# Patient Record
Sex: FEMALE | Race: WHITE | NOT HISPANIC OR LATINO
[De-identification: names, ages, dates, MRNs, and addresses within clinical notes are randomized per-mention and may not be internally consistent; named-entity substitution may affect disease eponyms.]

---

## 2018-07-05 ENCOUNTER — APPOINTMENT (OUTPATIENT)
Dept: INTERNAL MEDICINE | Facility: CLINIC | Age: 68
End: 2018-07-05

## 2018-07-05 VITALS
TEMPERATURE: 97.7 F | BODY MASS INDEX: 34.41 KG/M2 | WEIGHT: 187 LBS | DIASTOLIC BLOOD PRESSURE: 88 MMHG | HEART RATE: 95 BPM | OXYGEN SATURATION: 99 % | HEIGHT: 62 IN | SYSTOLIC BLOOD PRESSURE: 129 MMHG

## 2018-07-05 DIAGNOSIS — Z83.3 FAMILY HISTORY OF DIABETES MELLITUS: ICD-10-CM

## 2018-07-05 NOTE — HISTORY OF PRESENT ILLNESS
[FreeTextEntry1] : dyspnea on exertion [de-identified] : pt with chronic sob up 1 flight of stairs or up hills going on for few yrs. Pt able to go on elliptical machine for 45 mins without problems. pt had evaluation by me 3 yrs ago with normal pft and cxr. sob is slightly worse now than 3 yrs ago. denies cough or wheezing. Pt had normal echo and treadmill stress test recently.

## 2018-07-05 NOTE — ASSESSMENT
[FreeTextEntry1] : the etiology of dyspnea is unclear. I will repeat pft and cxr.We may be dealing with dyspnea due to simple obesity. I doubt we will find significant pulm pathology.

## 2018-07-06 ENCOUNTER — TRANSCRIPTION ENCOUNTER (OUTPATIENT)
Age: 68
End: 2018-07-06

## 2018-10-31 ENCOUNTER — RECORD ABSTRACTING (OUTPATIENT)
Age: 68
End: 2018-10-31

## 2018-11-16 ENCOUNTER — RESULT REVIEW (OUTPATIENT)
Age: 68
End: 2018-11-16

## 2018-11-19 ENCOUNTER — APPOINTMENT (OUTPATIENT)
Dept: BREAST CENTER | Facility: CLINIC | Age: 68
End: 2018-11-19
Payer: MEDICARE

## 2018-11-19 PROCEDURE — 99203 OFFICE O/P NEW LOW 30 MIN: CPT

## 2018-11-21 ENCOUNTER — RESULT REVIEW (OUTPATIENT)
Age: 68
End: 2018-11-21

## 2018-11-26 ENCOUNTER — APPOINTMENT (OUTPATIENT)
Dept: OBGYN | Facility: CLINIC | Age: 68
End: 2018-11-26
Payer: MEDICARE

## 2018-11-26 VITALS
BODY MASS INDEX: 33.49 KG/M2 | WEIGHT: 189 LBS | DIASTOLIC BLOOD PRESSURE: 82 MMHG | SYSTOLIC BLOOD PRESSURE: 124 MMHG | HEIGHT: 63 IN

## 2018-11-26 DIAGNOSIS — Z00.00 ENCOUNTER FOR GENERAL ADULT MEDICAL EXAMINATION W/OUT ABNORMAL FINDINGS: ICD-10-CM

## 2018-11-26 PROCEDURE — 99214 OFFICE O/P EST MOD 30 MIN: CPT

## 2018-11-27 PROBLEM — Z00.00 ENCOUNTER FOR PREVENTIVE HEALTH EXAMINATION: Status: ACTIVE | Noted: 2018-06-29

## 2018-11-28 ENCOUNTER — APPOINTMENT (OUTPATIENT)
Dept: RADIATION ONCOLOGY | Facility: CLINIC | Age: 68
End: 2018-11-28
Payer: MEDICARE

## 2018-11-28 VITALS
TEMPERATURE: 98.1 F | OXYGEN SATURATION: 98 % | DIASTOLIC BLOOD PRESSURE: 78 MMHG | RESPIRATION RATE: 18 BRPM | BODY MASS INDEX: 33.31 KG/M2 | HEART RATE: 98 BPM | HEIGHT: 63 IN | SYSTOLIC BLOOD PRESSURE: 125 MMHG | WEIGHT: 188 LBS

## 2018-11-28 PROCEDURE — 99205 OFFICE O/P NEW HI 60 MIN: CPT | Mod: 25

## 2018-11-28 NOTE — OB/GYN HISTORY
[Definite:  ___ (Date)] : the last menstrual period was [unfilled] [History of Birth Control Pills] : Patient has a history of taking birth control pills [___] : Living: [unfilled] [History of Hormone Replacement Therapy] : no history of hormone replacement therapy

## 2018-11-28 NOTE — REVIEW OF SYSTEMS
[Patient Intake Form Reviewed] : Patient intake form was reviewed [Fatigue] : fatigue [Loss of Hearing] : loss of hearing [SOB on Exertion] : shortness of breath during exertion [Joint Pain] : joint pain [Negative] : Endocrine [Fever] : no fever [Chills] : no chills [Recent Change In Weight] : ~T no recent weight change [Abdominal Pain] : no abdominal pain [Vomiting] : no vomiting [Constipation] : no constipation [Diarrhea] : no diarrhea [FreeTextEntry4] : left ear [FreeTextEntry5] : irregular heartbeat [FreeTextEntry7] : indigestion [FreeTextEntry9] : k [de-identified] : blood dyscrasia

## 2018-11-28 NOTE — HISTORY OF PRESENT ILLNESS
[FreeTextEntry1] : Mrs. Mcodwell is a 68 year old  postmenopausal female of Ashkenazi descent, who was recently diagnosed left breast invasive ductal carcinoma, referred to our office for a consultation regarding radiation therapy options. \par \par On 11/2/18, she had a screening mammogram that demonstrated a left focal asymmetric density in the upper outer left breast around the 1:00 axis, 8 cm from nipple. On the same day, a breast ultrasound revealed no suspicious sonographic abnormalities. A second look left  breast ultrasound (11/16/18) revealed a 1 cm left 1:00 N6 mass highly suspicious for malignancy. Diagnostic left breast mammogram demonstrated a spiculated mass spanning about 1 cm in long axis in the upper outer quadrant of the left breast.\par \par Mrs. Mcdowell underwent a left breast U/S guided core biopsy on 11/21/18 and pathology revealed invasive ductal carcinoma, grade 1 SBR 5/9, 1 / 1 cores positive for invasive carcinoma, and minimal extent of invasive carcinoma is 7 mm. Biomarkers pending. Recent bilateral breast MRI (11/27/18) demonstrated the known malignancy in the left breast measuring 0.9 cm at the 12:00 axis, in the mid third region. No abnormalities seen in the right breast. There is no evidence of axillary lymphadenopathy. \par \par She has a significant family history of cancer, which includes mother who was diagnosed with breast cancer at the age of 59, several 1 st cousins with breast cancer as well, and father who was diagnosed with rectal ca at the age of 66. Genetic testing done in 2014 was negative for BRCA1 and 2. Further genetic testing panels were done recently and are pending.  Mrs. Mcdowell has a follow-up appointment with Dr. Demarco on Dec 10th to further discuss surgical management. She  presents  to our office today to discuss the role of radiation therapy, whole breast vs. IORT.

## 2018-11-28 NOTE — LETTER CLOSING
[Consult Closing:] : Thank you for allowing me to participate in the care of this patient.  If you have any questions, please do not hesitate to contact me. [Sincerely yours,] : Sincerely yours, [FreeTextEntry3] : Roxana Porter MD

## 2018-11-28 NOTE — VITALS
[Maximal Pain Intensity: 0/10] : 0/10 [Least Pain Intensity: 0/10] : 0/10 [100: Normal, no complaints, no evidence of disease.] : 100: Normal, no complaints, no evidence of disease. [Date: ____________] : Patient's last distress assessment performed on [unfilled]. [8 - Distress Level] : Distress Level: 8 [Referred Patient  to social work for follow-up] : Patient was referred to social work for follow-up [Patient given social work contact information and resource sheet] : Patient was given social work contact information and resource sheet

## 2018-11-28 NOTE — PHYSICAL EXAM
[Normal] : normal scars, no masses, no nipple discharge [de-identified] : scar to posterior right flank s/p heminephrectomy in 1976 Statement Selected

## 2018-12-03 ENCOUNTER — NON-APPOINTMENT (OUTPATIENT)
Age: 68
End: 2018-12-03

## 2018-12-03 ENCOUNTER — RESULT REVIEW (OUTPATIENT)
Age: 68
End: 2018-12-03

## 2018-12-03 ENCOUNTER — APPOINTMENT (OUTPATIENT)
Dept: GERIATRICS | Facility: CLINIC | Age: 68
End: 2018-12-03
Payer: MEDICARE

## 2018-12-03 VITALS
SYSTOLIC BLOOD PRESSURE: 128 MMHG | OXYGEN SATURATION: 96 % | TEMPERATURE: 97.9 F | WEIGHT: 191 LBS | DIASTOLIC BLOOD PRESSURE: 80 MMHG | BODY MASS INDEX: 33.83 KG/M2 | HEART RATE: 105 BPM

## 2018-12-03 DIAGNOSIS — Z82.0 FAMILY HISTORY OF EPILEPSY AND OTHER DISEASES OF THE NERVOUS SYSTEM: ICD-10-CM

## 2018-12-03 DIAGNOSIS — Z80.0 FAMILY HISTORY OF MALIGNANT NEOPLASM OF DIGESTIVE ORGANS: ICD-10-CM

## 2018-12-03 PROCEDURE — 93010 ELECTROCARDIOGRAM REPORT: CPT

## 2018-12-03 PROCEDURE — 99204 OFFICE O/P NEW MOD 45 MIN: CPT | Mod: 25

## 2018-12-03 RX ORDER — RANITIDINE 300 MG/1
300 TABLET ORAL
Qty: 90 | Refills: 0 | Status: DISCONTINUED | COMMUNITY
Start: 2018-02-05 | End: 2018-12-03

## 2018-12-03 RX ORDER — PRAMIPEXOLE DIHYDROCHLORIDE 1.5 MG/1
1.5 TABLET, EXTENDED RELEASE ORAL ONCE
Refills: 0 | Status: DISCONTINUED | COMMUNITY
End: 2018-12-03

## 2018-12-03 RX ORDER — CARBIDOPA AND LEVODOPA 10; 100 MG/1; MG/1
10-100 TABLET ORAL 4 TIMES DAILY
Refills: 0 | Status: DISCONTINUED | COMMUNITY
End: 2018-12-03

## 2018-12-03 RX ORDER — ROPINIROLE 0.5 MG/1
0.5 TABLET, FILM COATED ORAL
Qty: 30 | Refills: 0 | Status: DISCONTINUED | COMMUNITY
Start: 2018-09-17 | End: 2018-12-03

## 2018-12-03 RX ORDER — GABAPENTIN 100 MG/1
100 CAPSULE ORAL
Qty: 30 | Refills: 0 | Status: DISCONTINUED | COMMUNITY
Start: 2018-10-03 | End: 2018-12-03

## 2018-12-03 RX ORDER — PREGABALIN 50 MG/1
50 CAPSULE ORAL
Qty: 60 | Refills: 0 | Status: DISCONTINUED | COMMUNITY
Start: 2018-11-06 | End: 2018-12-03

## 2018-12-03 RX ORDER — CARBIDOPA AND LEVODOPA 25; 100 MG/1; MG/1
25-100 TABLET, EXTENDED RELEASE ORAL 3 TIMES DAILY
Refills: 0 | Status: DISCONTINUED | COMMUNITY
End: 2018-12-03

## 2018-12-04 ENCOUNTER — APPOINTMENT (OUTPATIENT)
Dept: PLASTIC SURGERY | Facility: CLINIC | Age: 68
End: 2018-12-04
Payer: MEDICARE

## 2018-12-04 VITALS
OXYGEN SATURATION: 98 % | DIASTOLIC BLOOD PRESSURE: 80 MMHG | HEIGHT: 62 IN | RESPIRATION RATE: 18 BRPM | WEIGHT: 188 LBS | HEART RATE: 98 BPM | BODY MASS INDEX: 34.6 KG/M2 | SYSTOLIC BLOOD PRESSURE: 130 MMHG

## 2018-12-04 PROCEDURE — 99203 OFFICE O/P NEW LOW 30 MIN: CPT

## 2018-12-06 ENCOUNTER — TRANSCRIPTION ENCOUNTER (OUTPATIENT)
Age: 68
End: 2018-12-06

## 2018-12-12 ENCOUNTER — APPOINTMENT (OUTPATIENT)
Dept: BREAST CENTER | Facility: HOSPITAL | Age: 68
End: 2018-12-12
Payer: MEDICARE

## 2018-12-12 VITALS
RESPIRATION RATE: 18 BRPM | HEART RATE: 110 BPM | SYSTOLIC BLOOD PRESSURE: 129 MMHG | HEIGHT: 62 IN | DIASTOLIC BLOOD PRESSURE: 85 MMHG | OXYGEN SATURATION: 96 % | BODY MASS INDEX: 34.6 KG/M2 | WEIGHT: 188 LBS

## 2018-12-12 PROCEDURE — 99213 OFFICE O/P EST LOW 20 MIN: CPT

## 2018-12-13 ENCOUNTER — RESULT REVIEW (OUTPATIENT)
Age: 68
End: 2018-12-13

## 2018-12-19 ENCOUNTER — APPOINTMENT (OUTPATIENT)
Dept: BREAST CENTER | Facility: HOSPITAL | Age: 68
End: 2018-12-19

## 2018-12-19 ENCOUNTER — RESULT REVIEW (OUTPATIENT)
Age: 68
End: 2018-12-19

## 2018-12-26 ENCOUNTER — APPOINTMENT (OUTPATIENT)
Dept: BREAST CENTER | Facility: CLINIC | Age: 68
End: 2018-12-26
Payer: MEDICARE

## 2018-12-26 PROCEDURE — 99058 OFFICE EMERGENCY CARE: CPT

## 2018-12-28 ENCOUNTER — TRANSCRIPTION ENCOUNTER (OUTPATIENT)
Age: 68
End: 2018-12-28

## 2019-01-09 ENCOUNTER — APPOINTMENT (OUTPATIENT)
Dept: BREAST CENTER | Facility: CLINIC | Age: 69
End: 2019-01-09
Payer: MEDICARE

## 2019-01-09 ENCOUNTER — RX RENEWAL (OUTPATIENT)
Age: 69
End: 2019-01-09

## 2019-01-09 PROCEDURE — 99024 POSTOP FOLLOW-UP VISIT: CPT

## 2019-01-09 NOTE — PHYSICAL EXAM
[Normocephalic] : normocephalic [Atraumatic] : atraumatic [Supple] : supple [No Supraclavicular Adenopathy] : no supraclavicular adenopathy [Examined in the supine and seated position] : examined in the supine and seated position [No dominant masses] : no dominant masses in right breast  [No dominant masses] : no dominant masses left breast [No Nipple Retraction] : no left nipple retraction [No Nipple Discharge] : no left nipple discharge [No Axillary Lymphadenopathy] : no left axillary lymphadenopathy [No Edema] : no edema [No Rashes] : no rashes [No Ulceration] : no ulceration

## 2019-01-28 ENCOUNTER — APPOINTMENT (OUTPATIENT)
Dept: HEMATOLOGY ONCOLOGY | Facility: CLINIC | Age: 69
End: 2019-01-28
Payer: MEDICARE

## 2019-01-28 VITALS
OXYGEN SATURATION: 96 % | BODY MASS INDEX: 34.96 KG/M2 | TEMPERATURE: 98 F | SYSTOLIC BLOOD PRESSURE: 126 MMHG | DIASTOLIC BLOOD PRESSURE: 85 MMHG | HEIGHT: 61.81 IN | RESPIRATION RATE: 16 BRPM | HEART RATE: 93 BPM | WEIGHT: 189.99 LBS

## 2019-01-28 DIAGNOSIS — Z80.3 FAMILY HISTORY OF MALIGNANT NEOPLASM OF BREAST: ICD-10-CM

## 2019-01-28 PROCEDURE — 99205 OFFICE O/P NEW HI 60 MIN: CPT

## 2019-01-28 NOTE — HISTORY OF PRESENT ILLNESS
[de-identified] : Mrs. Mcdowell is a 68 year old postmenopausal female who is referred by Dr.Alice Demarco for newly diagnosed breast cancer.\par She was found on routine mammography in November 2019 she you have a focal asymmetric density in the left upper outer breast at 1:00 access 8 cm from the nipple. A one year prior had been negative.  Ultrasound-guided core biopsy in November 21, 2018 revealed a grade 1/3 invasive ductal carcinoma ER positive at 98% and DE positive at 90%,Ki-67 was less than 10%. She underwent some left breast lumpectomy and sentinel node dissection on December 19, 2018. Pathology revealed an 8 mm grade 1/3 invasive ductal carcinoma zero of 3 sentinel lymph nodes were involved with tumor. Oncotype DX score was 11. She also underwent genetic testing which is negative.\par

## 2019-01-28 NOTE — ASSESSMENT
[FreeTextEntry1] : 69 yo female referred by Dr. Nicole Demarco for evaluation of breast cancer.\par Patient has 8 mm tumor, invasive ductal carcinoma, ER/ [place] positive, HER2 not amplified with Oncotype Dx 11.\par Reviewed pathology results with patient and her , went over prognostic and therapeutic implications of receptor status and Oncotype Dx results. \par Plan:\par - bone density\par - Arimidex 1 mg PO daily - side effects explained to patient \par - she has Parkinson disease and PGM mutation which has to be taken into consideration\par - if decline in bone density will treat to prevent fracture form falls in view of Parkinson disease\par - will defer use of SERM in view of prothrombin gene mutation and increased risk of thrombosis \par Discussed weight control and healthy eating habits.  Encourage to participate in moderate exercise.\par \par \par

## 2019-01-28 NOTE — PHYSICAL EXAM
[Fully active, able to carry on all pre-disease performance without restriction] : Status 0 - Fully active, able to carry on all pre-disease performance without restriction [Normal] : affect appropriate [de-identified] : left breast scar

## 2019-01-28 NOTE — CONSULT LETTER
[Dear  ___] : Dear  [unfilled], [Consult Letter:] : I had the pleasure of evaluating your patient, [unfilled]. [Please see my note below.] : Please see my note below. [Consult Closing:] : Thank you very much for allowing me to participate in the care of this patient.  If you have any questions, please do not hesitate to contact me. [Sincerely,] : Sincerely, [FreeTextEntry3] : Saundra Shaffer MD\par [facility] [place] [facility]\par  [DrSakina  ___] : Dr. BELL [DrSakina ___] : Dr. BELL

## 2019-01-31 ENCOUNTER — APPOINTMENT (OUTPATIENT)
Dept: RADIATION ONCOLOGY | Facility: CLINIC | Age: 69
End: 2019-01-31
Payer: MEDICARE

## 2019-01-31 VITALS
RESPIRATION RATE: 18 BRPM | TEMPERATURE: 97.8 F | HEART RATE: 90 BPM | WEIGHT: 188 LBS | DIASTOLIC BLOOD PRESSURE: 91 MMHG | BODY MASS INDEX: 34.6 KG/M2 | OXYGEN SATURATION: 99 % | SYSTOLIC BLOOD PRESSURE: 132 MMHG

## 2019-01-31 PROCEDURE — 99213 OFFICE O/P EST LOW 20 MIN: CPT

## 2019-02-05 ENCOUNTER — RESULT REVIEW (OUTPATIENT)
Age: 69
End: 2019-02-05

## 2019-02-14 ENCOUNTER — APPOINTMENT (OUTPATIENT)
Dept: GERIATRICS | Facility: CLINIC | Age: 69
End: 2019-02-14
Payer: MEDICARE

## 2019-02-14 VITALS
DIASTOLIC BLOOD PRESSURE: 80 MMHG | HEIGHT: 61.81 IN | BODY MASS INDEX: 34.78 KG/M2 | SYSTOLIC BLOOD PRESSURE: 124 MMHG | OXYGEN SATURATION: 96 % | TEMPERATURE: 97.4 F | WEIGHT: 189 LBS | HEART RATE: 106 BPM

## 2019-02-14 PROCEDURE — 99214 OFFICE O/P EST MOD 30 MIN: CPT

## 2019-02-14 RX ORDER — HYDROCODONE BITARTRATE AND ACETAMINOPHEN 5; 300 MG/1; MG/1
5-300 TABLET ORAL
Qty: 20 | Refills: 0 | Status: DISCONTINUED | COMMUNITY
Start: 2018-12-12 | End: 2019-02-14

## 2019-02-14 NOTE — PHYSICAL EXAM
[General Appearance - Alert] : alert [General Appearance - In No Acute Distress] : in no acute distress [General Appearance - Well Nourished] : well nourished [General Appearance - Well Developed] : well developed [Sclera] : the sclera and conjunctiva were normal [PERRL With Normal Accommodation] : pupils were equal in size, round, and reactive to light [Extraocular Movements] : extraocular movements were intact [Normal Oral Mucosa] : normal oral mucosa [No Oral Pallor] : no oral pallor [Neck Appearance] : the appearance of the neck was normal [Neck Cervical Mass (___cm)] : no neck mass was observed [Respiration, Rhythm And Depth] : normal respiratory rhythm and effort [Exaggerated Use Of Accessory Muscles For Inspiration] : no accessory muscle use [Auscultation Breath Sounds / Voice Sounds] : lungs were clear to auscultation bilaterally [Heart Rate And Rhythm] : heart rate was normal and rhythm regular [Heart Sounds] : normal S1 and S2 [Heart Sounds Gallop] : no gallops [Heart Sounds Pericardial Friction Rub] : no pericardial rub [FreeTextEntry1] : L breast scar healing well [Bowel Sounds] : normal bowel sounds [Abdomen Soft] : soft [Abdomen Tenderness] : non-tender [Cervical Lymph Nodes Enlarged Posterior Bilaterally] : posterior cervical [Cervical Lymph Nodes Enlarged Anterior Bilaterally] : anterior cervical [Musculoskeletal - Swelling] : no joint swelling seen [Skin Color & Pigmentation] : normal skin color and pigmentation [] : no rash [Affect] : the affect was normal [Mood] : the mood was normal

## 2019-02-14 NOTE — ASSESSMENT
[FreeTextEntry1] : nausea\par advised to keep food log to see if related to meals\par could be due to recently added pramipaxole and sinemet combination but she has been on this in past\par may have to consider PRN zofran\par on H2 blocker and not wanting to be on PPI due to concern about bone fragility\par will have medicare wellness in summer 2019\par breast exam, labs and patient to obtiann immunization records\par colonoscopy printed and to be scanned into chart\par labs also available on [facility] and [facility]

## 2019-02-14 NOTE — HISTORY OF PRESENT ILLNESS
[0] : 2) Feeling down, depressed, or hopeless: Not at all [FreeTextEntry1] : now following with Dr. Shaffer\par has recently started arimidex - tolerating - taking it at night\par typically has constipation, so wondering if this will cause diarrhea or more regular stools\par did have dental work requiring antibiotics, for that reason there has been some hesitance to discuss bisphosphonates\par saw Dr. Delong - [place] [facility] specialist - who has restrated lower dose pramipexole (0.75mg daily).  prior had been on higher dose with Dr. Cardona but stopped due to LE edema.  LE edema has not returned.  \par She has intermittent nausea - sporadic but at times wakes up with feeling\par on zantac chronically\par remains on zetia as she did not tolerate statins

## 2019-02-14 NOTE — SOCIAL HISTORY
[No falls in past year] : Patient reported no falls in the past year [Fully functional (bathing, dressing, toileting, transferring, walking, feeding)] : Fully functional (bathing, dressing, toileting, transferring, walking, feeding) [Fully functional (using the telephone, shopping, preparing meals, housekeeping, doing laundry, using transportation,] : Fully functional and needs no help or supervision to perform IADLs (using the telephone, shopping, preparing meals, housekeeping, doing laundry, using transportation, managing medications and managing finances) [Smoke Detector] : smoke detector [Carbon Monoxide Detector] : carbon monoxide detector

## 2019-02-14 NOTE — REVIEW OF SYSTEMS
[Fever] : no fever [Chills] : no chills [Feeling Poorly] : not feeling poorly [Feeling Tired] : not feeling tired [Eyesight Problems] : eyesight problems [Discharge From Eyes] : no purulent discharge from the eyes [Nosebleeds] : no nosebleeds [Nasal Discharge] : no nasal discharge [Chest Pain] : no chest pain [Palpitations] : no palpitations [Cough] : no cough [SOB on Exertion] : no shortness of breath during exertion [Abdominal Pain] : no abdominal pain [Vomiting] : no vomiting [Constipation] : constipation [Vaginal Discharge] : no vaginal discharge [Abn Vaginal Bleeding] : no unexplained vaginal bleeding [Skin Lesions] : no skin lesions [Skin Wound] : no skin wound [Dizziness] : no dizziness [Fainting] : no fainting [Change In Personality] : no personality change [Emotional Problems] : no emotional problems [FreeTextEntry7] : intermittent nausea

## 2019-03-27 ENCOUNTER — TRANSCRIPTION ENCOUNTER (OUTPATIENT)
Age: 69
End: 2019-03-27

## 2019-03-27 ENCOUNTER — RESULT REVIEW (OUTPATIENT)
Age: 69
End: 2019-03-27

## 2019-03-27 ENCOUNTER — APPOINTMENT (OUTPATIENT)
Dept: HEMATOLOGY ONCOLOGY | Facility: CLINIC | Age: 69
End: 2019-03-27
Payer: MEDICARE

## 2019-03-27 VITALS
RESPIRATION RATE: 20 BRPM | BODY MASS INDEX: 34.6 KG/M2 | HEIGHT: 61.81 IN | HEART RATE: 112 BPM | WEIGHT: 188 LBS | TEMPERATURE: 97.4 F | SYSTOLIC BLOOD PRESSURE: 116 MMHG | OXYGEN SATURATION: 96 % | DIASTOLIC BLOOD PRESSURE: 77 MMHG

## 2019-03-27 PROCEDURE — 99214 OFFICE O/P EST MOD 30 MIN: CPT

## 2019-03-29 ENCOUNTER — APPOINTMENT (OUTPATIENT)
Dept: GERIATRICS | Facility: CLINIC | Age: 69
End: 2019-03-29
Payer: MEDICARE

## 2019-03-29 VITALS
SYSTOLIC BLOOD PRESSURE: 130 MMHG | BODY MASS INDEX: 34.96 KG/M2 | DIASTOLIC BLOOD PRESSURE: 84 MMHG | OXYGEN SATURATION: 97 % | WEIGHT: 190 LBS | HEIGHT: 61.81 IN | HEART RATE: 105 BPM | TEMPERATURE: 97.6 F

## 2019-03-29 PROCEDURE — ZZZZZ: CPT

## 2019-03-29 NOTE — SOCIAL HISTORY
[One fall no injury in past year] : Patient reported one fall in the past year without injury [Fully functional (bathing, dressing, toileting, transferring, walking, feeding)] : Fully functional (bathing, dressing, toileting, transferring, walking, feeding) [Fully functional (using the telephone, shopping, preparing meals, housekeeping, doing laundry, using transportation,] : Fully functional and needs no help or supervision to perform IADLs (using the telephone, shopping, preparing meals, housekeeping, doing laundry, using transportation, managing medications and managing finances)

## 2019-04-03 ENCOUNTER — OTHER (OUTPATIENT)
Age: 69
End: 2019-04-03

## 2019-04-05 ENCOUNTER — TRANSCRIPTION ENCOUNTER (OUTPATIENT)
Age: 69
End: 2019-04-05

## 2019-04-05 NOTE — PHYSICAL EXAM
[Fully active, able to carry on all pre-disease performance without restriction] : Status 0 - Fully active, able to carry on all pre-disease performance without restriction [Normal] : affect appropriate [de-identified] : left breast scar

## 2019-04-05 NOTE — REVIEW OF SYSTEMS
[Negative] : Allergic/Immunologic [Fatigue] : fatigue [FreeTextEntry9] : tremor and muscle spasms due to parkinsons

## 2019-04-05 NOTE — HISTORY OF PRESENT ILLNESS
[de-identified] : Mrs. Mcdowell is a 68 year old postmenopausal female who is referred by Dr.Alice Demarco for newly diagnosed breast cancer.\par She was found on routine mammography in November 2019 she you have a focal asymmetric density in the left upper outer breast at 1:00 access 8 cm from the nipple. A one year prior had been negative.  Ultrasound-guided core biopsy in November 21, 2018 revealed a grade 1/3 invasive ductal carcinoma ER positive at 98% and [place] positive at 90%,Ki-67 was less than 10%. She underwent some left breast lumpectomy and sentinel node dissection on December 19, 2018. Pathology revealed an 8 mm grade 1/3 invasive ductal carcinoma zero of 3 sentinel lymph nodes were involved with tumor. Oncotype DX score was 11. She also underwent genetic testing which is negative.\par  [de-identified] : Patient presents today for follow up and management of breast cancer- remains on Anastrazole tolerating well.

## 2019-04-05 NOTE — CONSULT LETTER
[Consult Letter:] : I had the pleasure of evaluating your patient, [unfilled]. [Please see my note below.] : Please see my note below. [Dear  ___] : Dear  [unfilled], [Consult Closing:] : Thank you very much for allowing me to participate in the care of this patient.  If you have any questions, please do not hesitate to contact me. [Sincerely,] : Sincerely, [DrSakina ___] : Dr. BELL [DrSakina  ___] : Dr. BELL [FreeTextEntry3] : Saundra Shaffer MD\par [facility] [place] [facility]\par

## 2019-04-05 NOTE — ASSESSMENT
[FreeTextEntry1] : 69 yo female referred by Dr. Nicole Demarco for evaluation of breast cancer.\par Patient has 8 mm tumor, invasive ductal carcinoma, ER/ [place] positive, HER2 not amplified with Oncotype Dx 11.\par Reviewed pathology results with patient and her , went over prognostic and therapeutic implications of receptor status and Oncotype Dx results. \par \par Plan:\par - bone density 2/19- showed osteopenia -1.8 left femoral neck and lumbar spine 1.3\par - patient undergoing dental tx and wants to postpone tx\par - will refer for bone density after 12 months\par - weight bearing exercises 30 min 2-3 times per week\par - calcium and vit D \par \par - elevated CEA - 4.9 - repeat today\par - colonoscopy Feb 2018 \par - Arimidex 1 mg PO daily \par \par - she has Parkinson disease and PGM mutation which has to be taken into consideration\par \par - will defer use of SERM in view of prothrombin gene mutation and increased risk of thrombosis \par Discussed weight control and healthy eating habits.  Encourage to participate in moderate exercise.\par \par \par

## 2019-04-08 ENCOUNTER — APPOINTMENT (OUTPATIENT)
Dept: BREAST CENTER | Facility: CLINIC | Age: 69
End: 2019-04-08
Payer: MEDICARE

## 2019-04-08 ENCOUNTER — OTHER (OUTPATIENT)
Age: 69
End: 2019-04-08

## 2019-04-08 VITALS
HEART RATE: 108 BPM | RESPIRATION RATE: 18 BRPM | DIASTOLIC BLOOD PRESSURE: 69 MMHG | WEIGHT: 188 LBS | OXYGEN SATURATION: 96 % | SYSTOLIC BLOOD PRESSURE: 117 MMHG | BODY MASS INDEX: 34.6 KG/M2 | HEIGHT: 61.81 IN

## 2019-04-08 PROCEDURE — 99213 OFFICE O/P EST LOW 20 MIN: CPT

## 2019-04-10 ENCOUNTER — TRANSCRIPTION ENCOUNTER (OUTPATIENT)
Age: 69
End: 2019-04-10

## 2019-04-11 ENCOUNTER — OTHER (OUTPATIENT)
Age: 69
End: 2019-04-11

## 2019-04-13 ENCOUNTER — APPOINTMENT (OUTPATIENT)
Dept: NUCLEAR MEDICINE | Facility: IMAGING CENTER | Age: 69
End: 2019-04-13
Payer: MEDICARE

## 2019-04-13 ENCOUNTER — OUTPATIENT (OUTPATIENT)
Dept: OUTPATIENT SERVICES | Facility: HOSPITAL | Age: 69
LOS: 1 days | End: 2019-04-13
Payer: MEDICARE

## 2019-04-13 DIAGNOSIS — C50.912 MALIGNANT NEOPLASM OF UNSPECIFIED SITE OF LEFT FEMALE BREAST: ICD-10-CM

## 2019-04-13 PROCEDURE — 78815 PET IMAGE W/CT SKULL-THIGH: CPT | Mod: 26,PS

## 2019-04-13 PROCEDURE — A9552: CPT

## 2019-04-13 PROCEDURE — 78815 PET IMAGE W/CT SKULL-THIGH: CPT

## 2019-04-16 ENCOUNTER — RESULT REVIEW (OUTPATIENT)
Age: 69
End: 2019-04-16

## 2019-04-18 ENCOUNTER — APPOINTMENT (OUTPATIENT)
Dept: HEMATOLOGY ONCOLOGY | Facility: CLINIC | Age: 69
End: 2019-04-18
Payer: MEDICARE

## 2019-04-18 ENCOUNTER — RESULT REVIEW (OUTPATIENT)
Age: 69
End: 2019-04-18

## 2019-04-18 PROCEDURE — 99215 OFFICE O/P EST HI 40 MIN: CPT

## 2019-04-19 ENCOUNTER — RESULT REVIEW (OUTPATIENT)
Age: 69
End: 2019-04-19

## 2019-04-19 NOTE — ASSESSMENT
[FreeTextEntry1] : 67 yo female referred by Dr. Nicole Demarco for evaluation of breast cancer.\par Patient has Stage 1 breast cancer 8 mm tumor, invasive ductal carcinoma, ER/ [place] positive, HER2 not amplified with Oncotype Dx 11. diagnosed November 2018\par \par Patient started on Arimidex - tolerates well.\par Repeated CEA - increased level to 18, with hoarseness of the voice. \par CT scan showed mass 1.8 x 1.4 x 1.7 cm lesion.\par Patient underwent biopsy- results c/w acinar adenoca of the lung -molecular testing ordered.\par \par PETCT done at [facility] images reviewed.\par 1, Skin thickening of the breast - post op/ RT \par 2. SERGEY lung nodule\par 3. Perihilar LN\par 4. Bone foci - diffuse with no corresponding lesions on CT\par 5. Live activity - small foci\par 6. DIstal esophagus uptake\par \par \par Plan:\par - liver MRI\par - bone biopsy - d/w Dr. Mcwilliams\par - GI evaluation\par - tx surgery evaluation after above testing\par

## 2019-04-19 NOTE — HISTORY OF PRESENT ILLNESS
[de-identified] : Mrs. Mcdowell is a 68 year old postmenopausal female who is referred by Dr.Alice Demarco for newly diagnosed breast cancer.\par She was found on routine mammography in November 2019 she you have a focal asymmetric density in the left upper outer breast at 1:00 access 8 cm from the nipple. A one year prior had been negative.  Ultrasound-guided core biopsy in November 21, 2018 revealed a grade 1/3 invasive ductal carcinoma ER positive at 98% and [place] positive at 90%,Ki-67 was less than 10%. She underwent some left breast lumpectomy and sentinel node dissection on December 19, 2018. Pathology revealed an 8 mm grade 1/3 invasive ductal carcinoma zero of 3 sentinel lymph nodes were involved with tumor. Oncotype DX score was 11. She also underwent genetic testing which is negative.\par  [de-identified] : Patient presents today for follow up and management of breast cancer- remains on Anastrazole tolerating well.

## 2019-04-19 NOTE — PHYSICAL EXAM
[Fully active, able to carry on all pre-disease performance without restriction] : Status 0 - Fully active, able to carry on all pre-disease performance without restriction [Normal] : grossly intact [de-identified] : left breast scar

## 2019-04-19 NOTE — REVIEW OF SYSTEMS
[Fatigue] : fatigue [Negative] : Heme/Lymph [FreeTextEntry9] : tremor and muscle spasms due to parkinsons

## 2019-04-22 ENCOUNTER — RESULT REVIEW (OUTPATIENT)
Age: 69
End: 2019-04-22

## 2019-04-23 ENCOUNTER — RESULT REVIEW (OUTPATIENT)
Age: 69
End: 2019-04-23

## 2019-04-30 ENCOUNTER — RX CHANGE (OUTPATIENT)
Age: 69
End: 2019-04-30

## 2019-05-22 ENCOUNTER — RESULT REVIEW (OUTPATIENT)
Age: 69
End: 2019-05-22

## 2019-05-22 ENCOUNTER — OTHER (OUTPATIENT)
Age: 69
End: 2019-05-22

## 2019-05-22 ENCOUNTER — APPOINTMENT (OUTPATIENT)
Dept: HEMATOLOGY ONCOLOGY | Facility: CLINIC | Age: 69
End: 2019-05-22
Payer: MEDICARE

## 2019-05-22 VITALS
HEIGHT: 61.81 IN | TEMPERATURE: 97.5 F | RESPIRATION RATE: 18 BRPM | SYSTOLIC BLOOD PRESSURE: 127 MMHG | OXYGEN SATURATION: 99 % | DIASTOLIC BLOOD PRESSURE: 85 MMHG | BODY MASS INDEX: 33.49 KG/M2 | HEART RATE: 100 BPM | WEIGHT: 182 LBS

## 2019-05-22 PROCEDURE — 99214 OFFICE O/P EST MOD 30 MIN: CPT

## 2019-05-22 RX ORDER — SUCRALFATE 1 G/1
1 TABLET ORAL
Qty: 69 | Refills: 0 | Status: DISCONTINUED | COMMUNITY
Start: 2019-03-29 | End: 2019-05-22

## 2019-05-22 RX ORDER — CLINDAMYCIN HYDROCHLORIDE 300 MG/1
300 CAPSULE ORAL
Qty: 21 | Refills: 0 | Status: DISCONTINUED | COMMUNITY
Start: 2019-01-30 | End: 2019-05-22

## 2019-05-22 RX ORDER — CHLORHEXIDINE GLUCONATE, 0.12% ORAL RINSE 1.2 MG/ML
0.12 SOLUTION DENTAL
Qty: 473 | Refills: 0 | Status: DISCONTINUED | COMMUNITY
Start: 2019-01-30 | End: 2019-05-22

## 2019-05-22 RX ORDER — METHYLPREDNISOLONE 4 MG/1
4 TABLET ORAL
Qty: 1 | Refills: 0 | Status: DISCONTINUED | COMMUNITY
Start: 2019-05-10 | End: 2019-05-22

## 2019-05-22 RX ORDER — AZITHROMYCIN 250 MG/1
250 TABLET, FILM COATED ORAL
Qty: 6 | Refills: 0 | Status: DISCONTINUED | COMMUNITY
Start: 2019-01-09 | End: 2019-05-22

## 2019-05-24 ENCOUNTER — OTHER (OUTPATIENT)
Age: 69
End: 2019-05-24

## 2019-05-28 ENCOUNTER — TRANSCRIPTION ENCOUNTER (OUTPATIENT)
Age: 69
End: 2019-05-28

## 2019-05-29 ENCOUNTER — TRANSCRIPTION ENCOUNTER (OUTPATIENT)
Age: 69
End: 2019-05-29

## 2019-05-31 ENCOUNTER — APPOINTMENT (OUTPATIENT)
Dept: GERIATRICS | Facility: CLINIC | Age: 69
End: 2019-05-31
Payer: MEDICARE

## 2019-05-31 ENCOUNTER — APPOINTMENT (OUTPATIENT)
Dept: CARDIOLOGY | Facility: CLINIC | Age: 69
End: 2019-05-31
Payer: MEDICARE

## 2019-05-31 ENCOUNTER — MOBILE ON CALL (OUTPATIENT)
Age: 69
End: 2019-05-31

## 2019-05-31 VITALS
WEIGHT: 180 LBS | HEIGHT: 61.81 IN | SYSTOLIC BLOOD PRESSURE: 122 MMHG | BODY MASS INDEX: 33.13 KG/M2 | DIASTOLIC BLOOD PRESSURE: 80 MMHG | HEART RATE: 109 BPM

## 2019-05-31 VITALS
HEART RATE: 112 BPM | DIASTOLIC BLOOD PRESSURE: 67 MMHG | SYSTOLIC BLOOD PRESSURE: 128 MMHG | WEIGHT: 181 LBS | BODY MASS INDEX: 33.31 KG/M2 | OXYGEN SATURATION: 98 % | TEMPERATURE: 97.9 F

## 2019-05-31 PROCEDURE — 93000 ELECTROCARDIOGRAM COMPLETE: CPT

## 2019-05-31 PROCEDURE — 99214 OFFICE O/P EST MOD 30 MIN: CPT

## 2019-05-31 PROCEDURE — 99204 OFFICE O/P NEW MOD 45 MIN: CPT

## 2019-05-31 RX ORDER — ONDANSETRON 8 MG/1
8 TABLET, ORALLY DISINTEGRATING ORAL
Qty: 20 | Refills: 2 | Status: DISCONTINUED | COMMUNITY
Start: 2019-05-22 | End: 2019-05-31

## 2019-05-31 RX ORDER — ALPRAZOLAM 0.25 MG/1
0.25 TABLET ORAL
Qty: 30 | Refills: 0 | Status: DISCONTINUED | COMMUNITY
Start: 2019-04-18 | End: 2019-05-31

## 2019-05-31 RX ORDER — PRAMIPEXOLE DIHYDROCHLORIDE 0.38 MG/1
0.38 TABLET, EXTENDED RELEASE ORAL
Qty: 30 | Refills: 0 | Status: DISCONTINUED | COMMUNITY
Start: 2018-12-27 | End: 2019-05-31

## 2019-05-31 RX ORDER — TRIAMCINOLONE ACETONIDE 5 MG/G
0.5 CREAM TOPICAL 3 TIMES DAILY
Qty: 2 | Refills: 1 | Status: DISCONTINUED | COMMUNITY
Start: 2019-05-13 | End: 2019-05-31

## 2019-05-31 RX ORDER — CLONAZEPAM 0.5 MG/1
0.5 TABLET ORAL
Qty: 60 | Refills: 0 | Status: DISCONTINUED | COMMUNITY
Start: 2018-11-27 | End: 2019-05-31

## 2019-05-31 RX ORDER — PRAMIPEXOLE DIHYDROCHLORIDE 0.75 MG/1
0.75 TABLET ORAL DAILY
Refills: 0 | Status: DISCONTINUED | COMMUNITY
Start: 2019-02-14 | End: 2019-05-31

## 2019-05-31 NOTE — HISTORY OF PRESENT ILLNESS
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  She was also followed for small pericardial effusion.Unfortunately this year she was diagnosed with breast cancer   left lumpectomy with intraop RT and arimadex and was found to have adenocarcinoma  lung cancer with bony mets. She is now on Tagrisso as a daily pill. 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 She has an appointment with palliative care later today.  She denies chest  pain, dyspnea, palpitations  or syncope.

## 2019-06-09 NOTE — HISTORY OF PRESENT ILLNESS
[de-identified] : Mrs. Mcdowell is a 68 year old postmenopausal female who is referred by Dr.Alice Demarco for newly diagnosed breast cancer.\par She was found on routine mammography in November 2019 she you have a focal asymmetric density in the left upper outer breast at 1:00 access 8 cm from the nipple. A one year prior had been negative.  Ultrasound-guided core biopsy in November 21, 2018 revealed a grade 1/3 invasive ductal carcinoma ER positive at 98% and [place] positive at 90%,Ki-67 was less than 10%. She underwent some left breast lumpectomy and sentinel node dissection on December 19, 2018. Pathology revealed an 8 mm grade 1/3 invasive ductal carcinoma zero of 3 sentinel lymph nodes were involved with tumor. Oncotype DX score was 11. She also underwent genetic testing which is negative.\par  [de-identified] : Mrs. 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 She is s/p collagen injections for dysphonia. and has pain in the left femur and between the scapulae. She is using tramadol 50 mg with some success.She denies rash or diarrhea.

## 2019-06-09 NOTE — ASSESSMENT
[FreeTextEntry1] : 69 yo female referred by Dr. Nicole Demarco for evaluation of breast cancer.\par Patient has Stage 1 breast cancer 8 mm tumor, invasive ductal carcinoma, ER/ [place] positive, HER2 not amplified with Oncotype Dx 11. diagnosed November 2018\par \par Patient started on Arimidex - tolerates well.\par Repeated CEA - increased level to 18, with hoarseness of the voice. \par CT scan showed mass 1.8 x 1.4 x 1.7 cm lesion.\par Patient underwent biopsy- results c/w acinar adenoca of the lung -molecular testing ordered.\par \par PETCT done at [facility] images reviewed.\par 1, Skin thickening of the breast - post op/ RT \par 2. SERGEY lung nodule\par 3. Perihilar LN\par 4. Bone foci - diffuse with no corresponding lesions on CT\par 5. Live activity - small foci\par 6. DIstal esophagus uptake\par \par \par Plan:\par - biopsy of lung nodule c/w adeno ca of the lung, EGFR mutated, T190 mutated, started on Tagrisso.  Side effects, toxicities and benefits reviewed with pt. L4 vetebral body biopsy confirmed met adenoca of the lung.\par -  GI evaluation, endoscopy showed hiatal hernia, \par - History of present illness, review of systems, physical exam and treatment plan reviewed with . \par - Office visit in 3 weeks or prn for new or worsening symptoms. tx surgery evaluation after above testing\par - Monitor CBC,Chem Profile, CEA,CA 27-29\par

## 2019-06-09 NOTE — REASON FOR VISIT
[Follow-Up Visit] : a follow-up [Spouse] : spouse [FreeTextEntry2] : breast cancer, EGFR mutated adenocarcinoma of the lung.

## 2019-06-09 NOTE — REVIEW OF SYSTEMS
[Fatigue] : fatigue [Negative] : Heme/Lymph [Fever] : no fever [Recent Change In Weight] : ~T no recent weight change [Eye Pain] : no eye pain [Vision Problems] : no vision problems [Dysphagia] : no dysphagia [Nosebleeds] : no nosebleeds [Chest Pain] : no chest pain [Lower Ext Edema] : no lower extremity edema [Shortness Of Breath] : no shortness of breath [Cough] : no cough [Constipation] : no constipation [Diarrhea] : no diarrhea [Dysuria] : no dysuria [Joint Pain] : joint pain [Muscle Pain] : muscle pain [Dizziness] : dizziness [Anxiety] : no anxiety [Depression] : no depression [Muscle Weakness] : muscle weakness [Easy Bleeding] : no tendency for easy bleeding [Easy Bruising] : no tendency for easy bruising [FreeTextEntry9] :  muscle spasms due to parkinsons [de-identified] : tremor

## 2019-06-09 NOTE — RESULTS/DATA
[FreeTextEntry1] : 5/22/19\par WBC 6.8\par HGB 12.4\par HCT 38.5\par ,000\par Chem WNL\par CEA 23.5 ( prior 26.6)

## 2019-06-09 NOTE — PHYSICAL EXAM
[Restricted in physically strenuous activity but ambulatory and able to carry out work of a light or sedentary nature] : Status 1- Restricted in physically strenuous activity but ambulatory and able to carry out work of a light or sedentary nature, e.g., light house work, office work [Normal] : normal spine exam without palpable tenderness, no kyphosis or scoliosis [de-identified] : left breast scar

## 2019-06-19 ENCOUNTER — RESULT REVIEW (OUTPATIENT)
Age: 69
End: 2019-06-19

## 2019-06-19 ENCOUNTER — APPOINTMENT (OUTPATIENT)
Dept: HEMATOLOGY ONCOLOGY | Facility: CLINIC | Age: 69
End: 2019-06-19
Payer: MEDICARE

## 2019-06-19 ENCOUNTER — APPOINTMENT (OUTPATIENT)
Dept: PAIN MANAGEMENT | Facility: CLINIC | Age: 69
End: 2019-06-19
Payer: MEDICARE

## 2019-06-19 VITALS
BODY MASS INDEX: 33.49 KG/M2 | DIASTOLIC BLOOD PRESSURE: 73 MMHG | HEART RATE: 94 BPM | HEIGHT: 61.81 IN | RESPIRATION RATE: 20 BRPM | SYSTOLIC BLOOD PRESSURE: 112 MMHG | WEIGHT: 182 LBS | OXYGEN SATURATION: 98 % | TEMPERATURE: 97.4 F

## 2019-06-19 VITALS
HEIGHT: 61.81 IN | SYSTOLIC BLOOD PRESSURE: 124 MMHG | BODY MASS INDEX: 33.49 KG/M2 | DIASTOLIC BLOOD PRESSURE: 82 MMHG | WEIGHT: 182 LBS

## 2019-06-19 PROCEDURE — 99204 OFFICE O/P NEW MOD 45 MIN: CPT

## 2019-06-19 PROCEDURE — 99215 OFFICE O/P EST HI 40 MIN: CPT

## 2019-06-19 NOTE — PHYSICAL EXAM
[Restricted in physically strenuous activity but ambulatory and able to carry out work of a light or sedentary nature] : Status 1- Restricted in physically strenuous activity but ambulatory and able to carry out work of a light or sedentary nature, e.g., light house work, office work [Normal] : affect appropriate [de-identified] : left breast scar

## 2019-06-19 NOTE — REVIEW OF SYSTEMS
[Fatigue] : fatigue [Joint Pain] : joint pain [Muscle Pain] : muscle pain [Dizziness] : dizziness [Muscle Weakness] : muscle weakness [Negative] : Allergic/Immunologic [Anxiety] : anxiety [Fever] : no fever [Recent Change In Weight] : ~T no recent weight change [Eye Pain] : no eye pain [Vision Problems] : no vision problems [Dysphagia] : no dysphagia [Nosebleeds] : no nosebleeds [Chest Pain] : no chest pain [Lower Ext Edema] : no lower extremity edema [Shortness Of Breath] : no shortness of breath [Cough] : no cough [Constipation] : no constipation [Diarrhea] : no diarrhea [Dysuria] : no dysuria [Easy Bleeding] : no tendency for easy bleeding [Depression] : no depression [Easy Bruising] : no tendency for easy bruising [FreeTextEntry9] :  muscle spasms due to parkinsons [de-identified] : tremor

## 2019-06-19 NOTE — HISTORY OF PRESENT ILLNESS
[de-identified] : Mrs. Mcdowell is a 68 year old postmenopausal female who is referred by Dr.Alice Demarco for newly diagnosed breast cancer.\par She was found on routine mammography in November 2019 she you have a focal asymmetric density in the left upper outer breast at 1:00 access 8 cm from the nipple. A one year prior had been negative.  Ultrasound-guided core biopsy in November 21, 2018 revealed a grade 1/3 invasive ductal carcinoma ER positive at 98% and WI positive at 90%,Ki-67 was less than 10%. She underwent some left breast lumpectomy and sentinel node dissection on December 19, 2018. Pathology revealed an 8 mm grade 1/3 invasive ductal carcinoma zero of 3 sentinel lymph nodes were involved with tumor. Oncotype DX score was 11. She also underwent genetic testing which is negative.\par  [de-identified] : Mrs. 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  Ptient is having left hip and knee pain- using medical marijuana with relief.

## 2019-06-19 NOTE — ASSESSMENT
[FreeTextEntry1] : 69 yo female referred by Dr. Nicole Demarco for evaluation of breast cancer.\par Patient has Stage 1 breast cancer 8 mm tumor, invasive ductal carcinoma, ER/ MO positive, HER2 not amplified with Oncotype Dx 11. diagnosed November 2018\par \par Patient started on Arimidex - tolerates well.\par Repeated CEA - increased level to 18, with hoarseness of the voice. 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  Side effects, toxicities and benefits reviewed with pt. L4 vertebral body biopsy confirmed met adenoca of the lung.\par \par \par Plan:\par - tolerates Tagrisso well\par - intermittent nausea- rx Kytril \par - pain left hip area, increased recently with ambulation\par - doesn’t tolerate tramadol, no response to tylenol, BDA - pain management evaluation \par

## 2019-06-20 NOTE — PHYSICAL EXAM
[de-identified] : Constitutional: Well-developed, in no acute distress\par Eyes: Pupil- Right: normal, Left: normal\par Neck exam: Inspection normal\par Respiratory: Normal effort, speaking in full sentences\par Cardiovascular: Regular rate and rhythm\par Vascular: Dorsal pedis pulses normal and equal bilaterally\par Abdomen: Inspection normal, no distension\par Skin: Inspection normal, no bruising noted\par Musculoskeletal:\par Lumbar Spine:   Gait: Antalgic\par 		Inspection: Normal curvature, no abnormal kyphosis or scoliosis\par 		Facet loading: pain bilaterally\par 		Palpation:\par 			Lumbar and paraspinal muscles: pain bilaterally\par 			Sacroiliac joint: no pain\par 			Greater trochanter: no pain\par 		Muscle Strength:\par 		Iliopsoas: 5/5 bilaterally\par 		Quadriceps: 5/5 bilaterally\par 		Hamstrings: 5/5 bilaterally\par 		Tibialis anterior: 5/5 bilaterally\par 		Extensor hallucis longus: 5/5 bilaterally\par \par 		Sensation: normal and equal in bilateral lower extremities\par \par 		Reflexes:\par 			Patellar reflex: 2+ bilaterally\par 			Ankle jerk reflex: 2+ bilaterally\par 		\par 		Straight leg raise: negative bilaterally\par \par Extremity: no edema noted\par Neurological: Memory normal, AAO x 3, Cranial nerves II - XII grossly normal\par Psychiatric: Appropriate mood and affect, oriented to time, place, person, and situation\par \par \par

## 2019-06-20 NOTE — ASSESSMENT
[FreeTextEntry1] : 68 yof w/ breast and lung ca and Parkinson disease presents w/ left hip pain that radiates down the left leg. Pain radiates down the lateral aspect of the leg pain does not go below the knee. Pain is most consistent with lumbar radiculopathy. It is potentially also related to hip, although because there is no region to the groin this is less likely. Cannot rule out cancer-related pain. Recommend MRI of the lumbar spine to evaluate. Vision wishes to decline imaging at this time and continue conservative care. Patient will return if she desires further intervention, I do believe that she would likely benefit from an epidural steroid injection.

## 2019-06-20 NOTE — HISTORY OF PRESENT ILLNESS
[___ mths] : [unfilled] month(s) ago [Joint Pain] : joint  [4] : a current pain level of 4/10 [Aching] : aching [7] : a minimum pain level of 7/10 [Walking] : walking [Heat] : heat [FreeTextEntry1] : 68 yof w/ breast and lung ca and Parkinson disease presents w/ left hip pain that radiates down the left leg. Pain radiates down the lateral aspect of the leg pain does not go below the knee. Pain is worse activity. Pain improves with rest. Pain is described as aching. The patient has had the pain in the past, however, it worsened in the past 2 months. Sitting and heat improves the pain. She is using medical marijuana with relief. She reports that she has had side effects with multiple other medications including opioids and gabapentin. Walking makes the pain worse. Quality of life is significantly impaired.\par  [FreeTextEntry7] : left keen/leg

## 2019-06-26 ENCOUNTER — APPOINTMENT (OUTPATIENT)
Dept: GASTROENTEROLOGY | Facility: CLINIC | Age: 69
End: 2019-06-26
Payer: MEDICARE

## 2019-06-26 ENCOUNTER — RESULT REVIEW (OUTPATIENT)
Age: 69
End: 2019-06-26

## 2019-06-26 VITALS
SYSTOLIC BLOOD PRESSURE: 118 MMHG | WEIGHT: 180 LBS | DIASTOLIC BLOOD PRESSURE: 64 MMHG | HEART RATE: 109 BPM | HEIGHT: 61 IN | BODY MASS INDEX: 33.99 KG/M2 | OXYGEN SATURATION: 96 %

## 2019-06-26 PROCEDURE — 99204 OFFICE O/P NEW MOD 45 MIN: CPT

## 2019-06-27 ENCOUNTER — APPOINTMENT (OUTPATIENT)
Dept: GERIATRICS | Facility: CLINIC | Age: 69
End: 2019-06-27
Payer: MEDICARE

## 2019-06-27 VITALS
TEMPERATURE: 97.8 F | OXYGEN SATURATION: 97 % | SYSTOLIC BLOOD PRESSURE: 94 MMHG | DIASTOLIC BLOOD PRESSURE: 62 MMHG | BODY MASS INDEX: 33.99 KG/M2 | WEIGHT: 180 LBS | HEIGHT: 61 IN | HEART RATE: 117 BPM

## 2019-06-27 PROCEDURE — 99215 OFFICE O/P EST HI 40 MIN: CPT

## 2019-06-28 ENCOUNTER — RX CHANGE (OUTPATIENT)
Age: 69
End: 2019-06-28

## 2019-06-28 ENCOUNTER — RX RENEWAL (OUTPATIENT)
Age: 69
End: 2019-06-28

## 2019-06-28 NOTE — HISTORY OF PRESENT ILLNESS
[FreeTextEntry1] : 69 year F stage 4 lung ca on chemotherapy, stage 1 breast ca  s/p left lumpectomy and XRT, now on anastrazole, \par Parkinsons disease (Dr. Cardona) , hoarse voice starting 2 weeks after breast surgery s/p vocal cord injections, chronic GERD previously followed with Dr. Moore. \par She is seen at the request of Dr. Shaffer . \par She had recent PET/CT with increased signal in distal esophagus \par She has elevated CEA\par \par She has nausea since starting chemotherapy. no vomiting. few pounds of weight loss since starting chemo. \par She takes H2RA for GERD, feels that PPI increases tremor from PD. \par Patient denies abdominal pain , dysphagia/odynophagia, change in bowel habits, diarrhea, constipation, brbpr, melena.  Patient has daily BM, denies regular NSAID use. \par \par last egd/colonoscopy 2/1/2018 with Dr. Moore- 8 cm HH, mild reflux changes on bx\par ?poor right sided prep\par redundant colon \par no h/o colon polyps\par told to repeat in 5 years due to family hx crc (father rectal ca, dx 66)\par

## 2019-06-28 NOTE — PHYSICAL EXAM
[General Appearance - Alert] : alert [General Appearance - In No Acute Distress] : in no acute distress [Sclera] : the sclera and conjunctiva were normal [Outer Ear] : the ears and nose were normal in appearance [Hearing Threshold Finger Rub Not Bryan] : hearing was normal [Neck Appearance] : the appearance of the neck was normal [] : no respiratory distress [Apical Impulse] : the apical impulse was normal [Abdomen Soft] : soft [Abdomen Tenderness] : non-tender [Abnormal Walk] : normal gait [Skin Color & Pigmentation] : normal skin color and pigmentation [No Focal Deficits] : no focal deficits [Oriented To Time, Place, And Person] : oriented to person, place, and time

## 2019-06-28 NOTE — ASSESSMENT
[FreeTextEntry1] : Explained that elevated CEA is nonspecific. With colonoscopy ~ 1 year ago without polyps (although report suggests limited preparation in the right colon) risk of colon cancer is low. Due to patient's significant nausea currently which is related to chemotherapy performing colon preparation would be challenging for the patient. \par Due to increased signal on PET in distal esophagus recommend EGD for evaluation, r/o malignancy. \par Will continue to follow patient closely and will consider for colonoscopy following EGD. \par \par

## 2019-07-09 NOTE — REVIEW OF SYSTEMS
[Feeling Tired] : feeling tired [Hoarseness] : hoarseness [As Noted in HPI] : as noted in HPI [Negative] : Heme/Lymph

## 2019-07-09 NOTE — ASSESSMENT
[FreeTextEntry1] : 69 y/o F with metastatic lung cancer, breast cancer, Parkinson's, with back and leg pain possible radiculopathy and nausea\par \par valium TID prn anxiety muscle spasm trial\par lidoderm patch to left hip\par tylenol 1000 mg Q8hr\par vicoden \par zofran \par medical danay certified --will give trial and referral to pain management as trying to avoid side effects from oral pain meds\par \par f/u in one month  [Driving] : driving [Pain Management] : pain management [Medication Management] : medication management

## 2019-07-09 NOTE — PHYSICAL EXAM
[General Appearance - Alert] : alert [General Appearance - In No Acute Distress] : in no acute distress [General Appearance - Well Nourished] : well nourished [General Appearance - Well Developed] : well developed [General Appearance - Well-Appearing] : healthy appearing [Sclera] : the sclera and conjunctiva were normal [Extraocular Movements] : extraocular movements were intact [Strabismus] : no strabismus was seen [Normal Oral Mucosa] : normal oral mucosa [Outer Ear] : the ears and nose were normal in appearance [Hearing Threshold Finger Rub Not [place]] : hearing was normal [Nasal Cavity] : the nasal mucosa and septum were normal [Oropharynx] : The oropharynx was normal [Neck Appearance] : the appearance of the neck was normal [Thyroid Diffuse Enlargement] : the thyroid was not enlarged [Respiration, Rhythm And Depth] : normal respiratory rhythm and effort [Auscultation Breath Sounds / Voice Sounds] : lungs were clear to auscultation bilaterally [Exaggerated Use Of Accessory Muscles For Inspiration] : no accessory muscle use [Heart Rate And Rhythm] : heart rate was normal and rhythm regular [Heart Sounds] : normal S1 and S2 [Abdomen Soft] : soft [Bowel Sounds] : normal bowel sounds [Abdomen Tenderness] : non-tender [Abdomen Mass (___ Cm)] : no abdominal mass palpated [Abdomen Hernia] : no hernia was discovered [No CVA Tenderness] : no ~M costovertebral angle tenderness [No Spinal Tenderness] : no spinal tenderness [Abnormal Walk] : normal gait [Musculoskeletal - Swelling] : no joint swelling seen [Motor Tone] : muscle strength and tone were normal [Skin Turgor] : normal skin turgor [] : no rash [Cranial Nerves] : cranial nerves 2-12 were intact [Oriented To Time, Place, And Person] : oriented to person, place, and time

## 2019-07-09 NOTE — HISTORY OF PRESENT ILLNESS
[FreeTextEntry1] : pt is a 68 y/o pleasant woman accompanied by her  of over 30 years with recently diagnosed lung cancer by biopys of SERGEY nodule with perihilar LN + for adenoca of lung diagnose 4/19\par pt also with history of stage 1 breast ca invasive ductal ca ER/SC +, diagnosed in 11/18\par \par she also has history of parkinson's disease on car/dopa\par and with chronic hoarsenes for which she has been seeing GI and ENT\par \par pt reports she battles with depressive moods since diagnosis and with low energy. she also has chronic pain in hip for which she has tried multiple medications and "does not like the way they make me sleepy" \par \par pt and  tearful during interview. she would like to pursue all treatments to prolong life in balance with quality of life. she has grandchildren she cares for and would like to remain active for them.\par \par + nausea with oral chemo, minimal relief with zofran\par She is interested in medical marijuana \par \par

## 2019-07-10 ENCOUNTER — APPOINTMENT (OUTPATIENT)
Dept: GERIATRICS | Facility: CLINIC | Age: 69
End: 2019-07-10
Payer: MEDICARE

## 2019-07-10 ENCOUNTER — RESULT REVIEW (OUTPATIENT)
Age: 69
End: 2019-07-10

## 2019-07-10 VITALS
WEIGHT: 178 LBS | HEART RATE: 111 BPM | OXYGEN SATURATION: 99 % | BODY MASS INDEX: 33.63 KG/M2 | RESPIRATION RATE: 20 BRPM | SYSTOLIC BLOOD PRESSURE: 90 MMHG | TEMPERATURE: 97.9 F | DIASTOLIC BLOOD PRESSURE: 63 MMHG

## 2019-07-10 PROCEDURE — 99215 OFFICE O/P EST HI 40 MIN: CPT

## 2019-07-10 NOTE — SOCIAL HISTORY
[No falls in past year] : Patient reported no falls in the past year [Fully functional (bathing, dressing, toileting, transferring, walking, feeding)] : Fully functional (bathing, dressing, toileting, transferring, walking, feeding) [Fully functional (using the telephone, shopping, preparing meals, housekeeping, doing laundry, using transportation,] : Fully functional and needs no help or supervision to perform IADLs (using the telephone, shopping, preparing meals, housekeeping, doing laundry, using transportation, managing medications and managing finances) [Smoke Detector] : smoke detector [Carbon Monoxide Detector] : carbon monoxide detector [Grab Bars] : grab bars [Seat Belt] :  uses seat belt [Driving] : driving

## 2019-07-11 VITALS
DIASTOLIC BLOOD PRESSURE: 70 MMHG | RESPIRATION RATE: 20 BRPM | HEART RATE: 90 BPM | OXYGEN SATURATION: 99 % | SYSTOLIC BLOOD PRESSURE: 110 MMHG

## 2019-07-11 NOTE — HISTORY OF PRESENT ILLNESS
[0] : 2) Feeling down, depressed, or hopeless: Not at all [FreeTextEntry1] : Mrs. Mcdowell is a very pleasant 68 y/o F with multiple medical problems including metastatic lung cancer with disease to bone on Tagrisso, history of Breast cancer s/p Mastectomy, radiation and on arimadex, Parkinsons' disease with restless legs on Carba-dopa, aslo with \par Prothrombin gene mutation with vasculitis. pt accompanied bye her  of over 30 years \par pt with persistent recurrent nausea, occasional dizziness\par decreased appetite and pain in left hip and back.\par \par pt has tried multiple medications and sensitive to side effects including oxycodone, morphine\par pt has been able to tolerate vicodin well with minimal side effects \par \par Pt has tired MM with good response pt reports her nausea was well controlled, appetite stable, and pain resolved. pt has also been using lidoderm patch which helps with pain in the night as well. \par \par Pt takes tylenol also which takes edge off pain.\par

## 2019-07-11 NOTE — ASSESSMENT
[PPS Score: ____] : Palliative Performance Scale (PPS) Score: [unfilled] [FreeTextEntry1] : Mrs. 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 \par \par Pain/ nausea/ decreased appetite\par continue trial of medical marijuana 1:1 dose BID as symptoms well controlled\par continue lidoderm patch\par continue tylenol \par continue 1/4 tab of vicodin QHS\par f/u with Dr. kelly for possible injection for pain management \par continue Zofran prn as Ganisteron not tolerated and pt allergic to compazine\par \par discussed results of MRI and echo with pt \par f/u neuro,  Dr. Bryan, Dr. Hdez, and pt has appointment with PCP next week to discuss hypotension \par f/u prn based on symptoms

## 2019-07-11 NOTE — PHYSICAL EXAM
[General Appearance - Alert] : alert [General Appearance - Well Nourished] : well nourished [General Appearance - Well Developed] : well developed [General Appearance - In No Acute Distress] : in no acute distress [Normal Oral Mucosa] : normal oral mucosa [Nasal Cavity] : the nasal mucosa and septum were normal [Oropharynx] : The oropharynx was normal [Auscultation Breath Sounds / Voice Sounds] : lungs were clear to auscultation bilaterally [Respiration, Rhythm And Depth] : normal respiratory rhythm and effort [Heart Sounds] : normal S1 and S2 [Bowel Sounds] : normal bowel sounds [Abdomen Soft] : soft [Abdomen Tenderness] : non-tender [Abdomen Mass (___ Cm)] : no abdominal mass palpated [Abdomen Hernia] : no hernia was discovered [Nail Clubbing] : no clubbing  or cyanosis of the fingernails [] : no rash [Skin Lesions] : no skin lesions [No Focal Deficits] : no focal deficits [Oriented To Time, Place, And Person] : oriented to person, place, and time [Impaired Insight] : insight and judgment were intact [Affect] : the affect was normal

## 2019-07-12 NOTE — PHYSICAL EXAM
[General Appearance - Alert] : alert [General Appearance - In No Acute Distress] : in no acute distress [General Appearance - Well Nourished] : well nourished [General Appearance - Well Developed] : well developed [Sclera] : the sclera and conjunctiva were normal [Strabismus] : no strabismus was seen [Normal Oral Mucosa] : normal oral mucosa [No Oral Cyanosis] : no oral cyanosis [Outer Ear] : the ears and nose were normal in appearance [Oropharynx] : The oropharynx was normal [Examination Of The Oral Cavity] : the lips and gums were normal [Neck Appearance] : the appearance of the neck was normal [Auscultation Breath Sounds / Voice Sounds] : lungs were clear to auscultation bilaterally [Heart Rate And Rhythm] : heart rate was normal and rhythm regular [Heart Sounds] : normal S1 and S2 [Murmurs] : no murmurs [Bowel Sounds] : normal bowel sounds [Abdomen Soft] : soft [Abdomen Tenderness] : non-tender [Abdomen Mass (___ Cm)] : no abdominal mass palpated [Abdomen Hernia] : no hernia was discovered [No CVA Tenderness] : no ~M costovertebral angle tenderness [Abnormal Walk] : normal gait [] : no rash [Oriented To Time, Place, And Person] : oriented to person, place, and time

## 2019-07-12 NOTE — HISTORY OF PRESENT ILLNESS
[FreeTextEntry1] : Mrs. Mcdowell is a very pleasant 70 y/o F with multiple medical problems including metastatic lung cancer with disease to bone on Tagrisso, history of Breast cancer s/p Mastectomy, radiation and on arimadex, Parkinsons' disease with restless legs on Carba-dopa, also with Prothrombin gene mutation with vasculitis. \par \par pt with persistent recurrent nausea, occasional dizziness\par decreased appetite and pain in left hip and back.\par \par pt has tried multiple medications and sensitive to side effects including oxycodone, morphine\par pt has been able to tolerate Vicodin well with minimal side effects \par \par Pt has tired MM with equivocal response, pt reports it worked at times and other times she was too "dopey" however was not in pain and helped with pain. pt has also been using lidoderm patch which helps with pain in the night as well. \par \par Pt takes tylenol also which takes edge off pain.\par

## 2019-07-12 NOTE — ASSESSMENT
[FreeTextEntry1] : 69 y/o F with metastatic lung cancer, history of breast cancer, Parkinson's Disease on chemotherapy \par pt with persistent nausea --will continue to titrate medical marijuana\par discussed acupressure points \par ganisteron not effective and pt with side effects with zofran though helps with nausea\par pt reports Valium was too strong, will continue xanax as needed as pt also with sedating effects with Klonopin \par Lidoderm patch to left hip\par tylenol 1000 mg Q8h \par vicoden prn\par will give trial of diclofenac gel to left hip and shoulders.\par pt being seen by Pain management encouraged pt to proceed with imaging for possible injection as she is sensitive to medications. \par \par gianfranco jon certified

## 2019-07-16 ENCOUNTER — APPOINTMENT (OUTPATIENT)
Dept: GERIATRICS | Facility: CLINIC | Age: 69
End: 2019-07-16
Payer: MEDICARE

## 2019-07-16 VITALS
BODY MASS INDEX: 33.79 KG/M2 | HEART RATE: 108 BPM | OXYGEN SATURATION: 99 % | DIASTOLIC BLOOD PRESSURE: 70 MMHG | SYSTOLIC BLOOD PRESSURE: 108 MMHG | TEMPERATURE: 97.4 F | WEIGHT: 179 LBS | HEIGHT: 61 IN

## 2019-07-16 DIAGNOSIS — Z78.9 OTHER SPECIFIED HEALTH STATUS: ICD-10-CM

## 2019-07-16 PROCEDURE — 99215 OFFICE O/P EST HI 40 MIN: CPT | Mod: 25

## 2019-07-16 PROCEDURE — G0439: CPT

## 2019-07-16 RX ORDER — DIAZEPAM 2 MG/1
2 TABLET ORAL 3 TIMES DAILY
Qty: 90 | Refills: 0 | Status: DISCONTINUED | COMMUNITY
Start: 2019-05-31 | End: 2019-07-16

## 2019-07-16 RX ORDER — GRANISETRON HYDROCHLORIDE 1 MG/1
1 TABLET, FILM COATED ORAL
Qty: 20 | Refills: 0 | Status: DISCONTINUED | COMMUNITY
Start: 2019-06-19 | End: 2019-07-16

## 2019-07-16 NOTE — ASSESSMENT
[FreeTextEntry1] : ultimate goal is pain management and nausea control at this time\par refilled diclofenac as she has not trialed, advised that this is an nsaid but will try on trial basis only\par also advised to follow up with pain management to discuss MRI and possibility of epidural\par heating pad to BL shoulders plus diclofenac\par labs ordered \par will reach out to Dr. Cardona neurology to discuss hypotension and potentially decreasing night time sinemet as symptoms are usually in late afternoon - to discuss trial.  IT is very likely that symptoms are related to her active treatment and other medications, but I feel this is worth at least discussing with neurology\par \par may have to consider PRN zofran\par

## 2019-07-16 NOTE — REVIEW OF SYSTEMS
[Feeling Poorly] : feeling poorly [Feeling Tired] : feeling tired [Eyesight Problems] : eyesight problems [Constipation] : constipation [Arthralgias] : arthralgias [Joint Pain] : joint pain [Joint Swelling] : joint swelling [Fever] : no fever [Chills] : no chills [Discharge From Eyes] : no purulent discharge from the eyes [Nosebleeds] : no nosebleeds [Nasal Discharge] : no nasal discharge [Chest Pain] : no chest pain [Palpitations] : no palpitations [Cough] : no cough [SOB on Exertion] : no shortness of breath during exertion [Abdominal Pain] : no abdominal pain [Vomiting] : no vomiting [Vaginal Discharge] : no vaginal discharge [Abn Vaginal Bleeding] : no unexplained vaginal bleeding [Skin Lesions] : no skin lesions [Skin Wound] : no skin wound [Confused] : no confusion [Convulsions] : no convulsions [Dizziness] : no dizziness [Fainting] : no fainting [Change In Personality] : no personality change [Emotional Problems] : no emotional problems [FreeTextEntry7] : intermittent nausea

## 2019-07-16 NOTE — PHYSICAL EXAM
[General Appearance - Alert] : alert [General Appearance - In No Acute Distress] : in no acute distress [General Appearance - Well Nourished] : well nourished [General Appearance - Well Developed] : well developed [Sclera] : the sclera and conjunctiva were normal [PERRL With Normal Accommodation] : pupils were equal in size, round, and reactive to light [Extraocular Movements] : extraocular movements were intact [Normal Oral Mucosa] : normal oral mucosa [No Oral Pallor] : no oral pallor [Neck Appearance] : the appearance of the neck was normal [Neck Cervical Mass (___cm)] : no neck mass was observed [Respiration, Rhythm And Depth] : normal respiratory rhythm and effort [Exaggerated Use Of Accessory Muscles For Inspiration] : no accessory muscle use [Auscultation Breath Sounds / Voice Sounds] : lungs were clear to auscultation bilaterally [Heart Rate And Rhythm] : heart rate was normal and rhythm regular [Heart Sounds] : normal S1 and S2 [Heart Sounds Gallop] : no gallops [Heart Sounds Pericardial Friction Rub] : no pericardial rub [Breast Appearance] : normal in appearance [Breast Abnormal Lactation (Galactorrhea)] : no nipple discharge [Bowel Sounds] : normal bowel sounds [Abdomen Soft] : soft [Abdomen Tenderness] : non-tender [Cervical Lymph Nodes Enlarged Posterior Bilaterally] : posterior cervical [Cervical Lymph Nodes Enlarged Anterior Bilaterally] : anterior cervical [Musculoskeletal - Swelling] : no joint swelling seen [Skin Color & Pigmentation] : normal skin color and pigmentation [] : no rash [Affect] : the affect was normal [Mood] : the mood was normal [FreeTextEntry1] : two left breast scars, well healed

## 2019-07-16 NOTE — HISTORY OF PRESENT ILLNESS
[PMH Reviewed and Updated] : past medical history reviewed and updated [PSH Reviewed and Updated] : past surgical history reviewed and updated [Family History Reviewed and Updated] : family history reviewed and updated [Medication and Allergies Reconciled] : medication and allergies reconciled [4] : 4 [Over the Past 2 Weeks, Have You Felt Down, Depressed, or Hopeless?] : 1.) Over the past 2 weeks, have you felt down, depressed, or hopeless? Yes [Retired] : retired from work [None] : The patient has no concerns about alcohol abuse [Never] : has never used illicit drugs [Compliant with medications] : compliant with medications [Fully Independent] : fully independent [Drives without concerns] : drives without concerns [Seatbelts] : seatbelts [Smoke Detectors] : smoke detectors [Carbon Monoxide Detector] : carbon monoxide detector [Over the Past 2 Weeks, Have You Felt Little Interest or Pleasure Doing Things?] : 2.) Over the past 2 weeks, have you felt little interest or pleasure doing things? No [FreeTextEntry1] : many new medical problems since our last visit including metastatic lung cancer with metastasis to bone on Tagrisso, history of Breast cancer s/p Mastectomy, Parkinsons' disease with restless legs on sinemet and pramipexole for RLS.  She is being treated for vocal cord paralysis by ENT Dr. Rodriguez.  She is workiing with pain management and recently had MRI and has EGD scheduled given persistent nausea.  \par \par She is working with palliative care and using lidocaine patches for pain.  Diclofenac was recently added to plan and she continues to use medical marijuana PRN in evening which helps greatly with nausea and pain but often makes her "loopy" so she uses doses sparingly.  \par \par Recently she has noted low blood pressures (SBP ) and feels very lethargic and tired.  \par She feels exhausted and feels she is seeing so many doctors which is overwhelming.  Her goal is to celebrate her 50th wedding anniversary with her  which is in 3 years but she is not sure she will be alive at that time.  She continues to see her 3 grand kids who give her great happiness.  \par pt has tried multiple medications and sensitive to side effects including oxycodone, morphine\par pt has been able to tolerate vicodin well with minimal side effects \par \par Pt has tired MM with good response pt reports her nausea was well controlled, appetite stable, and pain resolved. pt has also been using lidoderm patch which helps with pain in the night as well. \par \par Pt takes tylenol also which takes edge off pain.\par \par

## 2019-07-16 NOTE — ADDENDUM
[FreeTextEntry1] : spoke with neurology\par plan will be to change extended release or CR to BID and maintain short acting dosing\par this is on trial basis (skip evening dose)

## 2019-07-17 ENCOUNTER — RESULT REVIEW (OUTPATIENT)
Age: 69
End: 2019-07-17

## 2019-07-17 ENCOUNTER — RX RENEWAL (OUTPATIENT)
Age: 69
End: 2019-07-17

## 2019-07-17 ENCOUNTER — APPOINTMENT (OUTPATIENT)
Dept: HEMATOLOGY ONCOLOGY | Facility: CLINIC | Age: 69
End: 2019-07-17
Payer: MEDICARE

## 2019-07-17 ENCOUNTER — MEDICATION RENEWAL (OUTPATIENT)
Age: 69
End: 2019-07-17

## 2019-07-17 VITALS
OXYGEN SATURATION: 98 % | HEART RATE: 94 BPM | RESPIRATION RATE: 20 BRPM | WEIGHT: 175 LBS | DIASTOLIC BLOOD PRESSURE: 70 MMHG | BODY MASS INDEX: 33.04 KG/M2 | TEMPERATURE: 98.7 F | SYSTOLIC BLOOD PRESSURE: 105 MMHG | HEIGHT: 61.02 IN

## 2019-07-17 VITALS
DIASTOLIC BLOOD PRESSURE: 70 MMHG | BODY MASS INDEX: 33.04 KG/M2 | HEART RATE: 94 BPM | OXYGEN SATURATION: 98 % | RESPIRATION RATE: 20 BRPM | TEMPERATURE: 98.7 F | HEIGHT: 61.02 IN | SYSTOLIC BLOOD PRESSURE: 105 MMHG | WEIGHT: 175 LBS

## 2019-07-17 PROCEDURE — 99213 OFFICE O/P EST LOW 20 MIN: CPT

## 2019-07-21 ENCOUNTER — RESULT REVIEW (OUTPATIENT)
Age: 69
End: 2019-07-21

## 2019-07-22 ENCOUNTER — APPOINTMENT (OUTPATIENT)
Dept: GASTROENTEROLOGY | Facility: HOSPITAL | Age: 69
End: 2019-07-22

## 2019-07-24 ENCOUNTER — APPOINTMENT (OUTPATIENT)
Dept: PAIN MANAGEMENT | Facility: CLINIC | Age: 69
End: 2019-07-24
Payer: MEDICARE

## 2019-07-24 VITALS
SYSTOLIC BLOOD PRESSURE: 100 MMHG | BODY MASS INDEX: 33.04 KG/M2 | DIASTOLIC BLOOD PRESSURE: 64 MMHG | HEIGHT: 61.02 IN | WEIGHT: 175 LBS

## 2019-07-24 PROCEDURE — 99214 OFFICE O/P EST MOD 30 MIN: CPT

## 2019-07-24 NOTE — HISTORY OF PRESENT ILLNESS
[Joint Pain] : joint  [___ mths] : [unfilled] month(s) ago [4] : a current pain level of 4/10 [7] : a minimum pain level of 7/10 [Aching] : aching [Walking] : walking [Heat] : heat [FreeTextEntry1] : As per my note dated 06/19/19: "68 yof w/ breast and lung ca and Parkinson disease presents w/ left hip pain that radiates down the left leg. Pain radiates down the lateral aspect of the leg pain does not go below the knee. Pain is worse activity. Pain improves with rest. Pain is described as aching. The patient has had the pain in the past, however, it worsened in the past 2 months. Sitting and heat improves the pain. She is using medical marijuana with relief. She reports that she has had side effects with multiple other medications including opioids and gabapentin. Walking makes the pain worse. Quality of life is significantly impaired."\par \par Pt returns for follow-up today, 07/24/19. She returns to review the recent MRI of her lumbar spine. The pain continues down the lateral aspect of the left leg. Pain is worse with activity. The pain improves with rest. Right leg is within normal limits. No other recent changes in health. She is having some relief with medical marijuana.\par  [FreeTextEntry7] : left keen/leg

## 2019-07-24 NOTE — DATA REVIEWED
[FreeTextEntry1] : MRI LUMBAR SPINE (07/10/19):\par \par  There is a mild levoscoliosis of the lumbar spine. There is additionally a levoscoliosis of the\par visualized lower thoracic spine. There is exaggeration of the lumbar lordosis. There is degenerative\par grade 1 retrolisthesis of L1 on L2 and L2 on L3 and anterolisthesis of L4 on L5.\par \par  There are innumerable foci of abnormal marrow signal throughout the visualized osseous structures\par compatible with the history of metastatic disease. No prior MR imaging of the lumbar spine is\par available for comparison. There are images from prior CT chest, abdomen, and pelvis performed on\par 4/5/2019 and outside PET/CT performed 4/13/2019 which demonstrated osseous metastases, however\par comparison across modalities is limited. There is no evidence of pathologic fracture or epidural\par tumor.\par \par  The visualized paraspinal soft tissues demonstrate no acute abnormalities. There is a cystic lesion\par in the pancreatic neck was previously evaluated on abdominal MRI of 4/22/2019. Bilateral renal cysts\par are noted.\par \par  The conus medullaris terminates at L1 and the thecal sac terminates at S2-S3. No abnormal signal is\par identified in the visualized spinal cord.\par \par  L1-2: Minimal retrolisthesis of L1 and L2 with a mild disc bulge. Mild facet/ligamentous\par hypertrophy. No significant central canal or subarticular stenosis. Mild left foraminal stenosis and\par no significant right foraminal stenosis.\par \par  L2-3: Minimal retrolisthesis of L2 on L3 with a mild disc bulge. Mild facet/ligamentous\par hypertrophy. No significant central canal or subarticular stenosis. Mild/moderate bilateral\par foraminal stenosis.\par \par  L3-4: Mild disc bulge. Mild facet/ligamentous hypertrophy. No significant central canal or\par subarticular stenosis. Mild/moderate bilateral foraminal stenosis.\par \par  L4-5: Grade 1 anterolisthesis of L4 on L5 with a mild disc bulge. Moderate facet arthropathy. No\par significant central canal stenosis. Mild bilateral subarticular stenosis. Moderate right foraminal\par stenosis and no significant left foraminal stenosis.\par \par  L5-S1: No significant disc bulge or herniation. Mild facet arthropathy. No significant central\par canal, subarticular, or foraminal stenosis.\par \par \par \par  IMPRESSION:\par \par  Diffuse osseous metastatic disease. No evidence of pathologic fracture or epidural tumor.\par \par  Lumbar spondylosis and spondylolisthesis. No significant canal stenosis. Varying degrees of\par foraminal stenosis as above, most pronounced at L4-5 on the right.\par

## 2019-07-24 NOTE — PHYSICAL EXAM
[de-identified] : Constitutional: Well-developed, in no acute distress\par Eyes: Pupil- Right: normal, Left: normal\par Neck exam: Inspection normal\par Respiratory: Normal effort, speaking in full sentences\par Cardiovascular: Regular rate and rhythm\par Vascular: Dorsal pedis pulses normal and equal bilaterally\par Abdomen: Inspection normal, no distension\par Skin: Inspection normal, no bruising noted\par Musculoskeletal:\par Lumbar Spine:   Gait: Antalgic\par 		Inspection: Normal curvature, no abnormal kyphosis or scoliosis\par 		Facet loading: pain bilaterally\par 		Palpation:\par 			Lumbar and paraspinal muscles: pain bilaterally\par 			Sacroiliac joint: no pain\par 			Greater trochanter: no pain\par 		Muscle Strength:\par 		Iliopsoas: 5/5 bilaterally\par 		Quadriceps: 5/5 bilaterally\par 		Hamstrings: 5/5 bilaterally\par 		Tibialis anterior: 5/5 bilaterally\par 		Extensor hallucis longus: 5/5 bilaterally\par \par 		Sensation: normal and equal in bilateral lower extremities\par \par 		Reflexes:\par 			Patellar reflex: 2+ bilaterally\par 			Ankle jerk reflex: 2+ bilaterally\par 		\par 		Straight leg raise: negative bilaterally\par \par Extremity: no edema noted\par Neurological: Memory normal, AAO x 3, Cranial nerves II - XII grossly normal\par Psychiatric: Appropriate mood and affect, oriented to time, place, person, and situation\par \par \par

## 2019-07-24 NOTE — ASSESSMENT
[FreeTextEntry1] : 69 yof w/ breast and lung ca and Parkinson disease presents w/ left hip pain that radiates down the left leg, here to review recent MRI of the lumbar spine. I have personally reviewed the patient's MRI in detail and discussed it with them which is significant for varying degrees of  foraminal stenosis, most pronounced at L4-5 on the right. Pain radiates down the lateral aspect of the leg pain does not go below the knee. Cannot rule out cancer-related pain. Recommend MRI of the lumbar spine to evaluate. Although there are no clear findings on MRI to elucidate a cause of the left lateral posterior leg pain, it is reasonable to consider inflammation from the disc. Given her failure to improve with all other conservative measures, recommend L5-S1 intralaminar epidural steroid injection under fluoroscopic guidance. Patient will followup 2 weeks following intervention.

## 2019-07-26 NOTE — HISTORY OF PRESENT ILLNESS
[de-identified] : Mrs. Mcdowell is a 68 year old postmenopausal female who is referred by Dr.Alice Demarco for newly diagnosed breast cancer.\par She was found on routine mammography in November 2019 she you have a focal asymmetric density in the left upper outer breast at 1:00 access 8 cm from the nipple. A one year prior had been negative.  Ultrasound-guided core biopsy in November 21, 2018 revealed a grade 1/3 invasive ductal carcinoma ER positive at 98% and AK positive at 90%,Ki-67 was less than 10%. She underwent some left breast lumpectomy and sentinel node dissection on December 19, 2018. Pathology revealed an 8 mm grade 1/3 invasive ductal carcinoma zero of 3 sentinel lymph nodes were involved with tumor. Oncotype DX score was 11. She also underwent genetic testing which is negative.\par  [de-identified] : Mrs. 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 Diaz for back pain management. She underwent an MRI which certainly is at disease. She continues on Zofran ODT. She is having an upper endoscopy on Monday.  She is having hypotension form antiparkinsonian meds. She uses Medical marijuana with relief.  She has a.m nausea and has decreased appetite.

## 2019-07-26 NOTE — PHYSICAL EXAM
[Restricted in physically strenuous activity but ambulatory and able to carry out work of a light or sedentary nature] : Status 1- Restricted in physically strenuous activity but ambulatory and able to carry out work of a light or sedentary nature, e.g., light house work, office work [Normal] : affect appropriate [de-identified] : left breast scar

## 2019-07-26 NOTE — ASSESSMENT
[FreeTextEntry1] : 69 yo female referred by Dr. Nicole Demarco for evaluation of breast cancer.\par Patient has Stage 1 breast cancer 8 mm tumor, invasive ductal carcinoma, ER/ OK positive, HER2 not amplified with Oncotype Dx 11. diagnosed November 2018\par \par Patient started on Arimidex - tolerates well.\par Repeated CEA - increased level to 18, with hoarseness of the voice. 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  Side effects, toxicities and benefits reviewed with pt. L4 vertebral body biopsy confirmed met adenoca of the lung.\par \par \par Plan:\par - tolerates Tagrisso well\par - intermittent nausea- Ondansetron ODT, Kytril not effective\par - pain left hip area, increased recently with ambulation, to see pain amnagement\par - Upper endoscopy pending\par - PET/CT in Aug\par - History of present illness, review of systems, physical exam and treatment plan reviewed with . \par - Office visit in 4  weeks or prn for new or worsening symptoms.  \par - CBC,Chem,CEA at next visit\par

## 2019-07-26 NOTE — RESULTS/DATA
[FreeTextEntry1] : 7/17/19\par WBC 7.3\par HGB 12.4\par HCT 37.4\par ,000\par Chem WNL\par CEA 16.4 (16.9)

## 2019-07-26 NOTE — REVIEW OF SYSTEMS
[Fatigue] : fatigue [Joint Pain] : joint pain [Anxiety] : anxiety [Dizziness] : dizziness [Muscle Pain] : muscle pain [Muscle Weakness] : muscle weakness [Negative] : Heme/Lymph [Fever] : no fever [Recent Change In Weight] : ~T no recent weight change [Eye Pain] : no eye pain [Vision Problems] : no vision problems [Dysphagia] : no dysphagia [Nosebleeds] : no nosebleeds [Chest Pain] : no chest pain [Lower Ext Edema] : no lower extremity edema [Shortness Of Breath] : no shortness of breath [Cough] : no cough [Constipation] : no constipation [Diarrhea] : no diarrhea [Dysuria] : no dysuria [Depression] : no depression [Easy Bleeding] : no tendency for easy bleeding [Easy Bruising] : no tendency for easy bruising [FreeTextEntry9] :  muscle spasms due to parkinsons [de-identified] : tremor

## 2019-07-31 ENCOUNTER — RESULT REVIEW (OUTPATIENT)
Age: 69
End: 2019-07-31

## 2019-07-31 ENCOUNTER — APPOINTMENT (OUTPATIENT)
Dept: PAIN MANAGEMENT | Facility: HOSPITAL | Age: 69
End: 2019-07-31

## 2019-08-01 ENCOUNTER — RX RENEWAL (OUTPATIENT)
Age: 69
End: 2019-08-01

## 2019-08-14 ENCOUNTER — RX RENEWAL (OUTPATIENT)
Age: 69
End: 2019-08-14

## 2019-08-14 ENCOUNTER — RESULT REVIEW (OUTPATIENT)
Age: 69
End: 2019-08-14

## 2019-08-14 ENCOUNTER — APPOINTMENT (OUTPATIENT)
Dept: PAIN MANAGEMENT | Facility: CLINIC | Age: 69
End: 2019-08-14
Payer: MEDICARE

## 2019-08-14 ENCOUNTER — APPOINTMENT (OUTPATIENT)
Dept: HEMATOLOGY ONCOLOGY | Facility: CLINIC | Age: 69
End: 2019-08-14
Payer: MEDICARE

## 2019-08-14 ENCOUNTER — APPOINTMENT (OUTPATIENT)
Dept: HEMATOLOGY ONCOLOGY | Facility: CLINIC | Age: 69
End: 2019-08-14

## 2019-08-14 VITALS
BODY MASS INDEX: 32.85 KG/M2 | DIASTOLIC BLOOD PRESSURE: 82 MMHG | WEIGHT: 174 LBS | SYSTOLIC BLOOD PRESSURE: 122 MMHG | HEIGHT: 61 IN

## 2019-08-14 VITALS
RESPIRATION RATE: 18 BRPM | HEART RATE: 98 BPM | DIASTOLIC BLOOD PRESSURE: 79 MMHG | HEIGHT: 61.02 IN | BODY MASS INDEX: 32.47 KG/M2 | TEMPERATURE: 98 F | SYSTOLIC BLOOD PRESSURE: 122 MMHG | WEIGHT: 172 LBS | OXYGEN SATURATION: 98 %

## 2019-08-14 PROCEDURE — 99215 OFFICE O/P EST HI 40 MIN: CPT

## 2019-08-14 PROCEDURE — 99214 OFFICE O/P EST MOD 30 MIN: CPT

## 2019-08-14 RX ORDER — ACETAMINOPHEN 500 MG/1
500 TABLET ORAL 3 TIMES DAILY
Qty: 180 | Refills: 0 | Status: COMPLETED | COMMUNITY
Start: 2019-05-31 | End: 2019-08-14

## 2019-08-14 RX ORDER — RASAGILINE 1 MG/1
1 TABLET ORAL DAILY
Refills: 0 | Status: COMPLETED | COMMUNITY
Start: 2018-03-12 | End: 2019-08-14

## 2019-08-14 NOTE — REVIEW OF SYSTEMS
[Fever] : no fever [Recent Change In Weight] : ~T no recent weight change [Eye Pain] : no eye pain [Vision Problems] : no vision problems [Dysphagia] : no dysphagia [Nosebleeds] : no nosebleeds [Chest Pain] : no chest pain [Lower Ext Edema] : no lower extremity edema [Shortness Of Breath] : no shortness of breath [Cough] : no cough [Constipation] : no constipation [Diarrhea] : no diarrhea [Dysuria] : no dysuria [Depression] : no depression [Easy Bruising] : no tendency for easy bruising [Easy Bleeding] : no tendency for easy bleeding [FreeTextEntry9] :  muscle spasms due to parkinsons [de-identified] : tremor

## 2019-08-14 NOTE — HISTORY OF PRESENT ILLNESS
[de-identified] : Mrs. Mcdowell is a 68 year old postmenopausal female who is referred by Dr.Alice Demarco for newly diagnosed breast cancer.\par She was found on routine mammography in November 2019 she you have a focal asymmetric density in the left upper outer breast at 1:00 access 8 cm from the nipple. A one year prior had been negative.  Ultrasound-guided core biopsy in November 21, 2018 revealed a grade 1/3 invasive ductal carcinoma ER positive at 98% and MN positive at 90%,Ki-67 was less than 10%. She underwent some left breast lumpectomy and sentinel node dissection on December 19, 2018. Pathology revealed an 8 mm grade 1/3 invasive ductal carcinoma zero of 3 sentinel lymph nodes were involved with tumor. Oncotype DX score was 11. She also underwent genetic testing which is negative.\par  [de-identified] : Mrs. 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  Having some fatigue and left leg pain seeing pain mgmt s/p epidural not much improvement noticed per pt.  Using medical marijuana with Dr. Diaz

## 2019-08-14 NOTE — HISTORY OF PRESENT ILLNESS
[Joint Pain] : joint  [___ mths] : [unfilled] month(s) ago [4] : a current pain level of 4/10 [7] : a minimum pain level of 7/10 [Aching] : aching [Walking] : walking [Heat] : heat [FreeTextEntry1] : As per my note dated 06/19/19: "68 yof w/ breast and lung ca and Parkinson disease presents w/ left hip pain that radiates down the left leg. Pain radiates down the lateral aspect of the leg pain does not go below the knee. Pain is worse activity. Pain improves with rest. Pain is described as aching. The patient has had the pain in the past, however, it worsened in the past 2 months. Sitting and heat improves the pain. She is using medical marijuana with relief. She reports that she has had side effects with multiple other medications including opioids and gabapentin. Walking makes the pain worse. Quality of life is significantly impaired."\par \par As per my note dated, 07/24/19. "She returns to review the recent MRI of her lumbar spine. The pain continues down the lateral aspect of the left leg. Pain is worse with activity. The pain improves with rest. Right leg is within normal limits. No other recent changes in health. She is having some relief with medical marijuana."\par \par Pt returns for follow-up today, 08/14/19. She is s/p L5-S1 intralaminar LANE. She does not report significant relief from the injection. She reports that her pain is improving with increased use of medical marijuana. Pain is sharp and burning. Different from her restless leg pain. \par \par Interventions:\par L5-S1 intralaminar LANE (07/31/19):\par  [FreeTextEntry7] : left keen/leg

## 2019-08-14 NOTE — ASSESSMENT
[FreeTextEntry1] : 69 yo female referred by Dr. Nicole Demarco for evaluation of breast cancer.\par Patient has Stage 1 breast cancer 8 mm tumor, invasive ductal carcinoma, ER/ SD positive, HER2 not amplified with Oncotype Dx 11. diagnosed November 2018\par \par Patient started on Arimidex - tolerates well.\par Repeated CEA - increased level to 18, with hoarseness of the voice. 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  Side effects, toxicities and benefits reviewed with pt. L4 vertebral body biopsy confirmed met adenoca of the lung.\par \par \par Plan:\par - takes Tagrisso 11 pm, nausea in am \par -no response to Kytril or Zofran \par - pain left hip area - no evidence of bone infiltration\par - Upper endoscopy - gastritis \par - PET/CT in Aug- scheduled for September 4\par - hypotension - responded to change of dose/ meds\par  - CBC,Chem,CEA at next visit\par - B12 level > 1500 - change to 3 x week \par

## 2019-08-14 NOTE — ASSESSMENT
[FreeTextEntry1] : 69 yof w/ breast and lung ca and Parkinson disease presents w/ left hip pain that radiates down the left leg, now s/p L5-S1 interlaminar LANE without significant relief. I have again  personally reviewed the patient's MRI in detail and discussed it with them which is significant for varying degrees of  foraminal stenosis, most pronounced at L4-5 on the right. As she is improving with medical marijuana, recommend continue conservative care at this time. Patient does have PET scan coming up to reassess disease state. Following imaging, if patient desires, would consider LANE at higher level, which may be source of leg pain, if no other source found on imaging. RTC two months, or sooner if patient desires.

## 2019-08-14 NOTE — PHYSICAL EXAM
[de-identified] : Constitutional: Well-developed, in no acute distress\par Eyes: Pupil- Right: normal, Left: normal\par Neck exam: Inspection normal\par Respiratory: Normal effort, speaking in full sentences\par Cardiovascular: Regular rate and rhythm\par Vascular: Dorsal pedis pulses normal and equal bilaterally\par Abdomen: Inspection normal, no distension\par Skin: Inspection normal, no bruising noted\par Musculoskeletal:\par Lumbar Spine:   Gait: Antalgic\par 		Inspection: Normal curvature, no abnormal kyphosis or scoliosis\par 		Facet loading: pain bilaterally\par 		Palpation:\par 			Lumbar and paraspinal muscles: pain bilaterally\par 			Sacroiliac joint: no pain\par 			Greater trochanter: no pain\par 		Muscle Strength:\par 		Iliopsoas: 5/5 bilaterally\par 		Quadriceps: 5/5 bilaterally\par 		Hamstrings: 5/5 bilaterally\par 		Tibialis anterior: 5/5 bilaterally\par 		Extensor hallucis longus: 5/5 bilaterally\par \par 		Sensation: normal and equal in bilateral lower extremities\par \par 		Reflexes:\par 			Patellar reflex: 2+ bilaterally\par 			Ankle jerk reflex: 2+ bilaterally\par 		\par 		Straight leg raise: negative bilaterally\par \par Extremity: no edema noted\par Neurological: Memory normal, AAO x 3, Cranial nerves II - XII grossly normal\par Psychiatric: Appropriate mood and affect, oriented to time, place, person, and situation\par \par \par

## 2019-08-22 ENCOUNTER — RESULT REVIEW (OUTPATIENT)
Age: 69
End: 2019-08-22

## 2019-08-22 ENCOUNTER — OTHER (OUTPATIENT)
Age: 69
End: 2019-08-22

## 2019-08-27 ENCOUNTER — OTHER (OUTPATIENT)
Age: 69
End: 2019-08-27

## 2019-08-28 ENCOUNTER — RX RENEWAL (OUTPATIENT)
Age: 69
End: 2019-08-28

## 2019-09-04 ENCOUNTER — APPOINTMENT (OUTPATIENT)
Dept: NUCLEAR MEDICINE | Facility: IMAGING CENTER | Age: 69
End: 2019-09-04
Payer: MEDICARE

## 2019-09-04 ENCOUNTER — OUTPATIENT (OUTPATIENT)
Dept: OUTPATIENT SERVICES | Facility: HOSPITAL | Age: 69
LOS: 1 days | End: 2019-09-04
Payer: MEDICARE

## 2019-09-04 DIAGNOSIS — C50.912 MALIGNANT NEOPLASM OF UNSPECIFIED SITE OF LEFT FEMALE BREAST: ICD-10-CM

## 2019-09-04 PROCEDURE — 78815 PET IMAGE W/CT SKULL-THIGH: CPT | Mod: 26,PS

## 2019-09-04 PROCEDURE — 78815 PET IMAGE W/CT SKULL-THIGH: CPT

## 2019-09-04 PROCEDURE — A9552: CPT

## 2019-09-11 ENCOUNTER — RESULT REVIEW (OUTPATIENT)
Age: 69
End: 2019-09-11

## 2019-09-11 ENCOUNTER — APPOINTMENT (OUTPATIENT)
Dept: HEMATOLOGY ONCOLOGY | Facility: CLINIC | Age: 69
End: 2019-09-11
Payer: MEDICARE

## 2019-09-11 VITALS
HEIGHT: 61.02 IN | TEMPERATURE: 98 F | RESPIRATION RATE: 16 BRPM | BODY MASS INDEX: 32.1 KG/M2 | SYSTOLIC BLOOD PRESSURE: 100 MMHG | WEIGHT: 170 LBS | OXYGEN SATURATION: 98 % | HEART RATE: 101 BPM | DIASTOLIC BLOOD PRESSURE: 69 MMHG

## 2019-09-11 PROCEDURE — 99215 OFFICE O/P EST HI 40 MIN: CPT

## 2019-09-11 NOTE — PHYSICAL EXAM
[Restricted in physically strenuous activity but ambulatory and able to carry out work of a light or sedentary nature] : Status 1- Restricted in physically strenuous activity but ambulatory and able to carry out work of a light or sedentary nature, e.g., light house work, office work [Normal] : affect appropriate [de-identified] : left breast scar

## 2019-09-11 NOTE — HISTORY OF PRESENT ILLNESS
[de-identified] : Mrs. Mcdowell is a 68 year old postmenopausal female who is referred by Dr.Alice Demarco for newly diagnosed breast cancer.\par She was found on routine mammography in November 2019 she you have a focal asymmetric density in the left upper outer breast at 1:00 access 8 cm from the nipple. A one year prior had been negative.  Ultrasound-guided core biopsy in November 21, 2018 revealed a grade 1/3 invasive ductal carcinoma ER positive at 98% and AR positive at 90%,Ki-67 was less than 10%. She underwent some left breast lumpectomy and sentinel node dissection on December 19, 2018. Pathology revealed an 8 mm grade 1/3 invasive ductal carcinoma zero of 3 sentinel lymph nodes were involved with tumor. Oncotype DX score was 11. She also underwent genetic testing which is negative.\par  [de-identified] : Mrs. 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  Was started on Midordrine however she feels that it caused reoccurring UTI's

## 2019-09-11 NOTE — REVIEW OF SYSTEMS
[Fatigue] : fatigue [Joint Pain] : joint pain [Muscle Pain] : muscle pain [Dizziness] : dizziness [Anxiety] : anxiety [Muscle Weakness] : muscle weakness [Negative] : Allergic/Immunologic [Fever] : no fever [Eye Pain] : no eye pain [Recent Change In Weight] : ~T no recent weight change [Vision Problems] : no vision problems [Dysphagia] : no dysphagia [Nosebleeds] : no nosebleeds [Chest Pain] : no chest pain [Lower Ext Edema] : no lower extremity edema [Shortness Of Breath] : no shortness of breath [Cough] : no cough [Constipation] : no constipation [Dysuria] : no dysuria [Diarrhea] : no diarrhea [Easy Bleeding] : no tendency for easy bleeding [Depression] : no depression [Easy Bruising] : no tendency for easy bruising [FreeTextEntry9] :  muscle spasms due to parkinsons [FreeTextEntry7] : nausea [de-identified] : tremor

## 2019-09-12 ENCOUNTER — RESULT REVIEW (OUTPATIENT)
Age: 69
End: 2019-09-12

## 2019-09-16 ENCOUNTER — RX RENEWAL (OUTPATIENT)
Age: 69
End: 2019-09-16

## 2019-09-23 ENCOUNTER — APPOINTMENT (OUTPATIENT)
Dept: BREAST CENTER | Facility: CLINIC | Age: 69
End: 2019-09-23
Payer: MEDICARE

## 2019-09-23 ENCOUNTER — RESULT REVIEW (OUTPATIENT)
Age: 69
End: 2019-09-23

## 2019-09-23 VITALS
HEIGHT: 61.02 IN | DIASTOLIC BLOOD PRESSURE: 65 MMHG | HEART RATE: 100 BPM | WEIGHT: 168 LBS | RESPIRATION RATE: 18 BRPM | SYSTOLIC BLOOD PRESSURE: 119 MMHG | BODY MASS INDEX: 31.72 KG/M2 | OXYGEN SATURATION: 96 %

## 2019-09-23 PROCEDURE — 99213 OFFICE O/P EST LOW 20 MIN: CPT

## 2019-09-23 RX ORDER — MIDODRINE HYDROCHLORIDE 5 MG/1
5 TABLET ORAL
Refills: 0 | Status: DISCONTINUED | COMMUNITY
End: 2019-08-29

## 2019-09-23 RX ORDER — ASPIRIN 81 MG
81 TABLET, DELAYED RELEASE (ENTERIC COATED) ORAL DAILY
Refills: 0 | Status: DISCONTINUED | COMMUNITY
End: 2019-09-12

## 2019-09-23 RX ORDER — CARBIDOPA AND LEVODOPA 25; 100 MG/1; MG/1
25-100 TABLET, EXTENDED RELEASE ORAL 3 TIMES DAILY
Refills: 0 | Status: COMPLETED | COMMUNITY
Start: 2018-04-09 | End: 2019-09-23

## 2019-09-23 RX ORDER — SULFAMETHOXAZOLE AND TRIMETHOPRIM 800; 160 MG/1; MG/1
800-160 TABLET ORAL
Qty: 10 | Refills: 0 | Status: DISCONTINUED | COMMUNITY
Start: 2019-08-27 | End: 2019-09-23

## 2019-09-23 RX ORDER — RANITIDINE 150 MG/1
150 TABLET ORAL
Qty: 180 | Refills: 2 | Status: DISCONTINUED | COMMUNITY
End: 2019-09-23

## 2019-09-24 ENCOUNTER — RESULT REVIEW (OUTPATIENT)
Age: 69
End: 2019-09-24

## 2019-09-24 ENCOUNTER — RX RENEWAL (OUTPATIENT)
Age: 69
End: 2019-09-24

## 2019-10-10 ENCOUNTER — RESULT REVIEW (OUTPATIENT)
Age: 69
End: 2019-10-10

## 2019-10-10 ENCOUNTER — APPOINTMENT (OUTPATIENT)
Dept: HEMATOLOGY ONCOLOGY | Facility: CLINIC | Age: 69
End: 2019-10-10
Payer: MEDICARE

## 2019-10-10 VITALS
HEART RATE: 88 BPM | OXYGEN SATURATION: 98 % | RESPIRATION RATE: 16 BRPM | HEIGHT: 61.02 IN | WEIGHT: 164.99 LBS | TEMPERATURE: 98.6 F | DIASTOLIC BLOOD PRESSURE: 77 MMHG | BODY MASS INDEX: 31.15 KG/M2 | SYSTOLIC BLOOD PRESSURE: 126 MMHG

## 2019-10-10 PROCEDURE — 99215 OFFICE O/P EST HI 40 MIN: CPT

## 2019-10-10 NOTE — PHYSICAL EXAM
[Restricted in physically strenuous activity but ambulatory and able to carry out work of a light or sedentary nature] : Status 1- Restricted in physically strenuous activity but ambulatory and able to carry out work of a light or sedentary nature, e.g., light house work, office work [Normal] : affect appropriate [de-identified] : left breast scar

## 2019-10-10 NOTE — ASSESSMENT
[FreeTextEntry1] : 67 yo female referred by Dr. Nicole Demarco for evaluation of breast cancer.\par Patient has Stage 1 breast cancer 8 mm tumor, invasive ductal carcinoma, ER/ CO positive, HER2 not amplified with Oncotype Dx 11. diagnosed November 2018\par \par Patient started on Arimidex - tolerates well.\par Repeated CEA - increased level to 18, with hoarseness of the voice. 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  Side effects, toxicities and benefits reviewed with pt. L4 vertebral body biopsy confirmed met adenoca of the lung.\par \par \par Plan:\par - takes Tagrisso 11 pm, nausea in am \par - Sancuso\par - Xgeva today 10/10/19\par - ongoing weight loss- on Marinol 2.5 mg - didn’t tolerate higher dose, change to dose am and mid day\par - PET/CT- interval decrease of SERGEY nodule, resolution of liver nodule and bone mets.  \par - Thyroid nodule - US thyroid with biopsy done - no malignancy \par - hypotension - responded to change of dose/ meds\par - CBC,Chem,CEA at next visit\par - florineff for hypotension\par   \par

## 2019-10-10 NOTE — HISTORY OF PRESENT ILLNESS
[de-identified] : Mrs. Mcdowell is a 68 year old postmenopausal female who is referred by Dr.Alice Demarco for newly diagnosed breast cancer.\par She was found on routine mammography in November 2019 she you have a focal asymmetric density in the left upper outer breast at 1:00 access 8 cm from the nipple. A one year prior had been negative.  Ultrasound-guided core biopsy in November 21, 2018 revealed a grade 1/3 invasive ductal carcinoma ER positive at 98% and NC positive at 90%,Ki-67 was less than 10%. She underwent some left breast lumpectomy and sentinel node dissection on December 19, 2018. Pathology revealed an 8 mm grade 1/3 invasive ductal carcinoma zero of 3 sentinel lymph nodes were involved with tumor. Oncotype DX score was 11. She also underwent genetic testing which is negative.\par  [de-identified] : Mrs. 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  She continue to have fatigue and left upper leg pain at times, relieved by Tyleno land [place] qhs.

## 2019-10-10 NOTE — REVIEW OF SYSTEMS
[Fatigue] : fatigue [Joint Pain] : joint pain [Muscle Pain] : muscle pain [Dizziness] : dizziness [Anxiety] : anxiety [Muscle Weakness] : muscle weakness [Negative] : Allergic/Immunologic [Fever] : no fever [Recent Change In Weight] : ~T no recent weight change [Eye Pain] : no eye pain [Vision Problems] : no vision problems [Dysphagia] : no dysphagia [Nosebleeds] : no nosebleeds [Lower Ext Edema] : no lower extremity edema [Shortness Of Breath] : no shortness of breath [Chest Pain] : no chest pain [Cough] : no cough [Constipation] : no constipation [Dysuria] : no dysuria [Diarrhea] : no diarrhea [Depression] : no depression [Easy Bruising] : no tendency for easy bruising [Easy Bleeding] : no tendency for easy bleeding [FreeTextEntry9] :  muscle spasms due to parkinsons [de-identified] : tremor [FreeTextEntry7] : nausea

## 2019-11-07 ENCOUNTER — RESULT REVIEW (OUTPATIENT)
Age: 69
End: 2019-11-07

## 2019-11-07 ENCOUNTER — APPOINTMENT (OUTPATIENT)
Dept: HEMATOLOGY ONCOLOGY | Facility: CLINIC | Age: 69
End: 2019-11-07
Payer: MEDICARE

## 2019-11-07 VITALS
WEIGHT: 164.4 LBS | SYSTOLIC BLOOD PRESSURE: 102 MMHG | DIASTOLIC BLOOD PRESSURE: 68 MMHG | OXYGEN SATURATION: 97 % | HEIGHT: 61.02 IN | HEART RATE: 98 BPM | TEMPERATURE: 98.1 F | BODY MASS INDEX: 31.04 KG/M2 | RESPIRATION RATE: 18 BRPM

## 2019-11-07 PROCEDURE — 99214 OFFICE O/P EST MOD 30 MIN: CPT

## 2019-11-08 ENCOUNTER — RX RENEWAL (OUTPATIENT)
Age: 69
End: 2019-11-08

## 2019-11-20 ENCOUNTER — RESULT REVIEW (OUTPATIENT)
Age: 69
End: 2019-11-20

## 2019-11-22 ENCOUNTER — TRANSCRIPTION ENCOUNTER (OUTPATIENT)
Age: 69
End: 2019-11-22

## 2019-11-27 NOTE — REVIEW OF SYSTEMS
[Fatigue] : fatigue [Joint Pain] : joint pain [Muscle Pain] : muscle pain [Dizziness] : dizziness [Anxiety] : anxiety [Muscle Weakness] : muscle weakness [Negative] : Allergic/Immunologic [Fever] : no fever [Recent Change In Weight] : ~T no recent weight change [Eye Pain] : no eye pain [Vision Problems] : no vision problems [Dysphagia] : no dysphagia [Nosebleeds] : no nosebleeds [Chest Pain] : no chest pain [Lower Ext Edema] : no lower extremity edema [Shortness Of Breath] : no shortness of breath [Cough] : no cough [Constipation] : no constipation [Diarrhea] : no diarrhea [Dysuria] : no dysuria [Skin Rash] : no skin rash [Depression] : no depression [Easy Bleeding] : no tendency for easy bleeding [Easy Bruising] : no tendency for easy bruising [FreeTextEntry7] : nausea [FreeTextEntry9] :  muscle spasms due to parkinsons, left upper leg pain. [de-identified] : tremor

## 2019-11-27 NOTE — PHYSICAL EXAM
[Restricted in physically strenuous activity but ambulatory and able to carry out work of a light or sedentary nature] : Status 1- Restricted in physically strenuous activity but ambulatory and able to carry out work of a light or sedentary nature, e.g., light house work, office work [Normal] : affect appropriate [de-identified] : left breast scar

## 2019-11-27 NOTE — ASSESSMENT
[FreeTextEntry1] : 67 yo female referred by Dr. Nicole Demarco for evaluation of breast cancer.\par Patient has Stage 1 breast cancer 8 mm tumor, invasive ductal carcinoma, ER/ MD positive, HER2 not amplified with Oncotype Dx 11. diagnosed November 2018\par \par Patient started on Arimidex - tolerates well.\par Repeated CEA - increased level to 18, with hoarseness of the voice. 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  Side effects, toxicities and benefits reviewed with pt. L4 vertebral body biopsy confirmed met adenoca of the lung.\par \par \par Plan:\par - continue Tagrisso 11 pm, nausea in am \par - cont Sancuso for nausea, Dronabinol, THC, Medical Marijuana.\par - continue Xgeva monthly\par - ongoing weight loss on Marinol 2.5 mg - didn’t tolerate higher dose, change to dose am and mid day\par - PET/CT- interval decrease of SERGEY nodule, resolution of liver nodule and bone mets.  \par - Thyroid nodule - US thyroid with biopsy done - no malignancy \par - hypotension - responded to change of dose/ meds\par - History of present illness, review of systems, physical exam and treatment plan reviewed with . \par . Office visit in 4 weeks or prn for new or worsening symptoms. \par - CBC,Chem,CEA, CA27-29 at next visit\par \par   \par

## 2019-11-27 NOTE — RESULTS/DATA
[FreeTextEntry1] : 11/7/19\par WBC 4.3\par HGB 11.6\par HCT 35.9\par ,000\par Chem WNL\par CEA 10.5 (12.1)\par CA 27-29 13.6

## 2019-11-27 NOTE — HISTORY OF PRESENT ILLNESS
[Therapy: ___] : Therapy: [unfilled] [de-identified] : Mrs. Mcdowell is a 68 year old postmenopausal female who is referred by Dr.Alice Demarco for newly diagnosed breast cancer.\par She was found on routine mammography in November 2019 she you have a focal asymmetric density in the left upper outer breast at 1:00 access 8 cm from the nipple. A one year prior had been negative.  Ultrasound-guided core biopsy in November 21, 2018 revealed a grade 1/3 invasive ductal carcinoma ER positive at 98% and [place] positive at 90%,Ki-67 was less than 10%. She underwent some left breast lumpectomy and sentinel node dissection on December 19, 2018. Pathology revealed an 8 mm grade 1/3 invasive ductal carcinoma zero of 3 sentinel lymph nodes were involved with tumor. Oncotype DX score was 11. She also underwent genetic testing which is negative.\par  [de-identified] : Mrs. 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 she has ahd nausea since week 4. She is using Sancuso patch,Zofran, Dronabinol, THC and Medical Marijuana.  She continues to have fatigue and left upper leg pain at times, relieved by Tyleno land Vic[place] qhs.

## 2019-12-02 ENCOUNTER — APPOINTMENT (OUTPATIENT)
Dept: OBGYN | Facility: CLINIC | Age: 69
End: 2019-12-02
Payer: MEDICARE

## 2019-12-02 VITALS
WEIGHT: 163 LBS | HEIGHT: 60 IN | SYSTOLIC BLOOD PRESSURE: 120 MMHG | BODY MASS INDEX: 32 KG/M2 | DIASTOLIC BLOOD PRESSURE: 78 MMHG

## 2019-12-02 PROCEDURE — G0101: CPT

## 2019-12-05 ENCOUNTER — RESULT REVIEW (OUTPATIENT)
Age: 69
End: 2019-12-05

## 2019-12-05 ENCOUNTER — APPOINTMENT (OUTPATIENT)
Dept: HEMATOLOGY ONCOLOGY | Facility: CLINIC | Age: 69
End: 2019-12-05
Payer: MEDICARE

## 2019-12-05 VITALS
SYSTOLIC BLOOD PRESSURE: 127 MMHG | HEIGHT: 60 IN | HEART RATE: 101 BPM | TEMPERATURE: 98.1 F | BODY MASS INDEX: 32.06 KG/M2 | RESPIRATION RATE: 16 BRPM | OXYGEN SATURATION: 98 % | DIASTOLIC BLOOD PRESSURE: 81 MMHG | WEIGHT: 163.3 LBS

## 2019-12-05 PROCEDURE — 99214 OFFICE O/P EST MOD 30 MIN: CPT

## 2019-12-27 ENCOUNTER — RX RENEWAL (OUTPATIENT)
Age: 69
End: 2019-12-27

## 2019-12-27 NOTE — HISTORY OF PRESENT ILLNESS
[de-identified] : Mrs. Mcdowell is a 68 year old postmenopausal female who is referred by Dr.Alice Demarco for newly diagnosed breast cancer.\par She was found on routine mammography in November 2019 she you have a focal asymmetric density in the left upper outer breast at 1:00 access 8 cm from the nipple. A one year prior had been negative.  Ultrasound-guided core biopsy in November 21, 2018 revealed a grade 1/3 invasive ductal carcinoma ER positive at 98% and [place] positive at 90%,Ki-67 was less than 10%. She underwent some left breast lumpectomy and sentinel node dissection on December 19, 2018. Pathology revealed an 8 mm grade 1/3 invasive ductal carcinoma zero of 3 sentinel lymph nodes were involved with tumor. Oncotype DX score was 11. She also underwent genetic testing which is negative.\par  [de-identified] : Mrs. 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 she has ahd nausea since week 4. She is using Sancuso patch, Dronabinol, THC and Medical Marijuana (very little).  She continues to have fatigue.    \par \par Mammogram 11/2019- no malignancy in either breast

## 2019-12-27 NOTE — ASSESSMENT
[FreeTextEntry1] : 67 yo female referred by Dr. Nicole Demarco for evaluation of breast cancer.\par Patient has Stage 1 breast cancer 8 mm tumor, invasive ductal carcinoma, ER/ CA positive, HER2 not amplified with Oncotype Dx 11. diagnosed November 2018\par \par Patient started on Arimidex - tolerates well.\par Repeated CEA - increased level to 18, with hoarseness of the voice. 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  Side effects, toxicities and benefits reviewed with pt. L4 vertebral body biopsy confirmed met adenoca of the lung.\par \par \par Plan:\par - continue Tagrisso 11 pm, nausea in am \par - cont Sancuso for nausea, Dronabinol, THC, Medical Marijuana.\par - continue Xgeva monthly\par - ongoing weight loss on Marinol 2.5 mg - didn’t tolerate higher dose, change to dose am and mid day\par - PET/CT- interval decrease of SERGEY nodule, resolution of liver nodule and bone mets.  \par - Thyroid nodule - US thyroid with biopsy done - no malignancy \par - hypotension - responded to change of dose/ meds\par -\par . Office visit in 4 weeks or prn for new or worsening symptoms. \par - CBC,Chem,CEA, CA27-29 at next visit\par \par   \par

## 2019-12-27 NOTE — REVIEW OF SYSTEMS
[Fever] : no fever [Eye Pain] : no eye pain [Recent Change In Weight] : ~T no recent weight change [Vision Problems] : no vision problems [Dysphagia] : no dysphagia [Nosebleeds] : no nosebleeds [Lower Ext Edema] : no lower extremity edema [Chest Pain] : no chest pain [Shortness Of Breath] : no shortness of breath [Cough] : no cough [Diarrhea] : no diarrhea [Constipation] : no constipation [Dysuria] : no dysuria [Skin Rash] : no skin rash [Easy Bleeding] : no tendency for easy bleeding [Depression] : no depression [Easy Bruising] : no tendency for easy bruising [FreeTextEntry7] : nausea improved [FreeTextEntry9] :  muscle spasms due to parkinsons, left upper leg pain. [de-identified] : tremor

## 2019-12-27 NOTE — ASSESSMENT
[FreeTextEntry1] : 69 yo female referred by Dr. Nicole Demarco for evaluation of breast cancer.\par Patient has Stage 1 breast cancer 8 mm tumor, invasive ductal carcinoma, ER/ [place] positive, HER2 not amplified with Oncotype Dx 11. diagnosed November 2018\par \par Patient started on Arimidex - tolerates well.\par Repeated CEA - increased level to 18, with hoarseness of the voice. 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  Side effects, toxicities and benefits reviewed with pt. L4 vertebral body biopsy confirmed met adenoca of the lung.\par \par \par Plan:\par - continue Tagrisso 11 pm, nausea in am \par - cont Sancuso for nausea, Dronabinol, THC, Medical Marijuana.\par - continue Xgeva monthly\par - ongoing weight loss on Marinol 2.5 mg - didn’t tolerate higher dose, change to dose am and mid day\par - PET/CT- interval decrease of SERGEY nodule, resolution of liver nodule and bone mets.  \par - Thyroid nodule - US thyroid with biopsy done - no malignancy \par - hypotension - responded to change of dose/ meds\par -\par . Office visit in 4 weeks or prn for new or worsening symptoms. \par - CBC,Chem,CEA, CA27-29 at next visit\par \par   \par

## 2019-12-27 NOTE — REVIEW OF SYSTEMS
[Fatigue] : fatigue [Joint Pain] : joint pain [Muscle Pain] : muscle pain [Dizziness] : dizziness [Anxiety] : anxiety [Muscle Weakness] : muscle weakness [Negative] : Allergic/Immunologic [Fever] : no fever [Recent Change In Weight] : ~T no recent weight change [Eye Pain] : no eye pain [Vision Problems] : no vision problems [Dysphagia] : no dysphagia [Nosebleeds] : no nosebleeds [Lower Ext Edema] : no lower extremity edema [Chest Pain] : no chest pain [Shortness Of Breath] : no shortness of breath [Cough] : no cough [Constipation] : no constipation [Diarrhea] : no diarrhea [Dysuria] : no dysuria [Skin Rash] : no skin rash [Depression] : no depression [Easy Bleeding] : no tendency for easy bleeding [Easy Bruising] : no tendency for easy bruising [FreeTextEntry7] : nausea improved [FreeTextEntry9] :  muscle spasms due to parkinsons, left upper leg pain. [de-identified] : tremor

## 2019-12-27 NOTE — HISTORY OF PRESENT ILLNESS
[Therapy: ___] : Therapy: [unfilled] [de-identified] : Mrs. Mcdowell is a 68 year old postmenopausal female who is referred by Dr.Alice Demraco for newly diagnosed breast cancer.\par She was found on routine mammography in November 2019 she you have a focal asymmetric density in the left upper outer breast at 1:00 access 8 cm from the nipple. A one year prior had been negative.  Ultrasound-guided core biopsy in November 21, 2018 revealed a grade 1/3 invasive ductal carcinoma ER positive at 98% and [place] positive at 90%,Ki-67 was less than 10%. She underwent some left breast lumpectomy and sentinel node dissection on December 19, 2018. Pathology revealed an 8 mm grade 1/3 invasive ductal carcinoma zero of 3 sentinel lymph nodes were involved with tumor. Oncotype DX score was 11. She also underwent genetic testing which is negative.\par  [de-identified] : Mrs. 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 she has ahd nausea since week 4. She is using Sancuso patch, Dronabinol, THC and Medical Marijuana (very little).  She continues to have fatigue.    \par \par Mammogram 11/2019- no malignancy in either breast

## 2019-12-27 NOTE — PHYSICAL EXAM
[Restricted in physically strenuous activity but ambulatory and able to carry out work of a light or sedentary nature] : Status 1- Restricted in physically strenuous activity but ambulatory and able to carry out work of a light or sedentary nature, e.g., light house work, office work [Normal] : grossly intact [de-identified] : left breast scar

## 2020-01-02 ENCOUNTER — RESULT REVIEW (OUTPATIENT)
Age: 70
End: 2020-01-02

## 2020-01-02 ENCOUNTER — APPOINTMENT (OUTPATIENT)
Dept: HEMATOLOGY ONCOLOGY | Facility: CLINIC | Age: 70
End: 2020-01-02
Payer: MEDICARE

## 2020-01-02 VITALS
OXYGEN SATURATION: 97 % | HEIGHT: 60 IN | DIASTOLIC BLOOD PRESSURE: 81 MMHG | WEIGHT: 164 LBS | HEART RATE: 106 BPM | RESPIRATION RATE: 18 BRPM | BODY MASS INDEX: 32.2 KG/M2 | SYSTOLIC BLOOD PRESSURE: 127 MMHG | TEMPERATURE: 98.5 F

## 2020-01-02 PROCEDURE — 99213 OFFICE O/P EST LOW 20 MIN: CPT

## 2020-01-02 NOTE — REVIEW OF SYSTEMS
[Fatigue] : fatigue [Joint Pain] : joint pain [Muscle Pain] : muscle pain [Anxiety] : anxiety [Muscle Weakness] : muscle weakness [Negative] : Allergic/Immunologic [Eye Pain] : no eye pain [Fever] : no fever [Recent Change In Weight] : ~T no recent weight change [Dysphagia] : no dysphagia [Vision Problems] : no vision problems [Nosebleeds] : no nosebleeds [Chest Pain] : no chest pain [Shortness Of Breath] : no shortness of breath [Lower Ext Edema] : no lower extremity edema [Cough] : no cough [Diarrhea] : no diarrhea [Constipation] : no constipation [Dysuria] : no dysuria [Skin Rash] : no skin rash [Depression] : no depression [Easy Bleeding] : no tendency for easy bleeding [Easy Bruising] : no tendency for easy bruising [FreeTextEntry7] : nausea improved [FreeTextEntry9] :  muscle spasms due to parkinsons, left upper leg pain. [de-identified] : tremor

## 2020-01-02 NOTE — HISTORY OF PRESENT ILLNESS
[Therapy: ___] : Therapy: [unfilled] [de-identified] : Mrs. 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 Her appetite is ok with no recent weight loss but has lost of taste. Pt still takes Vicodin prn for pain. She's going away to [email or web address] and [place] next week and would like to have restaging scan in February. She's currently enrolling in spinning class twice a week to help with her Parkinson. Patient denies vomiting, SOB/GONZALEZ, bleeding, fluid retention, fever, headaches, chest pain.\par \par Mammogram 11/2019- no malignancy in either breast [de-identified] : Mrs. Mcdowell is a 68 year old postmenopausal female who is referred by Dr.Alice Demarco for newly diagnosed breast cancer.\par She was found on routine mammography in November 2019 she you have a focal asymmetric density in the left upper outer breast at 1:00 access 8 cm from the nipple. A one year prior had been negative.  Ultrasound-guided core biopsy in November 21, 2018 revealed a grade 1/3 invasive ductal carcinoma ER positive at 98% and ND positive at 90%,Ki-67 was less than 10%. She underwent some left breast lumpectomy and sentinel node dissection on December 19, 2018. Pathology revealed an 8 mm grade 1/3 invasive ductal carcinoma zero of 3 sentinel lymph nodes were involved with tumor. Oncotype DX score was 11. She also underwent genetic testing which is negative.\par

## 2020-01-02 NOTE — ASSESSMENT
[FreeTextEntry1] : 67 yo female referred by Dr. Nicole Demarco for evaluation of breast cancer.\par Patient has Stage 1 breast cancer 8 mm tumor, invasive ductal carcinoma, ER/ [place] positive, HER2 not amplified with Oncotype Dx 11. diagnosed November 2018\par \par Patient started on Arimidex - tolerates well.\par Repeated CEA - increased level to 18, with hoarseness of the voice. 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  Side effects, toxicities and benefits reviewed with pt. L4 vertebral body biopsy confirmed met adenoca of the lung.\par \par \par Plan:\par - continue Tagrisso 11 pm, nausea in am \par - Nasuea is controlled with  Sancuso and Dronabinol 2.5m bid for nausea\par - continue Xgeva monthly\par - PET/CT- interval decrease of SERGEY nodule, resolution of liver nodule and bone mets.  - new restaging PET scan in Feb 2020\par - Thyroid nodule - US thyroid with biopsy done - no malignancy \par - hypotension - responded to change of dose/ meds\par \par neoplasm pain - continue with Vicodin prn\par \par . Office visit in 4 weeks or prn for new or worsening symptoms. \par - CBC,Chem,CEA, CA27-29 at next visit\par \par   \par

## 2020-01-02 NOTE — PHYSICAL EXAM
[Restricted in physically strenuous activity but ambulatory and able to carry out work of a light or sedentary nature] : Status 1- Restricted in physically strenuous activity but ambulatory and able to carry out work of a light or sedentary nature, e.g., light house work, office work [Normal] : grossly intact [de-identified] : left breast scar

## 2020-01-22 ENCOUNTER — APPOINTMENT (OUTPATIENT)
Dept: ENDOCRINOLOGY | Facility: CLINIC | Age: 70
End: 2020-01-22
Payer: MEDICARE

## 2020-01-22 VITALS
OXYGEN SATURATION: 98 % | DIASTOLIC BLOOD PRESSURE: 70 MMHG | BODY MASS INDEX: 32 KG/M2 | SYSTOLIC BLOOD PRESSURE: 120 MMHG | WEIGHT: 163 LBS | HEART RATE: 96 BPM | HEIGHT: 60 IN

## 2020-01-22 PROCEDURE — 99214 OFFICE O/P EST MOD 30 MIN: CPT

## 2020-01-22 RX ORDER — MIDODRINE HYDROCHLORIDE 2.5 MG/1
2.5 TABLET ORAL
Refills: 0 | Status: DISCONTINUED | COMMUNITY
Start: 2019-11-07 | End: 2020-01-22

## 2020-01-29 ENCOUNTER — RESULT REVIEW (OUTPATIENT)
Age: 70
End: 2020-01-29

## 2020-01-29 ENCOUNTER — APPOINTMENT (OUTPATIENT)
Dept: HEMATOLOGY ONCOLOGY | Facility: CLINIC | Age: 70
End: 2020-01-29
Payer: MEDICARE

## 2020-01-29 VITALS
HEART RATE: 97 BPM | HEIGHT: 60 IN | OXYGEN SATURATION: 97 % | RESPIRATION RATE: 16 BRPM | BODY MASS INDEX: 32 KG/M2 | SYSTOLIC BLOOD PRESSURE: 134 MMHG | WEIGHT: 162.99 LBS | DIASTOLIC BLOOD PRESSURE: 83 MMHG | TEMPERATURE: 98 F

## 2020-01-29 PROCEDURE — 99214 OFFICE O/P EST MOD 30 MIN: CPT

## 2020-01-30 LAB
T4 FREE SERPL-MCNC: 1.4 NG/DL
THYROGLOB AB SERPL-ACNC: 21.9 IU/ML
THYROPEROXIDASE AB SERPL IA-ACNC: <10 IU/ML
TSH SERPL-ACNC: 1.46 UIU/ML

## 2020-01-30 NOTE — HISTORY OF PRESENT ILLNESS
[FreeTextEntry1] : \par Ms. 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

## 2020-01-30 NOTE — REVIEW OF SYSTEMS
[Fatigue] : fatigue [Decreased Appetite] : ~L decreased appetite [Recent Weight Loss (___ Lbs)] : recent [unfilled] ~Ulb weight loss [Dysphonia] : dysphonia [Palpitations] : palpitations [SOB on Exertion] : shortness of breath during exertion [Nausea] : nausea [Constipation] : constipation [Joint Pain] : joint pain [Muscle Cramps] : muscle cramps [Insomnia] : insomnia [Negative] : Heme/Lymph [Dysphagia] : no dysphagia [Neck Pain] : no neck pain [Chest Pain] : no chest pain [Cough] : no cough [Vomiting] : no vomiting was observed [FreeTextEntry2] : insomnia  [FreeTextEntry3] : floaters sees Dr Siddiqui  [FreeTextEntry5] : sees Dr Hdez , leg cramps  [FreeTextEntry7] : colonoscopy 2/2018 [FreeTextEntry8] : polyuria and urgency  [FreeTextEntry9] : had epidural 7/19 [de-identified] : hair loss

## 2020-02-03 ENCOUNTER — APPOINTMENT (OUTPATIENT)
Dept: NUCLEAR MEDICINE | Facility: IMAGING CENTER | Age: 70
End: 2020-02-03
Payer: MEDICARE

## 2020-02-03 ENCOUNTER — OUTPATIENT (OUTPATIENT)
Dept: OUTPATIENT SERVICES | Facility: HOSPITAL | Age: 70
LOS: 1 days | End: 2020-02-03
Payer: MEDICARE

## 2020-02-03 DIAGNOSIS — C34.90 MALIGNANT NEOPLASM OF UNSPECIFIED PART OF UNSPECIFIED BRONCHUS OR LUNG: ICD-10-CM

## 2020-02-03 PROCEDURE — 78815 PET IMAGE W/CT SKULL-THIGH: CPT

## 2020-02-03 PROCEDURE — 78815 PET IMAGE W/CT SKULL-THIGH: CPT | Mod: 26,PS,KX

## 2020-02-03 PROCEDURE — A9552: CPT

## 2020-02-06 NOTE — REVIEW OF SYSTEMS
[Fatigue] : fatigue [Joint Pain] : joint pain [Anxiety] : anxiety [Muscle Pain] : muscle pain [Muscle Weakness] : muscle weakness [Negative] : Allergic/Immunologic [Fever] : no fever [Vision Problems] : no vision problems [Eye Pain] : no eye pain [Recent Change In Weight] : ~T no recent weight change [Nosebleeds] : no nosebleeds [Dysphagia] : no dysphagia [Lower Ext Edema] : no lower extremity edema [Chest Pain] : no chest pain [Shortness Of Breath] : no shortness of breath [Cough] : no cough [Diarrhea] : no diarrhea [Constipation] : no constipation [Skin Rash] : no skin rash [Depression] : no depression [Dysuria] : no dysuria [Easy Bleeding] : no tendency for easy bleeding [Easy Bruising] : no tendency for easy bruising [FreeTextEntry9] :  muscle spasms due to parkinsons, left upper leg pain. [FreeTextEntry7] : nausea improved [de-identified] : tremor

## 2020-02-06 NOTE — REASON FOR VISIT
[Spouse] : spouse [Follow-Up Visit] : a follow-up [FreeTextEntry2] : breast cancer, EGFR mutated adenocarcinoma of the lung.

## 2020-02-06 NOTE — HISTORY OF PRESENT ILLNESS
[Therapy: ___] : Therapy: [unfilled] [de-identified] : Mrs. Mcdowell is a 68 year old postmenopausal female who is referred by Dr.Alice Demarco for newly diagnosed breast cancer.\par She was found on routine mammography in November 2019 she you have a focal asymmetric density in the left upper outer breast at 1:00 access 8 cm from the nipple. A one year prior had been negative.  Ultrasound-guided core biopsy in November 21, 2018 revealed a grade 1/3 invasive ductal carcinoma ER positive at 98% and [place] positive at 90%,Ki-67 was less than 10%. She underwent some left breast lumpectomy and sentinel node dissection on December 19, 2018. Pathology revealed an 8 mm grade 1/3 invasive ductal carcinoma zero of 3 sentinel lymph nodes were involved with tumor. Oncotype DX score was 11. She also underwent genetic testing which is negative.\par  [de-identified] : Mrs. 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 Pt is accompanied by spouse reports of doing well with nausea controlled with Marinol and Sancuso patch. Pt is now in spinning class for Parkinson's twice a week, appetite good with no recent weight loss. She followed up with endocrinologst 1/24/20 with WNLs TSH and to have another thyroid ultrasound in 3/2019. Pt has stopped taking Midodrine x 1 week due to it causing severe b/l leg pain and rash on abdomen. Her BP has been good, running around 90s/60s with no hypotension signs or symptoms. She's to scheduled for restaging scan next week 2/3/20.\par \par Mammogram 11/2019- no malignancy in either breast

## 2020-02-06 NOTE — PHYSICAL EXAM
[Restricted in physically strenuous activity but ambulatory and able to carry out work of a light or sedentary nature] : Status 1- Restricted in physically strenuous activity but ambulatory and able to carry out work of a light or sedentary nature, e.g., light house work, office work [Normal] : affect appropriate [de-identified] : left breast scar

## 2020-02-06 NOTE — ASSESSMENT
[FreeTextEntry1] : 68 yo female referred by Dr. Nicole Demarco for evaluation of breast cancer.\par Patient has Stage 1 breast cancer 8 mm tumor, invasive ductal carcinoma, ER/ [place] positive, HER2 not amplified with Oncotype Dx 11. diagnosed November 2018\par \par #Adenocarcinoma of lung\par -Repeated CEA -plataue at 10 (started out at 26.6)\par -CT scan showed mass 1.8 x 1.4 x 1.7 cm lesion.\par -Biopsy of lung nodule c/w adeno ca of the lung, EGFR mutated, T190 mutated, started on Tagrisso.  Side effects, -toxicities and benefits reviewed with pt. L4 vertebral body biopsy confirmed met adenoca of the lung.\par \par #Invasive Ductal Carcinoma - ER/[place] positive, HER 2 not amplified\par -dx 11/2018\par -continue with Anastrazole\par \par #benign thyroid nodule - followed by Dr. Park.  [facility] TSH, new thyroid ultrasound in 3/20\par \par Plan:\par - continue Tagrisso 11 pm, nausea in am \par - Nasuea is controlled with  Sancuso and Dronabinol 2.5m bid for nausea\par - continue Xgeva monthly\par - PET/CT- interval decrease of SERGEY nodule, resolution of liver nodule and bone mets.  - new restaging PET scan in Feb 3, 2020\par - hypotension - self d/c Midodrine due to b/l lower legs pain and rash - hypotension symptoms advised. to follow up with cardiologist.\par \par neoplasm pain - continue with Vicodin prn\par \par case and management discussed with Dr. Shaffer\par . Office visit in 4 weeks or prn for new or worsening symptoms. \par - CBC,Chem,CEA, CA27-29 at next visit\par \par   \par

## 2020-02-26 ENCOUNTER — RESULT REVIEW (OUTPATIENT)
Age: 70
End: 2020-02-26

## 2020-02-26 ENCOUNTER — APPOINTMENT (OUTPATIENT)
Dept: HEMATOLOGY ONCOLOGY | Facility: CLINIC | Age: 70
End: 2020-02-26
Payer: MEDICARE

## 2020-02-26 VITALS
OXYGEN SATURATION: 100 % | TEMPERATURE: 98.1 F | HEIGHT: 60 IN | SYSTOLIC BLOOD PRESSURE: 110 MMHG | HEART RATE: 92 BPM | DIASTOLIC BLOOD PRESSURE: 73 MMHG | WEIGHT: 162 LBS | RESPIRATION RATE: 20 BRPM | BODY MASS INDEX: 31.8 KG/M2

## 2020-02-26 PROCEDURE — 99215 OFFICE O/P EST HI 40 MIN: CPT

## 2020-02-26 NOTE — HISTORY OF PRESENT ILLNESS
[Therapy: ___] : Therapy: [unfilled] [de-identified] : Mrs. Mcdowell is a 68 year old postmenopausal female who is referred by Dr.Alice Demarco for newly diagnosed breast cancer.\par She was found on routine mammography in November 2019 she you have a focal asymmetric density in the left upper outer breast at 1:00 access 8 cm from the nipple. A one year prior had been negative.  Ultrasound-guided core biopsy in November 21, 2018 revealed a grade 1/3 invasive ductal carcinoma ER positive at 98% and [place] positive at 90%,Ki-67 was less than 10%. She underwent some left breast lumpectomy and sentinel node dissection on December 19, 2018. Pathology revealed an 8 mm grade 1/3 invasive ductal carcinoma zero of 3 sentinel lymph nodes were involved with tumor. Oncotype DX score was 11. She also underwent genetic testing which is negative.\par  [de-identified] : Mrs. 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 \par Nausea is stable- Dronabinol BID and Sancuso patch working, pain improved as well.  \par \par \par Had PET CT 2/3/2020-

## 2020-02-26 NOTE — REVIEW OF SYSTEMS
[Fatigue] : fatigue [Joint Pain] : joint pain [Muscle Pain] : muscle pain [Anxiety] : anxiety [Muscle Weakness] : muscle weakness [Negative] : Allergic/Immunologic [Fever] : no fever [Recent Change In Weight] : ~T no recent weight change [Eye Pain] : no eye pain [Vision Problems] : no vision problems [Dysphagia] : no dysphagia [Nosebleeds] : no nosebleeds [Chest Pain] : no chest pain [Lower Ext Edema] : no lower extremity edema [Shortness Of Breath] : no shortness of breath [Cough] : no cough [Constipation] : no constipation [Diarrhea] : no diarrhea [Dysuria] : no dysuria [Skin Rash] : no skin rash [Depression] : no depression [Easy Bleeding] : no tendency for easy bleeding [Easy Bruising] : no tendency for easy bruising [FreeTextEntry7] : nausea improved [FreeTextEntry9] :  muscle spasms due to parkinsons, left upper leg pain. [de-identified] : tremor

## 2020-02-26 NOTE — ASSESSMENT
[FreeTextEntry1] : 68 yo female referred by Dr. Nicole Demarco\par \par # Stage 1 breast cancer 8 mm tumor, invasive ductal carcinoma, ER/ [place] positive, HER2 not amplified with Oncotype Dx 11. diagnosed November 2018\par -continue with Anastrazole\par \par #Adenocarcinoma of lung\par -Repeated CEA -plateau at 10 (started out at 26.6)\par -CT scan showed mass 1.8 x 1.4 x 1.7 cm lesion.\par -Biopsy of lung nodule c/w adeno ca of the lung, EGFR mutated, T190 mutated, started on Tagrisso.  Side effects, -toxicities and benefits reviewed with pt. L4 vertebral body biopsy confirmed met adenoca of the lung.\par - PETCT  2/3/2020- decreased FDG uptake in promary lesion, sclerotic lesions in bones, thyroid nodule activity- under surveillance, duodenal uptake \par \par #benign thyroid nodule - followed by Dr. Park.  [facility] TSH, new thyroid ultrasound in 3/20\par \par # constipation- try lactulose\par \par Plan:\par - continue Tagrisso 11 pm, nausea in am \par - Nausea is controlled with  Sancuso and Dronabinol 2.5m bid for nausea\par - continue Xgeva monthly\par - PET/CT- interval decrease of SERGEY nodule, resolution of liver nodule and bone mets.  - new restaging PET scan in Feb 3, 2020\par - hypotension - self d/c Midodrine due to b/l lower legs pain and rash - hypotension symptoms advised. to follow up with cardiologist.\par \par neoplasm pain - continue with Vicodin prn\par \par . Office visit in 4 weeks or prn for new or worsening symptoms. \par - CBC,Chem,CEA, CA27-29 at next visit\par \par   \par

## 2020-02-26 NOTE — PHYSICAL EXAM
[Restricted in physically strenuous activity but ambulatory and able to carry out work of a light or sedentary nature] : Status 1- Restricted in physically strenuous activity but ambulatory and able to carry out work of a light or sedentary nature, e.g., light house work, office work [Normal] : grossly intact [de-identified] : left breast scar

## 2020-03-02 ENCOUNTER — RESULT REVIEW (OUTPATIENT)
Age: 70
End: 2020-03-02

## 2020-03-11 ENCOUNTER — APPOINTMENT (OUTPATIENT)
Dept: ENDOCRINOLOGY | Facility: CLINIC | Age: 70
End: 2020-03-11
Payer: MEDICARE

## 2020-03-11 VITALS
DIASTOLIC BLOOD PRESSURE: 70 MMHG | BODY MASS INDEX: 31.8 KG/M2 | SYSTOLIC BLOOD PRESSURE: 110 MMHG | HEART RATE: 88 BPM | WEIGHT: 162 LBS | OXYGEN SATURATION: 99 % | HEIGHT: 60 IN

## 2020-03-11 PROCEDURE — 99214 OFFICE O/P EST MOD 30 MIN: CPT

## 2020-03-11 NOTE — REVIEW OF SYSTEMS
[Fatigue] : fatigue [Decreased Appetite] : ~L decreased appetite [Recent Weight Loss (___ Lbs)] : recent [unfilled] ~Ulb weight loss [Dysphonia] : dysphonia [Palpitations] : palpitations [SOB on Exertion] : shortness of breath during exertion [Nausea] : nausea [Constipation] : constipation [Joint Pain] : joint pain [Muscle Cramps] : muscle cramps [Insomnia] : insomnia [Negative] : Heme/Lymph [Dysphagia] : no dysphagia [Neck Pain] : no neck pain [Chest Pain] : no chest pain [Cough] : no cough [Vomiting] : no vomiting was observed [FreeTextEntry2] : insomnia  [FreeTextEntry3] : floaters sees Dr Siddiqui  [FreeTextEntry5] : sees Dr Hdez , leg cramps  [FreeTextEntry7] : colonoscopy 2/2018 [FreeTextEntry8] : polyuria and urgency  [FreeTextEntry9] : had epidural 7/19 [de-identified] : hair loss

## 2020-03-11 NOTE — HISTORY OF PRESENT ILLNESS
[FreeTextEntry1] : Ms. 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

## 2020-03-18 NOTE — HISTORY OF PRESENT ILLNESS
[de-identified] : Mrs. Mcdowell is a 68 year old postmenopausal female who is referred by Dr.Alice Demarco for newly diagnosed breast cancer.\par She was found on routine mammography in November 2019 she you have a focal asymmetric density in the left upper outer breast at 1:00 access 8 cm from the nipple. A one year prior had been negative.  Ultrasound-guided core biopsy in November 21, 2018 revealed a grade 1/3 invasive ductal carcinoma ER positive at 98% and AK positive at 90%,Ki-67 was less than 10%. She underwent some left breast lumpectomy and sentinel node dissection on December 19, 2018. Pathology revealed an 8 mm grade 1/3 invasive ductal carcinoma zero of 3 sentinel lymph nodes were involved with tumor. Oncotype DX score was 11. She also underwent genetic testing which is negative.\par  [Therapy: ___] : Therapy: [unfilled] [de-identified] : Mrs. 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 \par Nausea is stable- Dronabinol BID and Sancuso patch working, pain improved as well.  \par \par \par Had PET CT 2/3/2020-

## 2020-03-18 NOTE — PHYSICAL EXAM
[Restricted in physically strenuous activity but ambulatory and able to carry out work of a light or sedentary nature] : Status 1- Restricted in physically strenuous activity but ambulatory and able to carry out work of a light or sedentary nature, e.g., light house work, office work [Normal] : affect appropriate [de-identified] : left breast scar

## 2020-03-18 NOTE — REVIEW OF SYSTEMS
[Fever] : no fever [Fatigue] : fatigue [Recent Change In Weight] : ~T no recent weight change [Eye Pain] : no eye pain [Vision Problems] : no vision problems [Dysphagia] : no dysphagia [Nosebleeds] : no nosebleeds [Chest Pain] : no chest pain [Lower Ext Edema] : no lower extremity edema [Shortness Of Breath] : no shortness of breath [Cough] : no cough [Constipation] : no constipation [Diarrhea] : no diarrhea [Dysuria] : no dysuria [Joint Pain] : joint pain [Muscle Pain] : muscle pain [Skin Rash] : no skin rash [Anxiety] : anxiety [Depression] : no depression [Muscle Weakness] : muscle weakness [Easy Bleeding] : no tendency for easy bleeding [Easy Bruising] : no tendency for easy bruising [Negative] : Allergic/Immunologic [FreeTextEntry7] : nausea improved [FreeTextEntry9] :  muscle spasms due to parkinsons, left upper leg pain. [de-identified] : tremor

## 2020-03-18 NOTE — ASSESSMENT
[FreeTextEntry1] : 68 yo female referred by Dr. Nicole Demarco\par \par # Stage 1 breast cancer 8 mm tumor, invasive ductal carcinoma, ER/ [place] positive, HER2 not amplified with Oncotype Dx 11. diagnosed November 2018\par -continue with Anastrazole\par \par #Adenocarcinoma of lung\par -Repeated CEA -plateau at 10 (started out at 26.6)\par -CT scan showed mass 1.8 x 1.4 x 1.7 cm lesion.\par -Biopsy of lung nodule c/w adeno ca of the lung, EGFR mutated, T190 mutated, started on Tagrisso.  Side effects, -toxicities and benefits reviewed with pt. L4 vertebral body biopsy confirmed met adenoca of the lung.\par - PETCT  2/3/2020- decreased FDG uptake in promary lesion, sclerotic lesions in bones, thyroid nodule activity- under surveillance, duodenal uptake \par \par #benign thyroid nodule - followed by Dr. Park.  [facility] TSH, new thyroid ultrasound in 3/20\par \par # constipation- try lactulose\par \par Plan:\par - continue Tagrisso 11 pm, nausea in am \par - Nausea is controlled with  Sancuso and Dronabinol 2.5m bid for nausea\par - continue Xgeva monthly\par - PET/CT- interval decrease of SERGEY nodule, resolution of liver nodule and bone mets.  - new restaging PET scan in Feb 3, 2020\par - hypotension - self d/c Midodrine due to b/l lower legs pain and rash - hypotension symptoms advised. to follow up with cardiologist.\par \par neoplasm pain - continue with Vicodin prn\par \par . Office visit in 4 weeks or prn for new or worsening symptoms. \par - CBC,Chem,CEA, CA27-29 at next visit\par \par   \par

## 2020-03-25 ENCOUNTER — APPOINTMENT (OUTPATIENT)
Dept: HEMATOLOGY ONCOLOGY | Facility: CLINIC | Age: 70
End: 2020-03-25
Payer: MEDICARE

## 2020-04-22 ENCOUNTER — RESULT REVIEW (OUTPATIENT)
Age: 70
End: 2020-04-22

## 2020-04-22 ENCOUNTER — APPOINTMENT (OUTPATIENT)
Dept: HEMATOLOGY ONCOLOGY | Facility: CLINIC | Age: 70
End: 2020-04-22
Payer: MEDICARE

## 2020-04-22 VITALS
RESPIRATION RATE: 18 BRPM | BODY MASS INDEX: 31.61 KG/M2 | OXYGEN SATURATION: 99 % | HEIGHT: 60 IN | DIASTOLIC BLOOD PRESSURE: 70 MMHG | TEMPERATURE: 97.7 F | HEART RATE: 83 BPM | WEIGHT: 161 LBS | SYSTOLIC BLOOD PRESSURE: 119 MMHG

## 2020-04-22 PROCEDURE — 99215 OFFICE O/P EST HI 40 MIN: CPT

## 2020-04-22 NOTE — REVIEW OF SYSTEMS
[Fatigue] : fatigue [Muscle Pain] : muscle pain [Joint Pain] : joint pain [Anxiety] : anxiety [Muscle Weakness] : muscle weakness [Negative] : Heme/Lymph [Fever] : no fever [Recent Change In Weight] : ~T no recent weight change [Eye Pain] : no eye pain [Vision Problems] : no vision problems [Dysphagia] : no dysphagia [Nosebleeds] : no nosebleeds [Lower Ext Edema] : no lower extremity edema [Chest Pain] : no chest pain [Shortness Of Breath] : no shortness of breath [Constipation] : no constipation [Cough] : no cough [Diarrhea] : no diarrhea [Skin Rash] : no skin rash [Dysuria] : no dysuria [Depression] : no depression [Easy Bleeding] : no tendency for easy bleeding [Easy Bruising] : no tendency for easy bruising [FreeTextEntry7] : nausea improved [FreeTextEntry9] :  muscle spasms due to parkinsons, left upper leg pain. [de-identified] : tremor

## 2020-04-22 NOTE — HISTORY OF PRESENT ILLNESS
[Therapy: ___] : Therapy: [unfilled] [de-identified] : Mrs. Mcdowell is a 68 year old postmenopausal female who is referred by Dr.Alice Demarco for newly diagnosed breast cancer.\par She was found on routine mammography in November 2019 she you have a focal asymmetric density in the left upper outer breast at 1:00 access 8 cm from the nipple. A one year prior had been negative.  Ultrasound-guided core biopsy in November 21, 2018 revealed a grade 1/3 invasive ductal carcinoma ER positive at 98% and NC positive at 90%,Ki-67 was less than 10%. She underwent some left breast lumpectomy and sentinel node dissection on December 19, 2018. Pathology revealed an 8 mm grade 1/3 invasive ductal carcinoma zero of 3 sentinel lymph nodes were involved with tumor. Oncotype DX score was 11. She also underwent genetic testing which is negative.\par  [de-identified] : Mrs. 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 \par Nausea is stable- Dronabinol BID and Sancuso patch working, pain improved as well.  \par \par \par Had PET CT 2/3/2020-

## 2020-04-22 NOTE — PHYSICAL EXAM
[Restricted in physically strenuous activity but ambulatory and able to carry out work of a light or sedentary nature] : Status 1- Restricted in physically strenuous activity but ambulatory and able to carry out work of a light or sedentary nature, e.g., light house work, office work [Normal] : affect appropriate [de-identified] : left breast scar

## 2020-04-22 NOTE — ASSESSMENT
[FreeTextEntry1] : 70 yo female referred by Dr. Nicole Demarco\par \par # Stage 1 breast cancer 8 mm tumor, invasive ductal carcinoma, ER/ [place] positive, HER2 not amplified with Oncotype Dx 11. diagnosed November 2018\par -continue with Anastrazole\par \par #Adenocarcinoma of lung- stage 4 diagnosed  April 2019\par -Repeated CEA -plateau at 11.5 (started out at 26.6)\par -CT scan showed mass 1.8 x 1.4 x 1.7 cm lesion.\par -Biopsy of lung nodule c/w adeno ca of the lung, EGFR mutated, T190 mutated, started on Tagrisso.  Side effects, -toxicities and benefits reviewed with pt. L4 vertebral body biopsy confirmed met adenoca of the lung.\par - PETCT  2/3/2020- decreased FDG uptake in primary lesion, sclerotic lesions in bones, thyroid nodule activity- under surveillance, duodenal uptake \par \par #benign thyroid nodule - followed by Dr. Park.  [facility] TSH, new thyroid ultrasound in 3/20\par \par # constipation- try lactulose\par \par Plan:\par - continue Tagrisso 11 pm, nausea in am \par - Nausea is controlled with  Sancuso and Dronabinol 2.5m bid for nausea\par - continue Xgeva monthly\par \par neoplasm pain - continue with Vicodin prn\par \par . Office visit in 4 weeks or prn for new or worsening symptoms. \par - CBC,Chem,CEA, CA27-29 at next visit\par \par   \par

## 2020-06-02 ENCOUNTER — APPOINTMENT (OUTPATIENT)
Dept: CARDIOLOGY | Facility: CLINIC | Age: 70
End: 2020-06-02
Payer: MEDICARE

## 2020-06-02 VITALS
SYSTOLIC BLOOD PRESSURE: 104 MMHG | BODY MASS INDEX: 30.82 KG/M2 | HEIGHT: 60 IN | DIASTOLIC BLOOD PRESSURE: 68 MMHG | HEART RATE: 96 BPM | WEIGHT: 157 LBS

## 2020-06-02 VITALS — SYSTOLIC BLOOD PRESSURE: 102 MMHG | DIASTOLIC BLOOD PRESSURE: 70 MMHG

## 2020-06-02 PROCEDURE — 99214 OFFICE O/P EST MOD 30 MIN: CPT

## 2020-06-02 PROCEDURE — 93000 ELECTROCARDIOGRAM COMPLETE: CPT

## 2020-06-02 RX ORDER — METHYLPREDNISOLONE 4 MG/1
4 TABLET ORAL
Qty: 30 | Refills: 0 | Status: DISCONTINUED | COMMUNITY
Start: 2020-04-23 | End: 2020-06-02

## 2020-06-02 RX ORDER — ONDANSETRON 8 MG/1
8 TABLET, ORALLY DISINTEGRATING ORAL
Qty: 30 | Refills: 2 | Status: DISCONTINUED | COMMUNITY
Start: 2019-08-01 | End: 2020-06-02

## 2020-06-02 RX ORDER — DICLOFENAC SODIUM 10 MG/G
1 GEL TOPICAL
Qty: 100 | Refills: 1 | Status: DISCONTINUED | COMMUNITY
Start: 2019-07-16 | End: 2020-06-02

## 2020-06-02 NOTE — REVIEW OF SYSTEMS
[Loss Of Hearing] : hearing loss [Easy Bruising] : a tendency for easy bruising [Negative] : Heme/Lymph [FreeTextEntry1] : constipation

## 2020-06-02 NOTE — REASON FOR VISIT
[Follow-Up - Clinic] : a clinic follow-up of [FreeTextEntry2] : 1 year [FreeTextEntry1] : sinus tachycardia

## 2020-06-02 NOTE — HISTORY OF PRESENT ILLNESS
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  She was also followed for small pericardial effusion.Unfortunately this year she was diagnosed with breast cancer   left lumpectomy with intraop RT and arimadex and was found to have adenocarcinoma  lung cancer with bony mets. \par She has lost 30 lbs, she has been hypotensive and was begun on midodrine which she  could not tolerate, then the fludrocortisone which "didn't work".

## 2020-06-08 ENCOUNTER — APPOINTMENT (OUTPATIENT)
Dept: BREAST CENTER | Facility: CLINIC | Age: 70
End: 2020-06-08
Payer: MEDICARE

## 2020-06-08 PROCEDURE — 99213 OFFICE O/P EST LOW 20 MIN: CPT

## 2020-06-09 ENCOUNTER — RESULT REVIEW (OUTPATIENT)
Age: 70
End: 2020-06-09

## 2020-06-17 ENCOUNTER — RESULT REVIEW (OUTPATIENT)
Age: 70
End: 2020-06-17

## 2020-06-17 ENCOUNTER — APPOINTMENT (OUTPATIENT)
Dept: HEMATOLOGY ONCOLOGY | Facility: CLINIC | Age: 70
End: 2020-06-17
Payer: MEDICARE

## 2020-06-17 VITALS
HEIGHT: 60 IN | TEMPERATURE: 97.5 F | BODY MASS INDEX: 31.02 KG/M2 | OXYGEN SATURATION: 98 % | WEIGHT: 158 LBS | SYSTOLIC BLOOD PRESSURE: 129 MMHG | HEART RATE: 96 BPM | RESPIRATION RATE: 18 BRPM | DIASTOLIC BLOOD PRESSURE: 78 MMHG

## 2020-06-17 PROCEDURE — 99215 OFFICE O/P EST HI 40 MIN: CPT

## 2020-06-22 NOTE — ASSESSMENT
[FreeTextEntry1] : 70 yo female referred by Dr. Nicole Demarco\par \par # Stage 1 breast cancer 8 mm tumor, invasive ductal carcinoma, ER/ [place] positive, HER2 not amplified with Oncotype Dx 11. diagnosed November 2018\par -continue with Anastrazole\par \par #Adenocarcinoma of lung- stage 4 diagnosed  April 2019\par -Repeated CEA -plateau at 11.5 (started out at 26.6)\par -CT scan showed mass 1.8 x 1.4 x 1.7 cm lesion.\par -Biopsy of lung nodule c/w adeno ca of the lung, EGFR mutated, T190 mutated, started on Tagrisso.  Side effects, -toxicities and benefits reviewed with pt. L4 vertebral body biopsy confirmed met adenoca of the lung.\par - PETCT  2/3/2020- decreased FDG uptake in primary lesion, sclerotic lesions in bones, thyroid nodule activity- under surveillance, duodenal uptake \par - plan for PETCT [facility]- August\par \par #benign thyroid nodule - followed by Dr. Park.  [facility] TSH, new thyroid ultrasound in 3/20\par \par # constipation- try lactulose\par \par Plan:\par - continue Tagrisso 11 pm, nausea in am \par - Nausea is controlled with  Sancuso and Dronabinol 2.5m bid for nausea\par - continue Xgeva monthly\par \par neoplasm pain - continue with Vicodin prn\par \par . Office visit in 4 weeks or prn for new or worsening symptoms. \par - CBC,Chem,CEA, CA27-29 at next visit\par \par   \par

## 2020-06-22 NOTE — HISTORY OF PRESENT ILLNESS
[Therapy: ___] : Therapy: [unfilled] [de-identified] : Mrs. Mcdowell is a 68 year old postmenopausal female who is referred by Dr.Alice Demarco for newly diagnosed breast cancer.\par She was found on routine mammography in November 2019 she you have a focal asymmetric density in the left upper outer breast at 1:00 access 8 cm from the nipple. A one year prior had been negative.  Ultrasound-guided core biopsy in November 21, 2018 revealed a grade 1/3 invasive ductal carcinoma ER positive at 98% and [place] positive at 90%,Ki-67 was less than 10%. She underwent some left breast lumpectomy and sentinel node dissection on December 19, 2018. Pathology revealed an 8 mm grade 1/3 invasive ductal carcinoma zero of 3 sentinel lymph nodes were involved with tumor. Oncotype DX score was 11. She also underwent genetic testing which is negative.\par  [de-identified] : Mrs. 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 \par Nausea is stable- Dronabinol BID and Sancuso patch working, pain improved as well.  \par \par \par Had PET CT 2/3/2020-

## 2020-06-22 NOTE — PHYSICAL EXAM
[Restricted in physically strenuous activity but ambulatory and able to carry out work of a light or sedentary nature] : Status 1- Restricted in physically strenuous activity but ambulatory and able to carry out work of a light or sedentary nature, e.g., light house work, office work [Normal] : affect appropriate [de-identified] : left breast scar

## 2020-06-22 NOTE — REVIEW OF SYSTEMS
[Fatigue] : fatigue [Muscle Pain] : muscle pain [Joint Pain] : joint pain [Anxiety] : anxiety [Muscle Weakness] : muscle weakness [Negative] : Allergic/Immunologic [Fever] : no fever [Recent Change In Weight] : ~T no recent weight change [Eye Pain] : no eye pain [Vision Problems] : no vision problems [Dysphagia] : no dysphagia [Chest Pain] : no chest pain [Nosebleeds] : no nosebleeds [Cough] : no cough [Lower Ext Edema] : no lower extremity edema [Shortness Of Breath] : no shortness of breath [Constipation] : no constipation [Dysuria] : no dysuria [Diarrhea] : no diarrhea [Depression] : no depression [Skin Rash] : no skin rash [Easy Bleeding] : no tendency for easy bleeding [Easy Bruising] : no tendency for easy bruising [de-identified] : tremor [FreeTextEntry7] : nausea improved [FreeTextEntry9] :  muscle spasms due to parkinsons, left upper leg pain.

## 2020-07-15 ENCOUNTER — RESULT REVIEW (OUTPATIENT)
Age: 70
End: 2020-07-15

## 2020-07-15 ENCOUNTER — APPOINTMENT (OUTPATIENT)
Dept: HEMATOLOGY ONCOLOGY | Facility: CLINIC | Age: 70
End: 2020-07-15
Payer: MEDICARE

## 2020-07-15 VITALS
TEMPERATURE: 97 F | OXYGEN SATURATION: 98 % | SYSTOLIC BLOOD PRESSURE: 115 MMHG | BODY MASS INDEX: 31.02 KG/M2 | HEIGHT: 60 IN | WEIGHT: 157.98 LBS | HEART RATE: 94 BPM | RESPIRATION RATE: 18 BRPM | DIASTOLIC BLOOD PRESSURE: 67 MMHG

## 2020-07-15 PROCEDURE — 99214 OFFICE O/P EST MOD 30 MIN: CPT

## 2020-07-24 NOTE — REVIEW OF SYSTEMS
[Fatigue] : fatigue [Joint Pain] : joint pain [Muscle Pain] : muscle pain [Muscle Weakness] : muscle weakness [Anxiety] : anxiety [Negative] : Allergic/Immunologic [Fever] : no fever [Recent Change In Weight] : ~T no recent weight change [Eye Pain] : no eye pain [Vision Problems] : no vision problems [Dysphagia] : no dysphagia [Nosebleeds] : no nosebleeds [Chest Pain] : no chest pain [Lower Ext Edema] : no lower extremity edema [Shortness Of Breath] : no shortness of breath [Cough] : no cough [Constipation] : no constipation [Diarrhea] : no diarrhea [Dysuria] : no dysuria [Skin Rash] : no skin rash [Depression] : no depression [Easy Bleeding] : no tendency for easy bleeding [Easy Bruising] : no tendency for easy bruising [FreeTextEntry7] : nausea improved [FreeTextEntry9] : muscle spasms due to parkinsons, left upper leg pain. [de-identified] : tremor

## 2020-07-24 NOTE — PHYSICAL EXAM
[Restricted in physically strenuous activity but ambulatory and able to carry out work of a light or sedentary nature] : Status 1- Restricted in physically strenuous activity but ambulatory and able to carry out work of a light or sedentary nature, e.g., light house work, office work [Normal] : affect appropriate [de-identified] : left breast scar

## 2020-07-24 NOTE — ASSESSMENT
[FreeTextEntry1] : 69 yo female referred by Dr. Nicole Demarco\par \par # Stage 1 breast cancer 8 mm tumor, invasive ductal carcinoma, ER/ [place] positive, HER2 not amplified with Oncotype Dx 11. diagnosed November 2018\par -continue with Anastrazole\par \par #Adenocarcinoma of lung- stage 4 diagnosed  April 2019\par -Repeated CEA -plateau at 11.5 (started out at 26.6)\par -CT scan showed mass 1.8 x 1.4 x 1.7 cm lesion.\par -Biopsy of lung nodule c/w adeno ca of the lung, EGFR mutated, T190 mutated, started on Tagrisso.  Side effects, -toxicities and benefits reviewed with pt. L4 vertebral body biopsy confirmed met adenoca of the lung.\par - PETCT  2/3/2020- decreased FDG uptake in primary lesion, sclerotic lesions in bones, thyroid nodule activity- under surveillance, duodenal uptake \par - plan for PETCT [facility]- August 2020\par - repeat CEA\par \par #benign thyroid nodule - followed by Dr. Park.  [facility] TSH, new thyroid ultrasound in 3/20\par \par # constipation- daily miralax\par \par Plan:\par - continue Tagrisso 11 pm, nausea in am \par - Nausea is controlled with  Sancuso and Dronabinol 2.5m bid for nausea\par - continue Xgeva monthly (last 7/15/2020)\par \par neoplasm pain - continue with Vicodin prn\par \par . Office visit in 4 weeks or prn for new or worsening symptoms. \par - CBC,Chem,CEA, CA27-29 at next visit

## 2020-07-24 NOTE — HISTORY OF PRESENT ILLNESS
[Therapy: ___] : Therapy: [unfilled] [de-identified] : Mrs. Mcdowell is a 68 year old postmenopausal female who is referred by Dr.Alice Demarco for newly diagnosed breast cancer.\par She was found on routine mammography in November 2019 she you have a focal asymmetric density in the left upper outer breast at 1:00 access 8 cm from the nipple. A one year prior had been negative.  Ultrasound-guided core biopsy in November 21, 2018 revealed a grade 1/3 invasive ductal carcinoma ER positive at 98% and [place] positive at 90%,Ki-67 was less than 10%. She underwent some left breast lumpectomy and sentinel node dissection on December 19, 2018. Pathology revealed an 8 mm grade 1/3 invasive ductal carcinoma zero of 3 sentinel lymph nodes were involved with tumor. Oncotype DX score was 11. She also underwent genetic testing which is negative.\par  [de-identified] : Mrs. 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 Rectocele has been bothering patient, continues to use miralax.  Nausea is controlled- Dronabinol BID and Sancuso patch working.  Appetite and weight stable.  She occasionally feels dizzy, but takes her time standing; she denies feeling lightheaded, SOB, and palpitations. \par \par PET CT scheduled for 8/5/2020\par \par \par

## 2020-07-24 NOTE — REASON FOR VISIT
[Follow-Up Visit] : a follow-up [FreeTextEntry2] : breast cancer, EGFR mutated adenocarcinoma of the lung.

## 2020-08-05 ENCOUNTER — APPOINTMENT (OUTPATIENT)
Dept: NUCLEAR MEDICINE | Facility: IMAGING CENTER | Age: 70
End: 2020-08-05
Payer: MEDICARE

## 2020-08-05 ENCOUNTER — OUTPATIENT (OUTPATIENT)
Dept: OUTPATIENT SERVICES | Facility: HOSPITAL | Age: 70
LOS: 1 days | End: 2020-08-05
Payer: MEDICARE

## 2020-08-05 DIAGNOSIS — C34.90 MALIGNANT NEOPLASM OF UNSPECIFIED PART OF UNSPECIFIED BRONCHUS OR LUNG: ICD-10-CM

## 2020-08-05 PROCEDURE — 78815 PET IMAGE W/CT SKULL-THIGH: CPT | Mod: 26,PS,KX

## 2020-08-05 PROCEDURE — A9552: CPT

## 2020-08-05 PROCEDURE — 78815 PET IMAGE W/CT SKULL-THIGH: CPT

## 2020-08-12 ENCOUNTER — RESULT REVIEW (OUTPATIENT)
Age: 70
End: 2020-08-12

## 2020-08-12 ENCOUNTER — APPOINTMENT (OUTPATIENT)
Dept: HEMATOLOGY ONCOLOGY | Facility: CLINIC | Age: 70
End: 2020-08-12
Payer: MEDICARE

## 2020-08-12 VITALS
BODY MASS INDEX: 30.63 KG/M2 | DIASTOLIC BLOOD PRESSURE: 69 MMHG | TEMPERATURE: 97.1 F | RESPIRATION RATE: 16 BRPM | HEIGHT: 60 IN | OXYGEN SATURATION: 98 % | HEART RATE: 94 BPM | WEIGHT: 156 LBS | SYSTOLIC BLOOD PRESSURE: 104 MMHG

## 2020-08-12 DIAGNOSIS — R94.8 ABNORMAL RESULTS OF FUNCTION STUDIES OF OTHER ORGANS AND SYSTEMS: ICD-10-CM

## 2020-08-12 PROCEDURE — 99215 OFFICE O/P EST HI 40 MIN: CPT

## 2020-08-12 NOTE — PHYSICAL EXAM
[Restricted in physically strenuous activity but ambulatory and able to carry out work of a light or sedentary nature] : Status 1- Restricted in physically strenuous activity but ambulatory and able to carry out work of a light or sedentary nature, e.g., light house work, office work [Normal] : affect appropriate [de-identified] : left breast scar

## 2020-08-12 NOTE — REVIEW OF SYSTEMS
[Fatigue] : fatigue [Joint Pain] : joint pain [Muscle Pain] : muscle pain [Anxiety] : anxiety [Muscle Weakness] : muscle weakness [Negative] : Allergic/Immunologic [Fever] : no fever [Recent Change In Weight] : ~T no recent weight change [Eye Pain] : no eye pain [Vision Problems] : no vision problems [Dysphagia] : no dysphagia [Nosebleeds] : no nosebleeds [Chest Pain] : no chest pain [Lower Ext Edema] : no lower extremity edema [Cough] : no cough [Shortness Of Breath] : no shortness of breath [Constipation] : no constipation [Diarrhea] : no diarrhea [Dysuria] : no dysuria [Skin Rash] : no skin rash [Depression] : no depression [Easy Bleeding] : no tendency for easy bleeding [Easy Bruising] : no tendency for easy bruising [FreeTextEntry7] : nausea improved [FreeTextEntry9] : muscle spasms due to parkinsons, left upper leg pain. [de-identified] : tremor

## 2020-08-12 NOTE — HISTORY OF PRESENT ILLNESS
[Therapy: ___] : Therapy: [unfilled] [de-identified] : Mrs. Mcdowell is a 68 year old postmenopausal female who is referred by Dr.Alice Demarco for newly diagnosed breast cancer.\par She was found on routine mammography in November 2019 she you have a focal asymmetric density in the left upper outer breast at 1:00 access 8 cm from the nipple. A one year prior had been negative.  Ultrasound-guided core biopsy in November 21, 2018 revealed a grade 1/3 invasive ductal carcinoma ER positive at 98% and WA positive at 90%,Ki-67 was less than 10%. She underwent some left breast lumpectomy and sentinel node dissection on December 19, 2018. Pathology revealed an 8 mm grade 1/3 invasive ductal carcinoma zero of 3 sentinel lymph nodes were involved with tumor. Oncotype DX score was 11. She also underwent genetic testing which is negative.\par  [de-identified] : Mrs. 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 Continues to use miralax. Minimal nausea.  Appetite and weight stable.  Lightheadedness persists - she denies SOB and palpitations. \par \par PET CT done on 8/5/2020.\par \par \par

## 2020-08-12 NOTE — ASSESSMENT
[FreeTextEntry1] : 69 yo female referred by Dr. Nicole Demarco\par \par # Stage 1 breast cancer 8 mm tumor, invasive ductal carcinoma, ER/ [place] positive, HER2 not amplified with Oncotype Dx 11. diagnosed November 2018\par -continue with Anastrazole\par - left breast pain - c/w fat necrosis on mammogram \par \par #Adenocarcinoma of lung- stage 4 diagnosed  April 2019\par -Repeated CEA -plateau at 11.5 (started out at 26.6)\par -CT scan showed mass 1.8 x 1.4 x 1.7 cm lesion.\par -Biopsy of lung nodule c/w adeno ca of the lung, EGFR mutated, T190 mutated, started on Tagrisso.  Side effects, -toxicities and benefits reviewed with pt. L4 vertebral body biopsy confirmed met adenoca of the lung.\par - PETCT  2/3/2020- decreased FDG uptake in primary lesion, sclerotic lesions in bones, thyroid nodule activity- under surveillance, duodenal uptake \par - plan for PETCT [facility]- August 2020- stable, uptake in right breast - c/w fat necrosis \par - repeat CEA\par \par #benign thyroid nodule - followed by Dr. Park.  [facility] TSH, new thyroid ultrasound in 3/20\par \par # constipation- daily miralax\par \par Plan:\par - continue Tagrisso 11 pm, nausea in am \par - Nausea is controlled with  Sancuso and Dronabinol 2.5m bid for nausea\par - continue Xgeva monthly (last 7/15/2020, 8/12/2020\par \par neoplasm pain - continue with Vicodin prn\par \par . Office visit in 4 weeks or prn for new or worsening symptoms. \par - CBC,Chem,CEA, CA27-29 at next visit

## 2020-09-08 ENCOUNTER — RESULT REVIEW (OUTPATIENT)
Age: 70
End: 2020-09-08

## 2020-09-09 ENCOUNTER — RESULT REVIEW (OUTPATIENT)
Age: 70
End: 2020-09-09

## 2020-09-09 ENCOUNTER — APPOINTMENT (OUTPATIENT)
Dept: HEMATOLOGY ONCOLOGY | Facility: CLINIC | Age: 70
End: 2020-09-09
Payer: MEDICARE

## 2020-09-09 VITALS
HEIGHT: 60 IN | BODY MASS INDEX: 31.33 KG/M2 | WEIGHT: 159.6 LBS | TEMPERATURE: 97.4 F | SYSTOLIC BLOOD PRESSURE: 128 MMHG | RESPIRATION RATE: 18 BRPM | OXYGEN SATURATION: 98 % | DIASTOLIC BLOOD PRESSURE: 78 MMHG | HEART RATE: 95 BPM

## 2020-09-09 PROCEDURE — 99215 OFFICE O/P EST HI 40 MIN: CPT

## 2020-09-09 NOTE — HISTORY OF PRESENT ILLNESS
[Therapy: ___] : Therapy: [unfilled] [de-identified] : Mrs. Mcdowell is a 68 year old postmenopausal female who is referred by Dr.Alice Demarco for newly diagnosed breast cancer.\par She was found on routine mammography in November 2019 she you have a focal asymmetric density in the left upper outer breast at 1:00 access 8 cm from the nipple. A one year prior had been negative.  Ultrasound-guided core biopsy in November 21, 2018 revealed a grade 1/3 invasive ductal carcinoma ER positive at 98% and [place] positive at 90%,Ki-67 was less than 10%. She underwent some left breast lumpectomy and sentinel node dissection on December 19, 2018. Pathology revealed an 8 mm grade 1/3 invasive ductal carcinoma zero of 3 sentinel lymph nodes were involved with tumor. Oncotype DX score was 11. She also underwent genetic testing which is negative.\par  [de-identified] : Mrs. 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 \par \par Patient had a thyroid US yesterday in [place] ordered by Dr. Park-  Multinodular thyroid, as outlined above.\par \par She has 4 pm period of dizziness,  drop in energy, recovers after nap.  \par \par

## 2020-09-09 NOTE — PHYSICAL EXAM
[Restricted in physically strenuous activity but ambulatory and able to carry out work of a light or sedentary nature] : Status 1- Restricted in physically strenuous activity but ambulatory and able to carry out work of a light or sedentary nature, e.g., light house work, office work [Normal] : grossly intact [de-identified] : left breast scar

## 2020-09-09 NOTE — REVIEW OF SYSTEMS
[Fatigue] : fatigue [Joint Pain] : joint pain [Muscle Pain] : muscle pain [Anxiety] : anxiety [Muscle Weakness] : muscle weakness [Negative] : Heme/Lymph [Fever] : no fever [Recent Change In Weight] : ~T no recent weight change [Dysphagia] : no dysphagia [Vision Problems] : no vision problems [Eye Pain] : no eye pain [Nosebleeds] : no nosebleeds [Chest Pain] : no chest pain [Lower Ext Edema] : no lower extremity edema [Shortness Of Breath] : no shortness of breath [Cough] : no cough [Dysuria] : no dysuria [Constipation] : no constipation [Diarrhea] : no diarrhea [Easy Bleeding] : no tendency for easy bleeding [Depression] : no depression [Skin Rash] : no skin rash [Easy Bruising] : no tendency for easy bruising [FreeTextEntry7] : nausea improved [de-identified] : tremor [FreeTextEntry9] : muscle spasms due to parkinsons, left upper leg pain.

## 2020-09-09 NOTE — ASSESSMENT
[FreeTextEntry1] : 69 yo female referred by Dr. Nicole Demarco\par \par # Stage 1 breast cancer 8 mm tumor, invasive ductal carcinoma, ER/ [place] positive, HER2 not amplified with Oncotype Dx 11. diagnosed November 2018\par -continue with Anastrazole\par - left breast pain - c/w fat necrosis on mammogram \par \par #Adenocarcinoma of lung- stage 4 diagnosed  April 2019\par -Repeated CEA -plateau at 11.5 (started out at 26.6)\par -CT scan showed mass 1.8 x 1.4 x 1.7 cm lesion.\par -Biopsy of lung nodule c/w adeno ca of the lung, EGFR mutated, T190 mutated, started on Tagrisso.  Side effects, -toxicities and benefits reviewed with pt. L4 vertebral body biopsy confirmed met adenoca of the lung.\par - PETCT  2/3/2020- decreased FDG uptake in primary lesion, sclerotic lesions in bones, thyroid nodule activity- under surveillance, duodenal uptake \par - plan for PETCT [facility]- August 2020- stable, uptake in right breast - c/w fat necrosis \par - repeat CEA\par \par #benign thyroid nodule - followed by Dr. Park.  [facility] TSH, new thyroid ultrasound in 3/20\par \par # constipation- daily miralax\par \par Plan:\par - continue Tagrisso 11 pm, nausea in am \par - Nausea is controlled with  Sancuso and Dronabinol 2.5m bid for nausea\par - continue Xgeva monthly (last 7/15/2020, 8/12/2020\par \par neoplasm pain - continue with Vicodin prn\par \par . Office visit in 4 weeks or prn for new or worsening symptoms. \par - CBC,Chem,CEA, CA27-29 at next visit

## 2020-09-22 ENCOUNTER — APPOINTMENT (OUTPATIENT)
Dept: GERIATRICS | Facility: CLINIC | Age: 70
End: 2020-09-22
Payer: MEDICARE

## 2020-09-22 DIAGNOSIS — Z86.79 PERSONAL HISTORY OF OTHER DISEASES OF THE CIRCULATORY SYSTEM: ICD-10-CM

## 2020-09-22 PROCEDURE — 99215 OFFICE O/P EST HI 40 MIN: CPT | Mod: 95

## 2020-09-22 NOTE — HISTORY OF PRESENT ILLNESS
[0] : 2) Feeling down, depressed, or hopeless: Not at all [PHQ-2 Score ___] : PHQ-2 Score [unfilled] [FreeTextEntry1] : Has not been seen in about one year\par \par Recent ER visit\par went out to eat - spicy food coupled with PRN miralax had several bouts of diarrhea that lead to severe dehydration\par \par Follows with oncology/breast surgery - metastatic lung cancer with metastasis to bone on Tagrisso, history of Breast cancer s/p Mastectomy, Parkinsons' disease with restless legs on sinemet and pramipexole for RLS - neurologist Dr. Cardona.  \par \par Same concern late afternoon (4PM) dizziness with low BP readings\par of note she is on long acting sinemet 7AM 7PM in addiiton to short acting doses (5-6 doses in 24 hours) \par no longer on midodrine\par \par had flu shot high dose in infusion center\par will obtain records thinks last PCP Dr. Fonseca gave her Pneumovax 23\par cannot recall if she has had prevnar 13\par has not had shingrix\par \par \par

## 2020-09-22 NOTE — ASSESSMENT
[FreeTextEntry1] : due for labs\par has not been seen in one year\par will check basic labs (Rx mailed to patient) as she will draw labs with endocrine in October\par discussed recent ER visit\par importance of hydration\par for afternoon dizziness\par advised to split 3PM short acting sinemet (take 1/2 tab only) and continue BP checks \par could be related to long acting and short acting sinemet combining effects\par if worsening rigidity or tremor will resumeprior dosing\par had frequency with midodrine but could also talk about other options\par will get back to me about which pnuemonia shot she had

## 2020-09-22 NOTE — REASON FOR VISIT
[Home] : at home, [unfilled] , at the time of the visit. [Medical Office: ([facility])___] : at the medical office located in  [Verbal consent obtained from patient] : the patient, [unfilled] [Follow-Up] : a follow-up visit

## 2020-09-22 NOTE — REVIEW OF SYSTEMS
[Fever] : no fever [Chills] : no chills [Feeling Tired] : feeling tired [Eye Pain] : no eye pain [Red Eyes] : eyes not red [Nosebleeds] : no nosebleeds [Nasal Discharge] : no nasal discharge [Chest Pain] : no chest pain [Palpitations] : no palpitations [Cough] : no cough [SOB on Exertion] : no shortness of breath during exertion [Constipation] : constipation [Diarrhea] : no diarrhea [Pelvic Pain] : no pelvic pain [Dysmenorrhea] : no dysmenorrhea [Joint Swelling] : no joint swelling [Joint Stiffness] : no joint stiffness [Itching] : no itching [Change In A Mole] : no change in a mole [Dizziness] : dizziness [Anxiety] : no anxiety [Depression] : no depression [FreeTextEntry7] : no0w on miralax

## 2020-09-24 ENCOUNTER — TRANSCRIPTION ENCOUNTER (OUTPATIENT)
Age: 70
End: 2020-09-24

## 2020-09-25 ENCOUNTER — RESULT REVIEW (OUTPATIENT)
Age: 70
End: 2020-09-25

## 2020-10-02 ENCOUNTER — APPOINTMENT (OUTPATIENT)
Dept: ENDOCRINOLOGY | Facility: CLINIC | Age: 70
End: 2020-10-02
Payer: MEDICARE

## 2020-10-02 VITALS
DIASTOLIC BLOOD PRESSURE: 80 MMHG | OXYGEN SATURATION: 98 % | HEART RATE: 90 BPM | BODY MASS INDEX: 31.25 KG/M2 | TEMPERATURE: 98.2 F | SYSTOLIC BLOOD PRESSURE: 124 MMHG | WEIGHT: 160 LBS

## 2020-10-02 PROCEDURE — 99214 OFFICE O/P EST MOD 30 MIN: CPT

## 2020-10-02 NOTE — REVIEW OF SYSTEMS
[FreeTextEntry1] : Constitutional: fatigue, decreased appetite and recent 30lb lb weight loss . insomnia. \par Eyes:. floaters sees Dr Siddiqui. \par ENT: dysphonia, but no dysphagia and no neck pain. \par Cardiovascular: palpitations, but no chest pain . sees Dr Hdez , leg cramps. \par Respiratory: shortness of breath during exertion, but no cough. \par Gastrointestinal: nausea and constipation, but no vomiting was observed . colonoscopy 2/2018. \par Genitourinary:. polyuria and urgency. \par Musculoskeletal: joint pain and muscle cramps . had epidural 7/19. \par Psychiatric: insomnia. \par Endocrine:. hair loss. \par Constitutional, Eyes, ENT, Cardiovascular, Respiratory, Gastrointestinal, Genitourinary, Musculoskeletal, Integumentary, Neurological, Psychiatric, Endocrine and Heme/Lymph are otherwise negative.

## 2020-10-02 NOTE — HISTORY OF PRESENT ILLNESS
[FreeTextEntry1] : Ms. 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

## 2020-10-07 ENCOUNTER — APPOINTMENT (OUTPATIENT)
Dept: HEMATOLOGY ONCOLOGY | Facility: CLINIC | Age: 70
End: 2020-10-07
Payer: MEDICARE

## 2020-10-07 ENCOUNTER — RESULT REVIEW (OUTPATIENT)
Age: 70
End: 2020-10-07

## 2020-10-07 VITALS
DIASTOLIC BLOOD PRESSURE: 61 MMHG | TEMPERATURE: 97.9 F | WEIGHT: 160 LBS | OXYGEN SATURATION: 98 % | HEIGHT: 60 IN | RESPIRATION RATE: 18 BRPM | HEART RATE: 98 BPM | BODY MASS INDEX: 31.41 KG/M2 | SYSTOLIC BLOOD PRESSURE: 114 MMHG

## 2020-10-07 PROCEDURE — 99215 OFFICE O/P EST HI 40 MIN: CPT

## 2020-10-07 NOTE — ASSESSMENT
[FreeTextEntry1] : 69 yo female \par \par # Stage 1 breast cancer 8 mm tumor, invasive ductal carcinoma, ER/ [place] positive, HER2 not amplified with Oncotype Dx 11. diagnosed November 2018\par -continue with Anastrazole\par - left breast pain - c/w fat necrosis on mammogram \par \par #Adenocarcinoma of lung- stage 4 diagnosed  April 2019\par -Repeated CEA -plateau at 11.5 (started out at 26.6)\par -CT scan showed mass 1.8 x 1.4 x 1.7 cm lesion.\par -Biopsy of lung nodule c/w adeno ca of the lung, EGFR mutated, T190 mutated, started on Tagrisso.  Side effects, -toxicities and benefits reviewed with pt. L4 vertebral body biopsy confirmed met adenoca of the lung.\par - PETCT  2/3/2020- decreased FDG uptake in primary lesion, sclerotic lesions in bones, thyroid nodule activity- under surveillance, duodenal uptake \par - plan for PETCT [facility]- August 2020- stable, uptake in right breast - c/w fat necrosis \par - repeat CEA\par \par #benign thyroid nodule - followed by Dr. Park. plan for biopsy\par # constipation- daily miralax\par \par Plan:\par - continue Tagrisso 11 pm, nausea in am \par - Nausea is controlled with  Sancuso and Dronabinol 2.5m bid for nausea\par - continue Xgeva monthly (last 7/15/2020, 8/12/2020, 9/12/, 10/7/2020\par - prevnar 10/7/2020\par \par neoplasm pain - continue with Vicodin prn\par \par . Office visit in 4 weeks or prn for new or worsening symptoms. \par - CBC,Chem,CEA, CA27-29 at next visit

## 2020-10-07 NOTE — HISTORY OF PRESENT ILLNESS
[Therapy: ___] : Therapy: [unfilled] [de-identified] : Mrs. Mcdowell is a 68 year old postmenopausal female who is referred by Dr.Alice Demarco for newly diagnosed breast cancer.\par She was found on routine mammography in November 2019 she you have a focal asymmetric density in the left upper outer breast at 1:00 access 8 cm from the nipple. A one year prior had been negative.  Ultrasound-guided core biopsy in November 21, 2018 revealed a grade 1/3 invasive ductal carcinoma ER positive at 98% and UT positive at 90%,Ki-67 was less than 10%. She underwent some left breast lumpectomy and sentinel node dissection on December 19, 2018. Pathology revealed an 8 mm grade 1/3 invasive ductal carcinoma zero of 3 sentinel lymph nodes were involved with tumor. Oncotype DX score was 11. She also underwent genetic testing which is negative.\par  [de-identified] : Mrs. 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 \par \par Food poisoning  two weeks ago, with diarrhea after restaurant food.\par \par Needs thyroid biopsy.   \par \par

## 2020-10-07 NOTE — PHYSICAL EXAM
[Restricted in physically strenuous activity but ambulatory and able to carry out work of a light or sedentary nature] : Status 1- Restricted in physically strenuous activity but ambulatory and able to carry out work of a light or sedentary nature, e.g., light house work, office work [Normal] : affect appropriate [de-identified] : left breast scar

## 2020-10-07 NOTE — REVIEW OF SYSTEMS
[Fatigue] : fatigue [Joint Pain] : joint pain [Muscle Pain] : muscle pain [Anxiety] : anxiety [Muscle Weakness] : muscle weakness [Negative] : Allergic/Immunologic [Fever] : no fever [Recent Change In Weight] : ~T no recent weight change [Eye Pain] : no eye pain [Vision Problems] : no vision problems [Dysphagia] : no dysphagia [Nosebleeds] : no nosebleeds [Chest Pain] : no chest pain [Lower Ext Edema] : no lower extremity edema [Shortness Of Breath] : no shortness of breath [Cough] : no cough [Constipation] : no constipation [Diarrhea] : no diarrhea [Dysuria] : no dysuria [Skin Rash] : no skin rash [Depression] : no depression [Easy Bleeding] : no tendency for easy bleeding [Easy Bruising] : no tendency for easy bruising [FreeTextEntry7] : nausea improved [FreeTextEntry9] : muscle spasms due to parkinsons, left upper leg pain. [de-identified] : tremor

## 2020-10-08 ENCOUNTER — RESULT REVIEW (OUTPATIENT)
Age: 70
End: 2020-10-08

## 2020-10-09 ENCOUNTER — RESULT REVIEW (OUTPATIENT)
Age: 70
End: 2020-10-09

## 2020-10-13 ENCOUNTER — TRANSCRIPTION ENCOUNTER (OUTPATIENT)
Age: 70
End: 2020-10-13

## 2020-10-16 ENCOUNTER — RESULT REVIEW (OUTPATIENT)
Age: 70
End: 2020-10-16

## 2020-10-23 ENCOUNTER — APPOINTMENT (OUTPATIENT)
Dept: PAIN MANAGEMENT | Facility: CLINIC | Age: 70
End: 2020-10-23
Payer: MEDICARE

## 2020-10-23 PROCEDURE — 99214 OFFICE O/P EST MOD 30 MIN: CPT | Mod: 95

## 2020-10-23 NOTE — ASSESSMENT
[FreeTextEntry1] : 70 yof w/ breast and lung ca and Parkinson disease presents w/ left femur pain as well as bilateral lower extremity pain below the knees. I have personally reviewed the patient's MRI in detail and discussed it with them which is significant for multiple lesions in the left femur. Would recommend to consult Dr. Carvajal for potential ablation or lesions, or consider radiation, if possible. Pain less likely from lumbar spine but if direct treatment of femur is not feasible or does not produce relief would order MRI lumbar spine and pursue intervention.

## 2020-10-23 NOTE — HISTORY OF PRESENT ILLNESS
[Joint Pain] : joint  [___ mths] : [unfilled] month(s) ago [4] : a current pain level of 4/10 [7] : a minimum pain level of 7/10 [Aching] : aching [Walking] : walking [Heat] : heat [FreeTextEntry1] : \par Reason for Telehealth visit: Leg pain\par \par This call took place with [facility] room on video. The patient and Dr. Pretty were both able to see each other and communicate through video. Greater then 50% of the time spent in the encounter involved counseling and coordination of care. \par \par Time spent on visit: 30 minutes\par \par As per my note dated 06/19/19: "68 yof w/ breast and lung ca and Parkinson disease presents w/ left hip pain that radiates down the left leg. Pain radiates down the lateral aspect of the leg pain does not go below the knee. Pain is worse activity. Pain improves with rest. Pain is described as aching. The patient has had the pain in the past, however, it worsened in the past 2 months. Sitting and heat improves the pain. She is using medical marijuana with relief. She reports that she has had side effects with multiple other medications including opioids and gabapentin. Walking makes the pain worse. Quality of life is significantly impaired."\par \par As per my note dated, 07/24/19. "She returns to review the recent MRI of her lumbar spine. The pain continues down the lateral aspect of the left leg. Pain is worse with activity. The pain improves with rest. Right leg is within normal limits. No other recent changes in health. She is having some relief with medical marijuana."\par \par As per my note dated, 08/14/19. "She is s/p L5-S1 intralaminar LANE. She does not report significant relief from the injection. She reports that her pain is improving with increased use of medical marijuana. Pain is sharp and burning. Different from her restless leg pain."\par \par Pt returns for follow-up today, 10/23/20.  Pain has returned. Her previous LANE helped tremendously. Pain is now from her knees to the feet. She also has pain in her femur. No pain in the back right now. Left femur pain only. Equal on right and left legs. \par \par Interventions:\par L5-S1 interlaminar LANE (07/31/19):\par  [FreeTextEntry7] : left keen/leg

## 2020-11-02 ENCOUNTER — RESULT REVIEW (OUTPATIENT)
Age: 70
End: 2020-11-02

## 2020-11-04 ENCOUNTER — RESULT REVIEW (OUTPATIENT)
Age: 70
End: 2020-11-04

## 2020-11-04 ENCOUNTER — APPOINTMENT (OUTPATIENT)
Dept: HEMATOLOGY ONCOLOGY | Facility: CLINIC | Age: 70
End: 2020-11-04
Payer: MEDICARE

## 2020-11-04 VITALS
DIASTOLIC BLOOD PRESSURE: 73 MMHG | WEIGHT: 159 LBS | HEIGHT: 60 IN | OXYGEN SATURATION: 100 % | BODY MASS INDEX: 31.22 KG/M2 | SYSTOLIC BLOOD PRESSURE: 125 MMHG | HEART RATE: 101 BPM | RESPIRATION RATE: 16 BRPM | TEMPERATURE: 97.2 F

## 2020-11-04 PROCEDURE — 99214 OFFICE O/P EST MOD 30 MIN: CPT

## 2020-11-04 NOTE — REVIEW OF SYSTEMS
[Fatigue] : fatigue [Joint Pain] : joint pain [Muscle Pain] : muscle pain [Anxiety] : anxiety [Muscle Weakness] : muscle weakness [Negative] : Allergic/Immunologic [Fever] : no fever [Recent Change In Weight] : ~T no recent weight change [Eye Pain] : no eye pain [Vision Problems] : no vision problems [Dysphagia] : no dysphagia [Nosebleeds] : no nosebleeds [Chest Pain] : no chest pain [Lower Ext Edema] : no lower extremity edema [Shortness Of Breath] : no shortness of breath [Cough] : no cough [Constipation] : no constipation [Diarrhea] : no diarrhea [Dysuria] : no dysuria [Skin Rash] : no skin rash [Depression] : no depression [Easy Bleeding] : no tendency for easy bleeding [Easy Bruising] : no tendency for easy bruising [FreeTextEntry7] : nausea improved [FreeTextEntry9] : muscle spasms due to parkinsons, left upper leg pain.- having procedure next week in IR  [de-identified] : tremor

## 2020-11-04 NOTE — PHYSICAL EXAM
[Restricted in physically strenuous activity but ambulatory and able to carry out work of a light or sedentary nature] : Status 1- Restricted in physically strenuous activity but ambulatory and able to carry out work of a light or sedentary nature, e.g., light house work, office work [Normal] : affect appropriate [de-identified] : left breast scar

## 2020-11-04 NOTE — ASSESSMENT
[FreeTextEntry1] : 71 yo female \par \par # Stage 1 breast cancer 8 mm tumor, invasive ductal carcinoma, ER/ [place] positive, HER2 not amplified with Oncotype Dx 11. diagnosed November 2018\par -continue with Anastrazole\par - left breast pain - c/w fat necrosis on mammogram \par \par #Adenocarcinoma of lung- stage 4 diagnosed  April 2019\par -Repeated CEA -plateau at 11.5 (started out at 26.6)\par -CT scan showed mass 1.8 x 1.4 x 1.7 cm lesion.\par -Biopsy of lung nodule c/w adeno ca of the lung, EGFR mutated, T190 mutated, started on Tagrisso.  Side effects, -toxicities and benefits reviewed with pt. L4 vertebral body biopsy confirmed met adenoca of the lung.\par - PETCT  2/3/2020- decreased FDG uptake in primary lesion, sclerotic lesions in bones, thyroid nodule activity- under surveillance, duodenal uptake \par - plan for PETCT [facility]- August 2020- stable, uptake in right breast - c/w fat necrosis \par - repeat CEA\par \par #benign thyroid nodule - followed by Dr. Park. plan for biopsy\par # constipation- daily miralax\par \par Plan:\par - continue Tagrisso 11 pm, nausea in am \par - Nausea is controlled with  Sancuso and Dronabinol 2.5m bid for nausea- refill today Istop checked\par - continue Xgeva monthly (last 7/15/2020, 8/12/2020, 9/12/, 10/7/2020, 11/4/2020\par -neoplasm pain - continue with Vicodin prn only taking 1-2 tabs per day\par -having cryoablation on 11/12 to left femur for osseous mets/pain mgmt with Dr. Carvajal\par \par . Office visit in 4 weeks or prn for new or worsening symptoms- case and plan disused with Dr. Shaffer. \par - CBC,Chem,CEA, CA27-29 at next visit

## 2020-11-04 NOTE — HISTORY OF PRESENT ILLNESS
[Therapy: ___] : Therapy: [unfilled] [de-identified] : Mrs. Mcdowell is a 68 year old postmenopausal female who is referred by Dr.Alice Demarco for newly diagnosed breast cancer.\par She was found on routine mammography in November 2019 she you have a focal asymmetric density in the left upper outer breast at 1:00 access 8 cm from the nipple. A one year prior had been negative.  Ultrasound-guided core biopsy in November 21, 2018 revealed a grade 1/3 invasive ductal carcinoma ER positive at 98% and CA positive at 90%,Ki-67 was less than 10%. She underwent some left breast lumpectomy and sentinel node dissection on December 19, 2018. Pathology revealed an 8 mm grade 1/3 invasive ductal carcinoma zero of 3 sentinel lymph nodes were involved with tumor. Oncotype DX score was 11. She also underwent genetic testing which is negative.\par  [de-identified] : Mrs. 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   Feeling well/ stable \par \par

## 2020-11-10 ENCOUNTER — RESULT REVIEW (OUTPATIENT)
Age: 70
End: 2020-11-10

## 2020-11-12 ENCOUNTER — RESULT REVIEW (OUTPATIENT)
Age: 70
End: 2020-11-12

## 2020-11-20 ENCOUNTER — RESULT REVIEW (OUTPATIENT)
Age: 70
End: 2020-11-20

## 2020-12-02 ENCOUNTER — APPOINTMENT (OUTPATIENT)
Dept: HEMATOLOGY ONCOLOGY | Facility: CLINIC | Age: 70
End: 2020-12-02
Payer: MEDICARE

## 2020-12-02 ENCOUNTER — RESULT REVIEW (OUTPATIENT)
Age: 70
End: 2020-12-02

## 2020-12-02 ENCOUNTER — APPOINTMENT (OUTPATIENT)
Dept: CARDIOLOGY | Facility: CLINIC | Age: 70
End: 2020-12-02
Payer: MEDICARE

## 2020-12-02 ENCOUNTER — NON-APPOINTMENT (OUTPATIENT)
Age: 70
End: 2020-12-02

## 2020-12-02 VITALS
SYSTOLIC BLOOD PRESSURE: 109 MMHG | DIASTOLIC BLOOD PRESSURE: 74 MMHG | RESPIRATION RATE: 18 BRPM | TEMPERATURE: 98.2 F | HEART RATE: 104 BPM | WEIGHT: 159 LBS | HEIGHT: 60 IN | BODY MASS INDEX: 31.22 KG/M2 | OXYGEN SATURATION: 98 %

## 2020-12-02 VITALS
SYSTOLIC BLOOD PRESSURE: 104 MMHG | WEIGHT: 158 LBS | HEART RATE: 92 BPM | DIASTOLIC BLOOD PRESSURE: 70 MMHG | BODY MASS INDEX: 30.86 KG/M2

## 2020-12-02 PROCEDURE — 93000 ELECTROCARDIOGRAM COMPLETE: CPT

## 2020-12-02 PROCEDURE — 99214 OFFICE O/P EST MOD 30 MIN: CPT

## 2020-12-02 PROCEDURE — 99215 OFFICE O/P EST HI 40 MIN: CPT

## 2020-12-02 NOTE — HISTORY OF PRESENT ILLNESS
[de-identified] : Mrs. Mcdowell is a 68 year old postmenopausal female who is referred by Dr.Alice Demarco for newly diagnosed breast cancer.\par She was found on routine mammography in November 2019 she you have a focal asymmetric density in the left upper outer breast at 1:00 access 8 cm from the nipple. A one year prior had been negative.  Ultrasound-guided core biopsy in November 21, 2018 revealed a grade 1/3 invasive ductal carcinoma ER positive at 98% and VT positive at 90%,Ki-67 was less than 10%. She underwent some left breast lumpectomy and sentinel node dissection on December 19, 2018. Pathology revealed an 8 mm grade 1/3 invasive ductal carcinoma zero of 3 sentinel lymph nodes were involved with tumor. Oncotype DX score was 11. She also underwent genetic testing which is negative.\par  [de-identified] : Mrs. 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  s/p left ablation in IR to femur- having some pain at site but improving.\par \par Scheduled for cataract sx \par \par

## 2020-12-02 NOTE — REVIEW OF SYSTEMS
[Fever] : no fever [Recent Change In Weight] : ~T no recent weight change [Eye Pain] : no eye pain [Vision Problems] : no vision problems [Dysphagia] : no dysphagia [Nosebleeds] : no nosebleeds [Chest Pain] : no chest pain [Lower Ext Edema] : no lower extremity edema [Shortness Of Breath] : no shortness of breath [Cough] : no cough [Constipation] : no constipation [Diarrhea] : no diarrhea [Dysuria] : no dysuria [Skin Rash] : no skin rash [Depression] : no depression [Easy Bleeding] : no tendency for easy bleeding [Easy Bruising] : no tendency for easy bruising [FreeTextEntry7] : nausea improved [FreeTextEntry9] : muscle spasms due to parkinsons, left upper leg pain.-  [de-identified] : tremor

## 2020-12-02 NOTE — HISTORY OF PRESENT ILLNESS
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  She was also followed for small pericardial effusion.Unfortunately this year she was diagnosed with breast cancer   left lumpectomy with intraop RT and arimdex and was found to have adenocarcinoma  lung cancer with bony mets. 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 s/p left ablation in IR to femur- having some pain at site but improving.\par She is anticipating cataract surgery Dec 16th Dr Lacey.\par \par

## 2020-12-02 NOTE — ASSESSMENT
[FreeTextEntry1] : 71 yo female \par \par # Stage 1 breast cancer 8 mm tumor, invasive ductal carcinoma, ER/ [place] positive, HER2 not amplified with Oncotype Dx 11. diagnosed November 2018\par -continue with Anastrazole\par - left breast pain - c/w fat necrosis on mammogram \par \par #Adenocarcinoma of lung- stage 4 diagnosed  April 2019\par -Repeated CEA -plateau at 11.5 (started out at 26.6)\par -CT scan showed mass 1.8 x 1.4 x 1.7 cm lesion.\par -Biopsy of lung nodule c/w adeno ca of the lung, EGFR mutated, T190 mutated, started on Tagrisso.  Side effects, -toxicities and benefits reviewed with pt. L4 vertebral body biopsy confirmed met adenoca of the lung.\par - PETCT  2/3/2020- decreased FDG uptake in primary lesion, sclerotic lesions in bones, thyroid nodule activity- under surveillance, duodenal uptake \par - plan for PETCT [facility]- August 2020- stable, uptake in right breast - c/w fat necrosis \par - repeat CEA\par \par #benign thyroid nodule - followed by Dr. Park. plan for biopsy\par # constipation- daily miralax\par \par Plan:\par - continue Tagrisso 11 pm, nausea in am \par - Nausea is controlled with  Sancuso and Dronabinol 2.5m bid for nausea- refill today Istop checked\par - continue Xgeva monthly (last 7/15/2020, 8/12/2020, 9/12/, 10/7/2020, 11/4/2020, 12/2\par -neoplasm pain - continue with Vicodin prn only taking 1-2 tabs per day\par -s/p cryoablation on 11/12 to left femur for osseous mets/pain mgmt with Dr. Carvajal- mild pain- renew Vicodin today- was prescribed percocet- could not tolerate \par \par . Office visit in 4 weeks or prn for new or worsening symptoms- case and plan disused with Dr. Shaffer. \par - CBC,Chem,CEA, CA27-29 at next visit

## 2020-12-02 NOTE — REASON FOR VISIT
[Follow-Up Visit] : a follow-up [Follow-Up - Clinic] : a clinic follow-up of [FreeTextEntry2] : 6 month [FreeTextEntry1] : sinus tachycardia

## 2020-12-02 NOTE — PHYSICAL EXAM
[Restricted in physically strenuous activity but ambulatory and able to carry out work of a light or sedentary nature] : Status 1- Restricted in physically strenuous activity but ambulatory and able to carry out work of a light or sedentary nature, e.g., light house work, office work [Normal] : affect appropriate [General Appearance - Well Developed] : well developed [Normal Appearance] : normal appearance [Well Groomed] : well groomed [General Appearance - Well Nourished] : well nourished [No Deformities] : no deformities [General Appearance - In No Acute Distress] : no acute distress

## 2020-12-02 NOTE — DISCUSSION/SUMMARY
[FreeTextEntry1] : call to review bp, she was orthostatic at pmd and "feels it", drop to 90S but no syncope. Had side effects on midiodrine and florinef. advised to move slowly and stay well hydrated.

## 2020-12-02 NOTE — REVIEW OF SYSTEMS
[Fatigue] : fatigue [Joint Pain] : joint pain [Muscle Pain] : muscle pain [Anxiety] : anxiety [Muscle Weakness] : muscle weakness [Loss Of Hearing] : hearing loss [Easy Bruising] : a tendency for easy bruising [Negative] : Heme/Lymph [FreeTextEntry1] : constipation

## 2020-12-04 ENCOUNTER — RESULT REVIEW (OUTPATIENT)
Age: 70
End: 2020-12-04

## 2020-12-08 ENCOUNTER — RESULT REVIEW (OUTPATIENT)
Age: 70
End: 2020-12-08

## 2020-12-09 ENCOUNTER — APPOINTMENT (OUTPATIENT)
Dept: GERIATRICS | Facility: CLINIC | Age: 70
End: 2020-12-09
Payer: MEDICARE

## 2020-12-09 ENCOUNTER — APPOINTMENT (OUTPATIENT)
Dept: OBGYN | Facility: CLINIC | Age: 70
End: 2020-12-09
Payer: MEDICARE

## 2020-12-09 ENCOUNTER — NON-APPOINTMENT (OUTPATIENT)
Age: 70
End: 2020-12-09

## 2020-12-09 VITALS
DIASTOLIC BLOOD PRESSURE: 70 MMHG | BODY MASS INDEX: 31.44 KG/M2 | OXYGEN SATURATION: 100 % | TEMPERATURE: 97.7 F | SYSTOLIC BLOOD PRESSURE: 112 MMHG | WEIGHT: 161 LBS | HEART RATE: 108 BPM

## 2020-12-09 VITALS
DIASTOLIC BLOOD PRESSURE: 70 MMHG | WEIGHT: 160 LBS | BODY MASS INDEX: 31.41 KG/M2 | HEIGHT: 60 IN | SYSTOLIC BLOOD PRESSURE: 116 MMHG

## 2020-12-09 PROCEDURE — 99215 OFFICE O/P EST HI 40 MIN: CPT

## 2020-12-09 PROCEDURE — G0101: CPT

## 2020-12-09 NOTE — HISTORY OF PRESENT ILLNESS
[Patient reported mammogram was normal] : Patient reported mammogram was normal [FreeTextEntry1] : 71yo  S/P LAVH(ovaries in situ) here for annual Gyn exam.Pt is S/P left lumpectomy for invasive ductal Ca Grade 1/3 7mm.  On Anastrazole. + family h/o breast Ca(Mother,cousins). Had genetic testing-> NEGATIVE.  Pt was subsequently found to have lung Ca with bone metastasis and is currently being treated with Tagriso(daily oral med). She recently had cryoablation of stable lesions in femur. Has Parkinson's Disease remains stable on meds.Rectocele remains "about the same" but problematic with constipation\par  [Mammogramdate] : 12/8/20 [TextBox_19] : per Dr. Yancey"good"(report not yet dictated) [BreastSonogramDate] : 11/20/19 [TextBox_25] : BIRADS 2 [BoneDensityDate] : 2/5/19 [TextBox_37] : T Score Spine -1.3 Hip -0.8 Fem Neck -1.8 [ColonoscopyDate] : 2018

## 2020-12-09 NOTE — HISTORY OF PRESENT ILLNESS
[Preoperative Visit] : for a medical evaluation prior to surgery [Scheduled Procedure ___] : a [unfilled] [Date of Surgery ___] : on [unfilled] [Surgeon Name ___] : surgeon: [unfilled] [Stable] : Stable [Easy Bruising] : easy bruising [Anti-Platelet Agents] : anti-platelet agents [Impaired Immunity] : impaired immunity [Frequent Aspirin Use] : frequent aspirin use [Electrocardiogram] : ~T an ECG ~C was performed [Fair] : Fair [Fever] : no fever [Chills] : no chills [Fatigue] : no fatigue [Chest Pain] : no chest pain [Cough] : no cough [Dyspnea] : no dyspnea [Dysuria] : no dysuria [Urinary Frequency] : no urinary frequency [Nausea] : no nausea [Vomiting] : no vomiting [Diarrhea] : no diarrhea [Abdominal Pain] : no abdominal pain [Lower Extremity Swelling] : no lower extremity swelling [Poor Exercise Tolerance] : no poor exercise tolerance [Diabetes] : no diabetes [Cardiovascular Disease] : no cardiovascular disease [Pulmonary Disease] : no pulmonary disease [Nicotine Dependence] : no nicotine dependence [Alcohol Use] : no  alcohol use [Renal Disease] : no renal disease [GI Disease] : no gastrointestinal disease [Sleep Apnea] : no sleep apnea [Thromboembolic Problems] : no thromboembolic problems [Frequent use of NSAIDs] : no use of NSAIDs [Transfusion Reaction] : no transfusion reaction [Steroid Use in Last 6 Months] : no steroid use in the last six months [Prior Anesthesia] : No prior anesthesia [Prev Anesthesia Reaction] : no previous anesthesia reaction [Anesthesia Reaction] : no anesthesia reaction [Sudden Death] : no sudden death [Clotting Disorder] : no clotting disorder [Bleeding Disorder] : no bleeding disorder [de-identified] : Dr. Gtz

## 2020-12-09 NOTE — PHYSICAL EXAM
[General Appearance - Alert] : alert [General Appearance - In No Acute Distress] : in no acute distress [General Appearance - Well Nourished] : well nourished [General Appearance - Well Developed] : well developed [Sclera] : the sclera and conjunctiva were normal [PERRL With Normal Accommodation] : pupils were equal in size, round, and reactive to light [Extraocular Movements] : extraocular movements were intact [Strabismus] : no strabismus was seen [Outer Ear] : the ears and nose were normal in appearance [Hearing Threshold Finger Rub Not [place]] : hearing was normal [Both Tympanic Membranes Were Examined] : both tympanic membranes were normal [Nasal Cavity] : the nasal mucosa and septum were normal [Neck Appearance] : the appearance of the neck was normal [] : no respiratory distress [Respiration, Rhythm And Depth] : normal respiratory rhythm and effort [Exaggerated Use Of Accessory Muscles For Inspiration] : no accessory muscle use [Apical Impulse] : the apical impulse was normal [Heart Rate And Rhythm] : heart rate was normal and rhythm regular [Heart Sounds] : normal S1 and S2 [Bowel Sounds] : normal bowel sounds [Abdomen Soft] : soft [Abdomen Tenderness] : non-tender [Cervical Lymph Nodes Enlarged Posterior Bilaterally] : posterior cervical [Cervical Lymph Nodes Enlarged Anterior Bilaterally] : anterior cervical [No CVA Tenderness] : no ~M costovertebral angle tenderness [No Spinal Tenderness] : no spinal tenderness [Skin Color & Pigmentation] : normal skin color and pigmentation [Skin Turgor] : normal skin turgor [Affect] : the affect was normal [Mood] : the mood was normal

## 2020-12-09 NOTE — REVIEW OF SYSTEMS
[Feeling Tired] : feeling tired [Eyesight Problems] : eyesight problems [Constipation] : constipation [Dizziness] : dizziness [Fever] : no fever [Chills] : no chills [Eye Pain] : no eye pain [Red Eyes] : eyes not red [Nosebleeds] : no nosebleeds [Nasal Discharge] : no nasal discharge [Chest Pain] : no chest pain [Palpitations] : no palpitations [Cough] : no cough [SOB on Exertion] : no shortness of breath during exertion [Diarrhea] : no diarrhea [Pelvic Pain] : no pelvic pain [Dysmenorrhea] : no dysmenorrhea [Joint Swelling] : no joint swelling [Joint Stiffness] : no joint stiffness [Itching] : no itching [Change In A Mole] : no change in a mole [Anxiety] : no anxiety [Depression] : no depression [FreeTextEntry7] : now on miralax

## 2020-12-09 NOTE — REVIEW OF SYSTEMS
[Sight Problems] : sight problems [Constipation] : constipation [Arthralgias] : arthralgias [Negative] : Heme/Lymph [FreeTextEntry2] : Fatigue [FreeTextEntry4] : waiting for cataract surgery [FreeTextEntry5] : low BP [FreeTextEntry9] : left knee pain [de-identified] : Parkinson's [de-identified] : Thyroid nodules [FreeTextEntry1] : dry fingernails/toenails

## 2020-12-09 NOTE — ASSESSMENT
[Procedure Low Risk] : the procedure risk is low [Patient Intermediate Risk] : the patient is an intermediate risk [Optimized for Surgery] : the patient is optimized for surgery [As per surgery] : as per surgery [Continue] : Continue medications as currently directed [FreeTextEntry1] : Benefits outweigh risks for cataract surgery\par Reviewed CBC and EKG\par May proceed with procedure as scheduled \par \par advised to follow up with neurology regarding fatigue and parkinsonism\par \par

## 2020-12-14 ENCOUNTER — APPOINTMENT (OUTPATIENT)
Dept: BREAST CENTER | Facility: CLINIC | Age: 70
End: 2020-12-14
Payer: MEDICARE

## 2020-12-14 VITALS
OXYGEN SATURATION: 100 % | DIASTOLIC BLOOD PRESSURE: 72 MMHG | BODY MASS INDEX: 31.41 KG/M2 | HEART RATE: 98 BPM | SYSTOLIC BLOOD PRESSURE: 139 MMHG | HEIGHT: 60 IN | WEIGHT: 160 LBS | RESPIRATION RATE: 18 BRPM

## 2020-12-14 PROCEDURE — 99213 OFFICE O/P EST LOW 20 MIN: CPT

## 2020-12-30 ENCOUNTER — APPOINTMENT (OUTPATIENT)
Dept: HEMATOLOGY ONCOLOGY | Facility: CLINIC | Age: 70
End: 2020-12-30
Payer: MEDICARE

## 2020-12-30 ENCOUNTER — RESULT REVIEW (OUTPATIENT)
Age: 70
End: 2020-12-30

## 2020-12-30 VITALS
HEIGHT: 60 IN | BODY MASS INDEX: 31.02 KG/M2 | WEIGHT: 158 LBS | SYSTOLIC BLOOD PRESSURE: 109 MMHG | DIASTOLIC BLOOD PRESSURE: 73 MMHG | OXYGEN SATURATION: 98 % | HEART RATE: 103 BPM | TEMPERATURE: 97.2 F | RESPIRATION RATE: 16 BRPM

## 2020-12-30 PROCEDURE — 99214 OFFICE O/P EST MOD 30 MIN: CPT

## 2020-12-30 NOTE — PHYSICAL EXAM
[Restricted in physically strenuous activity but ambulatory and able to carry out work of a light or sedentary nature] : Status 1- Restricted in physically strenuous activity but ambulatory and able to carry out work of a light or sedentary nature, e.g., light house work, office work [Normal] : affect appropriate [de-identified] : left breast scar

## 2020-12-30 NOTE — REVIEW OF SYSTEMS
[Fatigue] : fatigue [Joint Pain] : joint pain [Muscle Pain] : muscle pain [Anxiety] : anxiety [Muscle Weakness] : muscle weakness [Negative] : Allergic/Immunologic [Fever] : no fever [Recent Change In Weight] : ~T no recent weight change [Eye Pain] : no eye pain [Vision Problems] : no vision problems [Dysphagia] : no dysphagia [Nosebleeds] : no nosebleeds [Chest Pain] : no chest pain [Lower Ext Edema] : no lower extremity edema [Shortness Of Breath] : no shortness of breath [Cough] : no cough [Constipation] : no constipation [Diarrhea] : no diarrhea [Dysuria] : no dysuria [Skin Rash] : no skin rash [Depression] : no depression [Easy Bleeding] : no tendency for easy bleeding [Easy Bruising] : no tendency for easy bruising [FreeTextEntry7] : nausea improved [FreeTextEntry9] : muscle spasms due to parkinsons, left upper leg pain.-  [de-identified] : tremor

## 2020-12-30 NOTE — ASSESSMENT
[FreeTextEntry1] : 69 yo female \par \par # Stage 1 breast cancer 8 mm tumor, invasive ductal carcinoma, ER/ [place] positive, HER2 not amplified with Oncotype Dx 11. diagnosed November 2018\par -continue with Anastrazole\par - left breast pain - c/w fat necrosis on mammogram \par \par #Adenocarcinoma of lung- stage 4 diagnosed  April 2019\par -Repeated CEA -plateau at 11.5 (started out at 26.6)\par -CT scan showed mass 1.8 x 1.4 x 1.7 cm lesion.\par -Biopsy of lung nodule c/w adeno ca of the lung, EGFR mutated, T190 mutated, started on Tagrisso.  Side effects, -toxicities and benefits reviewed with pt. L4 vertebral body biopsy confirmed met adenoca of the lung.\par - PETCT  2/3/2020- decreased FDG uptake in primary lesion, sclerotic lesions in bones, thyroid nodule activity- under surveillance, duodenal uptake \par - plan for PETCT JU[facility]- August 2020- stable, uptake in right breast - c/w fat necrosis \par - repeat CEA\par \par #benign thyroid nodule - followed by Dr. Park. plan for biopsy\par # constipation- daily miralax\par \par Plan:\par - continue Tagrisso 11 pm, nausea in am \par - Nausea is controlled with  Sancuso and Dronabinol 2.5m bid for nausea- refill today Istop checked\par - continue Xgeva monthly (last 7/15/2020, 8/12/2020, 9/12/, 10/7/2020, 11/4/2020, 12/2, 12/30/2020\par -neoplasm pain - continue with Vicodin prn rarely taking \par -s/p cryoablation on 11/12 to left femur for osseous mets/pain mgmt with Dr. Carvajal\par -mammo Dec 2020- stable- no evidence of disease \par -due for PET CT- will do full body due to femur involvement \par -CBC stable \par \par . Office visit in 4 weeks or prn for new or worsening symptoms- case and plan disused with Dr. Shaffer. \par - CBC,Chem,CEA, CA27-29 at next visit

## 2020-12-30 NOTE — HISTORY OF PRESENT ILLNESS
[Therapy: ___] : Therapy: [unfilled] [de-identified] : Mrs. Mcdowell is a 68 year old postmenopausal female who is referred by Dr.Alice Demarco for newly diagnosed breast cancer.\par She was found on routine mammography in November 2019 she you have a focal asymmetric density in the left upper outer breast at 1:00 access 8 cm from the nipple. A one year prior had been negative.  Ultrasound-guided core biopsy in November 21, 2018 revealed a grade 1/3 invasive ductal carcinoma ER positive at 98% and DC positive at 90%,Ki-67 was less than 10%. She underwent some left breast lumpectomy and sentinel node dissection on December 19, 2018. Pathology revealed an 8 mm grade 1/3 invasive ductal carcinoma zero of 3 sentinel lymph nodes were involved with tumor. Oncotype DX score was 11. She also underwent genetic testing which is negative.\par  [de-identified] : Mrs. 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  She is feeling well- had mammo 12/2020\par \par \par

## 2021-01-27 ENCOUNTER — RESULT REVIEW (OUTPATIENT)
Age: 71
End: 2021-01-27

## 2021-01-27 ENCOUNTER — APPOINTMENT (OUTPATIENT)
Dept: HEMATOLOGY ONCOLOGY | Facility: CLINIC | Age: 71
End: 2021-01-27
Payer: MEDICARE

## 2021-01-27 VITALS
TEMPERATURE: 97.6 F | RESPIRATION RATE: 16 BRPM | HEART RATE: 100 BPM | OXYGEN SATURATION: 97 % | BODY MASS INDEX: 31.8 KG/M2 | DIASTOLIC BLOOD PRESSURE: 80 MMHG | WEIGHT: 162 LBS | SYSTOLIC BLOOD PRESSURE: 129 MMHG | HEIGHT: 60 IN

## 2021-01-27 PROCEDURE — 99214 OFFICE O/P EST MOD 30 MIN: CPT

## 2021-02-01 NOTE — REVIEW OF SYSTEMS
[Fever] : no fever [Recent Change In Weight] : ~T no recent weight change [Eye Pain] : no eye pain [Vision Problems] : no vision problems [Dysphagia] : no dysphagia [Nosebleeds] : no nosebleeds [Chest Pain] : no chest pain [Lower Ext Edema] : no lower extremity edema [Shortness Of Breath] : no shortness of breath [Cough] : no cough [Diarrhea] : no diarrhea [Constipation] : no constipation [Dysuria] : no dysuria [Skin Rash] : no skin rash [Depression] : no depression [Easy Bleeding] : no tendency for easy bleeding [Easy Bruising] : no tendency for easy bruising [FreeTextEntry7] : nausea improved [FreeTextEntry9] : muscle spasms due to parkinsons, left upper leg pain.-  [de-identified] : tremor

## 2021-02-01 NOTE — HISTORY OF PRESENT ILLNESS
[de-identified] : Mrs. 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  She is feeling well- had mammo 12/2020 Due for PET next week \par C/o fatigue in the afternoon\par \par \par  [de-identified] : Mrs. Mcdowell is a 68 year old postmenopausal female who is referred by Dr.Alice Demarco for newly diagnosed breast cancer.\par She was found on routine mammography in November 2019 she you have a focal asymmetric density in the left upper outer breast at 1:00 access 8 cm from the nipple. A one year prior had been negative.  Ultrasound-guided core biopsy in November 21, 2018 revealed a grade 1/3 invasive ductal carcinoma ER positive at 98% and IN positive at 90%,Ki-67 was less than 10%. She underwent some left breast lumpectomy and sentinel node dissection on December 19, 2018. Pathology revealed an 8 mm grade 1/3 invasive ductal carcinoma zero of 3 sentinel lymph nodes were involved with tumor. Oncotype DX score was 11. She also underwent genetic testing which is negative.\par

## 2021-02-03 ENCOUNTER — APPOINTMENT (OUTPATIENT)
Dept: GERIATRICS | Facility: CLINIC | Age: 71
End: 2021-02-03

## 2021-02-05 ENCOUNTER — TRANSCRIPTION ENCOUNTER (OUTPATIENT)
Age: 71
End: 2021-02-05

## 2021-02-06 ENCOUNTER — APPOINTMENT (OUTPATIENT)
Age: 71
End: 2021-02-06
Payer: MEDICARE

## 2021-02-06 ENCOUNTER — RESULT REVIEW (OUTPATIENT)
Age: 71
End: 2021-02-06

## 2021-02-06 ENCOUNTER — OUTPATIENT (OUTPATIENT)
Dept: OUTPATIENT SERVICES | Facility: HOSPITAL | Age: 71
LOS: 1 days | End: 2021-02-06
Payer: MEDICARE

## 2021-02-06 DIAGNOSIS — C50.912 MALIGNANT NEOPLASM OF UNSPECIFIED SITE OF LEFT FEMALE BREAST: ICD-10-CM

## 2021-02-06 DIAGNOSIS — C79.51 SECONDARY MALIGNANT NEOPLASM OF BONE: ICD-10-CM

## 2021-02-06 DIAGNOSIS — C34.90 MALIGNANT NEOPLASM OF UNSPECIFIED PART OF UNSPECIFIED BRONCHUS OR LUNG: ICD-10-CM

## 2021-02-06 PROCEDURE — A9552: CPT

## 2021-02-06 PROCEDURE — G1004: CPT

## 2021-02-06 PROCEDURE — 78816 PET IMAGE W/CT FULL BODY: CPT | Mod: 26,PS,KX,MG

## 2021-02-06 PROCEDURE — 78816 PET IMAGE W/CT FULL BODY: CPT

## 2021-02-09 ENCOUNTER — TRANSCRIPTION ENCOUNTER (OUTPATIENT)
Age: 71
End: 2021-02-09

## 2021-02-11 ENCOUNTER — APPOINTMENT (OUTPATIENT)
Dept: NEUROLOGY | Facility: CLINIC | Age: 71
End: 2021-02-11
Payer: MEDICARE

## 2021-02-11 VITALS
SYSTOLIC BLOOD PRESSURE: 134 MMHG | BODY MASS INDEX: 31.41 KG/M2 | DIASTOLIC BLOOD PRESSURE: 85 MMHG | HEIGHT: 60 IN | WEIGHT: 160 LBS | HEART RATE: 97 BPM | TEMPERATURE: 97.7 F

## 2021-02-11 PROCEDURE — 99205 OFFICE O/P NEW HI 60 MIN: CPT

## 2021-02-14 NOTE — PHYSICAL EXAM
[Person] : oriented to person [Place] : oriented to place [Time] : oriented to time [Comprehension] : comprehension intact [Concentration Intact] : normal concentrating ability [Cranial Nerves Optic (II)] : visual acuity intact bilaterally,  visual fields full to confrontation, pupils equal round and reactive to light [Cranial Nerves Oculomotor (III)] : extraocular motion intact [Cranial Nerves Trigeminal (V)] : facial sensation intact symmetrically [Cranial Nerves Facial (VII)] : face symmetrical [Cranial Nerves Glossopharyngeal (IX)] : tongue and palate midline [Cranial Nerves Vestibulocochlear (VIII)] : hearing was intact bilaterally [Cranial Nerves Accessory (XI - Cranial And Spinal)] : head turning and shoulder shrug symmetric [Cranial Nerves Hypoglossal (XII)] : there was no tongue deviation with protrusion [Motor Strength] : muscle strength was normal in all four extremities [Involuntary Movements] : no involuntary movements were seen [Paresis Pronator Drift Right-Sided] : no pronator drift on the right [Paresis Pronator Drift Left-Sided] : no pronator drift on the left [Sensation Tactile Decrease] : light touch was intact [Coordination - Dysmetria Impaired Finger-to-Nose Bilateral] : not present [1+] : Ankle jerk left 1+ [FreeTextEntry1] : Face was minimally masked. Saccades were normal. Voice is normal.\par There were no significant resting, action or postural tremors. \par There was no significant rigidity.\par There was mild bradykinesia, right more than left.\par Normal leg agility.\par Mildly impaired toe tapping on the left.\par No trouble standing with arms crossed. Posture is normal.\par Gait is with good stride length and speed with mildly decreased right arm swing.\par Postural reflexes are intact.

## 2021-02-14 NOTE — HISTORY OF PRESENT ILLNESS
[FreeTextEntry1] : Rosamaria Mcdowell is a 70 year old F with a PMHx of Parkinson's disease, lung cancer (on targeted therapy), recent cataract surgery, history of breast cancer, GERD, thyroid nodules and tachycardia who presents for initial evaluation in the [facility] at [place] for Parkinson's disease. \par \par She was diagnosed in 2014 at [place]. She has been seeing a general neurologist.  \par \par She thought she had essential tremor. Her tremors are mostly in the right hand, controlled on Sinemet. Her left leg has restless leg syndrome.\par She feels worse in the afternoon, around 3-4pm. She gets wobbly. If she takes her BP, it is lower. She has not been officially tested for orthostatic hypotension. Sometimes her systolic is in 100s. She was tried on midodrine but she had hair loss and UTI. She was also tried on Gabapentin. When she didn't take the Sinemet ER in the morning she felt worse. No trouble with buttons, zippers or shoelaces, cutting food. No significant stiffness. She gets muscle spasms in the feet. She thinks the Sinemet lasts about 4 hours.  \par \par No trouble turning or adjusting in bed at night.\par She talks in her sleep and has fallen out of bed before. She wakes up to go to the bathroom at night. She also has breakthrough tremors at night. \par She urinates every 4 hours. \par \par She was reduced off the pramipexole due to potential side effects, but she is not sure which ones. When she decreased it, around 10p for about an hour she would be walking up and down the halls and could not lay still and got spasms in her left leg. She was tried on Lyrica without relief. They tried adjusting the Sinemet doses instead.\par \par She has no sense of smell.\par No changes in loudness of her voice. 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 Therapy did not work. She can still sing.\par No trouble swallowing. \par She has constipation. She has a bowel movement every couple of days. She takes Miralax daily or every other day. She has increased her water intake.\par She has urinary frequency. No incontinence. \par Memory, having more trouble finding words.\par Mood is depressed, but generally okay.\par Dizziness in the afternoon when standing. No falls. Her BP is sometimes low. \par Hallucinations, none.\par \par Current meds:\par Sinemet 10-100mg taking 6 tabs daily - 7-11-3-7-11-3a\par Sinemet ER 25-100mg 1 tab BID 7a-7p\par Pramipexole 0.75mg ER in the evening\par \par Prior meds:\par Azilect 1 mg daily\par \par Family history:\par Social history: retired nurse\par  \par No Kranthi scan.\par MRI brain with and without contrast was done in 4/2019.

## 2021-02-14 NOTE — DISCUSSION/SUMMARY
[FreeTextEntry1] : Rosamaria Mcdowell is a 70 year old F with a PMHx of Parkinson's disease diagnosed in 2014, lung cancer (on targeted therapy), recent cataract surgery, history of breast cancer, GERD, thyroid nodules and tachycardia who presents for initial evaluation in the [facility] at [place] for Parkinson's disease. \par \par The patient's history and physical examination are suggestive of mild idiopathic Parkinson's disease. She responds well to Sinemet, though could benefit from a dose adjustment as she does have wearing off and mild RLS symptoms.\par \par Plan:\par - Adjust Sinemet to:\par 7a - Sinemet 25-100mg ER 1 tab + Sinemet 25-100mg IR 1.5\par 11a - Sinemet IR 1 tab\par 3p - Sinemet IR 1.5 tabs\par 7p - Sinemet IR 1 tab\par 11p - Sinemet ER 1 tab, if needed 2 tabs\par - Hold Pramipexole. If RLS symptoms return, resume at 7p\par - Encouraged to increase exercise and physical activity and maintain an active social and intellectual life.\par - Bowel regimen titrated to 1 bowel movement a day with miralax, increased fiber and water intake\par \par RTC 3 months\par \par All questions were answered to her satisfaction. I spent 60 minutes with this patient.

## 2021-02-24 ENCOUNTER — RESULT REVIEW (OUTPATIENT)
Age: 71
End: 2021-02-24

## 2021-02-24 ENCOUNTER — APPOINTMENT (OUTPATIENT)
Dept: HEMATOLOGY ONCOLOGY | Facility: CLINIC | Age: 71
End: 2021-02-24
Payer: MEDICARE

## 2021-02-24 VITALS
TEMPERATURE: 97.5 F | HEIGHT: 60 IN | BODY MASS INDEX: 32 KG/M2 | RESPIRATION RATE: 16 BRPM | WEIGHT: 162.99 LBS | HEART RATE: 90 BPM | SYSTOLIC BLOOD PRESSURE: 115 MMHG | DIASTOLIC BLOOD PRESSURE: 75 MMHG | OXYGEN SATURATION: 98 %

## 2021-02-24 VITALS
DIASTOLIC BLOOD PRESSURE: 75 MMHG | TEMPERATURE: 97.5 F | RESPIRATION RATE: 16 BRPM | HEIGHT: 60 IN | HEART RATE: 90 BPM | SYSTOLIC BLOOD PRESSURE: 115 MMHG | WEIGHT: 162.99 LBS | BODY MASS INDEX: 32 KG/M2

## 2021-02-24 PROCEDURE — 99215 OFFICE O/P EST HI 40 MIN: CPT

## 2021-02-24 NOTE — REVIEW OF SYSTEMS
[Joint Pain] : joint pain [Muscle Pain] : muscle pain [Anxiety] : anxiety [Muscle Weakness] : muscle weakness [Negative] : Constitutional [Fever] : no fever [Recent Change In Weight] : ~T no recent weight change [Eye Pain] : no eye pain [Vision Problems] : no vision problems [Dysphagia] : no dysphagia [Nosebleeds] : no nosebleeds [Chest Pain] : no chest pain [Lower Ext Edema] : no lower extremity edema [Shortness Of Breath] : no shortness of breath [Cough] : no cough [Constipation] : no constipation [Diarrhea] : no diarrhea [Dysuria] : no dysuria [Skin Rash] : no skin rash [Depression] : no depression [Easy Bleeding] : no tendency for easy bleeding [Easy Bruising] : no tendency for easy bruising [FreeTextEntry7] : nausea improved [FreeTextEntry9] : muscle spasms due to parkinsons, left upper leg pain.-  [de-identified] : tremor

## 2021-02-24 NOTE — HISTORY OF PRESENT ILLNESS
[Therapy: ___] : Therapy: [unfilled] [de-identified] : Mrs. Mcdowell is a 68 year old postmenopausal female who is referred by Dr.Alice Demarco for newly diagnosed breast cancer.\par She was found on routine mammography in November 2019 she you have a focal asymmetric density in the left upper outer breast at 1:00 access 8 cm from the nipple. A one year prior had been negative.  Ultrasound-guided core biopsy in November 21, 2018 revealed a grade 1/3 invasive ductal carcinoma ER positive at 98% and GA positive at 90%,Ki-67 was less than 10%. She underwent some left breast lumpectomy and sentinel node dissection on December 19, 2018. Pathology revealed an 8 mm grade 1/3 invasive ductal carcinoma zero of 3 sentinel lymph nodes were involved with tumor. Oncotype DX score was 11. She also underwent genetic testing which is negative.\par  [de-identified] : Mrs. 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  She is feeling well.

## 2021-02-24 NOTE — PHYSICAL EXAM
[Restricted in physically strenuous activity but ambulatory and able to carry out work of a light or sedentary nature] : Status 1- Restricted in physically strenuous activity but ambulatory and able to carry out work of a light or sedentary nature, e.g., light house work, office work [Normal] : affect appropriate [de-identified] : left breast scar

## 2021-02-24 NOTE — ASSESSMENT
[FreeTextEntry1] : 69 yo female \par \par # Stage 1 breast cancer 8 mm tumor, invasive ductal carcinoma, ER/ [place] positive, HER2 not amplified with Oncotype Dx 11. diagnosed November 2018\par -continue with Anastrazole\par - left breast pain - c/w fat necrosis on mammogram \par \par #Adenocarcinoma of lung- stage 4 diagnosed  April 2019\par -Repeated CEA -plateau at 11.5 (started out at 26.6)\par -CT scan showed mass 1.8 x 1.4 x 1.7 cm lesion.\par -Biopsy of lung nodule c/w adeno ca of the lung, EGFR mutated, T190 mutated, started on Tagrisso.  Side effects, -toxicities and benefits reviewed with pt. L4 vertebral body biopsy confirmed met adenoca of the lung.\par - PETCT  2/3/2020- decreased FDG uptake in primary lesion, sclerotic lesions in bones, thyroid nodule activity- under surveillance, duodenal uptake \par - plan for PETCT Feb 2021- SARAH- stable, uptake in right breast - c/w fat necrosis \par - repeat CEA\par \par #benign thyroid nodule - followed by Dr. Park. plan for biopsy\par # constipation- daily miralax\par \par Plan:\par - continue Tagrisso 11 pm, nausea in am \par - Nausea is controlled with  Sancuso and Dronabinol 2.5m bid for nausea- refill today Istop checked\par - continue Xgeva monthly (last 7/15/2020, 8/12/2020, 9/12/, 10/7/2020, 11/4/2020, 12/2, 12/30/2020, 1/27/2020, 2/24/2021\par -neoplasm pain - continue with Vicodin prn rarely taking \par -s/p cryoablation on 11/12 to left femur for osseous mets/pain mgmt with Dr. Carvajal, decreased pain\par -mammo Dec 2020- stable- no evidence of disease, 1 year follow up  \par \par \par - CBC,Chem,CEA 2 1m, CA27-29  @ 6 months at next visit

## 2021-03-06 ENCOUNTER — NON-APPOINTMENT (OUTPATIENT)
Age: 71
End: 2021-03-06

## 2021-03-12 ENCOUNTER — TRANSCRIPTION ENCOUNTER (OUTPATIENT)
Age: 71
End: 2021-03-12

## 2021-03-18 ENCOUNTER — NON-APPOINTMENT (OUTPATIENT)
Age: 71
End: 2021-03-18

## 2021-03-23 ENCOUNTER — RESULT REVIEW (OUTPATIENT)
Age: 71
End: 2021-03-23

## 2021-03-25 ENCOUNTER — RESULT REVIEW (OUTPATIENT)
Age: 71
End: 2021-03-25

## 2021-03-25 ENCOUNTER — NON-APPOINTMENT (OUTPATIENT)
Age: 71
End: 2021-03-25

## 2021-03-25 ENCOUNTER — APPOINTMENT (OUTPATIENT)
Dept: HEMATOLOGY ONCOLOGY | Facility: CLINIC | Age: 71
End: 2021-03-25

## 2021-03-25 ENCOUNTER — APPOINTMENT (OUTPATIENT)
Dept: HEMATOLOGY ONCOLOGY | Facility: CLINIC | Age: 71
End: 2021-03-25
Payer: MEDICARE

## 2021-03-25 VITALS
SYSTOLIC BLOOD PRESSURE: 125 MMHG | HEART RATE: 100 BPM | TEMPERATURE: 97.1 F | DIASTOLIC BLOOD PRESSURE: 79 MMHG | WEIGHT: 161.3 LBS | OXYGEN SATURATION: 99 % | HEIGHT: 60 IN | RESPIRATION RATE: 16 BRPM | BODY MASS INDEX: 31.67 KG/M2

## 2021-03-25 DIAGNOSIS — M54.16 RADICULOPATHY, LUMBAR REGION: ICD-10-CM

## 2021-03-25 PROCEDURE — 99214 OFFICE O/P EST MOD 30 MIN: CPT

## 2021-03-25 NOTE — PHYSICAL EXAM
[Restricted in physically strenuous activity but ambulatory and able to carry out work of a light or sedentary nature] : Status 1- Restricted in physically strenuous activity but ambulatory and able to carry out work of a light or sedentary nature, e.g., light house work, office work [Normal] : affect appropriate [de-identified] : left breast scar

## 2021-03-25 NOTE — ASSESSMENT
[FreeTextEntry1] : 69 yo female \par \par # Stage 1 breast cancer 8 mm tumor, invasive ductal carcinoma, ER/ DC positive, HER2 not amplified with Oncotype Dx 11. diagnosed November 2018\par -continue with Anastrazole\par - left breast pain - c/w fat necrosis on mammogram \par \par #Adenocarcinoma of lung- stage 4 diagnosed  April 2019\par -Repeated CEA -plateau at 11.5 (started out at 26.6)\par -CT scan showed mass 1.8 x 1.4 x 1.7 cm lesion.\par -Biopsy of lung nodule c/w adeno ca of the lung, EGFR mutated, T190 mutated, started on Tagrisso.  Side effects, -toxicities and benefits reviewed with pt. L4 vertebral body biopsy confirmed met adenoca of the lung.\par - PETCT  2/3/2020- decreased FDG uptake in primary lesion, sclerotic lesions in bones, thyroid nodule activity- under surveillance, duodenal uptake \par - plan for PETCT Feb 2021- SARAH- stable, uptake in right breast - c/w fat necrosis \par - repeat CEA\par \par #benign thyroid nodule - followed by Dr. Park. plan for biopsy\par # constipation- daily miralax\par \par Plan:\par - continue Tagrisso 11 pm, nausea in am \par - Nausea is controlled with  Sancuso and Dronabinol 2.5mg bid for nausea- refill today I-stop checked\par - continue Xgeva monthly (last 7/15/2020, 8/12/2020, 9/12/, 10/7/2020, 11/4/2020, 12/2, 12/30/2020, 1/27/2020, 2/24/2021, 3/25/2021\par -neoplasm pain - continue with Vicodin prn rarely taking \par -mammo Dec 2020- stable- no evidence of disease, 1 year follow up \par -has follow up with Dr. Park for thyroid US \par -established care with Dr. Hendrix for her Parkinsons- having some adverse effects from new med [employer]- will follow up \par \par \par - CBC,Chem,CEA , CA27-29- case and mgmt discussed with Dr. Shaffer- to return in 1 month for treaement

## 2021-03-25 NOTE — REVIEW OF SYSTEMS
[Joint Pain] : joint pain [Muscle Pain] : muscle pain [Anxiety] : anxiety [Muscle Weakness] : muscle weakness [Negative] : Allergic/Immunologic [Fatigue] : fatigue [Fever] : no fever [Recent Change In Weight] : ~T no recent weight change [Eye Pain] : no eye pain [Vision Problems] : no vision problems [Dysphagia] : no dysphagia [Nosebleeds] : no nosebleeds [Chest Pain] : no chest pain [Lower Ext Edema] : no lower extremity edema [Shortness Of Breath] : no shortness of breath [Cough] : no cough [Constipation] : no constipation [Diarrhea] : no diarrhea [Dysuria] : no dysuria [Skin Rash] : no skin rash [Depression] : no depression [Easy Bleeding] : no tendency for easy bleeding [Easy Bruising] : no tendency for easy bruising [FreeTextEntry7] : nausea improved [FreeTextEntry9] : muscle spasms due to parkinsons, left upper leg pain.-  [de-identified] : tremor

## 2021-03-25 NOTE — HISTORY OF PRESENT ILLNESS
[Therapy: ___] : Therapy: [unfilled] [de-identified] : Mrs. Mcdowell is a 68 year old postmenopausal female who is referred by Dr.Alice Demarco for newly diagnosed breast cancer.\par She was found on routine mammography in November 2019 she you have a focal asymmetric density in the left upper outer breast at 1:00 access 8 cm from the nipple. A one year prior had been negative.  Ultrasound-guided core biopsy in November 21, 2018 revealed a grade 1/3 invasive ductal carcinoma ER positive at 98% and MA positive at 90%,Ki-67 was less than 10%. She underwent some left breast lumpectomy and sentinel node dissection on December 19, 2018. Pathology revealed an 8 mm grade 1/3 invasive ductal carcinoma zero of 3 sentinel lymph nodes were involved with tumor. Oncotype DX score was 11. She also underwent genetic testing which is negative.\par  [de-identified] : Mrs. 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  She is feeling well having some side effects from new Parkinsons meds

## 2021-03-26 ENCOUNTER — TRANSCRIPTION ENCOUNTER (OUTPATIENT)
Age: 71
End: 2021-03-26

## 2021-03-31 ENCOUNTER — TRANSCRIPTION ENCOUNTER (OUTPATIENT)
Age: 71
End: 2021-03-31

## 2021-04-02 ENCOUNTER — APPOINTMENT (OUTPATIENT)
Dept: ENDOCRINOLOGY | Facility: CLINIC | Age: 71
End: 2021-04-02
Payer: MEDICARE

## 2021-04-02 VITALS
BODY MASS INDEX: 31.41 KG/M2 | OXYGEN SATURATION: 98 % | DIASTOLIC BLOOD PRESSURE: 70 MMHG | WEIGHT: 160 LBS | SYSTOLIC BLOOD PRESSURE: 112 MMHG | HEIGHT: 60 IN | HEART RATE: 97 BPM | TEMPERATURE: 96.5 F

## 2021-04-02 LAB
T4 FREE SERPL-MCNC: 1.4 NG/DL
TSH SERPL-ACNC: 0.35 UIU/ML

## 2021-04-02 PROCEDURE — 99214 OFFICE O/P EST MOD 30 MIN: CPT

## 2021-04-02 NOTE — HISTORY OF PRESENT ILLNESS
[FreeTextEntry1] : Ms. SULLY CORNELIUS is a 70 year old female coming for follow up, for multinodular thyroid  last seen me 10/2018 for elevated HR , seen Dr Hdez and Dr [facility] at that time with negative workup, today was sent back by Dr Shaffer for mutinodular goiter new diagnosis on PET CT 9/2019 \par was diagnosed with breast cancer 11/2018 stage 1 , 8mm invazive ductal carcinoma ER/NE positive\par on Arimidex  since 1/2019\par has a vocal cord paralysed by patient left side sees Dr Rodriguez, sent to CT scan when lung Cancer was diagnosed with bone mets , also CEA was going up 4/2019\par responding well to therapy so far\par last US thyroid reviewed\par \par denies dysphagia \par denies neck irradiation\par denies Fhx thyroid cancer\par has hoarseness did voice therapy feels is getting better\par denies dyspnea\par \par US thyroid 9/2019\par \par \par never been on thyroid medication \par \par left thyroid nodule 2.9cm neg FNA at [facility] 9/2019 also growing on her last ultrasound\par has 10mm right thyroid nodule growing on last ultrasound\par \par repeated FNA 10/2020 with Dr Carvajal\par A. Right thyroid upper, ultrasound guided fine needle aspiration:\par BENIGN ([place] Category II)\par - Benign follicular cells dispersed in sheets and clusters in a background of thin colloid\par \par B. Left thyroid lower, ultrasound guided fine needle aspiration:\par BENIGN ([place] Category II)\par - Benign follicular cells dispersed in sheets and clusters in a background of thin colloid\par and hemosiderin-laden macrophages\par \par \par \par

## 2021-04-22 ENCOUNTER — RESULT REVIEW (OUTPATIENT)
Age: 71
End: 2021-04-22

## 2021-04-22 ENCOUNTER — APPOINTMENT (OUTPATIENT)
Dept: HEMATOLOGY ONCOLOGY | Facility: CLINIC | Age: 71
End: 2021-04-22
Payer: MEDICARE

## 2021-04-22 VITALS
RESPIRATION RATE: 15 BRPM | HEART RATE: 91 BPM | DIASTOLIC BLOOD PRESSURE: 73 MMHG | SYSTOLIC BLOOD PRESSURE: 115 MMHG | BODY MASS INDEX: 31.62 KG/M2 | TEMPERATURE: 97.6 F | HEIGHT: 60 IN | OXYGEN SATURATION: 97 % | WEIGHT: 161.06 LBS

## 2021-04-22 PROCEDURE — 99214 OFFICE O/P EST MOD 30 MIN: CPT

## 2021-04-22 NOTE — HISTORY OF PRESENT ILLNESS
[Therapy: ___] : Therapy: [unfilled] [de-identified] : Mrs. Mcdowell is a 68 year old postmenopausal female who is referred by Dr.Alice Demarco for newly diagnosed breast cancer.\par She was found on routine mammography in November 2019 she you have a focal asymmetric density in the left upper outer breast at 1:00 access 8 cm from the nipple. A one year prior had been negative.  Ultrasound-guided core biopsy in November 21, 2018 revealed a grade 1/3 invasive ductal carcinoma ER positive at 98% and ID positive at 90%,Ki-67 was less than 10%. She underwent some left breast lumpectomy and sentinel node dissection on December 19, 2018. Pathology revealed an 8 mm grade 1/3 invasive ductal carcinoma zero of 3 sentinel lymph nodes were involved with tumor. Oncotype DX score was 11. She also underwent genetic testing which is negative.\par  [de-identified] : Mrs. 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  She is feeling well - having some lower back pain

## 2021-04-22 NOTE — REVIEW OF SYSTEMS
[Fatigue] : fatigue [Joint Pain] : joint pain [Muscle Pain] : muscle pain [Anxiety] : anxiety [Muscle Weakness] : muscle weakness [Negative] : Allergic/Immunologic [Fever] : no fever [Recent Change In Weight] : ~T no recent weight change [Eye Pain] : no eye pain [Vision Problems] : no vision problems [Dysphagia] : no dysphagia [Nosebleeds] : no nosebleeds [Chest Pain] : no chest pain [Lower Ext Edema] : no lower extremity edema [Shortness Of Breath] : no shortness of breath [Cough] : no cough [Constipation] : no constipation [Diarrhea] : no diarrhea [Dysuria] : no dysuria [Skin Rash] : no skin rash [Depression] : no depression [Easy Bleeding] : no tendency for easy bleeding [Easy Bruising] : no tendency for easy bruising [FreeTextEntry7] : nausea improved [FreeTextEntry9] : muscle spasms due to parkinsons, left upper leg pain.-  [de-identified] : tremor

## 2021-04-22 NOTE — PHYSICAL EXAM
[Restricted in physically strenuous activity but ambulatory and able to carry out work of a light or sedentary nature] : Status 1- Restricted in physically strenuous activity but ambulatory and able to carry out work of a light or sedentary nature, e.g., light house work, office work [Normal] : affect appropriate [de-identified] : left breast scar

## 2021-04-22 NOTE — ASSESSMENT
[FreeTextEntry1] : 69 yo female \par \par # Stage 1 breast cancer 8 mm tumor, invasive ductal carcinoma, ER/ UT positive, HER2 not amplified with Oncotype Dx 11. diagnosed November 2018\par -continue with Anastrazole\par - left breast pain - c/w fat necrosis on mammogram \par \par #Adenocarcinoma of lung- stage 4 diagnosed  April 2019\par -Repeated CEA -plateau at 11.5 (started out at 26.6)\par -CT scan showed mass 1.8 x 1.4 x 1.7 cm lesion.\par -Biopsy of lung nodule c/w adeno ca of the lung, EGFR mutated, T190 mutated, started on Tagrisso.  Side effects, -toxicities and benefits reviewed with pt. L4 vertebral body biopsy confirmed met adenoca of the lung.\par - PETCT  2/3/2020- decreased FDG uptake in primary lesion, sclerotic lesions in bones, thyroid nodule activity- under surveillance, duodenal uptake \par - plan for PETCT Feb 2021- SARAH- stable, uptake in right breast - c/w fat necrosis \par - repeat CEA\par \par #benign thyroid nodule - followed by Dr. Park. plan for biopsy\par # constipation- daily miralax\par \par Plan:\par - continue Tagrisso 11 pm, nausea in am \par - Nausea is controlled with  Sancuso and Dronabinol 2.5mg bid for nausea- refill today I-stop checked\par - continue Xgeva monthly (last 7/15/2020, 8/12/2020, 9/12/, 10/7/2020, 11/4/2020, 12/2, 12/30/2020, 1/27/2020, 2/24/2021, 3/25/202, 4/22/2021\par -neoplasm pain - continue with Vicodin prn rarely taking \par -mammo Dec 2020- stable- no evidence of disease, 1 year follow up \par -has follow up with Dr. Park for thyroid US- stable nodule \par -established care with Dr. Hendrix for her Parkinsons- stopped horizant \par \par \par - CBC,Chem,CEA , CA27-29- case and mgmt discussed with Dr. Shaffer- to return in 1 month for treatment

## 2021-05-19 ENCOUNTER — APPOINTMENT (OUTPATIENT)
Dept: NEUROLOGY | Facility: CLINIC | Age: 71
End: 2021-05-19
Payer: MEDICARE

## 2021-05-19 VITALS
DIASTOLIC BLOOD PRESSURE: 85 MMHG | WEIGHT: 160 LBS | HEIGHT: 60 IN | TEMPERATURE: 97.2 F | BODY MASS INDEX: 31.41 KG/M2 | HEART RATE: 91 BPM | SYSTOLIC BLOOD PRESSURE: 137 MMHG

## 2021-05-19 PROCEDURE — 99215 OFFICE O/P EST HI 40 MIN: CPT

## 2021-05-19 RX ORDER — GABAPENTIN ENACARBIL 600 MG/1
600 TABLET, EXTENDED RELEASE ORAL
Qty: 30 | Refills: 3 | Status: DISCONTINUED | COMMUNITY
Start: 2021-03-06 | End: 2021-05-19

## 2021-05-20 ENCOUNTER — APPOINTMENT (OUTPATIENT)
Dept: HEMATOLOGY ONCOLOGY | Facility: CLINIC | Age: 71
End: 2021-05-20
Payer: MEDICARE

## 2021-05-20 ENCOUNTER — RESULT REVIEW (OUTPATIENT)
Age: 71
End: 2021-05-20

## 2021-05-20 VITALS
SYSTOLIC BLOOD PRESSURE: 111 MMHG | TEMPERATURE: 97.2 F | RESPIRATION RATE: 16 BRPM | WEIGHT: 159 LBS | HEART RATE: 95 BPM | HEIGHT: 60 IN | DIASTOLIC BLOOD PRESSURE: 70 MMHG | BODY MASS INDEX: 31.22 KG/M2 | OXYGEN SATURATION: 98 %

## 2021-05-20 PROCEDURE — 99215 OFFICE O/P EST HI 40 MIN: CPT

## 2021-05-20 NOTE — REVIEW OF SYSTEMS
[Fatigue] : fatigue [Joint Pain] : joint pain [Muscle Pain] : muscle pain [Anxiety] : anxiety [Muscle Weakness] : muscle weakness [Negative] : Allergic/Immunologic [Cough] : cough [SOB on Exertion] : shortness of breath during exertion [Skin Rash] : skin rash [Fever] : no fever [Recent Change In Weight] : ~T no recent weight change [Eye Pain] : no eye pain [Vision Problems] : no vision problems [Dysphagia] : no dysphagia [Nosebleeds] : no nosebleeds [Chest Pain] : no chest pain [Lower Ext Edema] : no lower extremity edema [Shortness Of Breath] : no shortness of breath [Constipation] : no constipation [Diarrhea] : no diarrhea [Dysuria] : no dysuria [Depression] : no depression [Easy Bleeding] : no tendency for easy bleeding [Easy Bruising] : no tendency for easy bruising [FreeTextEntry6] : dry cough  [FreeTextEntry7] : nausea improved [FreeTextEntry9] : muscle spasms due to parkinsons, left upper leg pain.-  [de-identified] : "bite" marks  [de-identified] : tremor

## 2021-05-20 NOTE — PHYSICAL EXAM
[Restricted in physically strenuous activity but ambulatory and able to carry out work of a light or sedentary nature] : Status 1- Restricted in physically strenuous activity but ambulatory and able to carry out work of a light or sedentary nature, e.g., light house work, office work [Normal] : affect appropriate [de-identified] : left breast scar

## 2021-05-20 NOTE — ASSESSMENT
[FreeTextEntry1] : 69 yo female \par \par # Stage 1 breast cancer 8 mm tumor, invasive ductal carcinoma, ER/ MI positive, HER2 not amplified with Oncotype Dx 11. diagnosed November 2018\par -continue with Anastrazole\par - left breast pain - c/w fat necrosis on mammogram \par \par #Adenocarcinoma of lung- stage 4 diagnosed  April 2019\par -Repeated CEA -plateau at 11.5 (started out at 26.6)\par -CT scan showed mass 1.8 x 1.4 x 1.7 cm lesion.\par -Biopsy of lung nodule c/w adeno ca of the lung, EGFR mutated, T190 mutated, started on Tagrisso.  Side effects, -toxicities and benefits reviewed with pt. L4 vertebral body biopsy confirmed met adenoca of the lung.\par - PETCT  2/3/2020- decreased FDG uptake in primary lesion, sclerotic lesions in bones, thyroid nodule activity- under surveillance, duodenal uptake \par - plan for PETCT Feb 2021- SARAH- stable, uptake in right breast - c/w fat necrosis \par - repeat CEA\par \par #benign thyroid nodule - followed by Dr. Park. plan for biopsy\par # constipation- daily miralax\par \par Plan:\par - continue Tagrisso 11 pm, nausea in am \par - Nausea is controlled with  Sancuso and Dronabinol 2.5mg bid for nausea- refill today I-stop checked\par - continue Xgeva monthly (last 7/15/2020, 8/12/2020, 9/12/, 10/7/2020, 11/4/2020, 12/2, 12/30/2020, 1/27/2020, 2/24/2021, 3/25/202, 4/22/2021, 5/202021\par -neoplasm pain - continue with Vicodin prn rarely taking \par -mammo Dec 2020- stable- no evidence of disease, 1 year follow up \par -has follow up with Dr. Park for thyroid US- stable nodule \par -established care with Dr. Hendrix for her Parkinsons- stopped horizant \par \par 5/20/21\par lung cancer stage 4 on tagrisso \par Pruritic raised pustules on the neck, chest - total 5- 6 on the body, rx triamcinolone \par Left iliac crest pain in am, - obtain mRI for metastatic disease\par \par \par - CBC,Chem,CEA  case and mgmt  to return in 1 month for treatment

## 2021-05-20 NOTE — HISTORY OF PRESENT ILLNESS
[Therapy: ___] : Therapy: [unfilled] [de-identified] : Mrs. Mcdowell is a 68 year old postmenopausal female who is referred by Dr.Alice Demarco for newly diagnosed breast cancer.\par She was found on routine mammography in November 2019 she you have a focal asymmetric density in the left upper outer breast at 1:00 access 8 cm from the nipple. A one year prior had been negative.  Ultrasound-guided core biopsy in November 21, 2018 revealed a grade 1/3 invasive ductal carcinoma ER positive at 98% and ND positive at 90%,Ki-67 was less than 10%. She underwent some left breast lumpectomy and sentinel node dissection on December 19, 2018. Pathology revealed an 8 mm grade 1/3 invasive ductal carcinoma zero of 3 sentinel lymph nodes were involved with tumor. Oncotype DX score was 11. She also underwent genetic testing which is negative.\par  [de-identified] : Mrs. 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  Patient has a couple of "bites" to the back of the neck which are increasingly itchy and red. Patient also has left hip and has SOB with exercise and walking up hills/stairs. Patient also requests a renewal for medications: dronabinol and vicodin.

## 2021-05-21 NOTE — PHYSICAL EXAM
[Person] : oriented to person [Place] : oriented to place [Time] : oriented to time [Concentration Intact] : normal concentrating ability [Comprehension] : comprehension intact [Cranial Nerves Optic (II)] : visual acuity intact bilaterally,  visual fields full to confrontation, pupils equal round and reactive to light [Cranial Nerves Oculomotor (III)] : extraocular motion intact [Cranial Nerves Trigeminal (V)] : facial sensation intact symmetrically [Cranial Nerves Facial (VII)] : face symmetrical [Cranial Nerves Vestibulocochlear (VIII)] : hearing was intact bilaterally [Cranial Nerves Glossopharyngeal (IX)] : tongue and palate midline [Cranial Nerves Accessory (XI - Cranial And Spinal)] : head turning and shoulder shrug symmetric [Cranial Nerves Hypoglossal (XII)] : there was no tongue deviation with protrusion [Motor Strength] : muscle strength was normal in all four extremities [Involuntary Movements] : no involuntary movements were seen [Sensation Tactile Decrease] : light touch was intact [1+] : Ankle jerk left 1+ [Paresis Pronator Drift Right-Sided] : no pronator drift on the right [Coordination - Dysmetria Impaired Finger-to-Nose Bilateral] : not present [Paresis Pronator Drift Left-Sided] : no pronator drift on the left [FreeTextEntry1] : Face was minimally masked. Saccades were normal. Voice is normal.\par There were no significant resting, action or postural tremors. \par There was no significant rigidity.\par There was mild bradykinesia, right more than left.\par Normal leg agility.\par Mildly impaired toe tapping on the left.\par No trouble standing with arms crossed. Posture is normal.\par Gait is with good stride length and speed with mildly decreased right arm swing.\par Postural reflexes are intact.

## 2021-05-21 NOTE — HISTORY OF PRESENT ILLNESS
[FreeTextEntry1] : Rosamaria Mcdowell is a 70 year old F with a PMHx of Parkinson's disease, lung cancer (on targeted therapy), hx cataract surgery, history of breast cancer, GERD, thyroid nodules and tachycardia who presents for follow up in the [facility] at [place] for Parkinson's disease. \par \par Interval history:\par Since the last visit, she reports she still feels tired around 2-3pm. \par She has a severe fatigue after she exercises or exerts herself. She does easily become short of breath.\par She has intermittent diarrhea and constipation. \par \par Current meds:\par Sinemet 10-100mg taking 6 tabs daily - 7a-11-3 (1.5 tabs)-7p \par Sinemet ER 25-100mg 1 tab BID 7a and 2 tabs at 11p \par Pramipexole 0.75mg ER in the evening\par \par Prior meds:\par Azilect 1 mg daily\par \par Initial history:\par She was diagnosed in 2014 at [place]. She has been seeing a general neurologist.  \par \par She thought she had essential tremor. Her tremors are mostly in the right hand, controlled on Sinemet. Her left leg has restless leg syndrome.\par She feels worse in the afternoon, around 3-4pm. She gets wobbly. If she takes her BP, it is lower. She has not been officially tested for orthostatic hypotension. Sometimes her systolic is in 100s. She was tried on midodrine but she had hair loss and UTI. She was also tried on Gabapentin. When she didn't take the Sinemet ER in the morning she felt worse. No trouble with buttons, zippers or shoelaces, cutting food. No significant stiffness. She gets muscle spasms in the feet. She thinks the Sinemet lasts about 4 hours.  \par \par No trouble turning or adjusting in bed at night.\par She talks in her sleep and has fallen out of bed before. She wakes up to go to the bathroom at night. She also has breakthrough tremors at night. \par She urinates every 4 hours. \par \par She was reduced off the pramipexole due to potential side effects, but she is not sure which ones. When she decreased it, around 10p for about an hour she would be walking up and down the halls and could not lay still and got spasms in her left leg. She was tried on Lyrica without relief. They tried adjusting the Sinemet doses instead.\par \par She has no sense of smell.\par No changes in loudness of her voice. 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 Therapy did not work. She can still sing.\par No trouble swallowing. \par She has constipation. She has a bowel movement every couple of days. She takes Miralax daily or every other day. She has increased her water intake.\par She has urinary frequency. No incontinence. \par Memory, having more trouble finding words.\par Mood is depressed, but generally okay.\par Dizziness in the afternoon when standing. No falls. Her BP is sometimes low. \par Hallucinations, none.\par \par Current meds:\par Sinemet 10-100mg taking 6 tabs daily - 7a-11-3-7-11-3a\par Sinemet ER 25-100mg 1 tab BID 7a-7p\par Pramipexole 0.75mg ER in the evening\par \par Prior meds:\par Azilect 1 mg daily\par \par Family history:\par Social history: retired nurse\par  \par No Kranthi scan.\par MRI brain with and without contrast was done in 4/2019.

## 2021-05-21 NOTE — DISCUSSION/SUMMARY
[FreeTextEntry1] : Rosamaria Mcdowell is a 70 year old F with a PMHx of Parkinson's disease diagnosed in 2014, lung cancer (on targeted therapy), recent cataract surgery, history of breast cancer, GERD, thyroid nodules and tachycardia who presents for initial evaluation in the [facility] at [place] for Parkinson's disease. \par \par The patient's history and physical examination are suggestive of possible mild idiopathic Parkinson's disease. She reports a good response to Sinemet. Her RLS is better controlled with Pramipexole. Tagrisso unlikely causing fatigue. Strength was good on examination, though some concern for myopathy with post-exercise fatigue.\par \par Plan:\par - Continue current Sinemet regimen and Pramipexole\par - Will consider EMG with post-exercise fatigue\par - Kranthi scan\par - Encouraged to increase exercise and physical activity and maintain an active social and intellectual life.\par \par RTC 3 months\par \par All questions were answered to her satisfaction. I spent 60 minutes with this patient.

## 2021-06-04 ENCOUNTER — RESULT REVIEW (OUTPATIENT)
Age: 71
End: 2021-06-04

## 2021-06-08 ENCOUNTER — NON-APPOINTMENT (OUTPATIENT)
Age: 71
End: 2021-06-08

## 2021-06-09 NOTE — REASON FOR VISIT
[Consideration for Non-Curative Therapy] : consideration for non-curative therapy for [Bone Metastasis] : bone metastasis [Lung Cancer] : lung cancer

## 2021-06-10 ENCOUNTER — APPOINTMENT (OUTPATIENT)
Dept: RADIATION ONCOLOGY | Facility: CLINIC | Age: 71
End: 2021-06-10
Payer: MEDICARE

## 2021-06-10 VITALS
SYSTOLIC BLOOD PRESSURE: 129 MMHG | TEMPERATURE: 98.5 F | HEIGHT: 60 IN | WEIGHT: 159 LBS | HEART RATE: 99 BPM | DIASTOLIC BLOOD PRESSURE: 77 MMHG | OXYGEN SATURATION: 98 % | BODY MASS INDEX: 31.22 KG/M2 | RESPIRATION RATE: 18 BRPM

## 2021-06-10 PROCEDURE — 99214 OFFICE O/P EST MOD 30 MIN: CPT | Mod: 25

## 2021-06-10 NOTE — HISTORY OF PRESENT ILLNESS
[FreeTextEntry1] : Mrs. Mcdowell is a 70 year old  postmenopausal female of Ashkenazi descent, who was diagnosed with left breast invasive ductal carcinoma s/p lumpectomy and IORT in 2018. Later diagnosed with Stage IV lung adenocarcinoma, presenting today for a consultation to discuss palliative radiation therapy. \par \par Mrs. Mcdowell underwent left breast lumpectomy and SLN excision by Dr. Demarco and IORT at [facility] in 2018. Surgical pathology revealed a 0.8 cm IDC, grade 1, No DCIS or LCIS, no LVI, margins negative (closest 12 mm anterior). 0/3 were positive for metastatic carcinoma. Pathological stage pT1b N0 (sn) (i-). Biomarkers were ER + 98% / AR + 90% / and Her2 negative, Ki-67 <10%. She was started on Anastrozole. Follow-up mammograms on 5/2019 and 11/2019 were negative. \par \par In April 2019, she was noted to have elevated tumor markers (CEA 26.6) and vocal hoarseness for which she was under the care of Dr. Rodriguez. He sent her for further evaluation with CT NECK (4/5/19) that demonstrated a 1.8 spiculated left upper lobe lung nodule suspicious for neoplasm and left posterior 4th rib sclerotic focus.  PET/CT (4/13/19) demonstrated possible recurrent left breast cancer, left upper and medial lung spiculated nodule and opacity suspicious for metastatic disease, left perihilar LNs, osseous lesions, right hepatic lobe minimally FDG avid, focal esophageal hypermetabolism,  including base of tongue, and asymmetric right vocal cord - paralysis. \par \par Subsequently, she underwent biopsy (4/16/19) and pathology was consistent with acinar adenocarcinoma of the lung. Bone biopsy of L4 and left iliac were positive for metastatic adenocarcinoma, consistent with lung primary. \par \par She was started on Tagrisso in May 2019.  NM PET/CT (2/2021) demonstrated no change in the pulmonary nodule / thyroid nodule. Distal left femur sclerotic metastasis not imaged on prior PET/CT (interval cryoablation by Dr. Carvajal). She reports she was having pain in the left femur, which resolved status post cryoablation. More recently she reports left iliac / sacral pain, rating it a 6-7 /10 on the pain scale. She is currently on Vicodin and Tylenol with some relief. Further evaluation with MRI Lumbar spine (6/4/21) demonstrated multiple lesions in the lumbar sacral spine. prominent L4 lesions, and multiple areas of soft tissue enhancement and suspicious epidural involvement. \par \par She presents today for a consultation to discuss palliative radiation therapy to the lumbar spine.

## 2021-06-10 NOTE — REVIEW OF SYSTEMS
[Patient Intake Form Reviewed] : Patient intake form was reviewed [Fatigue] : fatigue [Loss of Hearing] : loss of hearing [SOB on Exertion] : shortness of breath during exertion [Joint Pain] : joint pain [Negative] : Endocrine [Fever] : no fever [Chills] : no chills [Recent Change In Weight] : ~T no recent weight change [Abdominal Pain] : no abdominal pain [Vomiting] : no vomiting [Constipation] : no constipation [Diarrhea] : no diarrhea [FreeTextEntry4] : left ear [FreeTextEntry5] : irregular heartbeat [FreeTextEntry7] : indigestion [de-identified] : blood dyscrasia

## 2021-06-10 NOTE — LETTER CLOSING
[Consult Closing:] : Thank you for allowing me to participate in the care of this patient.  If you have any questions, please do not hesitate to contact me. [Sincerely yours,] : Sincerely yours, [FreeTextEntry3] : Roxana Porter MD\par

## 2021-06-21 ENCOUNTER — APPOINTMENT (OUTPATIENT)
Dept: HEMATOLOGY ONCOLOGY | Facility: CLINIC | Age: 71
End: 2021-06-21
Payer: MEDICARE

## 2021-06-21 ENCOUNTER — RESULT REVIEW (OUTPATIENT)
Age: 71
End: 2021-06-21

## 2021-06-21 VITALS
BODY MASS INDEX: 31.02 KG/M2 | RESPIRATION RATE: 16 BRPM | HEART RATE: 98 BPM | SYSTOLIC BLOOD PRESSURE: 113 MMHG | OXYGEN SATURATION: 98 % | HEIGHT: 60 IN | TEMPERATURE: 97 F | DIASTOLIC BLOOD PRESSURE: 78 MMHG | WEIGHT: 158 LBS

## 2021-06-21 PROCEDURE — 99214 OFFICE O/P EST MOD 30 MIN: CPT

## 2021-06-21 RX ORDER — MOXIFLOXACIN OPHTHALMIC 5 MG/ML
0.5 SOLUTION/ DROPS OPHTHALMIC
Qty: 3 | Refills: 0 | Status: COMPLETED | COMMUNITY
Start: 2020-12-21

## 2021-06-21 RX ORDER — LACTULOSE 10 G/15ML
10 SOLUTION ORAL
Qty: 1 | Refills: 2 | Status: COMPLETED | COMMUNITY
Start: 2020-02-26 | End: 2021-06-21

## 2021-06-21 RX ORDER — PREDNISOLONE ACETATE 10 MG/ML
1 SUSPENSION/ DROPS OPHTHALMIC
Qty: 5 | Refills: 0 | Status: COMPLETED | COMMUNITY
Start: 2020-12-01

## 2021-06-21 NOTE — REVIEW OF SYSTEMS
[Joint Pain] : joint pain [Muscle Pain] : muscle pain [Anxiety] : anxiety [Muscle Weakness] : muscle weakness [Negative] : Integumentary [Fever] : no fever [Recent Change In Weight] : ~T no recent weight change [Eye Pain] : no eye pain [Vision Problems] : no vision problems [Dysphagia] : no dysphagia [Nosebleeds] : no nosebleeds [Chest Pain] : no chest pain [Lower Ext Edema] : no lower extremity edema [Shortness Of Breath] : no shortness of breath [Constipation] : no constipation [Diarrhea] : no diarrhea [Dysuria] : no dysuria [Depression] : no depression [Easy Bleeding] : no tendency for easy bleeding [Easy Bruising] : no tendency for easy bruising [FreeTextEntry7] : nausea improved [FreeTextEntry9] : muscle spasms due to parkinsons, left upper leg pain.-  [de-identified] : tremor [de-identified] : brain fog

## 2021-06-21 NOTE — ASSESSMENT
[FreeTextEntry1] : 70 yo female \par \par # Stage 1 breast cancer 8 mm tumor, invasive ductal carcinoma, ER/ DE positive, HER2 not amplified with Oncotype Dx 11. diagnosed November 2018\par -continue with Anastrazole\par - left breast pain - c/w fat necrosis on mammogram \par \par #Adenocarcinoma of lung- stage 4 diagnosed  April 2019\par -Repeated CEA -plateau at 11.5 (started out at 26.6)\par -CT scan showed mass 1.8 x 1.4 x 1.7 cm lesion.\par -Biopsy of lung nodule c/w adeno ca of the lung, EGFR mutated, T190 mutated, started on Tagrisso.  Side effects, -toxicities and benefits reviewed with pt. L4 vertebral body biopsy confirmed met adenoca of the lung.\par - PETCT  2/3/2020- decreased FDG uptake in primary lesion, sclerotic lesions in bones, thyroid nodule activity- under surveillance, duodenal uptake \par - plan for PETCT Feb 2021- SARAH- stable, uptake in right breast - c/w fat necrosis \par - repeat CEA\par \par #benign thyroid nodule - followed by Dr. Park. plan for biopsy\par # constipation- daily miralax\par \par Plan:\par - continue Tagrisso 11 pm, nausea in am \par - Nausea is controlled with  Sancuso and Dronabinol 2.5mg bid for nausea- refill today I-stop checked\par - continue Xgeva monthly (last 7/15/2020, 8/12/2020, 9/12/, 10/7/2020, 11/4/2020, 12/2, 12/30/2020, 1/27/2020, 2/24/2021, 3/25/202, 4/22/2021, 5/202021, 6/21/2021\par -neoplasm pain - continue with Vicodin prn rarely taking \par -mammo Dec 2020- stable- no evidence of disease, 1 year follow up \par -has follow up with Dr. Park for thyroid US- stable nodule \par -established care with Dr. Hendrix for her Parkinsons- stopped horizant \par \par 6/21//2021\par Patient with back discomfort, to start RT to lower back\par PETCT in July 2021\par Xgeva today \par \par \par 5/20/21\par lung cancer stage 4 on tagrisso \par Pruritic raised pustules on the neck, chest - total 5- 6 on the body, rx triamcinolone \par Left iliac crest pain in am, - obtain mRI for metastatic disease\par \par \par - CBC,Chem,CEA  case and mgmt  to return in 1 month for treatment

## 2021-06-21 NOTE — HISTORY OF PRESENT ILLNESS
[Therapy: ___] : Therapy: [unfilled] [de-identified] : Mrs. Mcdowell is a 68 year old postmenopausal female who is referred by Dr.Alice Demarco for newly diagnosed breast cancer.\par She was found on routine mammography in November 2019 she you have a focal asymmetric density in the left upper outer breast at 1:00 access 8 cm from the nipple. A one year prior had been negative.  Ultrasound-guided core biopsy in November 21, 2018 revealed a grade 1/3 invasive ductal carcinoma ER positive at 98% and IA positive at 90%,Ki-67 was less than 10%. She underwent some left breast lumpectomy and sentinel node dissection on December 19, 2018. Pathology revealed an 8 mm grade 1/3 invasive ductal carcinoma zero of 3 sentinel lymph nodes were involved with tumor. Oncotype DX score was 11. She also underwent genetic testing which is negative.\par  [de-identified] : Mrs. 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 Patient will be going for RT, awaiting scheduling. Patient is f/u with neurologist d/t brain fog.

## 2021-06-21 NOTE — REASON FOR VISIT
[Follow-Up Visit] : a follow-up [FreeTextEntry2] : breast cancer, EGFR mutated adenocarcinoma of the lung. Alert and oriented to person, place and time

## 2021-06-21 NOTE — PHYSICAL EXAM
[Restricted in physically strenuous activity but ambulatory and able to carry out work of a light or sedentary nature] : Status 1- Restricted in physically strenuous activity but ambulatory and able to carry out work of a light or sedentary nature, e.g., light house work, office work [Normal] : affect appropriate [de-identified] : left breast scar

## 2021-06-25 ENCOUNTER — RESULT REVIEW (OUTPATIENT)
Age: 71
End: 2021-06-25

## 2021-06-28 ENCOUNTER — TRANSCRIPTION ENCOUNTER (OUTPATIENT)
Age: 71
End: 2021-06-28

## 2021-06-29 ENCOUNTER — NON-APPOINTMENT (OUTPATIENT)
Age: 71
End: 2021-06-29

## 2021-06-29 NOTE — VITALS
[Maximal Pain Intensity: 4/10] : 4/10 [Least Pain Intensity: 0/10] : 0/10 [Pain Location: ___] : Pain Location: [unfilled] [Opioid] : opioid [90: Able to carry normal activity; minor signs or symptoms of disease.] : 90: Able to carry normal activity; minor signs or symptoms of disease.

## 2021-06-29 NOTE — REASON FOR VISIT
[Bone Metastasis] : bone metastasis [Lung Cancer] : lung cancer [Routine On-Treatment] : a routine on-treatment visit for

## 2021-06-30 NOTE — HISTORY OF PRESENT ILLNESS
[FreeTextEntry1] : Ms. Mcdowell is present today for OTV. She is status post fraction 4 / 5 of radiation to the lumber spine for metastatic adenocarcinoma. She reports she has intermittent lower back pain, rating it a 4 / 10 on the pain scale. She is currently taking Vicodin at night with some relief. She offers complaints of fatigue. She continues on Xgeva (last dose 6/21/21) and Tagrisso and had follow-up with Dr. Shaffer on 6/21/21. She reports she will be scheduled for a PET/CT in July. She is scheduled for PTE on 8/12/21.

## 2021-06-30 NOTE — DISEASE MANAGEMENT
[Clinical] : TNM Stage: c [I] : I [TTNM] : 1b [NTNM] : 0 [MTNM] : 0 [de-identified] : 1600 cGy [de-identified] : 2000 cGy / 2000 cGy [de-identified] : L1-Sacrum

## 2021-07-01 ENCOUNTER — NON-APPOINTMENT (OUTPATIENT)
Age: 71
End: 2021-07-01

## 2021-07-02 ENCOUNTER — TRANSCRIPTION ENCOUNTER (OUTPATIENT)
Age: 71
End: 2021-07-02

## 2021-07-02 ENCOUNTER — NON-APPOINTMENT (OUTPATIENT)
Age: 71
End: 2021-07-02

## 2021-07-07 ENCOUNTER — APPOINTMENT (OUTPATIENT)
Dept: GASTROENTEROLOGY | Facility: CLINIC | Age: 71
End: 2021-07-07
Payer: MEDICARE

## 2021-07-07 VITALS
SYSTOLIC BLOOD PRESSURE: 118 MMHG | WEIGHT: 159 LBS | HEIGHT: 60 IN | BODY MASS INDEX: 31.22 KG/M2 | DIASTOLIC BLOOD PRESSURE: 66 MMHG

## 2021-07-07 PROCEDURE — 99214 OFFICE O/P EST MOD 30 MIN: CPT

## 2021-07-07 NOTE — ASSESSMENT
[FreeTextEntry1] : GERD- controlled on Pepcid. continue. \par \par Elevated CEA- chronic stable. patient previously declined colonoscopy for further evaluation and declines colonoscopy at this time. She has PET CT scheduled later this week and she would like to have this done first prior to considering colonoscopy. \par Last colonoscopy 02/2018 with Dr. Moore with limited right sided preparation, no polyps \par \par

## 2021-07-07 NOTE — PHYSICAL EXAM
[General Appearance - Alert] : alert [General Appearance - In No Acute Distress] : in no acute distress [Sclera] : the sclera and conjunctiva were normal [Outer Ear] : the ears and nose were normal in appearance [Hearing Threshold Finger Rub Not [place]] : hearing was normal [Neck Appearance] : the appearance of the neck was normal [] : no respiratory distress [Apical Impulse] : the apical impulse was normal [Abdomen Soft] : soft [Abdomen Tenderness] : non-tender [Abnormal Walk] : normal gait [Skin Color & Pigmentation] : normal skin color and pigmentation [No Focal Deficits] : no focal deficits [Oriented To Time, Place, And Person] : oriented to person, place, and time [FreeTextEntry1] : deferred to colonoscopy

## 2021-07-07 NOTE — HISTORY OF PRESENT ILLNESS
[FreeTextEntry1] : 71 year F stage 4 lung ca on chemotherapy, stage 1 breast ca  s/p left lumpectomy and XRT, now on anastrazole, \par Parkinsons disease, GERD \par presents for follow up. She is overall doing well. \par \par \par chronic nausea from Tagrisso managed with antiemetics, reflux controlled on pepcid\par chronic constipation managed with MiraLAX/fiber\par she was in ED with food poisoning with diarrhea, nonbloody, discharged, no abx. bm returned to normal. \par weight is stable. no brbpr/melena. no abd pain \par She has follow up PET CT next week. She gets PET every 6 months. \par She has chronic stable elevation in CEA. She had last colonoscopy in 2018. She declined colonoscopy in 2019. \par She again declines colonoscopy today. She would like to have PET CT scan as scheduled next week before deciding on colonoscopy. \par \par EGD 7/22/19- for elevated CEA, abnormal PET, \par showed LA A esophagitis, gastritis, HH, \par GEJ bx- GIM, normal duodenum, stomach bx\par \par \par last egd/colonoscopy 2/1/2018 with Dr. Moore- 8 cm HH, mild reflux changes on bx\par ?poor right sided prep\par redundant colon \par no h/o colon polyps\par told to repeat in 5 years due to family hx crc (father rectal ca, dx 66)\par

## 2021-07-12 ENCOUNTER — RESULT REVIEW (OUTPATIENT)
Age: 71
End: 2021-07-12

## 2021-07-15 ENCOUNTER — OUTPATIENT (OUTPATIENT)
Dept: OUTPATIENT SERVICES | Facility: HOSPITAL | Age: 71
LOS: 1 days | End: 2021-07-15
Payer: MEDICARE

## 2021-07-15 ENCOUNTER — APPOINTMENT (OUTPATIENT)
Dept: NUCLEAR MEDICINE | Facility: IMAGING CENTER | Age: 71
End: 2021-07-15
Payer: MEDICARE

## 2021-07-15 DIAGNOSIS — C50.912 MALIGNANT NEOPLASM OF UNSPECIFIED SITE OF LEFT FEMALE BREAST: ICD-10-CM

## 2021-07-15 DIAGNOSIS — C79.51 SECONDARY MALIGNANT NEOPLASM OF BONE: ICD-10-CM

## 2021-07-15 DIAGNOSIS — C34.90 MALIGNANT NEOPLASM OF UNSPECIFIED PART OF UNSPECIFIED BRONCHUS OR LUNG: ICD-10-CM

## 2021-07-15 PROCEDURE — 78815 PET IMAGE W/CT SKULL-THIGH: CPT

## 2021-07-15 PROCEDURE — 78815 PET IMAGE W/CT SKULL-THIGH: CPT | Mod: 26,PS,KX,MH

## 2021-07-15 PROCEDURE — A9552: CPT

## 2021-07-19 ENCOUNTER — RESULT REVIEW (OUTPATIENT)
Age: 71
End: 2021-07-19

## 2021-07-19 ENCOUNTER — APPOINTMENT (OUTPATIENT)
Dept: HEMATOLOGY ONCOLOGY | Facility: CLINIC | Age: 71
End: 2021-07-19
Payer: MEDICARE

## 2021-07-19 VITALS
WEIGHT: 157.9 LBS | SYSTOLIC BLOOD PRESSURE: 104 MMHG | HEART RATE: 103 BPM | RESPIRATION RATE: 16 BRPM | TEMPERATURE: 98.7 F | BODY MASS INDEX: 31 KG/M2 | HEIGHT: 60 IN | DIASTOLIC BLOOD PRESSURE: 71 MMHG | OXYGEN SATURATION: 96 %

## 2021-07-19 PROCEDURE — 99215 OFFICE O/P EST HI 40 MIN: CPT

## 2021-07-19 NOTE — REVIEW OF SYSTEMS
[Joint Pain] : joint pain [Muscle Pain] : muscle pain [Anxiety] : anxiety [Muscle Weakness] : muscle weakness [Negative] : Allergic/Immunologic [Fatigue] : fatigue [Fever] : no fever [Recent Change In Weight] : ~T no recent weight change [Eye Pain] : no eye pain [Vision Problems] : no vision problems [Dysphagia] : no dysphagia [Nosebleeds] : no nosebleeds [Chest Pain] : no chest pain [Lower Ext Edema] : no lower extremity edema [Shortness Of Breath] : no shortness of breath [Constipation] : no constipation [Diarrhea] : no diarrhea [Dysuria] : no dysuria [Depression] : no depression [Easy Bleeding] : no tendency for easy bleeding [Easy Bruising] : no tendency for easy bruising [FreeTextEntry7] : nausea still persistent  [FreeTextEntry9] : muscle spasms due to parkinsons, left hip pain  [de-identified] : tremor [de-identified] : brain fog

## 2021-07-19 NOTE — ASSESSMENT
[FreeTextEntry1] : 70 yo female \par \par # Stage 1 breast cancer 8 mm tumor, invasive ductal carcinoma, ER/ [place] positive, HER2 not amplified with Oncotype Dx 11. diagnosed November 2018\par -continue with Anastrazole\par - left breast pain - c/w fat necrosis on mammogram \par \par #Adenocarcinoma of lung- stage 4 diagnosed  April 2019\par -Repeated CEA -plateau at 11.5 (started out at 26.6)\par -CT scan showed mass 1.8 x 1.4 x 1.7 cm lesion.\par -Biopsy of lung nodule c/w adeno ca of the lung, EGFR mutated, T190 mutated, started on Tagrisso.  Side effects, -toxicities and benefits reviewed with pt. L4 vertebral body biopsy confirmed met adenoca of the lung.\par - PETCT  2/3/2020- decreased FDG uptake in primary lesion, sclerotic lesions in bones, thyroid nodule activity- under surveillance, duodenal uptake \par - plan for PETCT Feb 2021- SARAH- stable, uptake in right breast - c/w fat necrosis \par - repeat CEA\par \par #benign thyroid nodule - followed by Dr. Park. plan for biopsy\par # constipation- daily miralax\par \par Plan:\par - continue Tagrisso 11 pm, nausea in am \par - Nausea is controlled with  Sancuso and Dronabinol 2.5mg bid for nausea- refill today I-stop checked\par - continue Xgeva monthly (last 7/15/2020, 8/12/2020, 9/12/, 10/7/2020, 11/4/2020, 12/2, 12/30/2020, 1/27/2020, 2/24/2021, 3/25/202, 4/22/2021, 5/202021, 6/21/2021\par -neoplasm pain - continue with Vicodin prn rarely taking \par -mammo Dec 2020- stable- no evidence of disease, 1 year follow up \par -has follow up with Dr. Park for thyroid US- stable nodule \par -established care with Dr. Hendrix for her Parkinsons- stopped horizant \par \par 5/20/21\par lung cancer stage 4 on tagrisso \par Pruritic raised pustules on the neck, chest - total 5- 6 on the body, rx triamcinolone \par Left iliac crest pain in am, - obtain mRI for metastatic disease\par \par 6/21//2021\par Patient with back discomfort, to start RT to lower back\par PETCT in July 2021\par Xgeva today \par \par 7/19/2021\par PETCT 7/2021- stable\par Increased fatigue from RT, but recovering well\par Xgeva today \par Incomplete colonoscopy \par MId- thoracic pain - schedule MRI \par \par - CBC,Chem,CEA  case and mgmt  to return in 1 month for treatment

## 2021-07-19 NOTE — HISTORY OF PRESENT ILLNESS
[Therapy: ___] : Therapy: [unfilled] [de-identified] : Mrs. Mcdowell is a 68 year old postmenopausal female who is referred by Dr.Alice Demarco for newly diagnosed breast cancer.\par She was found on routine mammography in November 2019 she you have a focal asymmetric density in the left upper outer breast at 1:00 access 8 cm from the nipple. A one year prior had been negative.  Ultrasound-guided core biopsy in November 21, 2018 revealed a grade 1/3 invasive ductal carcinoma ER positive at 98% and ME positive at 90%,Ki-67 was less than 10%. She underwent some left breast lumpectomy and sentinel node dissection on December 19, 2018. Pathology revealed an 8 mm grade 1/3 invasive ductal carcinoma zero of 3 sentinel lymph nodes were involved with tumor. Oncotype DX score was 11. She also underwent genetic testing which is negative.\par  [de-identified] : Mrs. 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 On June 30th RT was completed. Patient is still following up with the neurologist for the brain fog. Patient had a PET/CT on 7/13/2021.

## 2021-07-19 NOTE — PHYSICAL EXAM
[Restricted in physically strenuous activity but ambulatory and able to carry out work of a light or sedentary nature] : Status 1- Restricted in physically strenuous activity but ambulatory and able to carry out work of a light or sedentary nature, e.g., light house work, office work [Normal] : affect appropriate [de-identified] : left breast scar

## 2021-07-26 ENCOUNTER — TRANSCRIPTION ENCOUNTER (OUTPATIENT)
Age: 71
End: 2021-07-26

## 2021-07-27 ENCOUNTER — APPOINTMENT (OUTPATIENT)
Dept: GERIATRICS | Facility: CLINIC | Age: 71
End: 2021-07-27
Payer: MEDICARE

## 2021-07-27 VITALS
OXYGEN SATURATION: 98 % | HEART RATE: 94 BPM | SYSTOLIC BLOOD PRESSURE: 100 MMHG | WEIGHT: 159 LBS | TEMPERATURE: 98 F | BODY MASS INDEX: 31.05 KG/M2 | DIASTOLIC BLOOD PRESSURE: 70 MMHG

## 2021-07-27 PROCEDURE — 99213 OFFICE O/P EST LOW 20 MIN: CPT

## 2021-07-27 NOTE — PHYSICAL EXAM
[General Appearance - Alert] : alert [General Appearance - In No Acute Distress] : in no acute distress [General Appearance - Well Nourished] : well nourished [General Appearance - Well Developed] : well developed [Sclera] : the sclera and conjunctiva were normal [PERRL With Normal Accommodation] : pupils were equal in size, round, and reactive to light [Extraocular Movements] : extraocular movements were intact [Strabismus] : no strabismus was seen [Outer Ear] : the ears and nose were normal in appearance [Hearing Threshold Finger Rub Not [place]] : hearing was normal [Both Tympanic Membranes Were Examined] : both tympanic membranes were normal [Nasal Cavity] : the nasal mucosa and septum were normal [Neck Appearance] : the appearance of the neck was normal [] : no respiratory distress [Respiration, Rhythm And Depth] : normal respiratory rhythm and effort [Exaggerated Use Of Accessory Muscles For Inspiration] : no accessory muscle use [Heart Rate And Rhythm] : heart rate was normal and rhythm regular [Apical Impulse] : the apical impulse was normal [Heart Sounds] : normal S1 and S2 [Bowel Sounds] : normal bowel sounds [Abdomen Tenderness] : non-tender [Abdomen Soft] : soft [Cervical Lymph Nodes Enlarged Posterior Bilaterally] : posterior cervical [Cervical Lymph Nodes Enlarged Anterior Bilaterally] : anterior cervical [No CVA Tenderness] : no ~M costovertebral angle tenderness [No Spinal Tenderness] : no spinal tenderness [Skin Color & Pigmentation] : normal skin color and pigmentation [Skin Turgor] : normal skin turgor [Affect] : the affect was normal [Mood] : the mood was normal [FreeTextEntry1] : erythema around site

## 2021-07-27 NOTE — ASSESSMENT
[FreeTextEntry1] : no overt EM rash\par wanting to avoid antibiotics if possible\par will give 1 dose of doxycycline ppx\par and repeat labs in 6-8 weeks\par if positive testing will  treat with 10 days of doxycyline\par at this point does not seem likely\par tick was attached for <24 hours, no EM rash\par \par \par \par

## 2021-07-27 NOTE — HISTORY OF PRESENT ILLNESS
[No falls in past year] : Patient reported no falls in the past year [Fully functional (bathing, dressing, toileting, transferring, walking, feeding)] : Fully functional (bathing, dressing, toileting, transferring, walking, feeding) [Fully functional (using the telephone, shopping, preparing meals, housekeeping, doing laundry, using transportation,] : Fully functional and needs no help or supervision to perform IADLs (using the telephone, shopping, preparing meals, housekeeping, doing laundry, using transportation, managing medications and managing finances) [Smoke Detector] : smoke detector [Carbon Monoxide Detector] : carbon monoxide detector [Grab Bars] : grab bars [0] : 2) Feeling down, depressed, or hopeless: Not at all [PHQ-2 Score ___] : PHQ-2 Score [unfilled] [FreeTextEntry1] : Presenting with tick bite on R wrist about 5 days ago\par Noted swelling and some redness\par no fevers or chills\par no joint pain\par concerned about interactions with other drugs\par  [Driving] : not driving

## 2021-08-11 ENCOUNTER — NON-APPOINTMENT (OUTPATIENT)
Age: 71
End: 2021-08-11

## 2021-08-12 ENCOUNTER — APPOINTMENT (OUTPATIENT)
Dept: RADIATION ONCOLOGY | Facility: CLINIC | Age: 71
End: 2021-08-12
Payer: MEDICARE

## 2021-08-12 VITALS
SYSTOLIC BLOOD PRESSURE: 123 MMHG | OXYGEN SATURATION: 98 % | HEIGHT: 60 IN | TEMPERATURE: 98 F | HEART RATE: 94 BPM | DIASTOLIC BLOOD PRESSURE: 79 MMHG | BODY MASS INDEX: 31.22 KG/M2 | WEIGHT: 159 LBS | RESPIRATION RATE: 18 BRPM

## 2021-08-12 PROCEDURE — 99024 POSTOP FOLLOW-UP VISIT: CPT

## 2021-08-14 ENCOUNTER — RESULT REVIEW (OUTPATIENT)
Age: 71
End: 2021-08-14

## 2021-08-16 NOTE — REVIEW OF SYSTEMS
[Joint Pain] : joint pain [Negative] : Heme/Lymph [FreeTextEntry9] : "lower back pain unchanged" and "mid back pain, by bra strap, discomfort when taking a deep breath" [de-identified] : recent tick bite to right wrist

## 2021-08-16 NOTE — HISTORY OF PRESENT ILLNESS
[FreeTextEntry1] : Mrs. Mcdowell is a 71 year old postmenopausal female of Ashkenazi descent, who was diagnosed with left breast invasive ductal carcinoma s/p lumpectomy and IORT in 2018. Subsequently, also later diagnosed with Stage IV lung adenocarcinoma and received palliative radiation therapy to the lumbar spine. She is present today for her post treatment evaluation. \par \par Mrs. Mcdowell underwent left breast lumpectomy and SLN excision by Dr. Demarco and IORT at [facility] in 2018. Surgical pathology revealed a 0.8 cm IDC, grade 1, No DCIS or LCIS, no LVI, margins negative (closest 12 mm anterior). 0/3 were positive for metastatic carcinoma. Pathological stage pT1b N0 (sn) (i-). Biomarkers were ER + 98% / OK + 90% / and Her2 negative, Ki-67 <10%. She was started on Anastrozole. Follow-up mammograms on 5/2019 and 11/2019 were negative. \par \par In April 2019, she was noted to have elevated tumor markers (CEA 26.6) and vocal hoarseness for which she was under the care of Dr. Rodriguez. He sent her for further evaluation with CT NECK (4/5/19) that demonstrated a 1.8 spiculated left upper lobe lung nodule suspicious for neoplasm and left posterior 4th rib sclerotic focus. PET/CT (4/13/19) demonstrated possible recurrent left breast cancer, left upper and medial lung spiculated nodule and opacity suspicious for metastatic disease, left perihilar LNs, osseous lesions, right hepatic lobe minimally FDG avid, focal esophageal hypermetabolism, including base of tongue, and asymmetric right vocal cord - paralysis. \par \par Subsequently, she underwent biopsy (4/16/19) and pathology was consistent with acinar adenocarcinoma of the lung. Bone biopsy of L4 and left iliac were positive for metastatic adenocarcinoma, consistent with lung primary. She was started on Tagrisso in May 2019. NM PET/CT (2/2021) demonstrated no change in the pulmonary nodule / thyroid nodule. Distal left femur sclerotic metastasis not imaged on prior PET/CT (interval cryoablation by Dr. Carvajal). She reports she was having pain in the left femur, which resolved status post cryoablation. More recently she reports left iliac / sacral pain, rating it a 6-7 /10 on the pain scale. Further evaluation with MRI Lumbar spine (6/4/21) demonstrated multiple lesions in the lumbar sacral spine. prominent L4 lesions, and multiple areas of soft tissue enhancement and suspicious epidural involvement. \par \par She received palliative radiation to the lumbar spine to a total of 20 Gy in 5 fractions, which she completed on 6/30/21. She reports lumbar spine pain remains the same, rating it a 4 / 10 on the pain scale. She continues to take Vicodin at night for pain relief. She offers complaints of new mid thoracic spine pain/discomfort that began 2 months ago. She is scheduled for MRI Thoracic spine on 8/14/21.  \par \par She continues on Xgeva and Tagrisso and had follow-up with Dr. Shaffer on 7/21/21. Follow-up PET/CT (7/15/21) demonstrated no new lesions. Specifically, a small nonspecific focus of increased FDG activity in the upper left breast, left upper lobe pulmonary nodule, FDG avid left lower pole thyroid nodule, all unchanged. Minimally FDG avid sclerotic metastasis, distal left femur unchanged. Multiple non sclerotic metastases in axial skeleton.  Ms. Mcdowell presents today for her post treatment evaluation status post radiation therapy.

## 2021-08-17 ENCOUNTER — APPOINTMENT (OUTPATIENT)
Dept: HEMATOLOGY ONCOLOGY | Facility: CLINIC | Age: 71
End: 2021-08-17
Payer: MEDICARE

## 2021-08-17 ENCOUNTER — RESULT REVIEW (OUTPATIENT)
Age: 71
End: 2021-08-17

## 2021-08-17 VITALS
DIASTOLIC BLOOD PRESSURE: 70 MMHG | TEMPERATURE: 97.4 F | BODY MASS INDEX: 31.22 KG/M2 | HEIGHT: 60 IN | SYSTOLIC BLOOD PRESSURE: 114 MMHG | RESPIRATION RATE: 16 BRPM | HEART RATE: 94 BPM | OXYGEN SATURATION: 100 % | WEIGHT: 159 LBS

## 2021-08-17 PROCEDURE — 99215 OFFICE O/P EST HI 40 MIN: CPT

## 2021-08-17 NOTE — REVIEW OF SYSTEMS
[Fatigue] : fatigue [Joint Pain] : joint pain [Muscle Pain] : muscle pain [Anxiety] : anxiety [Muscle Weakness] : muscle weakness [Negative] : Allergic/Immunologic [Fever] : no fever [Recent Change In Weight] : ~T no recent weight change [Eye Pain] : no eye pain [Vision Problems] : no vision problems [Dysphagia] : no dysphagia [Nosebleeds] : no nosebleeds [Chest Pain] : no chest pain [Lower Ext Edema] : no lower extremity edema [Shortness Of Breath] : no shortness of breath [Constipation] : no constipation [Diarrhea] : no diarrhea [Dysuria] : no dysuria [Depression] : no depression [Easy Bleeding] : no tendency for easy bleeding [Easy Bruising] : no tendency for easy bruising [FreeTextEntry7] : nausea still persistent  [FreeTextEntry9] : muscle spasms due to parkinsons, left hip pain  [de-identified] : tremor [de-identified] : brain fog

## 2021-08-17 NOTE — HISTORY OF PRESENT ILLNESS
[Therapy: ___] : Therapy: [unfilled] [de-identified] : Mrs. Mcdowell is a 68 year old postmenopausal female who is referred by Dr.Alice Demarco for newly diagnosed breast cancer.\par She was found on routine mammography in November 2019 she you have a focal asymmetric density in the left upper outer breast at 1:00 access 8 cm from the nipple. A one year prior had been negative.  Ultrasound-guided core biopsy in November 21, 2018 revealed a grade 1/3 invasive ductal carcinoma ER positive at 98% and MN positive at 90%,Ki-67 was less than 10%. She underwent some left breast lumpectomy and sentinel node dissection on December 19, 2018. Pathology revealed an 8 mm grade 1/3 invasive ductal carcinoma zero of 3 sentinel lymph nodes were involved with tumor. Oncotype DX score was 11. She also underwent genetic testing which is negative.\par  [de-identified] : Mrs. 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 On June 30th RT was completed. Patient is still following up with the neurologist for the brain fog. Patient had a PET/CT on 7/13/2021.

## 2021-08-17 NOTE — ASSESSMENT
[FreeTextEntry1] : 70 yo female \par \par # Stage 1 breast cancer 8 mm tumor, invasive ductal carcinoma, ER/ [place] positive, HER2 not amplified with Oncotype Dx 11. diagnosed November 2018\par -continue with Anastrazole\par - left breast pain - c/w fat necrosis on mammogram \par \par #Adenocarcinoma of lung- stage 4 diagnosed  April 2019\par -Repeated CEA -plateau at 11.5 (started out at 26.6)\par -CT scan showed mass 1.8 x 1.4 x 1.7 cm lesion.\par -Biopsy of lung nodule c/w adeno ca of the lung, EGFR mutated, T190 mutated, started on Tagrisso.  Side effects, -toxicities and benefits reviewed with pt. L4 vertebral body biopsy confirmed met adenoca of the lung.\par - PETCT  2/3/2020- decreased FDG uptake in primary lesion, sclerotic lesions in bones, thyroid nodule activity- under surveillance, duodenal uptake \par - plan for PETCT Feb 2021- SARAH- stable, uptake in right breast - c/w fat necrosis \par - repeat CEA\par \par #benign thyroid nodule - followed by Dr. Park. plan for biopsy\par # constipation- daily miralax\par \par Plan:\par - continue Tagrisso 11 pm, nausea in am \par - Nausea is controlled with  Sancuso and Dronabinol 2.5mg bid for nausea- refill today I-stop checked\par - continue Xgeva monthly (last 7/15/2020, 8/12/2020, 9/12/, 10/7/2020, 11/4/2020, 12/2, 12/30/2020, 1/27/2020, 2/24/2021, 3/25/202, 4/22/2021, 5/202021, 6/21/2021\par -neoplasm pain - continue with Vicodin prn rarely taking \par -mammo Dec 2020- stable- no evidence of disease, 1 year follow up \par -has follow up with Dr. Park for thyroid US- stable nodule \par -established care with Dr. Hendrix for her Parkinsons- stopped horizant \par \par 5/20/21\par lung cancer stage 4 on tagrisso \par Pruritic raised pustules on the neck, chest - total 5- 6 on the body, rx triamcinolone \par Left iliac crest pain in am, - obtain mRI for metastatic disease\par \par 6/21//2021\par Patient with back discomfort, to start RT to lower back\par PETCT in July 2021\par Xgeva today \par \par 7/19/2021\par PETCT 7/2021- stable\par Increased fatigue from RT, but recovering well\par Xgeva today \par Incomplete colonoscopy \par MId- thoracic pain - schedule MRI \par \par 8/17/2021\par MRI thoracic spine - bone mets, stable\par Patient needs crown placement \par Leukopenia - stable\par COvid boost vaccine\par Paint - intermittent, right scapula \par \par - CBC,Chem,CEA  case and mgmt  to return in 1 month for treatment

## 2021-08-17 NOTE — PHYSICAL EXAM
[Restricted in physically strenuous activity but ambulatory and able to carry out work of a light or sedentary nature] : Status 1- Restricted in physically strenuous activity but ambulatory and able to carry out work of a light or sedentary nature, e.g., light house work, office work [Normal] : affect appropriate [de-identified] : left breast scar

## 2021-08-18 ENCOUNTER — APPOINTMENT (OUTPATIENT)
Dept: NEUROLOGY | Facility: CLINIC | Age: 71
End: 2021-08-18
Payer: MEDICARE

## 2021-08-18 VITALS
BODY MASS INDEX: 31.22 KG/M2 | TEMPERATURE: 97.2 F | SYSTOLIC BLOOD PRESSURE: 115 MMHG | HEIGHT: 60 IN | HEART RATE: 103 BPM | WEIGHT: 159 LBS | DIASTOLIC BLOOD PRESSURE: 77 MMHG

## 2021-08-18 PROCEDURE — 99215 OFFICE O/P EST HI 40 MIN: CPT

## 2021-08-19 ENCOUNTER — APPOINTMENT (OUTPATIENT)
Dept: NEUROLOGY | Facility: CLINIC | Age: 71
End: 2021-08-19
Payer: MEDICARE

## 2021-08-19 PROCEDURE — 95886 MUSC TEST DONE W/N TEST COMP: CPT

## 2021-08-19 PROCEDURE — 95910 NRV CNDJ TEST 7-8 STUDIES: CPT

## 2021-08-20 NOTE — PHYSICAL EXAM
[Person] : oriented to person [Place] : oriented to place [Time] : oriented to time [Concentration Intact] : normal concentrating ability [Comprehension] : comprehension intact [Cranial Nerves Optic (II)] : visual acuity intact bilaterally,  visual fields full to confrontation, pupils equal round and reactive to light [Cranial Nerves Oculomotor (III)] : extraocular motion intact [Cranial Nerves Trigeminal (V)] : facial sensation intact symmetrically [Cranial Nerves Facial (VII)] : face symmetrical [Cranial Nerves Vestibulocochlear (VIII)] : hearing was intact bilaterally [Cranial Nerves Glossopharyngeal (IX)] : tongue and palate midline [Cranial Nerves Accessory (XI - Cranial And Spinal)] : head turning and shoulder shrug symmetric [Cranial Nerves Hypoglossal (XII)] : there was no tongue deviation with protrusion [Motor Strength] : muscle strength was normal in all four extremities [Involuntary Movements] : no involuntary movements were seen [Sensation Tactile Decrease] : light touch was intact [1+] : Ankle jerk left 1+ [Paresis Pronator Drift Right-Sided] : no pronator drift on the right [Paresis Pronator Drift Left-Sided] : no pronator drift on the left [Coordination - Dysmetria Impaired Finger-to-Nose Bilateral] : not present [FreeTextEntry1] : Face was minimally masked. Saccades were normal. Voice is normal.\par There were no significant resting, action or postural tremors. \par There was no significant rigidity.\par There was mild bradykinesia, right more than left.\par Normal leg agility.\par Mildly impaired toe tapping on the left.\par No trouble standing with arms crossed. Posture is normal.\par Gait is with good stride length and speed with mildly decreased right arm swing.\par Postural reflexes are intact.

## 2021-08-20 NOTE — DISCUSSION/SUMMARY
[FreeTextEntry1] : Rosamaria Mcdowell is a 71 year old F with a PMHx of Parkinson's disease, lung cancer (on targeted therapy), hx cataract surgery, history of breast cancer, GERD, thyroid nodules and tachycardia who presents for follow up in the [facility] at [place] for Parkinson's disease. 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 She reports a good response to Sinemet. Her RLS is better controlled with Pramipexole. Her examination has remained stable. Strength was good on examination, though some concern for myopathy with post-exercise fatigue. She continues to have mid afternoon fatigue, likely multifactorial.\par \par Plan:\par - Continue current Sinemet regimen and Pramipexole\par Sinemet 10-100mg taking 6 tabs daily - 7a-11-3 (1.5 tabs)-7p \par Sinemet ER 25-100mg 1 tab BID 7a and 2 tabs at 11p \par Pramipexole 0.75mg ER in the evening\par Miralax and FIber\par - She has an EMG scheduled for this week for evaluation for possible myopathy\par - Encouraged to increase exercise and physical activity and maintain an active social and intellectual life.\par \par RTC 3 months\par \par All questions were answered to her satisfaction. I spent 40 minutes with this patient.

## 2021-08-20 NOTE — HISTORY OF PRESENT ILLNESS
[FreeTextEntry1] : Rosamaria Mcdowell is a 71 year old F with a PMHx of Parkinson's disease, lung cancer (on targeted therapy), hx cataract surgery, history of breast cancer, GERD, thyroid nodules and tachycardia who presents for follow up in the [facility] at [place] for Parkinson's disease. \par \par Interval history:\par She reports that she is feeling a little dizziness today. She describes it as a lightheadedness. She is getting forgetful. She has missed an appointment despite written reminders. She is not forgetting things like the stove on or doors unlocked. Her  takes care of the bills. Her orthostatic vitals were normal. She had chills at night from 2 tabs of 11p and she is now take 1 tab at 11p. She has trouble staying asleep, especially after first awakening. She has her EMG scheduled for tomorrow. She still gets brain fog around 3/4p. She continues to have trouble standing when she squats down. She had 5 short radiation treatments to her lumbar spine. She is having a bowel movement almost every day. No hallucinations. No wearing off of the medications.\par \par Current meds:\par Sinemet 10-100mg taking 6 tabs daily - 7a-11-3 (1.5 tabs)-7p \par Sinemet ER 25-100mg 1 tab BID 7a and 2 tabs at 11p \par Pramipexole 0.75mg ER in the evening\par Miralax and FIber\par \par Prior meds:\par Azilect 1 mg daily\par Melatonin gave her palpitations\par \par Initial history:\par She was diagnosed in 2014 at [facility]. She has been seeing a general neurologist.  \par \par She thought she had essential tremor. Her tremors are mostly in the right hand, controlled on Sinemet. Her left leg has restless leg syndrome.\par She feels worse in the afternoon, around 3-4pm. She gets wobbly. If she takes her BP, it is lower. She has not been officially tested for orthostatic hypotension. Sometimes her systolic is in 100s. She was tried on midodrine but she had hair loss and UTI. She was also tried on Gabapentin. When she didn't take the Sinemet ER in the morning she felt worse. No trouble with buttons, zippers or shoelaces, cutting food. No significant stiffness. She gets muscle spasms in the feet. She thinks the Sinemet lasts about 4 hours.  \par \par No trouble turning or adjusting in bed at night.\par She talks in her sleep and has fallen out of bed before. She wakes up to go to the bathroom at night. She also has breakthrough tremors at night. \par She urinates every 4 hours. \par \par She was reduced off the pramipexole due to potential side effects, but she is not sure which ones. When she decreased it, around 10p for about an hour she would be walking up and down the halls and could not lay still and got spasms in her left leg. She was tried on Lyrica without relief. They tried adjusting the Sinemet doses instead.\par \par She has no sense of smell.\par No changes in loudness of her voice. 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 Therapy did not work. She can still sing.\par No trouble swallowing. \par She has constipation. She has a bowel movement every couple of days. She takes Miralax daily or every other day. She has increased her water intake.\par She has urinary frequency. No incontinence. \par Memory, having more trouble finding words.\par Mood is depressed, but generally okay.\par Dizziness in the afternoon when standing. No falls. Her BP is sometimes low. \par Hallucinations, none.\par \par Current meds:\par Sinemet 10-100mg taking 6 tabs daily - 7a-11-3-7-11-3a\par Sinemet ER 25-100mg 1 tab BID 7a-7p\par Pramipexole 0.75mg ER in the evening\par \par Prior meds:\par Azilect 1 mg daily\par \par Family history:\par Social history: retired nurse\par  \par No Kranthi scan.\par MRI brain with and without contrast was done in 4/2019.

## 2021-08-24 ENCOUNTER — TRANSCRIPTION ENCOUNTER (OUTPATIENT)
Age: 71
End: 2021-08-24

## 2021-09-13 ENCOUNTER — APPOINTMENT (OUTPATIENT)
Dept: GERIATRICS | Facility: CLINIC | Age: 71
End: 2021-09-13
Payer: MEDICARE

## 2021-09-13 ENCOUNTER — NON-APPOINTMENT (OUTPATIENT)
Age: 71
End: 2021-09-13

## 2021-09-13 VITALS
OXYGEN SATURATION: 99 % | SYSTOLIC BLOOD PRESSURE: 108 MMHG | BODY MASS INDEX: 30.86 KG/M2 | DIASTOLIC BLOOD PRESSURE: 60 MMHG | TEMPERATURE: 98 F | HEART RATE: 103 BPM | WEIGHT: 158 LBS

## 2021-09-13 PROCEDURE — G0439: CPT

## 2021-09-13 PROCEDURE — 93000 ELECTROCARDIOGRAM COMPLETE: CPT

## 2021-09-13 NOTE — HISTORY OF PRESENT ILLNESS
[No falls in past year] : Patient reported no falls in the past year [Completely Independent] : Completely independent. [Smoke Detector] : smoke detector [Carbon Monoxide Detector] : carbon monoxide detector [Wears Seat Belt] : wears seat belt [0] : 2) Feeling down, depressed, or hopeless: Not at all (0) [PMH Reviewed and Updated] : past medical history reviewed and updated [PSH Reviewed and Updated] : past surgical history reviewed and updated [Family History Reviewed and Updated] : family history reviewed and updated [Medication and Allergies Reconciled] : medication and allergies reconciled [Over the Past 2 Weeks, Have You Felt Down, Depressed, or Hopeless?] : 1.) Over the past 2 weeks, have you felt down, depressed, or hopeless? No [Over the Past 2 Weeks, Have You Felt Little Interest or Pleasure Doing Things?] : 2.) Over the past 2 weeks, have you felt little interest or pleasure doing things? No [Retired] : retired from work [Spouse] : spouse [Seatbelts] : seatbelts [FreeTextEntry1] : Follows with oncology/breast surgery - metastatic lung cancer with metastasis to bone on Tagrisso, history of Breast cancer s/p Mastectomy, Parkinsons' disease with restless legs on sinemet and pramipexole for RLS - neurologist Dr. Hendrix.  Recently had EMG and MRI lumbar spine due to worsening back pain - unrevealing.\par Continues on combo of regular and long acting sinemet\par describes same early afternoon "haze" with fatigue and low energy\par had COVID booster \par  [Driving Concerns] : not driving or driving without noted concerns [PHQ-2 Negative - No further assessment needed] : PHQ-2 Negative - No further assessment needed [[id number]] : 0

## 2021-09-13 NOTE — ASSESSMENT
[FreeTextEntry1] : added labs to be done with lymes testing next week\par due for flu shot next week\par advised one month after covid booster\par I have concerns about afternoon fatigue\par extensive medical workup has been done\par she has a metastatic cancer dx,  recovering from bladder cancer\par her 72th bday and milestone wedding anniversary next year\par concern for depression\par consider low dose sertraline to see if afternoon symptoms will improve\par she is hesitant \par watch for now \par

## 2021-09-13 NOTE — PHYSICAL EXAM
[General Appearance - Alert] : alert [General Appearance - In No Acute Distress] : in no acute distress [General Appearance - Well Nourished] : well nourished [General Appearance - Well Developed] : well developed [Sclera] : the sclera and conjunctiva were normal [PERRL With Normal Accommodation] : pupils were equal in size, round, and reactive to light [Extraocular Movements] : extraocular movements were intact [Normal Oral Mucosa] : normal oral mucosa [No Oral Pallor] : no oral pallor [Neck Appearance] : the appearance of the neck was normal [Neck Cervical Mass (___cm)] : no neck mass was observed [Respiration, Rhythm And Depth] : normal respiratory rhythm and effort [Exaggerated Use Of Accessory Muscles For Inspiration] : no accessory muscle use [Auscultation Breath Sounds / Voice Sounds] : lungs were clear to auscultation bilaterally [Heart Rate And Rhythm] : heart rate was normal and rhythm regular [Heart Sounds] : normal S1 and S2 [Heart Sounds Gallop] : no gallops [Heart Sounds Pericardial Friction Rub] : no pericardial rub [Breast Appearance] : normal in appearance [Breast Abnormal Lactation (Galactorrhea)] : no nipple discharge [FreeTextEntry1] : two left breast scars, well healed [Bowel Sounds] : normal bowel sounds [Abdomen Soft] : soft [Abdomen Tenderness] : non-tender [Cervical Lymph Nodes Enlarged Posterior Bilaterally] : posterior cervical [Cervical Lymph Nodes Enlarged Anterior Bilaterally] : anterior cervical [Musculoskeletal - Swelling] : no joint swelling seen [Skin Color & Pigmentation] : normal skin color and pigmentation [] : no rash [Affect] : the affect was normal [Mood] : the mood was normal

## 2021-09-13 NOTE — REVIEW OF SYSTEMS
[Fever] : no fever [Chills] : no chills [Feeling Poorly] : feeling poorly [Feeling Tired] : feeling tired [Eyesight Problems] : no eyesight problems [Discharge From Eyes] : no purulent discharge from the eyes [Nosebleeds] : no nosebleeds [Nasal Discharge] : no nasal discharge [Chest Pain] : no chest pain [Palpitations] : no palpitations [Cough] : no cough [SOB on Exertion] : no shortness of breath during exertion [Abdominal Pain] : no abdominal pain [Vomiting] : no vomiting [Vaginal Discharge] : no vaginal discharge [Abn Vaginal Bleeding] : no unexplained vaginal bleeding [Arthralgias] : arthralgias [Joint Swelling] : joint swelling [Skin Lesions] : no skin lesions [Skin Wound] : no skin wound [Confused] : no confusion [Convulsions] : no convulsions [Dizziness] : dizziness [Fainting] : no fainting [Change In Personality] : no personality change [Emotional Problems] : no emotional problems

## 2021-09-14 ENCOUNTER — APPOINTMENT (OUTPATIENT)
Dept: HEMATOLOGY ONCOLOGY | Facility: CLINIC | Age: 71
End: 2021-09-14
Payer: MEDICARE

## 2021-09-14 ENCOUNTER — RESULT REVIEW (OUTPATIENT)
Age: 71
End: 2021-09-14

## 2021-09-14 VITALS
OXYGEN SATURATION: 98 % | RESPIRATION RATE: 18 BRPM | SYSTOLIC BLOOD PRESSURE: 118 MMHG | WEIGHT: 157.56 LBS | BODY MASS INDEX: 30.93 KG/M2 | HEART RATE: 92 BPM | HEIGHT: 60 IN | TEMPERATURE: 97.9 F | DIASTOLIC BLOOD PRESSURE: 56 MMHG

## 2021-09-14 PROCEDURE — 99214 OFFICE O/P EST MOD 30 MIN: CPT

## 2021-09-14 NOTE — ASSESSMENT
[FreeTextEntry1] : 70 yo female \par \par # Stage 1 breast cancer 8 mm tumor, invasive ductal carcinoma, ER/ [place] positive, HER2 not amplified with Oncotype Dx 11. diagnosed November 2018\par -continue with Anastrazole\par - left breast pain - c/w fat necrosis on mammogram \par \par #Adenocarcinoma of lung- stage 4 diagnosed  April 2019\par -Repeated CEA -plateau at 11.5 (started out at 26.6)\par -CT scan showed mass 1.8 x 1.4 x 1.7 cm lesion.\par -Biopsy of lung nodule c/w adeno ca of the lung, EGFR mutated, T190 mutated, started on Tagrisso.  Side effects, -toxicities and benefits reviewed with pt. L4 vertebral body biopsy confirmed met adenoca of the lung.\par - PETCT  2/3/2020- decreased FDG uptake in primary lesion, sclerotic lesions in bones, thyroid nodule activity- under surveillance, duodenal uptake \par - plan for PETCT Feb 2021- SARAH- stable, uptake in right breast - c/w fat necrosis \par - repeat CEA\par \par #benign thyroid nodule - followed by Dr. Park. plan for biopsy\par # constipation- daily miralax\par \par Plan:\par - continue Tagrisso 11 pm, nausea in am \par - Nausea is controlled with  Sancuso and Dronabinol 2.5mg bid for nausea- refill today I-stop checked\par - continue Xgeva monthly (last 7/15/2020, 8/12/2020, 9/12/, 10/7/2020, 11/4/2020, 12/2, 12/30/2020, 1/27/2020, 2/24/2021, 3/25/202, 4/22/2021, 5/202021, 6/21/2021\par -neoplasm pain - continue with Vicodin prn rarely taking \par -mammo Dec 2020- stable- no evidence of disease, 1 year follow up \par -has follow up with Dr. Park for thyroid US- stable nodule \par -established care with Dr. Hendrix for her Parkinsons- stopped horizant \par \par 5/20/21\par lung cancer stage 4 on tagrisso \par Pruritic raised pustules on the neck, chest - total 5- 6 on the body, rx triamcinolone \par Left iliac crest pain in am, - obtain mRI for metastatic disease\par \par 6/21//2021\par Patient with back discomfort, to start RT to lower back\par PETCT in July 2021\par Xgeva today \par \par 7/19/2021\par PETCT 7/2021- stable\par Increased fatigue from RT, but recovering well\par Xgeva today \par Incomplete colonoscopy \par MId- thoracic pain - schedule MRI \par \par 8/17/2021\par MRI thoracic spine - bone mets, stable\par Patient needs crown placement \par Leukopenia - stable\par COvid boost vaccine\par Paint - intermittent, right scapula \par \par 9/14/2021\par pt feeling well- stable\par xgeva today \par remains on tagrisso- tolerates well \par having increased afternoon fatigue- PCP offered afternoon zoloft- however pt wants to think about it \par had crown replaced- nothing invasive\par stable leukopenia- no infections or fevers\par \par  CBC,Chem,CEA  case and mgmt  to return in 1 month for treatment - case and mgmt discussed with Dr. Shaffer

## 2021-09-14 NOTE — PHYSICAL EXAM
[Restricted in physically strenuous activity but ambulatory and able to carry out work of a light or sedentary nature] : Status 1- Restricted in physically strenuous activity but ambulatory and able to carry out work of a light or sedentary nature, e.g., light house work, office work [Normal] : affect appropriate [de-identified] : left breast scar

## 2021-09-14 NOTE — HISTORY OF PRESENT ILLNESS
[Therapy: ___] : Therapy: [unfilled] [de-identified] : Mrs. Mcdowell is a 68 year old postmenopausal female who is referred by Dr.Alice Demarco for newly diagnosed breast cancer.\par She was found on routine mammography in November 2019 she you have a focal asymmetric density in the left upper outer breast at 1:00 access 8 cm from the nipple. A one year prior had been negative.  Ultrasound-guided core biopsy in November 21, 2018 revealed a grade 1/3 invasive ductal carcinoma ER positive at 98% and [place] positive at 90%,Ki-67 was less than 10%. She underwent some left breast lumpectomy and sentinel node dissection on December 19, 2018. Pathology revealed an 8 mm grade 1/3 invasive ductal carcinoma zero of 3 sentinel lymph nodes were involved with tumor. Oncotype DX score was 11. She also underwent genetic testing which is negative.\par  [de-identified] : Mrs. 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  Pt having her ongoing fatigue

## 2021-09-14 NOTE — REVIEW OF SYSTEMS
[Fatigue] : fatigue [Joint Pain] : joint pain [Muscle Pain] : muscle pain [Anxiety] : anxiety [Muscle Weakness] : muscle weakness [Negative] : Allergic/Immunologic [Fever] : no fever [Recent Change In Weight] : ~T no recent weight change [Eye Pain] : no eye pain [Vision Problems] : no vision problems [Dysphagia] : no dysphagia [Nosebleeds] : no nosebleeds [Chest Pain] : no chest pain [Lower Ext Edema] : no lower extremity edema [Shortness Of Breath] : no shortness of breath [Constipation] : no constipation [Diarrhea] : no diarrhea [Dysuria] : no dysuria [Depression] : no depression [Easy Bleeding] : no tendency for easy bleeding [Easy Bruising] : no tendency for easy bruising [FreeTextEntry7] : nausea still persistent  [FreeTextEntry9] : muscle spasms due to parkinsons, left hip pain  [de-identified] : tremor [de-identified] : brain fog

## 2021-09-16 ENCOUNTER — TRANSCRIPTION ENCOUNTER (OUTPATIENT)
Age: 71
End: 2021-09-16

## 2021-09-20 ENCOUNTER — APPOINTMENT (OUTPATIENT)
Dept: GERIATRICS | Facility: CLINIC | Age: 71
End: 2021-09-20
Payer: MEDICARE

## 2021-09-20 ENCOUNTER — RESULT REVIEW (OUTPATIENT)
Age: 71
End: 2021-09-20

## 2021-09-20 PROCEDURE — 90662 IIV NO PRSV INCREASED AG IM: CPT

## 2021-09-20 PROCEDURE — G0008: CPT

## 2021-09-27 ENCOUNTER — TRANSCRIPTION ENCOUNTER (OUTPATIENT)
Age: 71
End: 2021-09-27

## 2021-09-28 ENCOUNTER — RESULT REVIEW (OUTPATIENT)
Age: 71
End: 2021-09-28

## 2021-09-30 ENCOUNTER — APPOINTMENT (OUTPATIENT)
Dept: GERIATRICS | Facility: CLINIC | Age: 71
End: 2021-09-30
Payer: MEDICARE

## 2021-09-30 PROCEDURE — 99443: CPT | Mod: 95

## 2021-09-30 NOTE — REASON FOR VISIT
[Home] : at home, [unfilled] , at the time of the visit. [Medical Office: ([facility])___] : at the medical office located in  [Verbal consent obtained from patient] : the patient, [unfilled] [Acute] : an acute visit

## 2021-09-30 NOTE — HISTORY OF PRESENT ILLNESS
[FreeTextEntry1] : lymes testing returned negative\par described afternoon fatigue pattern\par apparently had discussed stimulants with neurology on last visit\par also wants US with mammo when done this year\par open to trialing antidepressant\par also has questions about short acting vs long acting pramipexole  [No falls in past year] : Patient reported no falls in the past year [Completely Independent] : Completely independent. [1] : 2) Feeling down, depressed, or hopeless for several days (1) [PHQ-2 Negative - No further assessment needed] : PHQ-2 Negative - No further assessment needed [[id number]] : 2

## 2021-10-01 ENCOUNTER — TRANSCRIPTION ENCOUNTER (OUTPATIENT)
Age: 71
End: 2021-10-01

## 2021-10-04 ENCOUNTER — TRANSCRIPTION ENCOUNTER (OUTPATIENT)
Age: 71
End: 2021-10-04

## 2021-10-04 RX ORDER — PRAMIPEXOLE DIHYDROCHLORIDE 0.75 MG/1
0.75 TABLET, EXTENDED RELEASE ORAL
Qty: 90 | Refills: 0 | Status: DISCONTINUED | COMMUNITY
Start: 2019-04-10 | End: 2021-10-04

## 2021-10-12 ENCOUNTER — RESULT REVIEW (OUTPATIENT)
Age: 71
End: 2021-10-12

## 2021-10-12 ENCOUNTER — NON-APPOINTMENT (OUTPATIENT)
Age: 71
End: 2021-10-12

## 2021-10-12 ENCOUNTER — APPOINTMENT (OUTPATIENT)
Dept: HEMATOLOGY ONCOLOGY | Facility: CLINIC | Age: 71
End: 2021-10-12
Payer: MEDICARE

## 2021-10-12 VITALS
WEIGHT: 157.2 LBS | DIASTOLIC BLOOD PRESSURE: 72 MMHG | SYSTOLIC BLOOD PRESSURE: 110 MMHG | HEART RATE: 94 BPM | HEIGHT: 60 IN | BODY MASS INDEX: 30.86 KG/M2 | OXYGEN SATURATION: 98 % | RESPIRATION RATE: 16 BRPM | TEMPERATURE: 97.5 F

## 2021-10-12 PROCEDURE — 99214 OFFICE O/P EST MOD 30 MIN: CPT

## 2021-10-12 NOTE — HISTORY OF PRESENT ILLNESS
[Therapy: ___] : Therapy: [unfilled] [de-identified] : Mrs. Mcdowell is a 68 year old postmenopausal female who is referred by Dr.Alice Demarco for newly diagnosed breast cancer.\par She was found on routine mammography in November 2019 she you have a focal asymmetric density in the left upper outer breast at 1:00 access 8 cm from the nipple. A one year prior had been negative.  Ultrasound-guided core biopsy in November 21, 2018 revealed a grade 1/3 invasive ductal carcinoma ER positive at 98% and [place] positive at 90%,Ki-67 was less than 10%. She underwent some left breast lumpectomy and sentinel node dissection on December 19, 2018. Pathology revealed an 8 mm grade 1/3 invasive ductal carcinoma zero of 3 sentinel lymph nodes were involved with tumor. Oncotype DX score was 11. She also underwent genetic testing which is negative.\par  [de-identified] : Mrs. 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  Pt having her ongoing fatigue

## 2021-10-12 NOTE — REVIEW OF SYSTEMS
[Fatigue] : fatigue [Joint Pain] : joint pain [Muscle Pain] : muscle pain [Anxiety] : anxiety [Muscle Weakness] : muscle weakness [Negative] : Allergic/Immunologic [Fever] : no fever [Recent Change In Weight] : ~T no recent weight change [Eye Pain] : no eye pain [Vision Problems] : no vision problems [Dysphagia] : no dysphagia [Nosebleeds] : no nosebleeds [Chest Pain] : no chest pain [Lower Ext Edema] : no lower extremity edema [Shortness Of Breath] : no shortness of breath [Constipation] : no constipation [Diarrhea] : no diarrhea [Dysuria] : no dysuria [Depression] : no depression [Easy Bleeding] : no tendency for easy bleeding [Easy Bruising] : no tendency for easy bruising [FreeTextEntry7] : nausea still persistent  [FreeTextEntry9] : muscle spasms due to parkinsons, left hip pain  [de-identified] : tremor [de-identified] : brain fog

## 2021-10-12 NOTE — PHYSICAL EXAM
[Restricted in physically strenuous activity but ambulatory and able to carry out work of a light or sedentary nature] : Status 1- Restricted in physically strenuous activity but ambulatory and able to carry out work of a light or sedentary nature, e.g., light house work, office work [Normal] : affect appropriate [de-identified] : left breast scar

## 2021-10-12 NOTE — ASSESSMENT
[FreeTextEntry1] : 70 yo female \par \par # Stage 1 breast cancer 8 mm tumor, invasive ductal carcinoma, ER/ [place] positive, HER2 not amplified with Oncotype Dx 11. diagnosed November 2018\par -continue with Anastrazole\par - left breast pain - c/w fat necrosis on mammogram \par \par #Adenocarcinoma of lung- stage 4 diagnosed  April 2019\par -Repeated CEA -plateau at 11.5 (started out at 26.6)\par -CT scan showed mass 1.8 x 1.4 x 1.7 cm lesion.\par -Biopsy of lung nodule c/w adeno ca of the lung, EGFR mutated, T190 mutated, started on Tagrisso.  Side effects, -toxicities and benefits reviewed with pt. L4 vertebral body biopsy confirmed met adenoca of the lung.\par - PETCT  2/3/2020- decreased FDG uptake in primary lesion, sclerotic lesions in bones, thyroid nodule activity- under surveillance, duodenal uptake \par - plan for PETCT Feb 2021- SARAH- stable, uptake in right breast - c/w fat necrosis \par - repeat CEA\par -July 2021- PETCT - stable\par \par #benign thyroid nodule - followed by Dr. Park. plan for biopsy\par # constipation- daily miralax\par \par Plan:\par - continue Tagrisso 11 pm, nausea in am \par - Nausea is controlled with  Sancuso and Dronabinol 2.5mg bid for nausea- refill today I-stop checked\par - continue Xgeva monthly (last 7/15/2020, 8/12/2020, 9/12/, 10/7/2020, 11/4/2020, 12/2, 12/30/2020, 1/27/2020, 2/24/2021, 3/25/202, 4/22/2021, 5/202021, 6/21/2021\par -neoplasm pain - continue with Vicodin prn rarely taking \par -mammo Dec 2020- stable- no evidence of disease, 1 year follow up \par -has follow up with Dr. Park for thyroid US- stable nodule \par -established care with Dr. Hendrix for her Parkinsons- stopped horizant \par \par 5/20/21\par lung cancer stage 4 on tagrisso \par Pruritic raised pustules on the neck, chest - total 5- 6 on the body, rx triamcinolone \par Left iliac crest pain in am, - obtain mRI for metastatic disease\par \par 6/21//2021\par Patient with back discomfort, to start RT to lower back\par PETCT in July 2021\par Xgeva today \par \par 7/19/2021\par PETCT 7/2021- stable\par Increased fatigue from RT, but recovering well\par Xgeva today \par Incomplete colonoscopy \par MId- thoracic pain - schedule MRI \par \par 8/17/2021\par MRI thoracic spine - bone mets, stable\par Patient needs crown placement \par Leukopenia - stable\par COvid boost vaccine\par Paint - intermittent, right scapula \par \par 9/14/2021\par pt feeling well- stable\par xgeva today \par remains on tagrisso- tolerates well \par having increased afternoon fatigue- PCP offered afternoon zoloft- however pt wants to think about it \par had crown replaced- nothing invasive\par stable leukopenia- no infections or fevers\par \par 10/12/2021\par Fatigue at 3pm\par Xgeva - continue\par Tagrisso \par Started Zoloft \par Weight loss\par Increase dronabinol to 2.5 PO TID \par PETCT July 2021\par \par \par  CBC,Chem,CEA  case and mgmt  to return in 1 month for treatment -

## 2021-10-26 ENCOUNTER — TRANSCRIPTION ENCOUNTER (OUTPATIENT)
Age: 71
End: 2021-10-26

## 2021-10-27 ENCOUNTER — TRANSCRIPTION ENCOUNTER (OUTPATIENT)
Age: 71
End: 2021-10-27

## 2021-10-29 ENCOUNTER — TRANSCRIPTION ENCOUNTER (OUTPATIENT)
Age: 71
End: 2021-10-29

## 2021-11-03 ENCOUNTER — APPOINTMENT (OUTPATIENT)
Dept: ENDOCRINOLOGY | Facility: CLINIC | Age: 71
End: 2021-11-03
Payer: MEDICARE

## 2021-11-03 VITALS
HEIGHT: 60 IN | HEART RATE: 97 BPM | TEMPERATURE: 97.8 F | WEIGHT: 157.2 LBS | SYSTOLIC BLOOD PRESSURE: 104 MMHG | RESPIRATION RATE: 16 BRPM | BODY MASS INDEX: 30.86 KG/M2 | DIASTOLIC BLOOD PRESSURE: 70 MMHG | OXYGEN SATURATION: 96 %

## 2021-11-03 LAB
T3 SERPL-MCNC: 84 NG/DL
T4 FREE SERPL-MCNC: 1.3 NG/DL
THYROGLOB AB SERPL-ACNC: <20 IU/ML
THYROPEROXIDASE AB SERPL IA-ACNC: <10 IU/ML
TSH RECEPTOR AB: <1.1 IU/L
TSH SERPL-ACNC: 0.85 UIU/ML
TSI ACT/NOR SER: <0.1 IU/L

## 2021-11-03 PROCEDURE — 99214 OFFICE O/P EST MOD 30 MIN: CPT

## 2021-11-03 NOTE — HISTORY OF PRESENT ILLNESS
[FreeTextEntry1] : Ms. 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 sees Dr Rodriguez, sent to CT scan when lung Cancer was diagnosed with bone mets , also CEA was going up 4/2019\par responding well to therapy so far\par last US thyroid reviewed\par \par denies dysphagia \par denies neck irradiation\par denies Fhx thyroid cancer\par has hoarseness did voice therapy feels is getting better\par denies dyspnea\par \par US thyroid 9/2019\par \par \par never been on thyroid medication \par \par left thyroid nodule 2.9cm neg FNA at [facility] 9/2019 also growing on her last ultrasound\par has 10mm right thyroid nodule growing on last ultrasound\par \par repeated FNA 10/2020 with Dr Carvajal\par A. Right thyroid upper, ultrasound guided fine needle aspiration:\par BENIGN ([place] Category II)\par - Benign follicular cells dispersed in sheets and clusters in a background of thin colloid\par \par B. Left thyroid lower, ultrasound guided fine needle aspiration:\par BENIGN ([place] Category II)\par - Benign follicular cells dispersed in sheets and clusters in a background of thin colloid\par and hemosiderin-laden macrophages\par \par \par \par

## 2021-11-09 ENCOUNTER — APPOINTMENT (OUTPATIENT)
Dept: HEMATOLOGY ONCOLOGY | Facility: CLINIC | Age: 71
End: 2021-11-09
Payer: MEDICARE

## 2021-11-09 ENCOUNTER — RESULT REVIEW (OUTPATIENT)
Age: 71
End: 2021-11-09

## 2021-11-09 VITALS
OXYGEN SATURATION: 98 % | DIASTOLIC BLOOD PRESSURE: 76 MMHG | TEMPERATURE: 97.3 F | WEIGHT: 160 LBS | HEART RATE: 93 BPM | RESPIRATION RATE: 16 BRPM | SYSTOLIC BLOOD PRESSURE: 124 MMHG | BODY MASS INDEX: 31.41 KG/M2 | HEIGHT: 60 IN

## 2021-11-09 VITALS
HEIGHT: 60 IN | RESPIRATION RATE: 16 BRPM | TEMPERATURE: 97.9 F | SYSTOLIC BLOOD PRESSURE: 129 MMHG | HEART RATE: 98 BPM | BODY MASS INDEX: 22.69 KG/M2 | DIASTOLIC BLOOD PRESSURE: 82 MMHG | WEIGHT: 115.56 LBS | OXYGEN SATURATION: 99 %

## 2021-11-09 PROCEDURE — 99214 OFFICE O/P EST MOD 30 MIN: CPT | Mod: 25

## 2021-11-09 PROCEDURE — 36415 COLL VENOUS BLD VENIPUNCTURE: CPT

## 2021-11-09 NOTE — PHYSICAL EXAM
[Restricted in physically strenuous activity but ambulatory and able to carry out work of a light or sedentary nature] : Status 1- Restricted in physically strenuous activity but ambulatory and able to carry out work of a light or sedentary nature, e.g., light house work, office work [Normal] : affect appropriate [de-identified] : left breast scar

## 2021-11-09 NOTE — ASSESSMENT
[FreeTextEntry1] : 70 yo female \par \par # Stage 1 breast cancer 8 mm tumor, invasive ductal carcinoma, ER/ [place] positive, HER2 not amplified with Oncotype Dx 11. diagnosed November 2018\par -continue with Anastrazole\par - left breast pain - c/w fat necrosis on mammogram \par \par #Adenocarcinoma of lung- stage 4 diagnosed  April 2019\par -Repeated CEA -plateau at 11.5 (started out at 26.6)\par -CT scan showed mass 1.8 x 1.4 x 1.7 cm lesion.\par -Biopsy of lung nodule c/w adeno ca of the lung, EGFR mutated, T190 mutated, started on Tagrisso.  Side effects, -toxicities and benefits reviewed with pt. L4 vertebral body biopsy confirmed met adenoca of the lung.\par - PETCT  2/3/2020- decreased FDG uptake in primary lesion, sclerotic lesions in bones, thyroid nodule activity- under surveillance, duodenal uptake \par - plan for PETCT Feb 2021- SARAH- stable, uptake in right breast - c/w fat necrosis \par - repeat CEA\par -July 2021- PETCT - stable\par \par #benign thyroid nodule - followed by Dr. Park. plan for biopsy\par # constipation- daily miralax\par \par Plan:\par - continue Tagrisso 11 pm, nausea in am \par - Nausea is controlled with  Sancuso and Dronabinol 2.5mg bid for nausea- refill today I-stop checked\par - continue Xgeva monthly (last 7/15/2020, 8/12/2020, 9/12/, 10/7/2020, 11/4/2020, 12/2, 12/30/2020, 1/27/2020, 2/24/2021, 3/25/202, 4/22/2021, 5/202021, 6/21/2021\par -neoplasm pain - continue with Vicodin prn rarely taking \par -mammo Dec 2020- stable- no evidence of disease, 1 year follow up \par -has follow up with Dr. Park for thyroid US- stable nodule \par -established care with Dr. Hendrix for her Parkinsons- stopped horizant \par \par 5/20/21\par lung cancer stage 4 on tagrisso \par Pruritic raised pustules on the neck, chest - total 5- 6 on the body, rx triamcinolone \par Left iliac crest pain in am, - obtain mRI for metastatic disease\par \par 6/21//2021\par Patient with back discomfort, to start RT to lower back\par PETCT in July 2021\par Xgeva today \par \par 7/19/2021\par PETCT 7/2021- stable\par Increased fatigue from RT, but recovering well\par Xgeva today \par Incomplete colonoscopy \par MId- thoracic pain - schedule MRI \par \par 8/17/2021\par MRI thoracic spine - bone mets, stable\par Patient needs crown placement \par Leukopenia - stable\par COvid boost vaccine\par Paint - intermittent, right scapula \par \par 9/14/2021\par pt feeling well- stable\par xgeva today \par remains on tagrisso- tolerates well \par having increased afternoon fatigue- PCP offered afternoon zoloft- however pt wants to think about it \par had crown replaced- nothing invasive\par stable leukopenia- no infections or fevers\par \par 10/12/2021\par Fatigue at 3pm\par Xgeva - continue\par Tagrisso \par Started Zoloft \par Weight loss\par Increase dronabinol to 2.5 PO TID \par PETCT July 2021\par \par 11/9/2021\par continues with fatigue at 3pm\par this is week 6 of zoloft some improvement \par remains on Tagrisso /xgeva- no dental issues \par weight gain\par anemia of chronic disease - will check irons today  \par \par CBC,Chem,CEA, CA 27.29  case and mgmt  to return in 1 month for treatment - case and mgmt discussed with Dr. Shaffer

## 2021-11-09 NOTE — HISTORY OF PRESENT ILLNESS
[Therapy: ___] : Therapy: [unfilled] [de-identified] : Mrs. Mcdowell is a 68 year old postmenopausal female who is referred by Dr.Alice Demarco for newly diagnosed breast cancer.\par She was found on routine mammography in November 2019 she you have a focal asymmetric density in the left upper outer breast at 1:00 access 8 cm from the nipple. A one year prior had been negative.  Ultrasound-guided core biopsy in November 21, 2018 revealed a grade 1/3 invasive ductal carcinoma ER positive at 98% and [place] positive at 90%,Ki-67 was less than 10%. She underwent some left breast lumpectomy and sentinel node dissection on December 19, 2018. Pathology revealed an 8 mm grade 1/3 invasive ductal carcinoma zero of 3 sentinel lymph nodes were involved with tumor. Oncotype DX score was 11. She also underwent genetic testing which is negative.\par  [de-identified] : Mrs. 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  Pt having her ongoing fatigue and occasional pain - otherwise no complaints.

## 2021-11-09 NOTE — REVIEW OF SYSTEMS
[Fatigue] : fatigue [Joint Pain] : joint pain [Muscle Pain] : muscle pain [Anxiety] : anxiety [Muscle Weakness] : muscle weakness [Negative] : Allergic/Immunologic [Fever] : no fever [Recent Change In Weight] : ~T no recent weight change [Eye Pain] : no eye pain [Vision Problems] : no vision problems [Dysphagia] : no dysphagia [Nosebleeds] : no nosebleeds [Chest Pain] : no chest pain [Lower Ext Edema] : no lower extremity edema [Shortness Of Breath] : no shortness of breath [Constipation] : no constipation [Diarrhea] : no diarrhea [Dysuria] : no dysuria [Depression] : no depression [Easy Bleeding] : no tendency for easy bleeding [Easy Bruising] : no tendency for easy bruising [FreeTextEntry7] : nausea still persistent  [FreeTextEntry9] : muscle spasms due to parkinsons, left hip pain  [de-identified] : tremor [de-identified] : brain fog

## 2021-11-12 ENCOUNTER — TRANSCRIPTION ENCOUNTER (OUTPATIENT)
Age: 71
End: 2021-11-12

## 2021-11-15 ENCOUNTER — TRANSCRIPTION ENCOUNTER (OUTPATIENT)
Age: 71
End: 2021-11-15

## 2021-11-17 ENCOUNTER — APPOINTMENT (OUTPATIENT)
Dept: NEUROLOGY | Facility: CLINIC | Age: 71
End: 2021-11-17
Payer: MEDICARE

## 2021-11-17 PROCEDURE — 99215 OFFICE O/P EST HI 40 MIN: CPT | Mod: 95

## 2021-11-18 RX ORDER — SERTRALINE 25 MG/1
25 TABLET, FILM COATED ORAL DAILY
Qty: 30 | Refills: 2 | Status: DISCONTINUED | COMMUNITY
Start: 2021-09-30 | End: 2021-11-18

## 2021-11-18 RX ORDER — SERTRALINE HYDROCHLORIDE 50 MG/1
50 TABLET, FILM COATED ORAL
Qty: 90 | Refills: 0 | Status: DISCONTINUED | COMMUNITY
Start: 2021-10-29 | End: 2021-11-18

## 2021-11-18 NOTE — HISTORY OF PRESENT ILLNESS
[FreeTextEntry1] : Rosamaria Mcdowell is a 71 year old F with a PMHx of Parkinson's disease, lung cancer (on targeted therapy), hx cataract surgery, history of breast cancer, GERD, thyroid nodules and tachycardia who presents for follow up in the [facility] [place] for Parkinson's disease via telehealth.\par \par Interval history:\par She reports that she is still having the end of the day fatigue. She was tried on Zoloft, but no effect, and stopped after 6 weeks, which was last week. She started taking the Lyrica at the end of October and it seems to be working and she is feeling better. She has taken an extra in the evening, but mostly taking it once a day. She is still getting the real tired and droopy feeling around 230-3p and lasts until dinner usually. She states that she has noticed that she is swaying when standing in the kitchen etc. She denies a spinning sensation or history of vertigo.\par \par Current meds:\par Sinemet 10-100mg taking 6 tabs daily - 7a-11-3 (1.5 tabs)-7p \par Sinemet ER 25-100mg 1 tab BID 7a and 2 tabs at 11p \par Pramipexole 0.375mg ER in the evening\par Miralax and FIber\par Lyrica 25mg daily and an extra dose in the evening PRN\par \par Prior meds:\par Azilect 1 mg daily\par Melatonin gave her palpitations\par \par Initial history:\par She was diagnosed in 2014 at [place]. She has been seeing a general neurologist.  \par \par She thought she had essential tremor. Her tremors are mostly in the right hand, controlled on Sinemet. Her left leg has restless leg syndrome.\par She feels worse in the afternoon, around 3-4pm. She gets wobbly. If she takes her BP, it is lower. She has not been officially tested for orthostatic hypotension. Sometimes her systolic is in 100s. She was tried on midodrine but she had hair loss and UTI. She was also tried on Gabapentin. When she didn't take the Sinemet ER in the morning she felt worse. No trouble with buttons, zippers or shoelaces, cutting food. No significant stiffness. She gets muscle spasms in the feet. She thinks the Sinemet lasts about 4 hours.  \par \par No trouble turning or adjusting in bed at night.\par She talks in her sleep and has fallen out of bed before. She wakes up to go to the bathroom at night. She also has breakthrough tremors at night. \par She urinates every 4 hours. \par \par She was reduced off the pramipexole due to potential side effects, but she is not sure which ones. When she decreased it, around 10p for about an hour she would be walking up and down the halls and could not lay still and got spasms in her left leg. She was tried on Lyrica without relief. They tried adjusting the Sinemet doses instead.\par \par She has no sense of smell.\par No changes in loudness of her voice. 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 Therapy did not work. She can still sing.\par No trouble swallowing. \par She has constipation. She has a bowel movement every couple of days. She takes Miralax daily or every other day. She has increased her water intake.\par She has urinary frequency. No incontinence. \par Memory, having more trouble finding words.\par Mood is depressed, but generally okay.\par Dizziness in the afternoon when standing. No falls. Her BP is sometimes low. \par Hallucinations, none.\par \par Current meds:\par Sinemet 10-100mg taking 6 tabs daily - 7a-11-3-7-11-3a\par Sinemet ER 25-100mg 1 tab BID 7a-7p\par Pramipexole 0.75mg ER in the evening\par \par Prior meds:\par Azilect 1 mg daily\par \par Family history:\par Social history: retired nurse\par  \par No Kranthi scan.\par MRI brain with and without contrast was done in 4/2019.

## 2021-11-18 NOTE — REASON FOR VISIT
[Follow-Up: _____] : a [unfilled] follow-up visit [Home] : at home, [unfilled] , at the time of the visit. [Medical Office: ([facility])___] : at the medical office located in  [Verbal consent obtained from patient] : the patient, [unfilled]

## 2021-11-18 NOTE — PHYSICAL EXAM
[Person] : oriented to person [Place] : oriented to place [Time] : oriented to time [Concentration Intact] : normal concentrating ability [Comprehension] : comprehension intact [Cranial Nerves Optic (II)] : visual acuity intact bilaterally,  visual fields full to confrontation, pupils equal round and reactive to light [Cranial Nerves Oculomotor (III)] : extraocular motion intact [Cranial Nerves Trigeminal (V)] : facial sensation intact symmetrically [Cranial Nerves Facial (VII)] : face symmetrical [Cranial Nerves Vestibulocochlear (VIII)] : hearing was intact bilaterally [Cranial Nerves Glossopharyngeal (IX)] : tongue and palate midline [Cranial Nerves Accessory (XI - Cranial And Spinal)] : head turning and shoulder shrug symmetric [Cranial Nerves Hypoglossal (XII)] : there was no tongue deviation with protrusion [Motor Strength] : muscle strength was normal in all four extremities [Involuntary Movements] : no involuntary movements were seen [Sensation Tactile Decrease] : light touch was intact [Romberg's Sign] : a positive Romberg's sign was present [1+] : Ankle jerk left 1+ [Paresis Pronator Drift Right-Sided] : no pronator drift on the right [Paresis Pronator Drift Left-Sided] : no pronator drift on the left [Coordination - Dysmetria Impaired Finger-to-Nose Bilateral] : not present [FreeTextEntry1] : Face was not significantly masked. Saccades were normal. Voice is normal.\par There were no significant resting, action or postural tremors. \par Hand shaking was normal, suggesting no significant rigidity via telehealth.\par There was slight bradykinesia, right more than left.\par Normal leg agility.\par No trouble standing with arms crossed. Posture is normal.\par Gait is with good stride length and speed with mildly decreased right arm swing. No trouble with turns. No freezing of gait.\par Unable to assess postural reflexes via telehealth.

## 2021-11-18 NOTE — DISCUSSION/SUMMARY
[FreeTextEntry1] : Rosamaria Mcdowell is a 71 year old F with a PMHx of Parkinson's disease, lung cancer (on targeted therapy), hx cataract surgery, history of breast cancer, GERD, thyroid nodules and tachycardia who presents for follow up in the [facility] [place] for Parkinson's disease via telehealth.\par \par The patient's history and physical examination are suggestive of mild idiopathic Parkinson's disease. She reports a good response to Sinemet. Her RLS is better controlled with Pramipexole, and further control with Lyrica recently. She is interested in another trial of discontinuing Pramipexole to decreased her leg swelling potentially. Her examination has remained stable, aside from now positive romberg sign suggesting a vestibulopathy. She continues to have mid afternoon fatigue, likely multifactorial. She is interested in a trial of a stimulant for this chronic complaint.\par \par EMG was previously done and not suggestive of a myopathy.\par \par Recommendations:\par - Trial of discontinuing Pramipexole 0.375mg daily\par - Trial of Modafinil 50mg at 1pm\par - Continue Lyrica 25mg daily, and as needed in the evening\par - Sinemet 10-100mg taking 6 tabs daily - 7a-11-3 (1.5 tabs)-7p \par - Sinemet ER 25-100mg 1 tab BID 7a and 2 tabs at 11p \par - Continue Miralax for constipation\par - Mediterranean diet, antiinflammatory/antioxidant foods, probiotics, fiber, caffeine, and vitamins \par - Encouraged to increase exercise and physical activity and maintain an active social and intellectual life.\par - Referral for vestibular therapy \par \par RTC 3-4 months\par \par All questions were answered to her satisfaction. I spent 40 minutes with this patient.

## 2021-11-19 ENCOUNTER — TRANSCRIPTION ENCOUNTER (OUTPATIENT)
Age: 71
End: 2021-11-19

## 2021-11-23 ENCOUNTER — TRANSCRIPTION ENCOUNTER (OUTPATIENT)
Age: 71
End: 2021-11-23

## 2021-11-24 ENCOUNTER — APPOINTMENT (OUTPATIENT)
Dept: GASTROENTEROLOGY | Facility: CLINIC | Age: 71
End: 2021-11-24
Payer: MEDICARE

## 2021-11-24 VITALS
OXYGEN SATURATION: 99 % | HEIGHT: 60 IN | DIASTOLIC BLOOD PRESSURE: 60 MMHG | WEIGHT: 126 LBS | BODY MASS INDEX: 24.74 KG/M2 | SYSTOLIC BLOOD PRESSURE: 140 MMHG | TEMPERATURE: 98 F | HEART RATE: 87 BPM

## 2021-11-24 PROCEDURE — 99214 OFFICE O/P EST MOD 30 MIN: CPT

## 2021-11-24 NOTE — ASSESSMENT
[FreeTextEntry1] : \par \par Constipation\par -continue fiber supplement daily, prunes, start probiotic, trial of decreasing MiraLAX to 1/2 dose daily to avoid loose stool/FI\par \par Elevated CEA- chronic stable. patient previously declined colonoscopy for further evaluation\par -PET CT has been stable. \par -last colonoscopy 02/2018 with Dr. Moore with limited right sided preparation, no polyps. \par -will defer colonoscopy unless new sign or symptoms as benefits of colonoscopy at this time do not clearly outweigh risks in light of patients comorbidities/stage 4 lung ca. Discussed with patient who understands and agrees with this plan. \par \par GERD- controlled on Pepcid. continue. \par EGD path benign in 07/2019\par \par Nausea- due to chemo, controlled on current regimen\par

## 2021-11-24 NOTE — HISTORY OF PRESENT ILLNESS
[FreeTextEntry1] : 71 year old F, stage 4 lung ca on chemotherapy, stage 1 breast ca  s/p left lumpectomy and XRT, now on anastrazole, Parkinson disease, GERD \par presents for follow up of nausea and constipation.  \par She is overall doing well. \par \par repeat PET 07/2021 stable. \par \par very constipated, takes 1 dose of MiraLAX daily, \par she has a tough balance between hard stool and too loose stool with FI and gassiness\par prunes are helpful. 5-6 per day, this is helpful.\par she takes Metamucil every 2 days, \par gets gassy and has had episodes of incontinence when stool loose\par she has a rectocele\par takes oral iron supplement. \par following with hematology - has anemia of chronic disease\par \par Nausea controlled on antiemetics and pepcid\par \par She has chronic stable elevation in CEA. \par \par EGD 7/22/19- for elevated CEA, abnormal PET, \par showed LA A esophagitis, gastritis, HH, \par GEJ bx- GIM, normal duodenum, stomach bx\par \par \par last egd/colonoscopy 2/1/2018 with Dr. Moore- 8 cm HH, mild reflux changes on bx\par ?poor right sided prep\par redundant colon \par no h/o colon polyps\par told to repeat in 5 years due to family hx crc (father rectal ca, dx 66)\par

## 2021-11-24 NOTE — PHYSICAL EXAM
[General Appearance - Alert] : alert [General Appearance - In No Acute Distress] : in no acute distress [Sclera] : the sclera and conjunctiva were normal [Outer Ear] : the ears and nose were normal in appearance [Hearing Threshold Finger Rub Not [place]] : hearing was normal [Neck Appearance] : the appearance of the neck was normal [] : no respiratory distress [Apical Impulse] : the apical impulse was normal [Abdomen Soft] : soft [Abdomen Tenderness] : non-tender [Abnormal Walk] : normal gait [Skin Color & Pigmentation] : normal skin color and pigmentation [No Focal Deficits] : no focal deficits [Oriented To Time, Place, And Person] : oriented to person, place, and time [FreeTextEntry1] : deferred

## 2021-11-29 ENCOUNTER — RESULT REVIEW (OUTPATIENT)
Age: 71
End: 2021-11-29

## 2021-11-29 ENCOUNTER — TRANSCRIPTION ENCOUNTER (OUTPATIENT)
Age: 71
End: 2021-11-29

## 2021-12-02 ENCOUNTER — APPOINTMENT (OUTPATIENT)
Dept: GERIATRICS | Facility: CLINIC | Age: 71
End: 2021-12-02
Payer: MEDICARE

## 2021-12-02 VITALS
HEART RATE: 102 BPM | DIASTOLIC BLOOD PRESSURE: 60 MMHG | BODY MASS INDEX: 30.86 KG/M2 | SYSTOLIC BLOOD PRESSURE: 108 MMHG | TEMPERATURE: 97.9 F | WEIGHT: 158 LBS | OXYGEN SATURATION: 98 %

## 2021-12-02 PROCEDURE — 99212 OFFICE O/P EST SF 10 MIN: CPT

## 2021-12-03 NOTE — PHYSICAL EXAM
Please advise on pt's creatinine. I think pt may benefit by having a nephrologist. She wants to know about the function of her kidney.   [Alert] : alert [No Acute Distress] : in no acute distress [Sclera] : the sclera and conjunctiva were normal [EOMI] : extraocular movements were intact [PERRL] : pupils were equal in size, round, and reactive to light [Normal Oral Mucosa] : normal oral mucosa [No Oral Pallor] : no oral pallor [Normal Appearance] : the appearance of the neck was normal [No Neck Mass] : no neck mass was observed [Supple] : the neck was supple [No Respiratory Distress] : no respiratory distress [No Acc Muscle Use] : no accessory muscle use [Respiration, Rhythm And Depth] : normal respiratory rhythm and effort [Normal S1, S2] : normal S1 and S2 [Heart Rate And Rhythm] : heart rate was normal and rhythm regular [Bowel Sounds] : normal bowel sounds [Abdomen Tenderness] : non-tender [Abdomen Soft] : soft [Normal Affect] : the affect was normal [Normal Mood] : the mood was normal [de-identified] : healed lesions scalp line, abdomen

## 2021-12-03 NOTE — ASSESSMENT
[FreeTextEntry1] : given description of intense itching and stomach hand involvement\par I had been concerned about bug bites or even scabies\par on exam see healed lesions - no signs of above scabies or bed bugs \par unclear if partially treated by steroids \par refer to dermatology also advised to take pictures if any active lesions\par \par unfortunately not able to be on sertraline or lyrica for now\par \par she has a metastatic cancer dx,  recovering from bladder cancer\par Her 72th bday and milestone wedding anniversary next year\par concern for depression but could not tolerate sertraline\par can try another option after rash resolved \par \par \par

## 2021-12-03 NOTE — HISTORY OF PRESENT ILLNESS
[No falls in past year] : Patient reported no falls in the past year [Completely Independent] : Completely independent. [Smoke Detector] : smoke detector [Carbon Monoxide Detector] : carbon monoxide detector [Wears Seat Belt] : wears seat belt [FreeTextEntry1] : Urgent visit\par bites/rash for several months\par had started in hairline but at times involved face, legs and fingers comes and goes\par never saw dermatologist\par did use steroids as was very itchy but no resolution\par steroids did not seem to help\par \par tremors also worsening after starting sertraline\par stopped sertraline 11/13 (started 10/2)\par stopped lyrica 11/22 (started 10/26)\par \par waiting to be on modafinil\par requiring prior authorization [Driving Concerns] : not driving or driving without noted concerns [1] : 2) Feeling down, depressed, or hopeless for several days (1) [PHQ-2 Negative - No further assessment needed] : PHQ-2 Negative - No further assessment needed [[id number]] : 2

## 2021-12-05 PROBLEM — H81.90 VESTIBULOPATHY, UNSPECIFIED LATERALITY: Status: ACTIVE | Noted: 2021-11-17

## 2021-12-05 PROBLEM — A69.20 ERYTHEMA MIGRANS (LYME DISEASE): Status: ACTIVE | Noted: 2021-07-27

## 2021-12-06 ENCOUNTER — NON-APPOINTMENT (OUTPATIENT)
Age: 71
End: 2021-12-06

## 2021-12-06 ENCOUNTER — APPOINTMENT (OUTPATIENT)
Dept: CARDIOLOGY | Facility: CLINIC | Age: 71
End: 2021-12-06
Payer: MEDICARE

## 2021-12-06 VITALS
HEART RATE: 100 BPM | HEIGHT: 60 IN | WEIGHT: 159 LBS | RESPIRATION RATE: 16 BRPM | SYSTOLIC BLOOD PRESSURE: 98 MMHG | BODY MASS INDEX: 31.22 KG/M2 | DIASTOLIC BLOOD PRESSURE: 62 MMHG | TEMPERATURE: 97.1 F

## 2021-12-06 DIAGNOSIS — H81.90 UNSPECIFIED DISORDER OF VESTIBULAR FUNCTION, UNSPECIFIED EAR: ICD-10-CM

## 2021-12-06 DIAGNOSIS — A69.20 LYME DISEASE, UNSPECIFIED: ICD-10-CM

## 2021-12-06 PROCEDURE — 93000 ELECTROCARDIOGRAM COMPLETE: CPT

## 2021-12-06 PROCEDURE — 99214 OFFICE O/P EST MOD 30 MIN: CPT

## 2021-12-06 RX ORDER — DOXYCYCLINE 100 MG/1
100 CAPSULE ORAL ONCE
Qty: 1 | Refills: 0 | Status: DISCONTINUED | COMMUNITY
Start: 2021-07-27 | End: 2021-12-06

## 2021-12-06 NOTE — REASON FOR VISIT
[Follow-Up - Clinic] : a clinic follow-up of [FreeTextEntry2] : 1year [FreeTextEntry1] : sinus tachycardia

## 2021-12-06 NOTE — HISTORY OF PRESENT ILLNESS
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  She was also followed for small pericardial effusion.Unfortunately this year she was diagnosed with breast cancer   left lumpectomy with intraop RT and arimdex and was found to have adenocarcinoma  lung cancer with bony mets. 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 s/p left ablation in IR to femur.\par She feels fatigue, has dyspnea on exertion, she denies PND, orthopnea,rare palpitations , no syncope.\par She had  an episode of palpitations last friday , felt palpitations and noted a HR of 120 on her watch. She denies syncope.\par \par

## 2021-12-07 ENCOUNTER — RESULT REVIEW (OUTPATIENT)
Age: 71
End: 2021-12-07

## 2021-12-07 ENCOUNTER — APPOINTMENT (OUTPATIENT)
Dept: HEMATOLOGY ONCOLOGY | Facility: CLINIC | Age: 71
End: 2021-12-07
Payer: MEDICARE

## 2021-12-07 VITALS
BODY MASS INDEX: 30.88 KG/M2 | HEIGHT: 60 IN | OXYGEN SATURATION: 97 % | TEMPERATURE: 97.5 F | WEIGHT: 157.31 LBS | SYSTOLIC BLOOD PRESSURE: 109 MMHG | RESPIRATION RATE: 16 BRPM | DIASTOLIC BLOOD PRESSURE: 70 MMHG | HEART RATE: 97 BPM

## 2021-12-07 PROCEDURE — 36415 COLL VENOUS BLD VENIPUNCTURE: CPT

## 2021-12-07 PROCEDURE — 99215 OFFICE O/P EST HI 40 MIN: CPT | Mod: 25

## 2021-12-07 NOTE — PHYSICAL EXAM
[Restricted in physically strenuous activity but ambulatory and able to carry out work of a light or sedentary nature] : Status 1- Restricted in physically strenuous activity but ambulatory and able to carry out work of a light or sedentary nature, e.g., light house work, office work [Normal] : affect appropriate [de-identified] : left breast scar

## 2021-12-07 NOTE — ASSESSMENT
[FreeTextEntry1] : 72 yo female \par \par # Stage 1 breast cancer 8 mm tumor, invasive ductal carcinoma, ER/ DE positive, HER2 not amplified with Oncotype Dx 11. diagnosed November 2018\par -continue with Anastrazole\par - left breast pain - c/w fat necrosis on mammogram \par - due for mammogram \par \par #Adenocarcinoma of lung- stage 4 diagnosed  April 2019\par -Repeated CEA -plateau at 11.5 (started out at 26.6)\par -CT scan showed mass 1.8 x 1.4 x 1.7 cm lesion.\par -Biopsy of lung nodule c/w adeno ca of the lung, EGFR mutated, T190 mutated, started on Tagrisso.  Side effects, -toxicities and benefits reviewed with pt. L4 vertebral body biopsy confirmed met adenoca of the lung.\par - PETCT  2/3/2020- decreased FDG uptake in primary lesion, sclerotic lesions in bones, thyroid nodule activity- under surveillance, duodenal uptake \par - plan for PETCT Feb 2021- SARAH- stable, uptake in right breast - c/w fat necrosis \par - repeat CEA\par -July 2021- PETCT - stable\par \par #benign thyroid nodule - followed by Dr. Park. plan for biopsy\par # constipation- daily miralax\par \par Plan:\par - continue Tagrisso 11 pm, nausea in am \par - Nausea is controlled with  Sancuso and Dronabinol 2.5mg bid for nausea- refill today I-stop checked\par - continue Xgeva monthly (last 7/15/2020, 8/12/2020, 9/12/, 10/7/2020, 11/4/2020, 12/2, 12/30/2020, 1/27/2020, 2/24/2021, 3/25/202, 4/22/2021, 5/202021, 6/21/2021\par -neoplasm pain - continue with Vicodin prn rarely taking \par -mammo Dec 2020- stable- no evidence of disease, 1 year follow up \par -has follow up with Dr. Park for thyroid US- stable nodule \par -established care with Dr. Hendrix for her Parkinsons- stopped horizant \par \par 5/20/21\par lung cancer stage 4 on tagrisso \par Pruritic raised pustules on the neck, chest - total 5- 6 on the body, rx triamcinolone \par Left iliac crest pain in am, - obtain mRI for metastatic disease\par \par 6/21//2021\par Patient with back discomfort, to start RT to lower back\par PETCT in July 2021\par Xgeva today \par \par 7/19/2021\par PETCT 7/2021- stable\par Increased fatigue from RT, but recovering well\par Xgeva today \par Incomplete colonoscopy \par MId- thoracic pain - schedule MRI \par \par 8/17/2021\par MRI thoracic spine - bone mets, stable\par Patient needs crown placement \par Leukopenia - stable\par COvid boost vaccine\par Paint - intermittent, right scapula \par \par 9/14/2021\par pt feeling well- stable\par xgeva today \par remains on tagrisso- tolerates well \par having increased afternoon fatigue- PCP offered afternoon zoloft- however pt wants to think about it \par had crown replaced- nothing invasive\par stable leukopenia- no infections or fevers\par \par 10/12/2021\par Fatigue at 3pm\par Xgeva - continue\par Tagrisso \par Started Zoloft \par Weight loss\par Increase dronabinol to 2.5 PO TID \par PETCT July 2021\par \par 11/9/2021\par continues with fatigue at 3pm\par this is week 6 of zoloft some improvement \par remains on Tagrisso /xgeva- no dental issues \par weight gain\par anemia of chronic disease - will check irons today  \par \par 12/7/2021\par Anemia, restless leg syndrome, - Ferritin 57, start oral iron- ferrous gluconate, \par Increased fatigue - working with neuro on provigil authorization\par Left leg pain - increased, day and night 5/10, \par Left femur MRI ordered with gado, patient \par Increased marinol to 5 mg PO TID for nausea, start olanzapine 2.5 mg PO daily \par Palliative care evaluation \par Continue tagrisso/ xgeva\par Rash - pruritic- triamcinolone\par osteopenia - Dexa\par \par CBC,Chem,CEA, CA 27.29  case and mgmt  to return in 1 month for treatment

## 2021-12-07 NOTE — REVIEW OF SYSTEMS
[Fatigue] : fatigue [Joint Pain] : joint pain [Muscle Pain] : muscle pain [Anxiety] : anxiety [Muscle Weakness] : muscle weakness [Negative] : Allergic/Immunologic [Fever] : no fever [Recent Change In Weight] : ~T no recent weight change [Eye Pain] : no eye pain [Vision Problems] : no vision problems [Dysphagia] : no dysphagia [Nosebleeds] : no nosebleeds [Chest Pain] : no chest pain [Lower Ext Edema] : no lower extremity edema [Shortness Of Breath] : no shortness of breath [Constipation] : no constipation [Diarrhea] : no diarrhea [Dysuria] : no dysuria [Depression] : no depression [Easy Bleeding] : no tendency for easy bleeding [Easy Bruising] : no tendency for easy bruising [FreeTextEntry7] : nausea still persistent  [FreeTextEntry9] : muscle spasms due to parkinsons, left hip pain  [de-identified] : brain fog

## 2021-12-07 NOTE — HISTORY OF PRESENT ILLNESS
[Therapy: ___] : Therapy: [unfilled] [de-identified] : Mrs. Mcdowell is a 68 year old postmenopausal female who is referred by Dr.Alice Demarco for newly diagnosed breast cancer.\par She was found on routine mammography in November 2019 she you have a focal asymmetric density in the left upper outer breast at 1:00 access 8 cm from the nipple. A one year prior had been negative.  Ultrasound-guided core biopsy in November 21, 2018 revealed a grade 1/3 invasive ductal carcinoma ER positive at 98% and NJ positive at 90%,Ki-67 was less than 10%. She underwent some left breast lumpectomy and sentinel node dissection on December 19, 2018. Pathology revealed an 8 mm grade 1/3 invasive ductal carcinoma zero of 3 sentinel lymph nodes were involved with tumor. Oncotype DX score was 11. She also underwent genetic testing which is negative.\par  [de-identified] : Mrs. 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 The neurologist put her on lyrica for her pain but she developed some patches rashes. She is getting a mammogram on 12/9/2021. Patient also had a zio patch placed by the cardiologist.

## 2021-12-08 ENCOUNTER — APPOINTMENT (OUTPATIENT)
Dept: GERIATRICS | Facility: CLINIC | Age: 71
End: 2021-12-08
Payer: MEDICARE

## 2021-12-08 VITALS
DIASTOLIC BLOOD PRESSURE: 63 MMHG | BODY MASS INDEX: 30.98 KG/M2 | RESPIRATION RATE: 16 BRPM | TEMPERATURE: 97.6 F | WEIGHT: 157.8 LBS | HEIGHT: 60 IN | SYSTOLIC BLOOD PRESSURE: 134 MMHG | HEART RATE: 78 BPM | OXYGEN SATURATION: 100 %

## 2021-12-08 PROCEDURE — 99205 OFFICE O/P NEW HI 60 MIN: CPT

## 2021-12-08 RX ORDER — SULFAMETHOXAZOLE AND TRIMETHOPRIM 800; 160 MG/1; MG/1
800-160 TABLET ORAL
Qty: 10 | Refills: 0 | Status: DISCONTINUED | COMMUNITY
Start: 2021-12-02 | End: 2021-12-08

## 2021-12-08 NOTE — ASSESSMENT
[FreeTextEntry1] : 72 y/o F w/ metastatic lung cancer, localized breast CA, Parkinson's presenting to re-establish primary palliative care. \par \par Spoke at length to patient and her  regarding a few treatment options. Aside from the physiological fatigue stemming from her cancer, I believe her depression may be at the root of her symptoms, especially given her Parkinson's Disease. I strongly encouraged patient to try out therapy ([facility] [employer]) and I feel that she could benefit from an additional trial of an SSRI. We also discussed medical cannabis, Modafonil (she is waiting on PA), and a short course of steroids for a general "boost" of mood, appetite, nausea, and pain relief.\par \par Rosamaria + her  would like to try Cannabis/steroids for now. Will check back in in a month for a follow up. \par

## 2021-12-08 NOTE — PHYSICAL EXAM
[General Appearance - Alert] : alert [General Appearance - In No Acute Distress] : in no acute distress [General Appearance - Well Nourished] : well nourished [General Appearance - Well Developed] : well developed [Normal Oral Mucosa] : normal oral mucosa [No Oral Pallor] : no oral pallor [Neck Appearance] : the appearance of the neck was normal [Neck Cervical Mass (___cm)] : no neck mass was observed [] : no respiratory distress [Edema] : there was no peripheral edema [Oriented To Time, Place, And Person] : oriented to person, place, and time [Impaired Insight] : insight and judgment were intact [Affect] : the affect was normal [Mood] : the mood was normal

## 2021-12-08 NOTE — HISTORY OF PRESENT ILLNESS
[FreeTextEntry1] : Rosamaria Mcdowell is a 70 y/o F w/ metastatic lung cancer to bone on chemo, localized breast cancer s/p lumpectomy and XRT on anastrazole, PD, vasculitis who was originally seen in 2019 for pain, persistent recurrent nausea and dizziness, restless leg syndrome who now presents to re-establish care. \par \par Nausea is now under control, ameliorated with Marinol + Sancuso patch. Now, she reports feeling "terrible" daily- it's a "continued, afternoon blahs" where she feels "crummy" and has to lie down. \par Also has low BP and she feels that may be contributing to her feeling so low. \par Tried Sertraline and had two days where she felt "the fog lifting" but nothing else. \par This has been going on for a year\par \par She is awaiting provigil authorization for fatigue from neuro\par \par ROS:\par - Sleeping OK, has to get up 1-2x to urinate. Falls asleep watching TV. Naps during the day.\par - Appetite waxes and wanes,  Takes Marinol 5mg which helps with nausea. Remains on Sancuso patch. \par - Pain is in L leg. Received cyroablation, which helps. Takes hydrocodone at night daily. Minimal to no pain during the day. \par - Constipation- takes miralax, fiber. Goes every 2-3 days. \par - Restless leg- Primpex is working but is causing her LE edema. Lyrica made her incontinent of urine. \par \par Her main complaint at this point is feeling the "afternoon blahs."

## 2021-12-09 ENCOUNTER — RESULT REVIEW (OUTPATIENT)
Age: 71
End: 2021-12-09

## 2021-12-10 ENCOUNTER — NON-APPOINTMENT (OUTPATIENT)
Age: 71
End: 2021-12-10

## 2021-12-13 ENCOUNTER — APPOINTMENT (OUTPATIENT)
Dept: OBGYN | Facility: CLINIC | Age: 71
End: 2021-12-13
Payer: MEDICARE

## 2021-12-13 ENCOUNTER — NON-APPOINTMENT (OUTPATIENT)
Age: 71
End: 2021-12-13

## 2021-12-13 VITALS
HEIGHT: 60 IN | SYSTOLIC BLOOD PRESSURE: 118 MMHG | WEIGHT: 157 LBS | DIASTOLIC BLOOD PRESSURE: 76 MMHG | BODY MASS INDEX: 30.82 KG/M2

## 2021-12-13 PROCEDURE — 99214 OFFICE O/P EST MOD 30 MIN: CPT

## 2021-12-13 RX ORDER — TRIAMCINOLONE ACETONIDE 5 MG/G
0.5 CREAM TOPICAL 3 TIMES DAILY
Qty: 2 | Refills: 1 | Status: DISCONTINUED | COMMUNITY
Start: 2021-12-07 | End: 2021-12-13

## 2021-12-13 NOTE — HISTORY OF PRESENT ILLNESS
[FreeTextEntry1] : 72yo  S/P LAVH(ovaries in situ) here for annual Gyn exam.Pt is S/P left lumpectomy for invasive ductal Ca Grade 1/3 7mm. On Anastrazole. + family h/o breast Ca(Mother,cousins). Had genetic testing-> NEGATIVE. Pt was subsequently found to have Stage 4 lung Ca with bone metastasis and is currently being treated with Tagriso(daily oral med) and doing well!  She had cryoablation of lesions in femur and recently RT to spine.  had bladder Ca this year. Has Parkinson's Disease remains stable on meds.Rectocele remains "about the same" but problematic with constipation, has some fecal incontinence with soft or liquid stool(takes Miralax) Granddaughter Eva, 13, now identifies as non-binary. \par  [Mammogramdate] : 12/9/21 [TextBox_19] : report pending [PapSmeardate] : hysterectomy [BoneDensityDate] : 12/9/21 [TextBox_37] : T Score Spine and hip not valid, Forearm -1.1 [ColonoscopyDate] : 2/1/18 [TextBox_43] : Dr. Moore- negative, declines repeat

## 2021-12-21 ENCOUNTER — RESULT REVIEW (OUTPATIENT)
Age: 71
End: 2021-12-21

## 2021-12-22 ENCOUNTER — TRANSCRIPTION ENCOUNTER (OUTPATIENT)
Age: 71
End: 2021-12-22

## 2021-12-22 ENCOUNTER — RESULT REVIEW (OUTPATIENT)
Age: 71
End: 2021-12-22

## 2022-01-04 ENCOUNTER — APPOINTMENT (OUTPATIENT)
Dept: GERIATRICS | Facility: CLINIC | Age: 72
End: 2022-01-04

## 2022-01-04 ENCOUNTER — NON-APPOINTMENT (OUTPATIENT)
Age: 72
End: 2022-01-04

## 2022-01-04 ENCOUNTER — APPOINTMENT (OUTPATIENT)
Dept: HEMATOLOGY ONCOLOGY | Facility: CLINIC | Age: 72
End: 2022-01-04

## 2022-01-06 ENCOUNTER — TRANSCRIPTION ENCOUNTER (OUTPATIENT)
Age: 72
End: 2022-01-06

## 2022-01-07 ENCOUNTER — TRANSCRIPTION ENCOUNTER (OUTPATIENT)
Age: 72
End: 2022-01-07

## 2022-01-10 ENCOUNTER — TRANSCRIPTION ENCOUNTER (OUTPATIENT)
Age: 72
End: 2022-01-10

## 2022-01-10 ENCOUNTER — APPOINTMENT (OUTPATIENT)
Dept: GERIATRICS | Facility: CLINIC | Age: 72
End: 2022-01-10
Payer: MEDICARE

## 2022-01-10 PROCEDURE — 99443: CPT | Mod: 95

## 2022-01-10 RX ORDER — DEXAMETHASONE 2 MG/1
2 TABLET ORAL DAILY
Qty: 30 | Refills: 0 | Status: DISCONTINUED | COMMUNITY
Start: 2021-12-08 | End: 2022-01-10

## 2022-01-10 NOTE — ASSESSMENT
[FreeTextEntry1] : 70 y/o F w/ metastatic lung cancer, localized breast CA, Parkinson's presenting for a palliative care follow up\par \par - Trial of Tramadol per patient's request- I think Oxycodone would be  more effective but will try this for now\par - Increase Dex to 4mg in the morning- can go up to 8mg BID, no h/o GIB\par \par RTC 1/24 @ noon for follow up\par

## 2022-01-10 NOTE — PHYSICAL EXAM
[General Appearance - Alert] : alert [No Oral Pallor] : no oral pallor [Neck Cervical Mass (___cm)] : no neck mass was observed [] : no respiratory distress [Oriented To Time, Place, And Person] : oriented to person, place, and time [Impaired Insight] : insight and judgment were intact [Affect] : the affect was normal [Mood] : the mood was normal

## 2022-01-10 NOTE — HISTORY OF PRESENT ILLNESS
[FreeTextEntry1] : Rosamaria Mcdowell is a 72 y/o F w/ metastatic lung cancer to bone on chemo, localized breast cancer s/p lumpectomy and XRT on anastrazole, PD, vasculitis who was originally seen in 2019 for pain, persistent recurrent nausea and dizziness, restless leg syndrome who now presents for a primary palliative care follow up.\par \par MRI 12/23 --> progression in L femur and pelvis lesions\par \par Does not feel better. Tried Dex 2mg and it didn't help stimulate her, only helped her rash.\par Tried Provigil- didn't notice a difference, fell asleep on it.\par Leg pain is now bad and Hydrocodone doesn't help at all Motrin 600mg BID is somewhat helping.\par Tried Cannabis, didn't help pain or wakefulness at all either.\par Alternates between constipation and diarrhea; does not take laxative regularly

## 2022-01-12 ENCOUNTER — APPOINTMENT (OUTPATIENT)
Dept: RADIATION ONCOLOGY | Facility: CLINIC | Age: 72
End: 2022-01-12
Payer: MEDICARE

## 2022-01-12 VITALS
BODY MASS INDEX: 31.41 KG/M2 | DIASTOLIC BLOOD PRESSURE: 63 MMHG | SYSTOLIC BLOOD PRESSURE: 131 MMHG | HEIGHT: 60 IN | TEMPERATURE: 98.7 F | HEART RATE: 95 BPM | OXYGEN SATURATION: 96 % | WEIGHT: 160 LBS | RESPIRATION RATE: 18 BRPM

## 2022-01-12 PROCEDURE — 99214 OFFICE O/P EST MOD 30 MIN: CPT

## 2022-01-12 NOTE — VITALS
[100: Normal, no complaints, no evidence of disease.] : 100: Normal, no complaints, no evidence of disease. [Maximal Pain Intensity: 6/10] : 6/10 [Least Pain Intensity: 2/10] : 2/10 [Pain Location: ___] : Pain Location: [unfilled] [Opioid] : opioid [Date: ____________] : Patient's last distress assessment performed on [unfilled]. [0 - No Distress] : Distress Level: 0

## 2022-01-13 NOTE — REVIEW OF SYSTEMS
[Joint Pain] : joint pain [Negative] : Heme/Lymph [FreeTextEntry9] : hip to thigh pain - left [de-identified] : recent tick bite to right wrist

## 2022-01-13 NOTE — HISTORY OF PRESENT ILLNESS
[FreeTextEntry1] : Mrs. Mcdowell is a 71 year old postmenopausal female of Ashkenazi descent, who was diagnosed with left breast invasive ductal carcinoma s/p lumpectomy and IORT in 2018. Subsequently, also later diagnosed with Stage IV lung adenocarcinoma and received palliative radiation therapy to the lumbar spine. \par \par Mrs. Mcdowell underwent left breast lumpectomy and SLN excision by Dr. Demarco and IORT at [facility] in 2018. Surgical pathology revealed a 0.8 cm IDC, grade 1, No DCIS or LCIS, no LVI, margins negative (closest 12 mm anterior). 0/3 were positive for metastatic carcinoma. Pathological stage pT1b N0 (sn) (i-). Biomarkers were ER + 98% / NJ + 90% / and Her2 negative, Ki-67 <10%. She was started on Anastrozole. Follow-up mammograms on 5/2019 and 11/2019 were negative. \par \par In April 2019, she was noted to have elevated tumor markers (CEA 26.6) and vocal hoarseness for which she was under the care of Dr. Rodriguez. He sent her for further evaluation with CT NECK (4/5/19) that demonstrated a 1.8 spiculated left upper lobe lung nodule suspicious for neoplasm and left posterior 4th rib sclerotic focus. PET/CT (4/13/19) demonstrated possible recurrent left breast cancer, left upper and medial lung spiculated nodule and opacity suspicious for metastatic disease, left perihilar LNs, osseous lesions, right hepatic lobe minimally FDG avid, focal esophageal hypermetabolism, including base of tongue, and asymmetric right vocal cord - paralysis. \par \par Subsequently, she underwent biopsy (4/16/19) and pathology was consistent with acinar adenocarcinoma of the lung. Bone biopsy of L4 and left iliac were positive for metastatic adenocarcinoma, consistent with lung primary. She was started on Tagrisso in May 2019. NM PET/CT (2/2021) demonstrated no change in the pulmonary nodule / thyroid nodule. Distal left femur sclerotic metastasis not imaged on prior PET/CT (interval cryoablation by Dr. Carvajal). She reports she was having pain in the left femur, which resolved status post cryoablation. More recently she reports left iliac / sacral pain, rating it a 6-7 /10 on the pain scale. Further evaluation with MRI Lumbar spine (6/4/21) demonstrated multiple lesions in the lumbar sacral spine. prominent L4 lesions, and multiple areas of soft tissue enhancement and suspicious epidural involvement. \par \par She received palliative radiation to the lumbar spine to a total of 20 Gy in 5 fractions, which she completed on 6/30/21. \par \par She continued on Xgeva and Tagrisso.  \par \par More recently, MRI Femur (12/22/22) demonstrated bony metastatic disease, with progression in size of several left femur and pelvic lesions. Enlargement of the distal femoral shaft lesion measured 1.5 cm and left posterior acetabular and ischial lesion. She rates the pain at 6 / 10 on the pain scale from the left hip down the thigh. She is currently taking Tramadol with effectiveness. She is under the care of Dr. Irwin for palliative care. She is scheduled for PET/CT on Friday. She remains on Tagrisso and Xgeva. Ms. Mcdowell is present today for a consultation to discuss palliative radiation therapy. \par \par She denies any focal neurological deficits. She has no sensory changes. She has no bowel or bladder incontinence.

## 2022-01-13 NOTE — PHYSICAL EXAM
[Normal] : oriented to person, place and time, the affect was normal, the mood was normal and not anxious [de-identified] : Strength intact in the bilateral upper and lower extremities. No sensory changes.

## 2022-01-14 ENCOUNTER — TRANSCRIPTION ENCOUNTER (OUTPATIENT)
Age: 72
End: 2022-01-14

## 2022-01-14 ENCOUNTER — APPOINTMENT (OUTPATIENT)
Dept: NUCLEAR MEDICINE | Facility: IMAGING CENTER | Age: 72
End: 2022-01-14
Payer: MEDICARE

## 2022-01-14 ENCOUNTER — OUTPATIENT (OUTPATIENT)
Dept: OUTPATIENT SERVICES | Facility: HOSPITAL | Age: 72
LOS: 1 days | End: 2022-01-14
Payer: MEDICARE

## 2022-01-14 DIAGNOSIS — C79.51 SECONDARY MALIGNANT NEOPLASM OF BONE: ICD-10-CM

## 2022-01-14 DIAGNOSIS — C34.90 MALIGNANT NEOPLASM OF UNSPECIFIED PART OF UNSPECIFIED BRONCHUS OR LUNG: ICD-10-CM

## 2022-01-14 PROCEDURE — A9552: CPT

## 2022-01-14 PROCEDURE — 78815 PET IMAGE W/CT SKULL-THIGH: CPT

## 2022-01-14 PROCEDURE — 78815 PET IMAGE W/CT SKULL-THIGH: CPT | Mod: 26,PS

## 2022-01-19 ENCOUNTER — RX RENEWAL (OUTPATIENT)
Age: 72
End: 2022-01-19

## 2022-01-20 ENCOUNTER — NON-APPOINTMENT (OUTPATIENT)
Age: 72
End: 2022-01-20

## 2022-01-24 ENCOUNTER — APPOINTMENT (OUTPATIENT)
Dept: GERIATRICS | Facility: CLINIC | Age: 72
End: 2022-01-24
Payer: MEDICARE

## 2022-01-24 ENCOUNTER — APPOINTMENT (OUTPATIENT)
Dept: GERIATRICS | Facility: CLINIC | Age: 72
End: 2022-01-24

## 2022-01-24 PROCEDURE — 99215 OFFICE O/P EST HI 40 MIN: CPT | Mod: 95

## 2022-01-24 RX ORDER — DEXAMETHASONE 4 MG/1
4 TABLET ORAL DAILY
Qty: 30 | Refills: 0 | Status: DISCONTINUED | COMMUNITY
Start: 2022-01-10 | End: 2022-01-24

## 2022-01-24 NOTE — REASON FOR VISIT
[Initial Evaluation] : an initial evaluation [Home] : at home, [unfilled] , at the time of the visit. [Medical Office: ([facility])___] : at the medical office located in  [Verbal consent obtained from patient] : the patient, [unfilled]

## 2022-01-24 NOTE — ASSESSMENT
[FreeTextEntry1] : 72 y/o F w/ metastatic lung cancer, localized breast CA, Parkinson's presenting for a palliative care follow up\par \par - C/w Tramadol PRN\par - Stop Dex\par - Start trial of Ritalin 5mg PO QAM- d/w Neuro, OK on their end\par

## 2022-01-25 ENCOUNTER — APPOINTMENT (OUTPATIENT)
Dept: NEUROLOGY | Facility: CLINIC | Age: 72
End: 2022-01-25
Payer: MEDICARE

## 2022-01-25 PROCEDURE — 99441: CPT

## 2022-01-28 ENCOUNTER — TRANSCRIPTION ENCOUNTER (OUTPATIENT)
Age: 72
End: 2022-01-28

## 2022-02-01 ENCOUNTER — TRANSCRIPTION ENCOUNTER (OUTPATIENT)
Age: 72
End: 2022-02-01

## 2022-02-01 ENCOUNTER — RESULT REVIEW (OUTPATIENT)
Age: 72
End: 2022-02-01

## 2022-02-01 ENCOUNTER — APPOINTMENT (OUTPATIENT)
Dept: HEMATOLOGY ONCOLOGY | Facility: CLINIC | Age: 72
End: 2022-02-01
Payer: MEDICARE

## 2022-02-01 VITALS
WEIGHT: 159.3 LBS | OXYGEN SATURATION: 98 % | HEIGHT: 60 IN | DIASTOLIC BLOOD PRESSURE: 74 MMHG | BODY MASS INDEX: 31.28 KG/M2 | HEART RATE: 102 BPM | SYSTOLIC BLOOD PRESSURE: 122 MMHG | TEMPERATURE: 96.2 F | RESPIRATION RATE: 16 BRPM

## 2022-02-01 PROCEDURE — 99214 OFFICE O/P EST MOD 30 MIN: CPT | Mod: 25

## 2022-02-01 PROCEDURE — 36415 COLL VENOUS BLD VENIPUNCTURE: CPT

## 2022-02-01 NOTE — REVIEW OF SYSTEMS
[Fatigue] : fatigue [Joint Pain] : joint pain [Muscle Pain] : muscle pain [Anxiety] : anxiety [Muscle Weakness] : muscle weakness [Negative] : Allergic/Immunologic [Fever] : no fever [Recent Change In Weight] : ~T no recent weight change [Eye Pain] : no eye pain [Vision Problems] : no vision problems [Dysphagia] : no dysphagia [Nosebleeds] : no nosebleeds [Chest Pain] : no chest pain [Lower Ext Edema] : no lower extremity edema [Shortness Of Breath] : no shortness of breath [Constipation] : no constipation [Diarrhea] : no diarrhea [Dysuria] : no dysuria [Depression] : no depression [Easy Bleeding] : no tendency for easy bleeding [Easy Bruising] : no tendency for easy bruising [FreeTextEntry7] : nausea still persistent  [FreeTextEntry9] : muscle spasms due to parkinsons, left hip pain  [de-identified] : tremor [de-identified] : brain fog

## 2022-02-01 NOTE — PHYSICAL EXAM
[Restricted in physically strenuous activity but ambulatory and able to carry out work of a light or sedentary nature] : Status 1- Restricted in physically strenuous activity but ambulatory and able to carry out work of a light or sedentary nature, e.g., light house work, office work [Normal] : affect appropriate [de-identified] : left breast scar

## 2022-02-01 NOTE — ASSESSMENT
[FreeTextEntry1] : 72 yo female \par \par # Stage 1 breast cancer 8 mm tumor, invasive ductal carcinoma, ER/ VA positive, HER2 not amplified with Oncotype Dx 11. diagnosed November 2018\par -continue with Anastrazole\par - left breast pain - c/w fat necrosis on mammogram \par \par #Adenocarcinoma of lung- stage 4 diagnosed  April 2019\par -Repeated CEA -plateau at 11.5 (started out at 26.6)\par -CT scan showed mass 1.8 x 1.4 x 1.7 cm lesion.\par -Biopsy of lung nodule c/w adeno ca of the lung, EGFR mutated, T190 mutated, started on Tagrisso.  Side effects, -toxicities and benefits reviewed with pt. L4 vertebral body biopsy confirmed met adenoca of the lung.\par - PETCT  2/3/2020- decreased FDG uptake in primary lesion, sclerotic lesions in bones, thyroid nodule activity- under surveillance, duodenal uptake \par - plan for PETCT Feb 2021- SARAH- stable, uptake in right breast - c/w fat necrosis \par - repeat CEA\par -July 2021- PETCT - stable\par \par #benign thyroid nodule - followed by Dr. Park. plan for biopsy\par # constipation- daily miralax\par \par Plan:\par - continue Tagrisso 11 pm, nausea in am \par - Nausea is controlled with  Sancuso and Dronabinol 2.5mg bid for nausea- refill today I-stop checked\par - continue Xgeva monthly (last 7/15/2020, 8/12/2020, 9/12/, 10/7/2020, 11/4/2020, 12/2, 12/30/2020, 1/27/2020, 2/24/2021, 3/25/202, 4/22/2021, 5/202021, 6/21/2021\par -neoplasm pain - continue with Vicodin prn rarely taking \par -mammo Dec 2020- stable- no evidence of disease, 1 year follow up \par -has follow up with Dr. Park for thyroid US- stable nodule \par -established care with Dr. Hendrix for her Parkinsons- stopped horizant \par \par 5/20/21\par lung cancer stage 4 on tagrisso \par Pruritic raised pustules on the neck, chest - total 5- 6 on the body, rx triamcinolone \par Left iliac crest pain in am, - obtain mRI for metastatic disease\par \par 6/21//2021\par Patient with back discomfort, to start RT to lower back\par PETCT in July 2021\par Xgeva today \par \par 7/19/2021\par PETCT 7/2021- stable\par Increased fatigue from RT, but recovering well\par Xgeva today \par Incomplete colonoscopy \par MId- thoracic pain - schedule MRI \par \par 8/17/2021\par MRI thoracic spine - bone mets, stable\par Patient needs crown placement \par Leukopenia - stable\par COvid boost vaccine\par Paint - intermittent, right scapula \par \par 9/14/2021\par pt feeling well- stable\par xgeva today \par remains on tagrisso- tolerates well \par having increased afternoon fatigue- PCP offered afternoon zoloft- however pt wants to think about it \par had crown replaced- nothing invasive\par stable leukopenia- no infections or fevers\par \par 10/12/2021\par Fatigue at 3pm\par Xgeva - continue\par Tagrisso \par Started Zoloft \par Weight loss\par Increase dronabinol to 2.5 PO TID \par PETCT July 2021\par \par 11/9/2021\par continues with fatigue at 3pm\par this is week 6 of zoloft some improvement \par remains on Tagrisso /xgeva- no dental issues \par weight gain\par anemia of chronic disease - will check irons today  \par \par 2/1/2022\par Adenocarcinoma of lung- stage 4/ breast cancer\par Tagrisso/Xgeva\par started on Ritalin in mornings for afternoon fatigue\par pain stable- switched from Vicodin to tramadol\par PET CT scan-  small area upper left breast, unchanged left upper lobe pulmonary nodules, left lower pole thyroid nodule.  MRI of the femur- distal femoral shaft lesion increased in size. To start RT.  Decreased pain with tramadol. \par Dexa - -1.1 - patient on Xgeva.\par Patient due for dental cleaning. \par Stil significant fatigue at 3 pm, under care of palliative care NP Noman. \par Nausea - on and off. \par CBC,Chem,CEA, CA 27.29  case and mgmt  to return in 1 month for treatment

## 2022-02-01 NOTE — HISTORY OF PRESENT ILLNESS
[Therapy: ___] : Therapy: [unfilled] [de-identified] : Mrs. Mcdowell is a 68 year old postmenopausal female who is referred by Dr.Alice Demarco for newly diagnosed breast cancer.\par She was found on routine mammography in November 2019 she you have a focal asymmetric density in the left upper outer breast at 1:00 access 8 cm from the nipple. A one year prior had been negative.  Ultrasound-guided core biopsy in November 21, 2018 revealed a grade 1/3 invasive ductal carcinoma ER positive at 98% and HI positive at 90%,Ki-67 was less than 10%. She underwent some left breast lumpectomy and sentinel node dissection on December 19, 2018. Pathology revealed an 8 mm grade 1/3 invasive ductal carcinoma zero of 3 sentinel lymph nodes were involved with tumor. Oncotype DX score was 11. She also underwent genetic testing which is negative.\par  [de-identified] : Mrs. 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  Pt having her ongoing fatigue and occasional pain - otherwise no complaints- taking Ritalin in am with not much improvement

## 2022-02-02 ENCOUNTER — TRANSCRIPTION ENCOUNTER (OUTPATIENT)
Age: 72
End: 2022-02-02

## 2022-02-03 ENCOUNTER — TRANSCRIPTION ENCOUNTER (OUTPATIENT)
Age: 72
End: 2022-02-03

## 2022-02-03 ENCOUNTER — NON-APPOINTMENT (OUTPATIENT)
Age: 72
End: 2022-02-03

## 2022-02-04 ENCOUNTER — NON-APPOINTMENT (OUTPATIENT)
Age: 72
End: 2022-02-04

## 2022-02-04 VITALS
SYSTOLIC BLOOD PRESSURE: 116 MMHG | DIASTOLIC BLOOD PRESSURE: 74 MMHG | HEART RATE: 81 BPM | OXYGEN SATURATION: 98 % | RESPIRATION RATE: 18 BRPM

## 2022-02-04 NOTE — REVIEW OF SYSTEMS
[Edema Limbs: Grade 0] : Edema Limbs: Grade 0  [Fatigue: Grade 0] : Fatigue: Grade 0 [Dermatitis Radiation: Grade 0] : Dermatitis Radiation: Grade 0

## 2022-02-04 NOTE — HISTORY OF PRESENT ILLNESS
[FreeTextEntry1] : Ms. Mcdowell is status post fraction 4 / 5 of radiation to the left femur / bone pelvis. She reports her pain to be a 2-3 / 10 on the pain scale. She is taking Tramadol with effectiveness. Ms. Mcdowell continues on Tagrisso and reports she is feeling well. She will follow-up in 1 month for her PTE.

## 2022-02-04 NOTE — VITALS
[Maximal Pain Intensity: 3/10] : 3/10 [Least Pain Intensity: 2/10] : 2/10 [Pain Location: ___] : Pain Location: [unfilled] [90: Able to carry normal activity; minor signs or symptoms of disease.] : 90: Able to carry normal activity; minor signs or symptoms of disease.

## 2022-02-04 NOTE — DISEASE MANAGEMENT
[Clinical] : TNM Stage: c [IV] : IV [TTNM] : 1b [NTNM] : 0 [MTNM] : 1 [de-identified] : 1600 cGy [de-identified] : 2000 cGy [de-identified] : bone pelvis + Left femur

## 2022-02-11 ENCOUNTER — NON-APPOINTMENT (OUTPATIENT)
Age: 72
End: 2022-02-11

## 2022-02-15 ENCOUNTER — TRANSCRIPTION ENCOUNTER (OUTPATIENT)
Age: 72
End: 2022-02-15

## 2022-02-15 ENCOUNTER — NON-APPOINTMENT (OUTPATIENT)
Age: 72
End: 2022-02-15

## 2022-02-16 ENCOUNTER — TRANSCRIPTION ENCOUNTER (OUTPATIENT)
Age: 72
End: 2022-02-16

## 2022-02-16 ENCOUNTER — APPOINTMENT (OUTPATIENT)
Dept: NEUROLOGY | Facility: CLINIC | Age: 72
End: 2022-02-16
Payer: MEDICARE

## 2022-02-16 VITALS
SYSTOLIC BLOOD PRESSURE: 125 MMHG | OXYGEN SATURATION: 99 % | HEART RATE: 96 BPM | WEIGHT: 155 LBS | TEMPERATURE: 98.2 F | BODY MASS INDEX: 30.43 KG/M2 | DIASTOLIC BLOOD PRESSURE: 83 MMHG | HEIGHT: 60 IN

## 2022-02-16 PROCEDURE — 99215 OFFICE O/P EST HI 40 MIN: CPT

## 2022-02-16 RX ORDER — MODAFINIL 100 MG/1
100 TABLET ORAL
Qty: 30 | Refills: 0 | Status: DISCONTINUED | COMMUNITY
Start: 2021-11-17 | End: 2022-02-16

## 2022-02-16 RX ORDER — CARBIDOPA AND LEVODOPA 10; 100 MG/1; MG/1
10-100 TABLET ORAL
Qty: 540 | Refills: 3 | Status: DISCONTINUED | COMMUNITY
Start: 2018-03-22 | End: 2022-02-16

## 2022-02-16 NOTE — PHYSICAL EXAM
[Person] : oriented to person [Place] : oriented to place [Time] : oriented to time [Concentration Intact] : normal concentrating ability [Comprehension] : comprehension intact [Cranial Nerves Optic (II)] : visual acuity intact bilaterally,  visual fields full to confrontation, pupils equal round and reactive to light [Cranial Nerves Oculomotor (III)] : extraocular motion intact [Cranial Nerves Trigeminal (V)] : facial sensation intact symmetrically [Cranial Nerves Facial (VII)] : face symmetrical [Cranial Nerves Vestibulocochlear (VIII)] : hearing was intact bilaterally [Cranial Nerves Glossopharyngeal (IX)] : tongue and palate midline [Cranial Nerves Accessory (XI - Cranial And Spinal)] : head turning and shoulder shrug symmetric [Cranial Nerves Hypoglossal (XII)] : there was no tongue deviation with protrusion [Motor Strength] : muscle strength was normal in all four extremities [Involuntary Movements] : no involuntary movements were seen [Sensation Tactile Decrease] : light touch was intact [Romberg's Sign] : a positive Romberg's sign was present [1+] : Ankle jerk left 1+ [Paresis Pronator Drift Right-Sided] : no pronator drift on the right [Paresis Pronator Drift Left-Sided] : no pronator drift on the left [Coordination - Dysmetria Impaired Finger-to-Nose Bilateral] : not present [FreeTextEntry1] : Face was not significantly masked. Saccades were normal. Voice is normal.\par There were intermittent, resting right hand tremors, with mild postural tremors with hands outstretched.\par No significant rigidity.\par There was mild bradykinesia, left more than right, with finger tapping, rapid alternating and sequential movements.\par Mildly decreased left leg agility. Slightly impaired toe tapping bilaterally.\par No trouble standing with arms crossed. Posture is normal.\par Gait is with good stride length and speed with mildly decreased right arm swing. Multistep turns. No freezing of gait. Mild veering to the right.\par Postural reflexes are intact.

## 2022-02-16 NOTE — HISTORY OF PRESENT ILLNESS
[FreeTextEntry1] : Rosamaria Mcdowell is a 71 year old F with a PMHx of Parkinson's disease, lung cancer (on targeted therapy), hx cataract surgery, history of breast cancer, GERD, thyroid nodules and tachycardia who presents for follow up in the [facility] at [place] for Parkinson's disease. Positive Kranthi scan.\par \par Interval history:\par Since the last visit, she reports more right hand tremor that makes her uncomfortable. She has had a response to the pramipexole IR but gets palpitations a few hours later. She has to take the 1pm dose earlier usually. When she sits for a while she is stiffer. She is exercising. \par She has constipation. She is taking Miralax, fiber. She drinks water. \par She is sleeping okay. She has tried melatonin before and it made it worse. She is talking in sleep and screaming in sleep. No acting out of dreams. She does feel refreshed in the morning. \par Occasional dizziness when standing. \par No hallucinations.\par She received the Ritalin 5mg daily which did not help. It was increased to twice a day yesterday.\par She did vestibular therapy, and she felt unsafe and could not tolerate the therapy. \par No falls.\par She had a short series of radiation to her femur nad pel\par \par Current meds:\par Sinemet 10-100mg taking 6 tabs daily - 9c-27-9a-3p-7p \par Sinemet ER 25-100mg 1 tab BID 7a and 1 tabs at 11p \par Pramipexole 0.375mg IR in the evening\par Miralax and FIber\par \par \par Prior meds:\par Azilect 1 mg daily\par Melatonin gave her palpitations\par Modafinil did not help her fatigue\par Dexamethasone for the fatigue kept her up\par Olanzapine and her parkinsonism became worse\par Lyrica was stopped for urinary incontinence\par Zoloft\par \par Initial history:\par She was diagnosed in 2014 at [place]. She has been seeing a general neurologist.  \par \par She thought she had essential tremor. Her tremors are mostly in the right hand, controlled on Sinemet. Her left leg has restless leg syndrome.\par She feels worse in the afternoon, around 3-4pm. She gets wobbly. If she takes her BP, it is lower. She has not been officially tested for orthostatic hypotension. Sometimes her systolic is in 100s. She was tried on midodrine but she had hair loss and UTI. She was also tried on Gabapentin. When she didn't take the Sinemet ER in the morning she felt worse. No trouble with buttons, zippers or shoelaces, cutting food. No significant stiffness. She gets muscle spasms in the feet. She thinks the Sinemet lasts about 4 hours.  \par \par No trouble turning or adjusting in bed at night.\par She talks in her sleep and has fallen out of bed before. She wakes up to go to the bathroom at night. She also has breakthrough tremors at night. \par She urinates every 4 hours. \par \par She was reduced off the pramipexole due to potential side effects, but she is not sure which ones. When she decreased it, around 10p for about an hour she would be walking up and down the halls and could not lay still and got spasms in her left leg. She was tried on Lyrica without relief. They tried adjusting the Sinemet doses instead.\par \par She has no sense of smell.\par No changes in loudness of her voice. 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 Therapy did not work. She can still sing.\par No trouble swallowing. \par She has constipation. She has a bowel movement every couple of days. She takes Miralax daily or every other day. She has increased her water intake.\par She has urinary frequency. No incontinence. \par Memory, having more trouble finding words.\par Mood is depressed, but generally okay.\par Dizziness in the afternoon when standing. No falls. Her BP is sometimes low. \par Hallucinations, none.\par \par Current meds:\par Sinemet 10-100mg taking 6 tabs daily - 7a-11-3-7-11-3a\par Sinemet ER 25-100mg 1 tab BID 7a-7p\par Pramipexole 0.75mg ER in the evening\par \par Prior meds:\par Azilect 1 mg daily\par \par Family history:\par Social history: retired nurse\par  \par No Kranthi scan.\par MRI brain with and without contrast was done in 4/2019.

## 2022-02-16 NOTE — DISCUSSION/SUMMARY
[FreeTextEntry1] : Rosamaria Mcdowell is a 71 year old F with a PMHx of Parkinson's disease, lung cancer (on targeted therapy), hx cataract surgery, history of breast cancer, GERD, thyroid nodules and tachycardia who presents for follow up in the [facility] at [place] for Parkinson's disease. Positive Kranthi scan.\par \par  The patient's history and physical examination are suggestive of mild idiopathic Parkinson's disease. She reports a good response to Sinemet, however she has noticed more tremors in the right hand that do bother her and that she is wearing off and has to take her medication exactly on time, if not 15 minutes earlier.\par \par Her RLS is controlled with Pramipexole IR 0.375mg but experienceing palpitations. Insurance would not cover the ER she was on previously. Lyrica discontinued.\par \par Her Romberg sign is positive but she was unable to tolerate vestibular therapy as she felt unsafe with the maneuvers. \par She continues to have mid afternoon fatigue, likely multifactorial. \par \par   EMG was previously done and not suggestive of a myopathy.\par \par She was started on Ritalin by Palliative care. No response yet, but increased yesterday. Modafinil did not help.\par \par   Recommendations: \par - Decrease Pramipexole 0.125mg from 3 tabs to 2 tabs and monitor for improvement in palpitation\par - Continue Ritalin per Palliative care\par - Trial of switching from Sinemet to Stalevo 100mg 1 tab at 3x-33b-5n-3p-7p\par - Continue Sinemet ER 25-100mg 1 tab at 7a and 11p\par  - Continue Miralax for constipation , fiber and start probiotics\par - Mediterranean diet, antiinflammatory/antioxidant foods, probiotics, fiber, caffeine, and vitamins  - - \par Encouraged to increase exercise and physical activity and maintain an active social and intellectual life. \par - She does have worsening depression, will consider Venlafaxine vs. Cymbalta after trial of Stalevo\par \par   RTC 4 months  \par \par All questions were answered to her satisfaction. I spent 40 minutes with this patient.

## 2022-02-22 ENCOUNTER — NON-APPOINTMENT (OUTPATIENT)
Age: 72
End: 2022-02-22

## 2022-02-23 ENCOUNTER — TRANSCRIPTION ENCOUNTER (OUTPATIENT)
Age: 72
End: 2022-02-23

## 2022-02-23 ENCOUNTER — NON-APPOINTMENT (OUTPATIENT)
Age: 72
End: 2022-02-23

## 2022-02-24 ENCOUNTER — NON-APPOINTMENT (OUTPATIENT)
Age: 72
End: 2022-02-24

## 2022-02-24 RX ORDER — CARBIDOPA, LEVODOPA AND ENTACAPONE 25; 100; 200 MG/1; MG/1; MG/1
25-100-200 TABLET, FILM COATED ORAL
Qty: 150 | Refills: 3 | Status: DISCONTINUED | COMMUNITY
Start: 2022-02-16 | End: 2022-02-24

## 2022-02-24 RX ORDER — METHYLPHENIDATE HYDROCHLORIDE 5 MG/1
5 TABLET ORAL
Qty: 60 | Refills: 0 | Status: DISCONTINUED | COMMUNITY
Start: 2022-01-27 | End: 2022-02-24

## 2022-02-28 ENCOUNTER — APPOINTMENT (OUTPATIENT)
Dept: NEUROLOGY | Facility: CLINIC | Age: 72
End: 2022-02-28
Payer: MEDICARE

## 2022-02-28 ENCOUNTER — NON-APPOINTMENT (OUTPATIENT)
Age: 72
End: 2022-02-28

## 2022-02-28 PROCEDURE — 99441: CPT

## 2022-03-01 ENCOUNTER — RESULT REVIEW (OUTPATIENT)
Age: 72
End: 2022-03-01

## 2022-03-01 ENCOUNTER — APPOINTMENT (OUTPATIENT)
Dept: HEMATOLOGY ONCOLOGY | Facility: CLINIC | Age: 72
End: 2022-03-01
Payer: MEDICARE

## 2022-03-01 VITALS
TEMPERATURE: 97 F | WEIGHT: 154.8 LBS | OXYGEN SATURATION: 100 % | RESPIRATION RATE: 18 BRPM | HEIGHT: 60 IN | SYSTOLIC BLOOD PRESSURE: 111 MMHG | HEART RATE: 88 BPM | BODY MASS INDEX: 30.39 KG/M2 | DIASTOLIC BLOOD PRESSURE: 71 MMHG

## 2022-03-01 PROCEDURE — 36415 COLL VENOUS BLD VENIPUNCTURE: CPT

## 2022-03-01 PROCEDURE — 99214 OFFICE O/P EST MOD 30 MIN: CPT | Mod: 25

## 2022-03-01 RX ORDER — TRAMADOL HYDROCHLORIDE 50 MG/1
50 TABLET, COATED ORAL
Qty: 120 | Refills: 0 | Status: COMPLETED | COMMUNITY
Start: 2022-01-10 | End: 2022-03-01

## 2022-03-01 RX ORDER — OLANZAPINE 2.5 MG/1
2.5 TABLET, FILM COATED ORAL DAILY
Qty: 30 | Refills: 3 | Status: DISCONTINUED | COMMUNITY
Start: 2021-12-07 | End: 2022-02-24

## 2022-03-01 NOTE — PHYSICAL EXAM
[Restricted in physically strenuous activity but ambulatory and able to carry out work of a light or sedentary nature] : Status 1- Restricted in physically strenuous activity but ambulatory and able to carry out work of a light or sedentary nature, e.g., light house work, office work [Normal] : affect appropriate [de-identified] : left breast scar

## 2022-03-01 NOTE — REVIEW OF SYSTEMS
[Fatigue] : fatigue [Joint Pain] : joint pain [Muscle Pain] : muscle pain [Anxiety] : anxiety [Muscle Weakness] : muscle weakness [Negative] : Allergic/Immunologic [SOB on Exertion] : shortness of breath during exertion [Fever] : no fever [Recent Change In Weight] : ~T no recent weight change [Eye Pain] : no eye pain [Vision Problems] : no vision problems [Dysphagia] : no dysphagia [Nosebleeds] : no nosebleeds [Chest Pain] : no chest pain [Lower Ext Edema] : no lower extremity edema [Shortness Of Breath] : no shortness of breath [Constipation] : no constipation [Diarrhea] : no diarrhea [Dysuria] : no dysuria [Depression] : no depression [Easy Bleeding] : no tendency for easy bleeding [Easy Bruising] : no tendency for easy bruising [FreeTextEntry6] : intermittently  [FreeTextEntry7] : improved nausea [FreeTextEntry9] : muscle spasms due to parkinsons, left hip pain  [de-identified] : tremor [de-identified] : brain fog

## 2022-03-01 NOTE — ASSESSMENT
[FreeTextEntry1] : 72 yo female \par \par # Stage 1 breast cancer 8 mm tumor, invasive ductal carcinoma, ER/ MT positive, HER2 not amplified with Oncotype Dx 11. diagnosed November 2018\par -continue with Anastrazole\par - left breast pain - c/w fat necrosis on mammogram up to date\par \par #Adenocarcinoma of lung- stage 4 diagnosed  April 2019\par -Repeated CEA -plateau at 11.5 (started out at 26.6)\par -CT scan showed mass 1.8 x 1.4 x 1.7 cm lesion.\par -Biopsy of lung nodule c/w adeno ca of the lung, EGFR mutated, T190 mutated, started on Tagrisso.  Side effects, -toxicities and benefits reviewed with pt. L4 vertebral body biopsy confirmed met adenoca of the lung.\par - PETCT  2/3/2020- decreased FDG uptake in primary lesion, sclerotic lesions in bones, thyroid nodule activity- under surveillance, duodenal uptake \par - plan for PETCT Feb 2021- SARAH- stable, uptake in right breast - c/w fat necrosis \par - repeat CEA\par -Jan 2022- PETCT - sclerotic bone lesions.\par - pain in the left femur 2-3/10 takes viocodin daily x . \par - lower back coccyx pain increased by pressure- rx diclofenac er 75 mg Po BID \par - visit to ER for dyspnea - images reviewed, evaluated for PE, related to Parkinson disease.  Lung mass 1.1 cm \par \par #benign thyroid nodule - followed by Dr. Park. plan for biopsy\par # constipation- daily miralax\par \par Plan:\par - continue Tagrisso 11 pm, nausea in am \par - Nausea is controlled with  Sancuso and Dronabinol 2.5mg bid for nausea- refill today I-stop checked\par - continue Xgeva monthly \par -neoplasm pain - continue with Vicodin prn rarely taking \par -mammo Dec 2020- stable- no evidence of disease, 1 year follow up \par -has follow up with Dr. Park for thyroid US- stable nodule \par -established care with Dr. Hendrix for her Parkinsons- stopped horizant \par \par 5/20/21\par lung cancer stage 4 on tagrisso \par Pruritic raised pustules on the neck, chest - total 5- 6 on the body, rx triamcinolone \par Left iliac crest pain in am, - obtain mRI for metastatic disease\par \par 6/21//2021\par Patient with back discomfort, to start RT to lower back\par PETCT in July 2021\par Xgeva today \par \par 7/19/2021\par PETCT 7/2021- stable\par Increased fatigue from RT, but recovering well\par Xgeva today \par Incomplete colonoscopy \par MId- thoracic pain - schedule MRI \par \par 8/17/2021\par MRI thoracic spine - bone mets, stable\par Patient needs crown placement \par Leukopenia - stable\par COvid boost vaccine\par Paint - intermittent, right scapula \par \par 9/14/2021\par pt feeling well- stable\par xgeva today \par remains on tagrisso- tolerates well \par having increased afternoon fatigue- PCP offered afternoon zoloft- however pt wants to think about it \par had crown replaced- nothing invasive\par stable leukopenia- no infections or fevers\par \par 10/12/2021\par Fatigue at 3pm\par Xgeva - continue\par Tagrisso \par Started Zoloft \par Weight loss\par Increase dronabinol to 2.5 PO TID \par PETCT July 2021\par \par 11/9/2021\par continues with fatigue at 3pm\par this is week 6 of zoloft some improvement \par remains on Tagrisso /xgeva- no dental issues \par weight gain\par anemia of chronic disease - will check irons today  \par \par 2/1/2022\par Adenocarcinoma of lung- stage 4/ breast cancer\par Tagrisso/Xgeva\par started on Ritalin in mornings for afternoon fatigue\par pain stable- switched from Vicodin to tramadol\par PET CT scan-  small area upper left breast, unchanged left upper lobe pulmonary nodules, left lower pole thyroid nodule.  MRI of the femur- distal femoral shaft lesion increased in size. To start RT.  Decreased pain with tramadol. \par Dexa - -1.1 - patient on Xgeva.\par Patient due for dental cleaning. \par Still significant fatigue at 3 pm, under care of palliative care NP Noman. \par Nausea - on and off. \par CBC,Chem,CEA, CA 27.29  case and mgmt  to return in 1 month for treatment

## 2022-03-01 NOTE — HISTORY OF PRESENT ILLNESS
[Therapy: ___] : Therapy: [unfilled] [de-identified] : Mrs. Mcdowell is a 68 year old postmenopausal female who is referred by Dr.Alice Demarco for newly diagnosed breast cancer.\par She was found on routine mammography in November 2019 she you have a focal asymmetric density in the left upper outer breast at 1:00 access 8 cm from the nipple. A one year prior had been negative.  Ultrasound-guided core biopsy in November 21, 2018 revealed a grade 1/3 invasive ductal carcinoma ER positive at 98% and [place] positive at 90%,Ki-67 was less than 10%. She underwent some left breast lumpectomy and sentinel node dissection on December 19, 2018. Pathology revealed an 8 mm grade 1/3 invasive ductal carcinoma zero of 3 sentinel lymph nodes were involved with tumor. Oncotype DX score was 11. She also underwent genetic testing which is negative.\par  [de-identified] : Mrs. 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 Patient still feeling fatigue around the afternoon\par Patient recently went to ER due to shortness of breath. She was worked up for a pulmonary embolism which was negative. \par , her neurologist, has prescribed other medications for the patient which she describes as "rescue" medications whenever she has a flare up Parkinsonian medication. Her neurologist also change her carbidopa/levodopa regime.

## 2022-03-02 ENCOUNTER — TRANSCRIPTION ENCOUNTER (OUTPATIENT)
Age: 72
End: 2022-03-02

## 2022-03-02 ENCOUNTER — NON-APPOINTMENT (OUTPATIENT)
Age: 72
End: 2022-03-02

## 2022-03-02 ENCOUNTER — APPOINTMENT (OUTPATIENT)
Dept: NEUROLOGY | Facility: CLINIC | Age: 72
End: 2022-03-02
Payer: MEDICARE

## 2022-03-02 PROCEDURE — 99441: CPT

## 2022-03-03 ENCOUNTER — NON-APPOINTMENT (OUTPATIENT)
Age: 72
End: 2022-03-03

## 2022-03-06 ENCOUNTER — RESULT REVIEW (OUTPATIENT)
Age: 72
End: 2022-03-06

## 2022-03-07 ENCOUNTER — NON-APPOINTMENT (OUTPATIENT)
Age: 72
End: 2022-03-07

## 2022-03-07 ENCOUNTER — APPOINTMENT (OUTPATIENT)
Dept: NEUROLOGY | Facility: CLINIC | Age: 72
End: 2022-03-07
Payer: MEDICARE

## 2022-03-07 PROCEDURE — 99441: CPT

## 2022-03-10 ENCOUNTER — TRANSCRIPTION ENCOUNTER (OUTPATIENT)
Age: 72
End: 2022-03-10

## 2022-03-10 RX ORDER — CLONAZEPAM 0.5 MG/1
0.5 TABLET ORAL
Qty: 30 | Refills: 0 | Status: DISCONTINUED | COMMUNITY
Start: 2022-03-07 | End: 2022-03-10

## 2022-03-16 ENCOUNTER — APPOINTMENT (OUTPATIENT)
Dept: RADIATION ONCOLOGY | Facility: CLINIC | Age: 72
End: 2022-03-16
Payer: MEDICARE

## 2022-03-16 VITALS
RESPIRATION RATE: 18 BRPM | HEART RATE: 99 BPM | SYSTOLIC BLOOD PRESSURE: 147 MMHG | DIASTOLIC BLOOD PRESSURE: 78 MMHG | OXYGEN SATURATION: 100 %

## 2022-03-16 PROCEDURE — 99024 POSTOP FOLLOW-UP VISIT: CPT

## 2022-03-16 NOTE — DISEASE MANAGEMENT
[Clinical] : TNM Stage: c [I] : I [TTNM] : 1b [NTNM] : 0 [MTNM] : 0 [de-identified] : 20 Gy [de-identified] : 20 Gy [de-identified] : bone pelvis and left femur

## 2022-03-16 NOTE — VITALS
[Maximal Pain Intensity: 6/10] : 6/10 [Least Pain Intensity: 2/10] : 2/10 [Pain Location: ___] : Pain Location: [unfilled] [Opioid] : opioid [100: Normal, no complaints, no evidence of disease.] : 100: Normal, no complaints, no evidence of disease.

## 2022-03-16 NOTE — PHYSICAL EXAM
[Sclera] : the sclera and conjunctiva were normal [] : no respiratory distress [Normal] : oriented to person, place and time, the affect was normal, the mood was normal and not anxious [de-identified] : Full ROM of left hip [de-identified] : Gait instability due to PD, but no limp

## 2022-03-16 NOTE — HISTORY OF PRESENT ILLNESS
[FreeTextEntry1] : Mrs. Mcdowell is a 71 year old postmenopausal female of Ashkenazi descent, who was diagnosed with left breast invasive ductal carcinoma s/p lumpectomy and IORT in 2018. Subsequently, also later diagnosed with Stage IV lung adenocarcinoma and received palliative radiation therapy to the lumbar spine. \par \par Mrs. Mcdowell underwent left breast lumpectomy and SLN excision by Dr. Demarco and IORT at [facility] in 2018. Surgical pathology revealed a 0.8 cm IDC, grade 1, No DCIS or LCIS, no LVI, margins negative (closest 12 mm anterior). 0/3 were positive for metastatic carcinoma. Pathological stage pT1b N0 (sn) (i-). Biomarkers were ER + 98% / DC + 90% / and Her2 negative, Ki-67 <10%. She was started on Anastrozole. Follow-up mammograms on 5/2019 and 11/2019 were negative. \par \par In April 2019, she was noted to have elevated tumor markers (CEA 26.6) and vocal hoarseness for which she was under the care of Dr. Rodriguez. He sent her for further evaluation with CT NECK (4/5/19) that demonstrated a 1.8 spiculated left upper lobe lung nodule suspicious for neoplasm and left posterior 4th rib sclerotic focus. PET/CT (4/13/19) demonstrated possible recurrent left breast cancer, left upper and medial lung spiculated nodule and opacity suspicious for metastatic disease, left perihilar LNs, osseous lesions, right hepatic lobe minimally FDG avid, focal esophageal hypermetabolism, including base of tongue, and asymmetric right vocal cord - paralysis. \par \par Subsequently, she underwent biopsy (4/16/19) and pathology was consistent with acinar adenocarcinoma of the lung. Bone biopsy of L4 and left iliac were positive for metastatic adenocarcinoma, consistent with lung primary. She was started on Tagrisso in May 2019. NM PET/CT (2/2021) demonstrated no change in the pulmonary nodule / thyroid nodule. Distal left femur sclerotic metastasis not imaged on prior PET/CT (interval cryoablation by Dr. Carvajal). She reports she was having pain in the left femur, which resolved status post cryoablation. More recently she reports left iliac / sacral pain, rating it a 6-7 /10 on the pain scale. Further evaluation with MRI Lumbar spine (6/4/21) demonstrated multiple lesions in the lumbar sacral spine. prominent L4 lesions, and multiple areas of soft tissue enhancement and suspicious epidural involvement. \par \par She received palliative radiation to the lumbar spine to a total of 20 Gy in 5 fractions, which she completed on 6/30/21. She continued on Xgeva and Tagrisso.  \par \par MRI Femur (12/22/22) demonstrated bony metastatic disease, with progression in size of several left femur and pelvic lesions. Enlargement of the distal femoral shaft lesion measured 1.5 cm and left posterior acetabular and ischial lesion. She rated\par the pain at 6 / 10 on the pain scale from the left hip down the thigh. She is under the care of Dr. Irwin for palliative care. \par \par Ms. Mcdowell received radiation therapy to the pelvis and left femur to a total of 20 Gy, completed on 2/5/22. She reports stable pain, no worsening in severity. She currently rates her pain a 2 -3 / 10 on the pain scale and is not taking any pain medications at this time. She continues on Tagrisso. She reports loss of appetite and weight loss of 8 - 10 pounds. She reports difficulty with Parkinson medications at this time. She is present today for her PTE.

## 2022-03-16 NOTE — REASON FOR VISIT
[Bone Metastasis] : bone metastasis [Lung Cancer] : lung cancer [Routine Follow-Up] : routine follow-up visit for

## 2022-03-16 NOTE — REVIEW OF SYSTEMS
[Joint Pain] : joint pain [Negative] : Heme/Lymph [Fatigue] : fatigue [Difficulty Walking] : difficulty walking [FreeTextEntry9] : hip to thigh pain - left [de-identified] : Parkinson's flare

## 2022-03-29 ENCOUNTER — NON-APPOINTMENT (OUTPATIENT)
Age: 72
End: 2022-03-29

## 2022-03-29 ENCOUNTER — RESULT REVIEW (OUTPATIENT)
Age: 72
End: 2022-03-29

## 2022-03-29 ENCOUNTER — APPOINTMENT (OUTPATIENT)
Dept: HEMATOLOGY ONCOLOGY | Facility: CLINIC | Age: 72
End: 2022-03-29
Payer: MEDICARE

## 2022-03-29 VITALS
BODY MASS INDEX: 29.13 KG/M2 | OXYGEN SATURATION: 98 % | RESPIRATION RATE: 18 BRPM | DIASTOLIC BLOOD PRESSURE: 72 MMHG | HEART RATE: 98 BPM | WEIGHT: 148.38 LBS | TEMPERATURE: 97.4 F | HEIGHT: 60 IN | SYSTOLIC BLOOD PRESSURE: 108 MMHG

## 2022-03-29 PROCEDURE — 36415 COLL VENOUS BLD VENIPUNCTURE: CPT

## 2022-03-29 PROCEDURE — 99214 OFFICE O/P EST MOD 30 MIN: CPT | Mod: 25

## 2022-03-29 NOTE — HISTORY OF PRESENT ILLNESS
[Therapy: ___] : Therapy: [unfilled] [de-identified] : Mrs. Mcdowell is a 68 year old postmenopausal female who is referred by Dr.Alice Demarco for newly diagnosed breast cancer.\par She was found on routine mammography in November 2019 she you have a focal asymmetric density in the left upper outer breast at 1:00 access 8 cm from the nipple. A one year prior had been negative.  Ultrasound-guided core biopsy in November 21, 2018 revealed a grade 1/3 invasive ductal carcinoma ER positive at 98% and PA positive at 90%,Ki-67 was less than 10%. She underwent some left breast lumpectomy and sentinel node dissection on December 19, 2018. Pathology revealed an 8 mm grade 1/3 invasive ductal carcinoma zero of 3 sentinel lymph nodes were involved with tumor. Oncotype DX score was 11. She also underwent genetic testing which is negative.\par  [de-identified] : Mrs. 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 Patient states that she is dizzy and having loose stools several times a day starting a few weeks ago. She is upset because her insurance is no longer covering her pramipexole. She states that she is still currently taking the medication but she is concerned about what will happen when she runs out. She states that she has been crying daily since beginning

## 2022-03-29 NOTE — ASSESSMENT
[FreeTextEntry1] : 70 yo female \par \par # Stage 1 breast cancer 8 mm tumor, invasive ductal carcinoma, ER/ MA positive, HER2 not amplified with Oncotype Dx 11. diagnosed November 2018\par -continue with Anastrazole\par - left breast pain - c/w fat necrosis on mammogram up to date 12/21\par \par #Adenocarcinoma of lung- stage 4 diagnosed  April 2019\par -Repeated CEA -plateau at 11.5 (started out at 26.6)\par -CT scan showed mass 1.8 x 1.4 x 1.7 cm lesion.\par -Biopsy of lung nodule c/w adeno ca of the lung, EGFR mutated, T190 mutated, started on Tagrisso.  Side effects, -toxicities and benefits reviewed with pt. L4 vertebral body biopsy confirmed met adenoca of the lung.\par - PETCT  2/3/2020- decreased FDG uptake in primary lesion, sclerotic lesions in bones, thyroid nodule activity- under surveillance, duodenal uptake \par - plan for PETCT Feb 2021- SARAH- stable, uptake in right breast - c/w fat necrosis \par - repeat CEA\par -Jan 2022- PETCT - sclerotic bone lesions.\par - pain in the left femur 2-3/10 takes viocodin daily, s/p RT \par -decreased lower back pain \par - visit to ER for dyspnea - images reviewed, evaluated for PE, related to Parkinson disease.  Lung mass 1.1 cm on CT\par - patient with weight loss, struggles with Parkinson disease\par \par #benign thyroid nodule - followed by Dr. Park. plan for biopsy\par # constipation- daily miralax\par \par - continue Tagrisso 11 pm, nausea in am \par - Nausea is controlled with  Sancuso and Dronabinol 2.5mg bid for nausea- refill today I-stop checked\par - continue Xgeva monthly \par -neoplasm pain - continue with Vicodin prn rarely taking \par -established care with Dr. Hendrix for her Parkinsons\par lung cancer stage 4 on tagrisso \par Tagrisso/Xgeva- extend Xgeva to q 6 weeks \par \par Still significant fatigue at 3 pm, under care of palliative care NP Noman. \par Nausea - on and off. \par CBC,Chem,CEA, CA 27.29  case and mgmt  to return in 1 month for treatment

## 2022-03-29 NOTE — PHYSICAL EXAM
[Restricted in physically strenuous activity but ambulatory and able to carry out work of a light or sedentary nature] : Status 1- Restricted in physically strenuous activity but ambulatory and able to carry out work of a light or sedentary nature, e.g., light house work, office work [Normal] : affect appropriate [de-identified] : left breast scar

## 2022-03-29 NOTE — REVIEW OF SYSTEMS
[Fatigue] : fatigue [SOB on Exertion] : shortness of breath during exertion [Joint Pain] : joint pain [Muscle Pain] : muscle pain [Anxiety] : anxiety [Negative] : Allergic/Immunologic [Recent Change In Weight] : ~T recent weight change [Diarrhea] : diarrhea [Muscle Weakness] : muscle weakness [Dizziness] : dizziness [Difficulty Walking] : difficulty walking [Depression] : depression [Fever] : no fever [Eye Pain] : no eye pain [Vision Problems] : no vision problems [Dysphagia] : no dysphagia [Nosebleeds] : no nosebleeds [Chest Pain] : no chest pain [Lower Ext Edema] : no lower extremity edema [Shortness Of Breath] : no shortness of breath [Constipation] : no constipation [Dysuria] : no dysuria [Easy Bleeding] : no tendency for easy bleeding [Easy Bruising] : no tendency for easy bruising [FreeTextEntry6] : intermittently  [FreeTextEntry7] : loose stool several x daily [FreeTextEntry9] : muscle spasms due to parkinsons, left hip pain  [de-identified] : tremor [de-identified] : brain fog

## 2022-04-01 ENCOUNTER — NON-APPOINTMENT (OUTPATIENT)
Age: 72
End: 2022-04-01

## 2022-04-05 ENCOUNTER — RX RENEWAL (OUTPATIENT)
Age: 72
End: 2022-04-05

## 2022-04-11 ENCOUNTER — NON-APPOINTMENT (OUTPATIENT)
Age: 72
End: 2022-04-11

## 2022-04-13 ENCOUNTER — TRANSCRIPTION ENCOUNTER (OUTPATIENT)
Age: 72
End: 2022-04-13

## 2022-04-13 ENCOUNTER — APPOINTMENT (OUTPATIENT)
Dept: GASTROENTEROLOGY | Facility: HOSPITAL | Age: 72
End: 2022-04-13

## 2022-04-14 ENCOUNTER — RESULT REVIEW (OUTPATIENT)
Age: 72
End: 2022-04-14

## 2022-04-14 ENCOUNTER — APPOINTMENT (OUTPATIENT)
Dept: HEMATOLOGY ONCOLOGY | Facility: CLINIC | Age: 72
End: 2022-04-14

## 2022-04-14 ENCOUNTER — TRANSCRIPTION ENCOUNTER (OUTPATIENT)
Age: 72
End: 2022-04-14

## 2022-04-14 ENCOUNTER — NON-APPOINTMENT (OUTPATIENT)
Age: 72
End: 2022-04-14

## 2022-04-14 VITALS
DIASTOLIC BLOOD PRESSURE: 68 MMHG | HEIGHT: 60 IN | WEIGHT: 146 LBS | OXYGEN SATURATION: 97 % | HEART RATE: 88 BPM | TEMPERATURE: 98.1 F | SYSTOLIC BLOOD PRESSURE: 110 MMHG | RESPIRATION RATE: 16 BRPM | BODY MASS INDEX: 28.66 KG/M2

## 2022-04-20 ENCOUNTER — TRANSCRIPTION ENCOUNTER (OUTPATIENT)
Age: 72
End: 2022-04-20

## 2022-04-20 ENCOUNTER — RX RENEWAL (OUTPATIENT)
Age: 72
End: 2022-04-20

## 2022-04-26 ENCOUNTER — NON-APPOINTMENT (OUTPATIENT)
Age: 72
End: 2022-04-26

## 2022-04-28 ENCOUNTER — APPOINTMENT (OUTPATIENT)
Dept: GASTROENTEROLOGY | Facility: CLINIC | Age: 72
End: 2022-04-28

## 2022-04-28 ENCOUNTER — APPOINTMENT (OUTPATIENT)
Dept: GASTROENTEROLOGY | Facility: CLINIC | Age: 72
End: 2022-04-28
Payer: MEDICARE

## 2022-04-28 VITALS
DIASTOLIC BLOOD PRESSURE: 70 MMHG | HEART RATE: 96 BPM | WEIGHT: 147 LBS | TEMPERATURE: 96.5 F | BODY MASS INDEX: 28.86 KG/M2 | OXYGEN SATURATION: 98 % | HEIGHT: 60 IN | SYSTOLIC BLOOD PRESSURE: 118 MMHG

## 2022-04-28 PROCEDURE — 99213 OFFICE O/P EST LOW 20 MIN: CPT

## 2022-04-28 PROCEDURE — 99203 OFFICE O/P NEW LOW 30 MIN: CPT

## 2022-04-28 RX ORDER — OPICAPONE 25 MG/1
25 CAPSULE ORAL
Qty: 30 | Refills: 0 | Status: DISCONTINUED | COMMUNITY
Start: 2022-02-28 | End: 2022-04-28

## 2022-04-28 RX ORDER — FERROUS GLUCONATE 324(37.5)
TABLET ORAL
Refills: 0 | Status: DISCONTINUED | COMMUNITY
End: 2022-04-28

## 2022-04-28 RX ORDER — PRAMIPEXOLE DIHYDROCHLORIDE 0.12 MG/1
0.12 TABLET ORAL
Qty: 90 | Refills: 3 | Status: DISCONTINUED | COMMUNITY
Start: 2022-02-08 | End: 2022-04-28

## 2022-04-28 RX ORDER — ESCITALOPRAM OXALATE 5 MG/1
5 TABLET ORAL DAILY
Qty: 30 | Refills: 3 | Status: DISCONTINUED | COMMUNITY
Start: 2022-02-24 | End: 2022-04-28

## 2022-04-28 RX ORDER — OPICAPONE 25 MG/1
25 CAPSULE ORAL
Refills: 0 | Status: DISCONTINUED | COMMUNITY
End: 2022-04-28

## 2022-04-28 RX ORDER — DICLOFENAC SODIUM 75 MG/1
75 TABLET, DELAYED RELEASE ORAL
Qty: 60 | Refills: 2 | Status: DISCONTINUED | COMMUNITY
Start: 2022-03-01 | End: 2022-04-28

## 2022-04-28 RX ORDER — TRIAMCINOLONE ACETONIDE 5 MG/G
0.5 CREAM TOPICAL 3 TIMES DAILY
Qty: 1 | Refills: 1 | Status: DISCONTINUED | COMMUNITY
Start: 2021-05-20 | End: 2022-04-28

## 2022-04-28 NOTE — ASSESSMENT
[FreeTextEntry1] : H/o Gi bleed- likely hemorrhoid. diverticular, now resolved. \par colonoscopy 04/2022 with Dr. Munguia- diverticulosis, hemorrhoids\par \par Constipation\par -MiraLAX , colace, fiber, titrated to 1 bm every 1-2 days. If no bm x 3 days, dulcolax. \par -will defer Linzess due to patient's sensitivity to medications. \par \par GERD- controlled on Pepcid. continue. \par EGD path benign in 07/2019\par \par Nausea- due to chemo, controlled on current regimen\par

## 2022-04-28 NOTE — PHYSICAL EXAM
[General Appearance - Alert] : alert [General Appearance - In No Acute Distress] : in no acute distress [Sclera] : the sclera and conjunctiva were normal [Outer Ear] : the ears and nose were normal in appearance [Hearing Threshold Finger Rub Not Dare] : hearing was normal [Neck Appearance] : the appearance of the neck was normal [] : no respiratory distress [Apical Impulse] : the apical impulse was normal [Abdomen Soft] : soft [Abdomen Tenderness] : non-tender [Abnormal Walk] : normal gait [Skin Color & Pigmentation] : normal skin color and pigmentation [No Focal Deficits] : no focal deficits [Oriented To Time, Place, And Person] : oriented to person, place, and time [FreeTextEntry1] : deferred

## 2022-04-28 NOTE — HISTORY OF PRESENT ILLNESS
[FreeTextEntry1] : 71 year old F, stage 4 lung ca on chemotherapy, stage 1 breast ca  s/p left lumpectomy and XRT, now on anastrazole, Parkinson disease, GERD \par presents for follow up of for GIB\par \par She is joined today by her . \par monday 4/11, developed small amount of BRBPR a/w constipation. she was seen in the ED. admitted to hospital, colonoscopy with Dr. Munguia on 4/13, diverticulosis, hemorrhoids. H/H and vital signs remained stable. \par She was discharged. had f/u H/H with Dr. Shaffer and was stable. no further bleeding. \par \par She continues to suffer from constipation , 1 bm every 3 days. \par MiraLAX once a day, prunes, digestive advantage 1 x per day\par PD drugs have been in creased recently.  \par \par things are not tasting that great, 45 lbs weight loss over 3 years. 10-15 lbs in the last 2 months. \par nausea not bad. on granisetron patch. GERD controlled\par recently stopped dronabinol. and dizziness improved. \par \par She is very sensitive to medications and if she increases MiraLAX she can have  diarrhea. \par variable success with suppositories, enemas. \par if she increases MiraLAX. she gets diarrhea \par \par she has a rectocele\par \par EGD 7/22/19- for elevated CEA, abnormal PET, \par showed LA A esophagitis, gastritis, HH, \par GEJ bx- GIM, normal duodenum, stomach bx\par \par \par  egd/colonoscopy 2/1/2018 with Dr. Moore- 8 cm HH, mild reflux changes on bx\par ?poor right sided prep\par redundant colon \par no h/o colon polyps\par told to repeat in 5 years due to family hx crc (father rectal ca, dx 66)\par

## 2022-05-03 ENCOUNTER — RX RENEWAL (OUTPATIENT)
Age: 72
End: 2022-05-03

## 2022-05-04 ENCOUNTER — TRANSCRIPTION ENCOUNTER (OUTPATIENT)
Age: 72
End: 2022-05-04

## 2022-05-09 ENCOUNTER — TRANSCRIPTION ENCOUNTER (OUTPATIENT)
Age: 72
End: 2022-05-09

## 2022-05-10 ENCOUNTER — APPOINTMENT (OUTPATIENT)
Dept: HEMATOLOGY ONCOLOGY | Facility: CLINIC | Age: 72
End: 2022-05-10
Payer: MEDICARE

## 2022-05-10 ENCOUNTER — RESULT REVIEW (OUTPATIENT)
Age: 72
End: 2022-05-10

## 2022-05-10 VITALS
SYSTOLIC BLOOD PRESSURE: 117 MMHG | BODY MASS INDEX: 28.88 KG/M2 | TEMPERATURE: 97 F | HEART RATE: 96 BPM | HEIGHT: 60 IN | RESPIRATION RATE: 16 BRPM | WEIGHT: 147.1 LBS | DIASTOLIC BLOOD PRESSURE: 67 MMHG | OXYGEN SATURATION: 98 %

## 2022-05-10 PROCEDURE — 99213 OFFICE O/P EST LOW 20 MIN: CPT | Mod: 25

## 2022-05-10 PROCEDURE — 36415 COLL VENOUS BLD VENIPUNCTURE: CPT

## 2022-05-10 NOTE — PHYSICAL EXAM
[Restricted in physically strenuous activity but ambulatory and able to carry out work of a light or sedentary nature] : Status 1- Restricted in physically strenuous activity but ambulatory and able to carry out work of a light or sedentary nature, e.g., light house work, office work [Normal] : affect appropriate [de-identified] : left breast scar

## 2022-05-10 NOTE — ASSESSMENT
[FreeTextEntry1] : 70 yo female \par \par # Stage 1 breast cancer 8 mm tumor, invasive ductal carcinoma, ER/ ND positive, HER2 not amplified with Oncotype Dx 11. diagnosed November 2018\par -continue with Anastrazole\par - left breast pain - c/w fat necrosis on mammogram up to date 12/21\par \par #Adenocarcinoma of lung- stage 4 diagnosed  April 2019\par -Repeated CEA -plateau at 11.5 (started out at 26.6)\par -CT scan showed mass 1.8 x 1.4 x 1.7 cm lesion.\par -Biopsy of lung nodule c/w adeno ca of the lung, EGFR mutated, T190 mutated, started on Tagrisso.  Side effects, -toxicities and benefits reviewed with pt. L4 vertebral body biopsy confirmed met adenoca of the lung.\par - PETCT  2/3/2020- decreased FDG uptake in primary lesion, sclerotic lesions in bones, thyroid nodule activity- under surveillance, duodenal uptake \par - plan for PETCT Feb 2021- SARAH- stable, uptake in right breast - c/w fat necrosis \par - repeat CEA\par -Jan 2022- PETCT - sclerotic bone lesions.\par - pain in the left femur 2-3/10 takes viocodin daily, s/p RT \par -decreased lower back pain \par - visit to ER for dyspnea - images reviewed, evaluated for PE, related to Parkinson disease.  Lung mass 1.1 cm on CT\par - patient with weight loss, struggles with Parkinson disease\par \par #benign thyroid nodule - followed by Dr. Park. plan for biopsy\par # constipation- daily miralax\par \par - continue Tagrisso 11 pm, nausea in am \par - Nausea is controlled with  Sancuso and Dronabinol 2.5mg bid for nausea- refill today I-stop checked\par - continue Xgeva monthly -neoplasm pain - continue with Vicodin prn rarely taking \par -established care with Dr. Hendrix for her Parkinsons\par lung cancer stage 4 on tagrisso \par Tagrisso/Xgeva- extend Xgeva to q 6 weeks \par -under the care of derm for "arthropod attack" prescribed Fluocinonide and Mupirocin ointment \par #check quadrant today\par \par Still significant fatigue at 3 pm, under care of palliative care NP Noman. \par Nausea - on and off. \par CBC,Chem,CEA, CA 27.29  case and mgmt  to return in 1 month for treatment  \par \par Labs ordered, drawn in office - reviewed case and mgmt discussed with Dr. Shaffer

## 2022-05-10 NOTE — HISTORY OF PRESENT ILLNESS
[Therapy: ___] : Therapy: [unfilled] [de-identified] : Mrs. Mcdowell is a 68 year old postmenopausal female who is referred by Dr.Alice Demarco for newly diagnosed breast cancer.\par She was found on routine mammography in November 2019 she you have a focal asymmetric density in the left upper outer breast at 1:00 access 8 cm from the nipple. A one year prior had been negative.  Ultrasound-guided core biopsy in November 21, 2018 revealed a grade 1/3 invasive ductal carcinoma ER positive at 98% and NE positive at 90%,Ki-67 was less than 10%. She underwent some left breast lumpectomy and sentinel node dissection on December 19, 2018. Pathology revealed an 8 mm grade 1/3 invasive ductal carcinoma zero of 3 sentinel lymph nodes were involved with tumor. Oncotype DX score was 11. She also underwent genetic testing which is negative.\par  [de-identified] : Mrs. 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

## 2022-05-10 NOTE — REVIEW OF SYSTEMS
[Fatigue] : fatigue [Recent Change In Weight] : ~T recent weight change [SOB on Exertion] : shortness of breath during exertion [Diarrhea] : diarrhea [Joint Pain] : joint pain [Muscle Pain] : muscle pain [Dizziness] : dizziness [Difficulty Walking] : difficulty walking [Anxiety] : anxiety [Depression] : depression [Muscle Weakness] : muscle weakness [Negative] : Allergic/Immunologic [Fever] : no fever [Eye Pain] : no eye pain [Vision Problems] : no vision problems [Dysphagia] : no dysphagia [Nosebleeds] : no nosebleeds [Chest Pain] : no chest pain [Lower Ext Edema] : no lower extremity edema [Shortness Of Breath] : no shortness of breath [Constipation] : no constipation [Dysuria] : no dysuria [Easy Bleeding] : no tendency for easy bleeding [Easy Bruising] : no tendency for easy bruising [FreeTextEntry6] : intermittently  [FreeTextEntry7] : loose stool several x daily [FreeTextEntry9] : muscle spasms due to parkinsons, left hip pain  [de-identified] : tremor [de-identified] : brain fog

## 2022-05-11 ENCOUNTER — APPOINTMENT (OUTPATIENT)
Dept: GERIATRICS | Facility: CLINIC | Age: 72
End: 2022-05-11
Payer: MEDICARE

## 2022-05-11 VITALS
DIASTOLIC BLOOD PRESSURE: 60 MMHG | WEIGHT: 147 LBS | HEART RATE: 104 BPM | SYSTOLIC BLOOD PRESSURE: 112 MMHG | OXYGEN SATURATION: 100 % | BODY MASS INDEX: 28.71 KG/M2

## 2022-05-11 PROCEDURE — 99215 OFFICE O/P EST HI 40 MIN: CPT

## 2022-05-11 NOTE — HISTORY OF PRESENT ILLNESS
[FreeTextEntry2] : [facility] inpt 4/13-4/14/22\par \par 71-year-old female with past medical history of left breast\par cancer and left lumpectomy, GERD, anemia, stage IV lung cancer, Parkinson's disease, rectocele,\par and constipation. The patient presented to the hospital with rectal bleeding. The patient had\par drop in the blood on the bathroom floor on Sunday and later noted maroon colored stools on Sunday.\par The patient was evaluated by GI, Dr. Munguia. The patient was advised to come to the hospital for\par further evaluation. The patient was found to have dark stools, abdominal cramping likely\par secondary to acute lower GI bleed. The patient was found to have guaiac positive stool. The\par patient had H and H of 12.3/37. The patient was closely monitored hematologically and had\par undergone a colonoscopy prep. The aspirin was placed on hold. The patient was found to have\par likely a diverticular bleed. The patient underwent colonoscopy and tolerated the procedure well. \par She was given IV fluids, electrolyte support, monitoring. The patient was found to have extensive\par diverticulosis in the whole colon and internal hemorrhoids. The patient was educated on need not\par to strain or push to go the bathroom, no exertion, also to avoid constipation. As the patient\par agrees to follow medical instructions, patient does not wish for further monitoring in the\[facility] hospital, though it is recommended. The patient wishes to follow up outpatient. The patient\par claimed that she will be compliant with CBC check and followup. If any symptoms occur or worsen,\par she will come back to the hospital immediately. The patient will stay with the full liquid diet\par for few days and continue to monitor her stool at home. Risks, benefits, and side effects were\par reviewed. The patient continues to remain fair. The patient is being discharged.\par \par The patient is recommended to stay in the hospital for further evaluation\par for close hematological monitoring, however, does not wish to stay. The patient agrees to follow\par up with Dr. Munguia and Dr. Shaffer within a few days for a CBC check to ensure safety. \par \par Update\par no subsequent GI bleeding\par was having more off symptoms and so sinemet dose was increased and frequency increased to Q3 hours\par saw dermatologist - diagnosed with some type of arthopod assault and bites on back on neck were treated with steroid and mupirocin treatment - responded well

## 2022-05-11 NOTE — REVIEW OF SYSTEMS
[Fever] : no fever [Chills] : no chills [Feeling Poorly] : not feeling poorly [Feeling Tired] : feeling tired [Eyesight Problems] : no eyesight problems [Discharge From Eyes] : no purulent discharge from the eyes [Nosebleeds] : no nosebleeds [Nasal Discharge] : no nasal discharge [Chest Pain] : no chest pain [Palpitations] : no palpitations [Cough] : no cough [SOB on Exertion] : no shortness of breath during exertion [Abdominal Pain] : no abdominal pain [Vomiting] : no vomiting [Vaginal Discharge] : no vaginal discharge [Arthralgias] : arthralgias [Abn Vaginal Bleeding] : no unexplained vaginal bleeding [Joint Swelling] : joint swelling [Skin Lesions] : no skin lesions [Skin Wound] : no skin wound [Confused] : no confusion [Convulsions] : no convulsions [Dizziness] : dizziness [Fainting] : no fainting [Change In Personality] : no personality change [Emotional Problems] : no emotional problems

## 2022-05-11 NOTE — ASSESSMENT
[FreeTextEntry1] : afternoon doldrums were actually related to off symptoms\par improvement since sinemet increased and frequency duration now 3 hours\par but more dyskinesias\par no signs of GI bleed\par bites on neck responded to mupirocin and steroid ointment \par she has a metastatic cancer dx,  recovering from bladder cancer\par Her 72th bday and milestone wedding anniversary this year\par now to discuss with neurology if low dose benzo perhaps even 0.25mg of ativan could be used for off symptoms  - severe anxiety\par \par \par

## 2022-05-11 NOTE — HEALTH RISK ASSESSMENT
[No falls in past year] : Patient reported no falls in the past year [0] : 2) Feeling down, depressed, or hopeless: Not at all (0) [] :  [Fully functional (bathing, dressing, toileting, transferring, walking, feeding)] : Fully functional (bathing, dressing, toileting, transferring, walking, feeding) [Fully functional (using the telephone, shopping, preparing meals, housekeeping, doing laundry, using] : Fully functional and needs no help or supervision to perform IADLs (using the telephone, shopping, preparing meals, housekeeping, doing laundry, using transportation, managing medications and managing finances) [Smoke Detector] : smoke detector [Carbon Monoxide Detector] : carbon monoxide detector [[id number]] : 0

## 2022-05-11 NOTE — PHYSICAL EXAM
[Alert] : alert [Sclera] : the sclera and conjunctiva were normal [EOMI] : extraocular movements were intact [PERRL] : pupils were equal in size, round, and reactive to light [Normal Oral Mucosa] : normal oral mucosa [No Oral Pallor] : no oral pallor [Normal Appearance] : the appearance of the neck was normal [No Neck Mass] : no neck mass was observed [Supple] : the neck was supple [No Respiratory Distress] : no respiratory distress [No Acc Muscle Use] : no accessory muscle use [Respiration, Rhythm And Depth] : normal respiratory rhythm and effort [Normal S1, S2] : normal S1 and S2 [Heart Rate And Rhythm] : heart rate was normal and rhythm regular [Bowel Sounds] : normal bowel sounds [Abdomen Tenderness] : non-tender [Normal Affect] : the affect was normal [Abdomen Soft] : soft [Normal Mood] : the mood was normal

## 2022-05-16 ENCOUNTER — NON-APPOINTMENT (OUTPATIENT)
Age: 72
End: 2022-05-16

## 2022-05-16 ENCOUNTER — APPOINTMENT (OUTPATIENT)
Dept: NEUROLOGY | Facility: CLINIC | Age: 72
End: 2022-05-16
Payer: MEDICARE

## 2022-05-16 PROCEDURE — 99441: CPT

## 2022-05-18 ENCOUNTER — APPOINTMENT (OUTPATIENT)
Dept: NEUROLOGY | Facility: CLINIC | Age: 72
End: 2022-05-18
Payer: MEDICARE

## 2022-05-18 ENCOUNTER — TRANSCRIPTION ENCOUNTER (OUTPATIENT)
Age: 72
End: 2022-05-18

## 2022-05-18 ENCOUNTER — APPOINTMENT (OUTPATIENT)
Dept: GERIATRICS | Facility: CLINIC | Age: 72
End: 2022-05-18

## 2022-05-18 PROCEDURE — 99443: CPT

## 2022-05-18 PROCEDURE — 99497 ADVNCD CARE PLAN 30 MIN: CPT | Mod: 25

## 2022-05-18 PROCEDURE — 99442: CPT

## 2022-05-20 ENCOUNTER — TRANSCRIPTION ENCOUNTER (OUTPATIENT)
Age: 72
End: 2022-05-20

## 2022-05-20 NOTE — PHYSICAL EXAM
[Alert] : alert [No Acute Distress] : in no acute distress [Normal Voice/Communication] : normal voice/communication

## 2022-05-20 NOTE — REVIEW OF SYSTEMS
[Fever] : no fever [Chills] : no chills [Feeling Poorly] : not feeling poorly [Feeling Tired] : feeling tired [Eyesight Problems] : no eyesight problems [Discharge From Eyes] : no purulent discharge from the eyes [Nosebleeds] : no nosebleeds [Nasal Discharge] : no nasal discharge [Chest Pain] : no chest pain [Palpitations] : no palpitations [Cough] : no cough [SOB on Exertion] : no shortness of breath during exertion [Abdominal Pain] : no abdominal pain [Vomiting] : no vomiting [Vaginal Discharge] : no vaginal discharge [Abn Vaginal Bleeding] : no unexplained vaginal bleeding [Arthralgias] : arthralgias [Joint Swelling] : joint swelling [Skin Lesions] : no skin lesions [Skin Wound] : no skin wound [Confused] : no confusion [Convulsions] : no convulsions [Dizziness] : dizziness [Fainting] : no fainting [Change In Personality] : no personality change [Emotional Problems] : no emotional problems

## 2022-05-20 NOTE — HISTORY OF PRESENT ILLNESS
[FreeTextEntry1] : telephonic visit to confirm wishes per MOLST and to review advanced directives\par  on line for short period\par patient is clear about MOLST\par she would not life prolonging measures \par MOLST finalized\par but she will need to sign as this is a telephonic encounter\par  [Completely Independent] : Completely independent. [1] : 2) Feeling down, depressed, or hopeless for several days (1) [PHQ-2 Negative - No further assessment needed] : PHQ-2 Negative - No further assessment needed [[id number]] : 2 [With Patient/Caregiver] : , with patient/caregiver [Reviewed updated] : Reviewed, updated [Designated Healthcare Proxy] : Designated healthcare proxy [DNR] : DNR [DNI] : DNI [Last Verification Date: ___] : Last Verification Date: [unfilled] [I will adhere to the patient's wishes.] : I will adhere to the patient's wishes. [Time Spent: ___ minutes] : Time Spent: [unfilled] minutes [AdvancecareDate] : 05/18/22

## 2022-05-20 NOTE — ASSESSMENT
[FreeTextEntry1] : MOLST finalized today\par  will  later today to have patient sign and finalize\par given her cancer/PD her goal is to avoid the hospital as possible\par discussed also with neurologist and patient for role of benzo for OFF symptoms  - severe anxiety\par she notes (patient) that she is on higher sinemet daily not just at bedtime now (25/100)\par bites on neck responded to mupirocin and steroid ointment \par she has a metastatic cancer dx,  recovering from bladder cancer\par Her 72th bday and milestone wedding anniversary this year\par \par \par

## 2022-05-20 NOTE — REASON FOR VISIT
[Home] : at home, [unfilled] , at the time of the visit. [Medical Office: ([facility])___] : at the medical office located in  [Spouse] : spouse [Verbal consent obtained from patient] : the patient, [unfilled] [Acute] : an acute visit [Family Member] : family member

## 2022-05-23 ENCOUNTER — TRANSCRIPTION ENCOUNTER (OUTPATIENT)
Age: 72
End: 2022-05-23

## 2022-05-24 ENCOUNTER — RX RENEWAL (OUTPATIENT)
Age: 72
End: 2022-05-24

## 2022-05-25 ENCOUNTER — TRANSCRIPTION ENCOUNTER (OUTPATIENT)
Age: 72
End: 2022-05-25

## 2022-06-01 ENCOUNTER — APPOINTMENT (OUTPATIENT)
Dept: GERIATRICS | Facility: CLINIC | Age: 72
End: 2022-06-01
Payer: MEDICARE

## 2022-06-01 ENCOUNTER — TRANSCRIPTION ENCOUNTER (OUTPATIENT)
Age: 72
End: 2022-06-01

## 2022-06-01 VITALS
HEART RATE: 101 BPM | RESPIRATION RATE: 18 BRPM | OXYGEN SATURATION: 98 % | DIASTOLIC BLOOD PRESSURE: 53 MMHG | BODY MASS INDEX: 28.9 KG/M2 | HEIGHT: 60 IN | SYSTOLIC BLOOD PRESSURE: 110 MMHG | WEIGHT: 147.19 LBS | TEMPERATURE: 97.9 F

## 2022-06-01 LAB
ACHR BLOCK AB SER QL: 8 %
ACHR MOD AB SER-ACNC: 0 %
ACRM BINDING ANTIBODY: 0.03 NMOL/L
MUSK ANTIBODY TEST: <1 U/ML
VGKC AB SER-SCNC: 0 PMOL/L

## 2022-06-01 PROCEDURE — 99215 OFFICE O/P EST HI 40 MIN: CPT

## 2022-06-01 NOTE — HISTORY OF PRESENT ILLNESS
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 S/p XRT to pelvis and femur 2/1/22-2/7/22\par \par 1/10: Does not feel better. Tried Dex 2mg and it didn't help stimulate her, only helped her rash.\par Tried Provigil- didn't notice a difference. Leg pain is worse. \par \par 1/24: Feels "a lot" better. Feels Dex at both doses (2 & 4mg) kept her up at night and made her restless leg worse. Tramadol is a "miracle" in terms of helping pain. Takes 2-3x/day. Sleeping better. Taking 1/2 dose of Miralax daily with good effect. Uses Curaleaf 1:1 2.5mg THC:CBD which helps her feel more alert. Extra Sinemet during the day is not really helping. RX Ritalin\par \par 2/24: Ritalin made her feel "jittery"- advised taper off and trial of Lexapro 5mg PO QD. \par \par 4/1: Reported increasing crying spells since starting medication, advised to stop\par \par 6/1: Presenting for routine follow up. Feels like "crap." Had many bug bites from an "arthropod attack," not improving, now s/p biopsy yesterday. Feels "wobbly," which happens. Feels afternoon "blahs" are 2/2 OFF periods of PD. No pain/nausea. Endorses leg pain, feels as though she may have neuropathy. Feet feel cold. Needs more assistance around the house (getting in and out of shower, etc). Did MOLST + HCP indicating complete comfort measures (scanned in), though remains on treatment. \par \par Fidel (): [phone] / [phone]\danielle Nogueira (son): [phone] / [phone]

## 2022-06-01 NOTE — REASON FOR VISIT
[Home] : at home, [unfilled] , at the time of the visit. [Medical Office: ([facility])___] : at the medical office located in  [Verbal consent obtained from patient] : the patient, [unfilled] [Initial Evaluation] : an initial evaluation

## 2022-06-01 NOTE — ADDENDUM
[FreeTextEntry1] : Spoke with patient over phone. She did not receive my message from yesterday.\par She states that the Ritalin makes her feel jittery.\par Advised to taper Ritalin down from 5mg BID to 5mg QD x 3 days, then stop. \par I explained that I suspect depression and anxiety may have a strong component to her afternoon "blahs" and feeling short of breath at times. We had discussed this back in December as well.\par She is now open to trying an SSRI- will try Lexapro 5mg PO QD.\par Reviewed common, transient SEs of nausea and headache.

## 2022-06-01 NOTE — ASSESSMENT
[DNR] : Code Status: DNR [Comfort] : Treatment Guidelines: Comfort [DNI] : Intubation: DNI [[facility]: _____[facility]last [facility] date: [unfilled] [______] : HCP: [unfilled] [FreeTextEntry1] : 72 y/o F w/ metastatic lung cancer, localized breast CA, Parkinson's presenting for a palliative care follow up\par \par - Intolerant of Lexapro, Ritalin, Modafonil\par - Discussed trial of CBD / cannabis- she may consider in the future\par - Strongly suggested psych evaluation- given depression associated with PD, she  may benefit from an antidepressant to reduce fatigue- she will think about it\par \par Discussed advanced directives and MOLST reviewed; she affirmed that she wants to remain at home, DNR/DNI, comfort care, etc. \par \par Encouraged family discussions so kids are on the same page- recommended "Go Wish" game\par \par

## 2022-06-02 LAB — PARANEOPLASTIC AB PNL SER: NORMAL

## 2022-06-06 ENCOUNTER — APPOINTMENT (OUTPATIENT)
Dept: GERIATRICS | Facility: CLINIC | Age: 72
End: 2022-06-06
Payer: MEDICARE

## 2022-06-06 VITALS
OXYGEN SATURATION: 100 % | SYSTOLIC BLOOD PRESSURE: 110 MMHG | DIASTOLIC BLOOD PRESSURE: 68 MMHG | TEMPERATURE: 97.9 F | HEART RATE: 101 BPM

## 2022-06-06 PROCEDURE — 99214 OFFICE O/P EST MOD 30 MIN: CPT

## 2022-06-07 ENCOUNTER — TRANSCRIPTION ENCOUNTER (OUTPATIENT)
Age: 72
End: 2022-06-07

## 2022-06-07 NOTE — PHYSICAL EXAM
[Normal Voice/Communication] : normal voice/communication [Alert] : alert [No Acute Distress] : in no acute distress [Sclera] : the sclera and conjunctiva were normal [EOMI] : extraocular movements were intact [PERRL] : pupils were equal in size, round, and reactive to light [Normal Oral Mucosa] : normal oral mucosa [No Oral Pallor] : no oral pallor [Normal Appearance] : the appearance of the neck was normal [No Neck Mass] : no neck mass was observed [Supple] : the neck was supple [No Respiratory Distress] : no respiratory distress [No Acc Muscle Use] : no accessory muscle use [Respiration, Rhythm And Depth] : normal respiratory rhythm and effort [Normal S1, S2] : normal S1 and S2 [Heart Rate And Rhythm] : heart rate was normal and rhythm regular [Bowel Sounds] : normal bowel sounds [Abdomen Tenderness] : non-tender [Abdomen Soft] : soft [Normal Affect] : the affect was normal [Normal Mood] : the mood was normal [de-identified] : healed lesions scalp line, abdomen

## 2022-06-07 NOTE — ASSESSMENT
[FreeTextEntry1] : seeing therapist - couples therapy\par advised individual sessions given complexity of medical health and anxiety \par currently patient and  have self treated to decrease frequency of sinemet to 2 hours\par advised against doing this as it was not under guidance of neurologist\par but moreso due to adverse effects of meds\par but this action also shows the severity of symptoms and their desperation to get help\par \par for that reason\par symptoms are related either to OFF periods or just generalized anxiety\par they will trial full 1mg dose of ativan\par if no improvement can trial 0.75 can prescribe different tab to make this easier\par has failed multiple antianxiety meds\par if no improvement can either escalate or consider buspar\par she will keep me updated via portal or by speaking to geriatrics practice LPN\par \par \par she has a metastatic cancer dx,  recovering from bladder cancer\par Her 72th bday and milestone wedding anniversary this year (August)\par hoping for relief before her bday, even if minimal \par \par \par

## 2022-06-07 NOTE — REVIEW OF SYSTEMS
[Feeling Poorly] : feeling poorly [Feeling Tired] : feeling tired [Arthralgias] : arthralgias [Joint Swelling] : joint swelling [Dizziness] : dizziness [Fever] : no fever [Chills] : no chills [Eyesight Problems] : no eyesight problems [Discharge From Eyes] : no purulent discharge from the eyes [Nosebleeds] : no nosebleeds [Nasal Discharge] : no nasal discharge [Chest Pain] : no chest pain [Palpitations] : no palpitations [Cough] : no cough [SOB on Exertion] : no shortness of breath during exertion [Abdominal Pain] : no abdominal pain [Vomiting] : no vomiting [Vaginal Discharge] : no vaginal discharge [Abn Vaginal Bleeding] : no unexplained vaginal bleeding [Skin Lesions] : no skin lesions [Skin Wound] : no skin wound [Confused] : no confusion [Convulsions] : no convulsions [Fainting] : no fainting [Change In Personality] : no personality change [Emotional Problems] : no emotional problems

## 2022-06-07 NOTE — HISTORY OF PRESENT ILLNESS
[No falls in past year] : Patient reported no falls in the past year [Completely Independent] : Completely independent. [Smoke Detector] : smoke detector [Carbon Monoxide Detector] : carbon monoxide detector [1] : 2) Feeling down, depressed, or hopeless for several days (1) [PHQ-2 Negative - No further assessment needed] : PHQ-2 Negative - No further assessment needed [FreeTextEntry1] : presenting with \par changing medications on her own\par due to OFF periods in afternoon\par \par symptoms include - feeling cold, more notable tremor involving other parts of body, tachycardia, inability to breathe\par \par due to this fear they have changed the short acting sinemet on their own to 1 tab at 1PM 3PM and 5PM\par instead of the prior 1.5 tabs 1pm 4PM\par \par concern for anxiety\par imbrija used for OFF symptoms but has not been using it inconsistently \par \par  [XGN8Msysc] : 2

## 2022-06-08 ENCOUNTER — TRANSCRIPTION ENCOUNTER (OUTPATIENT)
Age: 72
End: 2022-06-08

## 2022-06-13 ENCOUNTER — TRANSCRIPTION ENCOUNTER (OUTPATIENT)
Age: 72
End: 2022-06-13

## 2022-06-15 ENCOUNTER — APPOINTMENT (OUTPATIENT)
Dept: CARDIOLOGY | Facility: CLINIC | Age: 72
End: 2022-06-15
Payer: MEDICARE

## 2022-06-15 ENCOUNTER — NON-APPOINTMENT (OUTPATIENT)
Age: 72
End: 2022-06-15

## 2022-06-15 VITALS
SYSTOLIC BLOOD PRESSURE: 112 MMHG | HEIGHT: 61 IN | BODY MASS INDEX: 27.38 KG/M2 | OXYGEN SATURATION: 98 % | WEIGHT: 145 LBS | HEART RATE: 106 BPM | DIASTOLIC BLOOD PRESSURE: 60 MMHG

## 2022-06-15 DIAGNOSIS — Z87.19 PERSONAL HISTORY OF OTHER DISEASES OF THE DIGESTIVE SYSTEM: ICD-10-CM

## 2022-06-15 DIAGNOSIS — H02.401 UNSPECIFIED PTOSIS OF RIGHT EYELID: ICD-10-CM

## 2022-06-15 PROCEDURE — 99215 OFFICE O/P EST HI 40 MIN: CPT

## 2022-06-15 PROCEDURE — 93000 ELECTROCARDIOGRAM COMPLETE: CPT

## 2022-06-15 RX ORDER — ASPIRIN ENTERIC COATED TABLETS 81 MG 81 MG/1
81 TABLET, DELAYED RELEASE ORAL
Refills: 0 | Status: ACTIVE | COMMUNITY
Start: 2022-06-15

## 2022-06-15 RX ORDER — TRIAMCINOLONE ACETONIDE 1 MG/G
0.1 CREAM TOPICAL
Qty: 15 | Refills: 0 | Status: DISCONTINUED | COMMUNITY
Start: 2022-06-02 | End: 2022-06-15

## 2022-06-15 RX ORDER — DRONABINOL 5 MG/1
5 CAPSULE ORAL
Qty: 30 | Refills: 0 | Status: DISCONTINUED | COMMUNITY
Start: 2019-09-11 | End: 2022-06-15

## 2022-06-15 NOTE — HISTORY OF PRESENT ILLNESS
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  She was also followed for small pericardial effusion.\par \par She is treated for metastatic  breast cancer and adenocarcinoma  of  lung cancer with bony mets and parkinsons.\par \par She has had 2 er visits and a hospitalization since last visit, [facility] reviewed with patient and - scanned.\par 2 of the visits were for dyspnea at times of medications changes. The most recent was in April for a diverticular bleed. She is back on aspirin.\par \par When she gets the off period she feels short of breath and palpitations. She denies chest pain or syncope. She will be seeing a psychiatrist/oncologist. She has a difficult time at 3 in the afternoon each day.\par \par \par \par

## 2022-06-15 NOTE — REASON FOR VISIT
[Follow-Up - Clinic] : a clinic follow-up of [Other: _____] : [unfilled] [FreeTextEntry2] : 6 months [FreeTextEntry1] : sinus tachycardia

## 2022-06-21 ENCOUNTER — RESULT REVIEW (OUTPATIENT)
Age: 72
End: 2022-06-21

## 2022-06-21 ENCOUNTER — APPOINTMENT (OUTPATIENT)
Dept: HEMATOLOGY ONCOLOGY | Facility: CLINIC | Age: 72
End: 2022-06-21
Payer: MEDICARE

## 2022-06-21 VITALS
SYSTOLIC BLOOD PRESSURE: 109 MMHG | DIASTOLIC BLOOD PRESSURE: 65 MMHG | TEMPERATURE: 96.7 F | BODY MASS INDEX: 27.75 KG/M2 | HEART RATE: 104 BPM | RESPIRATION RATE: 16 BRPM | OXYGEN SATURATION: 98 % | WEIGHT: 147 LBS | HEIGHT: 61.02 IN

## 2022-06-21 PROCEDURE — 36415 COLL VENOUS BLD VENIPUNCTURE: CPT

## 2022-06-21 PROCEDURE — 99214 OFFICE O/P EST MOD 30 MIN: CPT | Mod: 25

## 2022-06-21 NOTE — ASSESSMENT
[FreeTextEntry1] : 72 yo female \par \par # Stage 1 breast cancer 8 mm tumor, invasive ductal carcinoma, ER/ MA positive, HER2 not amplified with Oncotype Dx 11. diagnosed November 2018\par -continue with Anastrazole\par - left breast pain - c/w fat necrosis on mammogram up to date 12/21\par \par #Adenocarcinoma of lung- stage 4 diagnosed  April 2019\par -Repeated CEA -plateau at 11.5 (started out at 26.6)\par -CT scan showed mass 1.8 x 1.4 x 1.7 cm lesion.\par -Biopsy of lung nodule c/w adeno ca of the lung, EGFR mutated, T190 mutated, started on Tagrisso.  Side effects, -toxicities and benefits reviewed with pt. L4 vertebral body biopsy confirmed met adenoca of the lung.\par - PETCT  2/3/2020- decreased FDG uptake in primary lesion, sclerotic lesions in bones, thyroid nodule activity- under surveillance, duodenal uptake \par - plan for PETCT Feb 2021- SARAH- stable, uptake in right breast - c/w fat necrosis \par - repeat CEA\par -Jan 2022- PETCT - sclerotic bone lesions.\par - pain in the left femur 2-3/10 takes viocodin daily, s/p RT \par -decreased lower back pain \par - visit to ER for dyspnea - images reviewed, evaluated for PE, related to Parkinson disease.  Lung mass 1.1 cm on CT\par - patient with weight loss, struggles with Parkinson disease\par \par #benign thyroid nodule - followed by Dr. Park. plan for biopsy\par # constipation- daily miralax\par \par - continue Tagrisso 11 pm, nausea in am \par - Nausea is controlled with  Sancuso and Dronabinol 2.5mg bid for nausea- refill today I-stop checked\par - continue Xgeva monthly -neoplasm pain - continue with Vicodin prn rarely taking \par -established care with Dr. Hendrix for her Parkinsons\par lung cancer stage 4 on tagrisso \par Tagrisso/Xgeva- extend Xgeva to q 6 weeks \par Patient evaluated by neuro for new tx for Parkinsons, will hold Sancuso \par Plan for PETCT  Summer\par Guardant 360 no EGFR mutation detected \par \par Still significant fatigue at 3 pm, under care of palliative care NP Noman. \par Nausea - on and off. \par CBC,Chem,CEA, CA 27.29  case and mgmt  to return in 1 month for treatment  \par \par Labs ordered, drawn in office

## 2022-06-21 NOTE — REVIEW OF SYSTEMS
[Fatigue] : fatigue [Recent Change In Weight] : ~T recent weight change [SOB on Exertion] : shortness of breath during exertion [Diarrhea] : diarrhea [Joint Pain] : joint pain [Muscle Pain] : muscle pain [Dizziness] : dizziness [Difficulty Walking] : difficulty walking [Anxiety] : anxiety [Depression] : depression [Muscle Weakness] : muscle weakness [Negative] : Allergic/Immunologic [Constipation] : constipation [Fever] : no fever [Eye Pain] : no eye pain [Vision Problems] : no vision problems [Dysphagia] : no dysphagia [Nosebleeds] : no nosebleeds [Chest Pain] : no chest pain [Lower Ext Edema] : no lower extremity edema [Shortness Of Breath] : no shortness of breath [Dysuria] : no dysuria [Easy Bleeding] : no tendency for easy bleeding [Easy Bruising] : no tendency for easy bruising [FreeTextEntry6] : intermittently  [FreeTextEntry9] : muscle spasms due to parkinsons, left hip pain  [de-identified] : tremor [de-identified] : brain fog

## 2022-06-21 NOTE — PHYSICAL EXAM
[Restricted in physically strenuous activity but ambulatory and able to carry out work of a light or sedentary nature] : Status 1- Restricted in physically strenuous activity but ambulatory and able to carry out work of a light or sedentary nature, e.g., light house work, office work [Normal] : affect appropriate [de-identified] : left breast scar

## 2022-06-21 NOTE — HISTORY OF PRESENT ILLNESS
[Therapy: ___] : Therapy: [unfilled] [de-identified] : Mrs. Mcdowell is a 68 year old postmenopausal female who is referred by Dr.Alice Demarco for newly diagnosed breast cancer.\par She was found on routine mammography in November 2019 she you have a focal asymmetric density in the left upper outer breast at 1:00 access 8 cm from the nipple. A one year prior had been negative.  Ultrasound-guided core biopsy in November 21, 2018 revealed a grade 1/3 invasive ductal carcinoma ER positive at 98% and ND positive at 90%,Ki-67 was less than 10%. She underwent some left breast lumpectomy and sentinel node dissection on December 19, 2018. Pathology revealed an 8 mm grade 1/3 invasive ductal carcinoma zero of 3 sentinel lymph nodes were involved with tumor. Oncotype DX score was 11. She also underwent genetic testing which is negative.\par  [de-identified] : Mrs. Mcdowell presents for follow up on Anastrazole for breast cancer. She states that she is fatigued and is having issues with her parkinsons- she is due to see a doctor at [facility] to discuss her Parkinsons. Patient states that she has alternating constipation and diarrhea.

## 2022-06-24 ENCOUNTER — APPOINTMENT (OUTPATIENT)
Dept: HEMATOLOGY ONCOLOGY | Facility: CLINIC | Age: 72
End: 2022-06-24

## 2022-06-24 PROCEDURE — 99215 OFFICE O/P EST HI 40 MIN: CPT

## 2022-07-05 ENCOUNTER — TRANSCRIPTION ENCOUNTER (OUTPATIENT)
Age: 72
End: 2022-07-05

## 2022-07-08 ENCOUNTER — APPOINTMENT (OUTPATIENT)
Dept: HEMATOLOGY ONCOLOGY | Facility: CLINIC | Age: 72
End: 2022-07-08

## 2022-07-08 PROCEDURE — 99215 OFFICE O/P EST HI 40 MIN: CPT | Mod: 95

## 2022-07-11 ENCOUNTER — TRANSCRIPTION ENCOUNTER (OUTPATIENT)
Age: 72
End: 2022-07-11

## 2022-07-19 ENCOUNTER — TRANSCRIPTION ENCOUNTER (OUTPATIENT)
Age: 72
End: 2022-07-19

## 2022-07-20 ENCOUNTER — APPOINTMENT (OUTPATIENT)
Dept: NEUROLOGY | Facility: CLINIC | Age: 72
End: 2022-07-20

## 2022-07-20 VITALS
BODY MASS INDEX: 27.38 KG/M2 | SYSTOLIC BLOOD PRESSURE: 107 MMHG | WEIGHT: 145 LBS | TEMPERATURE: 98.7 F | HEIGHT: 61.02 IN | DIASTOLIC BLOOD PRESSURE: 73 MMHG | HEART RATE: 108 BPM | OXYGEN SATURATION: 95 %

## 2022-07-20 PROCEDURE — 99215 OFFICE O/P EST HI 40 MIN: CPT

## 2022-07-20 RX ORDER — CARBIDOPA AND LEVODOPA 25; 100 MG/1; MG/1
25-100 TABLET, ORALLY DISINTEGRATING ORAL
Qty: 120 | Refills: 2 | Status: DISCONTINUED | COMMUNITY
Start: 2022-07-20 | End: 2022-07-20

## 2022-07-22 ENCOUNTER — APPOINTMENT (OUTPATIENT)
Dept: HEMATOLOGY ONCOLOGY | Facility: CLINIC | Age: 72
End: 2022-07-22

## 2022-07-22 ENCOUNTER — NON-APPOINTMENT (OUTPATIENT)
Age: 72
End: 2022-07-22

## 2022-07-22 PROCEDURE — 99215 OFFICE O/P EST HI 40 MIN: CPT | Mod: 95

## 2022-07-26 ENCOUNTER — NON-APPOINTMENT (OUTPATIENT)
Age: 72
End: 2022-07-26

## 2022-07-26 NOTE — PHYSICAL EXAM
[Person] : oriented to person [Place] : oriented to place [Time] : oriented to time [Concentration Intact] : normal concentrating ability [Comprehension] : comprehension intact [Cranial Nerves Optic (II)] : visual acuity intact bilaterally,  visual fields full to confrontation, pupils equal round and reactive to light [Cranial Nerves Oculomotor (III)] : extraocular motion intact [Cranial Nerves Facial (VII)] : face symmetrical [Cranial Nerves Trigeminal (V)] : facial sensation intact symmetrically [Cranial Nerves Vestibulocochlear (VIII)] : hearing was intact bilaterally [Cranial Nerves Glossopharyngeal (IX)] : tongue and palate midline [Cranial Nerves Accessory (XI - Cranial And Spinal)] : head turning and shoulder shrug symmetric [Cranial Nerves Hypoglossal (XII)] : there was no tongue deviation with protrusion [Motor Strength] : muscle strength was normal in all four extremities [Involuntary Movements] : no involuntary movements were seen [Sensation Tactile Decrease] : light touch was intact [Romberg's Sign] : a positive Romberg's sign was present [1+] : Ankle jerk left 1+ [Paresis Pronator Drift Right-Sided] : no pronator drift on the right [Paresis Pronator Drift Left-Sided] : no pronator drift on the left [Coordination - Dysmetria Impaired Finger-to-Nose Bilateral] : not present [FreeTextEntry1] : Face was not significantly masked. Saccades were normal. Voice is normal.\par No significant tremors\par No significant rigidity.\par There was mild bradykinesia, left more than right, with finger tapping, rapid alternating and sequential movements.\par Mildly decreased left leg agility. Slightly impaired toe tapping bilaterally.\par No trouble standing with arms crossed. Posture is normal.\par Gait is with good stride length and speed with mildly decreased right arm swing. Multistep turns. No freezing of gait. Mild veering to the right.\par Postural reflexes are intact.\par There were mild-moderate dyskinesias, including the head. Slight interference with walking.

## 2022-07-26 NOTE — HISTORY OF PRESENT ILLNESS
[FreeTextEntry1] : Rosamaria Mcdowell is a 72 year old F with a PMHx of Parkinson's disease, lung cancer (on targeted therapy), hx cataract surgery, history of breast cancer, GERD, thyroid nodules and tachycardia who presents for follow up in the [facility] at [place] for Parkinson's disease. Positive Kranthi scan. She is accomapnied by her  who provides collateral history.\par \par Interval history:\par Since the last visit, she has developed dyskinesias. She has been referred to [facility] at [facility] for psychiatry and the afternoon "blahs" that she is having. She has rapid heart beat and difficulty breathing, and significant tremors. She is feeling anxious. She is pretty good in the morning, but around 1-2p in the afternoon she starts worrying about what is going to happen and then she has a panic attack around 3pm. She will take the Inbrija at that time and Xanax if necessary. She takes a nap at 4pm and feels better, but around 7-8p and feels tired and climbs into bed and sleeps for an hour. When she wakes up overnight, she has a panic attack. She will try to use Inbrija at that time. \par \par Current meds:\par Sinemet 10-100mg taking 6 tabs daily - 3v-23-7v-3p-7p \par Sinemet ER 25-100mg 1 tab BID 7a and 1 tabs at 11p \par Pramipexole 0.375mg IR in the evening\par Miralax and Fiber\par Ativan 0.5mg BID and Xanax 0.5mg every 6-8 hours\par Inbrija 42mg 4 times a day - when she gets up, in the afternoon and before dinner\par \par \par Prior meds:\par Azilect 1 mg daily\par Melatonin gave her palpitations\par Modafinil did not help her fatigue\par Dexamethasone for the fatigue kept her up\par Olanzapine and her parkinsonism became worse\par Lyrica was stopped for urinary incontinence\par Zoloft\par \par Initial history:\par She was diagnosed in 2014 at [place]. She has been seeing a general neurologist.  \par \par She thought she had essential tremor. Her tremors are mostly in the right hand, controlled on Sinemet. Her left leg has restless leg syndrome.\par She feels worse in the afternoon, around 3-4pm. She gets wobbly. If she takes her BP, it is lower. She has not been officially tested for orthostatic hypotension. Sometimes her systolic is in 100s. She was tried on midodrine but she had hair loss and UTI. She was also tried on Gabapentin. When she didn't take the Sinemet ER in the morning she felt worse. No trouble with buttons, zippers or shoelaces, cutting food. No significant stiffness. She gets muscle spasms in the feet. She thinks the Sinemet lasts about 4 hours.  \par \par No trouble turning or adjusting in bed at night.\par She talks in her sleep and has fallen out of bed before. She wakes up to go to the bathroom at night. She also has breakthrough tremors at night. \par She urinates every 4 hours. \par \par She was reduced off the pramipexole due to potential side effects, but she is not sure which ones. When she decreased it, around 10p for about an hour she would be walking up and down the halls and could not lay still and got spasms in her left leg. She was tried on Lyrica without relief. They tried adjusting the Sinemet doses instead.\par \par She has no sense of smell.\par No changes in loudness of her voice. 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 Therapy did not work. She can still sing.\par No trouble swallowing. \par She has constipation. She has a bowel movement every couple of days. She takes Miralax daily or every other day. She has increased her water intake.\par She has urinary frequency. No incontinence. \par Memory, having more trouble finding words.\par Mood is depressed, but generally okay.\par Dizziness in the afternoon when standing. No falls. Her BP is sometimes low. \par Hallucinations, none.\par \par Current meds:\par Sinemet 10-100mg taking 6 tabs daily - 7a-11-3-7-11-3a\par Sinemet ER 25-100mg 1 tab BID 7a-7p\par Pramipexole 0.75mg ER in the evening\par \par Prior meds:\par Azilect 1 mg daily\par \par Family history:\par Social history: retired nurse\par  \par No Kranthi scan.\par MRI brain with and without contrast was done in 4/2019.

## 2022-07-26 NOTE — DISCUSSION/SUMMARY
[FreeTextEntry1] : Rosamaria Mcdowell is a 72 year old F with a PMHx of Parkinson's disease, lung cancer (on targeted therapy), hx cataract surgery, history of breast cancer, GERD, thyroid nodules and tachycardia who presents for follow up in the [facility] at [facility] for Parkinson's disease. Positive Kranthi scan. She is accompanied by her  who provides collateral history.\par \par  The patient's history and physical examination are suggestive of mild idiopathic Parkinson's disease. She reports a good response to Sinemet, however, she has developed dyskinesias and severe panic attacks that have been affecting her. She is following with psychiatry who is titrating her regimen. \par \par Her RLS is controlled with Pramipexole IR 0.375mg but experienceing palpitations. Insurance would not cover the ER she was on previously. Lyrica discontinued.\par \par Her Romberg sign is positive but she was unable to tolerate vestibular therapy as she felt unsafe with the maneuvers. \par She continues to have mid afternoon fatigue, likely multifactorial. \par \par   EMG was previously done and not suggestive of a myopathy.\par \par She was started on Ritalin by Palliative care. No response yet, but increased yesterday. Modafinil did not help.\par \par   Recommendations: \par - Continue Pramipexole 0.125mg 2 tabs and monitor for improvement in palpitation\par - Continue Ritalin per Palliative care\par - Decrease Sinemet from 5 times a day to 4 times a day, 1.5 tabs - monitor for improvement in dyskiensias\par - Continue Sinemet ER 25-100mg 1 tab at 7a and 11p\par  - Continue Miralax for constipation , fiber and start probiotics\par - Mediterranean diet, antiinflammatory/antioxidant foods, probiotics, fiber, caffeine, and vitamins  - - \par Encouraged to increase exercise and physical activity and maintain an active social and intellectual life. \par - Depression and anxiety management per psychiatry\par - Referral for PT\par \par   RTC 4 months  \par \par All questions were answered to her satisfaction. I spent 40 minutes with this patient.

## 2022-07-28 ENCOUNTER — NON-APPOINTMENT (OUTPATIENT)
Age: 72
End: 2022-07-28

## 2022-07-29 ENCOUNTER — NON-APPOINTMENT (OUTPATIENT)
Age: 72
End: 2022-07-29

## 2022-08-02 ENCOUNTER — APPOINTMENT (OUTPATIENT)
Dept: HEMATOLOGY ONCOLOGY | Facility: CLINIC | Age: 72
End: 2022-08-02

## 2022-08-02 ENCOUNTER — RESULT REVIEW (OUTPATIENT)
Age: 72
End: 2022-08-02

## 2022-08-02 VITALS
WEIGHT: 140.25 LBS | RESPIRATION RATE: 16 BRPM | SYSTOLIC BLOOD PRESSURE: 112 MMHG | TEMPERATURE: 97.5 F | OXYGEN SATURATION: 98 % | DIASTOLIC BLOOD PRESSURE: 63 MMHG | HEART RATE: 83 BPM | BODY MASS INDEX: 26.48 KG/M2 | HEIGHT: 61.02 IN

## 2022-08-02 PROCEDURE — 36415 COLL VENOUS BLD VENIPUNCTURE: CPT

## 2022-08-02 PROCEDURE — 99214 OFFICE O/P EST MOD 30 MIN: CPT | Mod: 25

## 2022-08-02 RX ORDER — CIPROFLOXACIN HYDROCHLORIDE 500 MG/1
500 TABLET, FILM COATED ORAL
Qty: 10 | Refills: 0 | Status: COMPLETED | COMMUNITY
Start: 2022-03-02

## 2022-08-02 NOTE — PHYSICAL EXAM
[Restricted in physically strenuous activity but ambulatory and able to carry out work of a light or sedentary nature] : Status 1- Restricted in physically strenuous activity but ambulatory and able to carry out work of a light or sedentary nature, e.g., light house work, office work [Normal] : affect appropriate [de-identified] : left breast scar

## 2022-08-02 NOTE — REVIEW OF SYSTEMS
[Fatigue] : fatigue [Recent Change In Weight] : ~T recent weight change [SOB on Exertion] : shortness of breath during exertion [Constipation] : constipation [Diarrhea] : diarrhea [Joint Pain] : joint pain [Muscle Pain] : muscle pain [Dizziness] : dizziness [Difficulty Walking] : difficulty walking [Anxiety] : anxiety [Depression] : depression [Muscle Weakness] : muscle weakness [Negative] : Allergic/Immunologic [Fever] : no fever [Eye Pain] : no eye pain [Vision Problems] : no vision problems [Dysphagia] : no dysphagia [Nosebleeds] : no nosebleeds [Chest Pain] : no chest pain [Lower Ext Edema] : no lower extremity edema [Shortness Of Breath] : no shortness of breath [Dysuria] : no dysuria [Easy Bleeding] : no tendency for easy bleeding [Easy Bruising] : no tendency for easy bruising [FreeTextEntry6] : intermittently  [FreeTextEntry9] : muscle spasms due to parkinsons, left hip pain  [de-identified] : tremor [de-identified] : brain fog

## 2022-08-02 NOTE — ASSESSMENT
[FreeTextEntry1] : 72 yo female \par \par # Stage 1 breast cancer 8 mm tumor, invasive ductal carcinoma, ER/ TX positive, HER2 not amplified with Oncotype Dx 11. diagnosed November 2018\par -continue with Anastrazole\par - left breast pain - c/w fat necrosis on mammogram up to date 12/21\par \par #Adenocarcinoma of lung- stage 4 diagnosed  April 2019\par -Repeated CEA -plateau at 11.5 (started out at 26.6)\par -CT scan showed mass 1.8 x 1.4 x 1.7 cm lesion.\par -Biopsy of lung nodule c/w adeno ca of the lung, EGFR mutated, T190 mutated, started on Tagrisso.  Side effects, -toxicities and benefits reviewed with pt. L4 vertebral body biopsy confirmed met adenoca of the lung.\par - PETCT  2/3/2020- decreased FDG uptake in primary lesion, sclerotic lesions in bones, thyroid nodule activity- under surveillance, duodenal uptake \par - plan for PETCT Feb 2021- SARAH- stable, uptake in right breast - c/w fat necrosis \par - repeat CEA\par -Jan 2022- PETCT - sclerotic bone lesions.\par - pain in the left femur 2-3/10 takes viocodin daily, s/p RT \par -decreased lower back pain \par - visit to ER for dyspnea - images reviewed, evaluated for PE, related to Parkinson disease.  Lung mass 1.1 cm on CT\par - patient with weight loss, struggles with Parkinson disease\par \par #benign thyroid nodule - followed by Dr. Park. plan for biopsy\par # constipation- daily miralax\par \par - continue Tagrisso 11 pm, nausea in am \par - Nausea is controlled with  Sancuso and Dronabinol 2.5mg bid for nausea- refill today I-stop checked\par - continue Xgeva monthly -neoplasm pain - continue with Vicodin prn rarely taking \par -established care with Dr. Hendrix for her Parkinsons\par lung cancer stage 4 on tagrisso \par Tagrisso/Xgeva- extend Xgeva to q 6 weeks \par Patient evaluated by neuro for new tx for Parkinsons, will hold Sancuso \par Plan for PETCT  Summer\par Guardant 360 no EGFR mutation detected \par \par Still significant fatigue at 3 pm, under care of palliative care NP Noman. \par Nausea - on and off. \par CBC,Chem,CEA, CA 27.29  case and mgmt  to return in 1 month for treatment  \par \par # new pancreatic mass - order MRI with gadolinium, Ca19-9\par \par # Episodes of chest tightness, tachycardia, SOB, feeling cold- was told it it anxiety attack.  It happens few times a day usually few times a day.  She had several trips to ER that didn’t show any other cardiopulmonary source. Attacks are debilitating. \par Labs ordered, drawn in office

## 2022-08-02 NOTE — HISTORY OF PRESENT ILLNESS
[Therapy: ___] : Therapy: [unfilled] [de-identified] : Mrs. Mcdowell is a 68 year old postmenopausal female who is referred by Dr.Alice Demarco for newly diagnosed breast cancer.\par She was found on routine mammography in November 2019 she you have a focal asymmetric density in the left upper outer breast at 1:00 access 8 cm from the nipple. A one year prior had been negative.  Ultrasound-guided core biopsy in November 21, 2018 revealed a grade 1/3 invasive ductal carcinoma ER positive at 98% and AK positive at 90%,Ki-67 was less than 10%. She underwent some left breast lumpectomy and sentinel node dissection on December 19, 2018. Pathology revealed an 8 mm grade 1/3 invasive ductal carcinoma zero of 3 sentinel lymph nodes were involved with tumor. Oncotype DX score was 11. She also underwent genetic testing which is negative.\par  [de-identified] : Mrs. Mcdowell presents for follow up on Anastrazole for breast cancer. She recently went to the ER 3x for increasing shortness of breath which they diagnosed it as a self-panic attack. They thought it might be an off period of the Parkinson's symptoms and her medications were changed and she was given an "emergency inhaler."  She is currently taking Levodopa every 4 hrs. She has been having "panic attacks." When she has an "attack" she will have shortness of breath, rapid heart beat, chills, and fatigue. They visited a psychiatric oncologist who diagnosed her with panic attacks and placed the patient on xanax.

## 2022-08-05 ENCOUNTER — APPOINTMENT (OUTPATIENT)
Dept: HEMATOLOGY ONCOLOGY | Facility: CLINIC | Age: 72
End: 2022-08-05

## 2022-08-05 PROCEDURE — 99215 OFFICE O/P EST HI 40 MIN: CPT | Mod: 95

## 2022-08-09 ENCOUNTER — NON-APPOINTMENT (OUTPATIENT)
Age: 72
End: 2022-08-09

## 2022-08-10 ENCOUNTER — APPOINTMENT (OUTPATIENT)
Dept: GERIATRICS | Facility: CLINIC | Age: 72
End: 2022-08-10

## 2022-08-10 VITALS
OXYGEN SATURATION: 97 % | BODY MASS INDEX: 26.24 KG/M2 | HEIGHT: 61.02 IN | WEIGHT: 139 LBS | DIASTOLIC BLOOD PRESSURE: 66 MMHG | SYSTOLIC BLOOD PRESSURE: 105 MMHG | TEMPERATURE: 97.5 F | RESPIRATION RATE: 16 BRPM | HEART RATE: 98 BPM

## 2022-08-10 PROCEDURE — 99215 OFFICE O/P EST HI 40 MIN: CPT

## 2022-08-10 RX ORDER — CYANOCOBALAMIN (VITAMIN B-12) 2500 MCG
2500 TABLET, SUBLINGUAL SUBLINGUAL
Refills: 0 | Status: ACTIVE | COMMUNITY

## 2022-08-10 RX ORDER — ALPRAZOLAM 0.5 MG/1
0.5 TABLET ORAL TWICE DAILY
Qty: 60 | Refills: 0 | Status: DISCONTINUED | COMMUNITY
Start: 2022-07-08 | End: 2022-08-10

## 2022-08-10 RX ORDER — GRANISETRON HYDROCHLORIDE 1 MG/1
1 TABLET, FILM COATED ORAL
Qty: 20 | Refills: 0 | Status: DISCONTINUED | COMMUNITY
Start: 2022-06-21 | End: 2022-08-10

## 2022-08-10 RX ORDER — LEVODOPA 42 MG/1
42 CAPSULE RESPIRATORY (INHALATION) 4 TIMES DAILY
Qty: 2 | Refills: 5 | Status: DISCONTINUED | COMMUNITY
Start: 2022-03-02 | End: 2022-08-10

## 2022-08-10 RX ORDER — MULTIVIT-MIN/FOLIC/VIT K/LYCOP 400-300MCG
50 MCG TABLET ORAL
Refills: 0 | Status: ACTIVE | COMMUNITY

## 2022-08-10 RX ORDER — FLUOXETINE HYDROCHLORIDE 10 MG/1
10 CAPSULE ORAL DAILY
Qty: 30 | Refills: 5 | Status: DISCONTINUED | COMMUNITY
Start: 2022-07-22 | End: 2022-08-10

## 2022-08-10 RX ORDER — LORATADINE 10 MG
TABLET,DISINTEGRATING ORAL
Refills: 0 | Status: DISCONTINUED | COMMUNITY
End: 2022-08-10

## 2022-08-10 RX ORDER — LORAZEPAM 1 MG/1
1 TABLET ORAL EVERY 4 HOURS
Qty: 180 | Refills: 0 | Status: DISCONTINUED | COMMUNITY
Start: 2022-03-10 | End: 2022-08-10

## 2022-08-10 RX ORDER — FLUOCINONIDE 0.5 MG/ML
0.05 SOLUTION TOPICAL
Qty: 60 | Refills: 0 | Status: DISCONTINUED | COMMUNITY
Start: 2022-05-03 | End: 2022-08-10

## 2022-08-10 RX ORDER — DENOSUMAB 120 MG/1.7ML
120 INJECTION SUBCUTANEOUS
Refills: 0 | Status: ACTIVE | COMMUNITY

## 2022-08-10 NOTE — PHYSICAL EXAM
[General Appearance - Alert] : alert [No Oral Pallor] : no oral pallor [Neck Cervical Mass (___cm)] : no neck mass was observed [] : no respiratory distress [Oriented To Time, Place, And Person] : oriented to person, place, and time [Impaired Insight] : insight and judgment were intact [Affect] : the affect was normal [Mood] : the mood was normal [FreeTextEntry1] : Frail, pale

## 2022-08-10 NOTE — ASSESSMENT
[DNR] : Code Status: DNR [Comfort] : Treatment Guidelines: Comfort [DNI] : Intubation: DNI [[facility]: _____[facility]: [unfilled] [______] : HCP: [unfilled] [FreeTextEntry1] : 71 y/o F w/ metastatic lung cancer, localized breast CA, Parkinson's presenting for a palliative care follow up\par \par - C/w Prozac 20mg PO QD\par - C/w Ativan 0.5mg BID CONNER\par - C/w Xanax 0.5mg Q6H PRN\par - Increase Miralax to 1 capful BID. \par \par - Therapy referral- pt will think about it\par \par \par RTC in 6 weeks or PRN

## 2022-08-10 NOTE — HISTORY OF PRESENT ILLNESS
[FreeTextEntry1] : Rosamaria Mcdowell is a 71 y/o F w/ metastatic lung cancer to bone on tagrisso/xgyeva, localized breast cancer s/p lumpectomy and XRT on anastrazole, PD on Inbrija, vasculitis who was originally seen in 2019 for pain, persistent recurrent nausea and dizziness, restless leg syndrome, recent admission to [facility] for GIB who now presents for a primary palliative care follow up.\par \par MRI 12/23 --> progression in L femur and pelvis lesions. S/p XRT to pelvis and femur 2/1/22-2/7/22\par \par 1/10: Does not feel better. Tried Dex 2mg and it didn't help stimulate her, only helped her rash.\par Tried Provigil- didn't notice a difference. Leg pain is worse. \par \par 1/24: Feels "a lot" better. Feels Dex at both doses (2 & 4mg) kept her up at night and made her restless leg worse. Tramadol is a "miracle" in terms of helping pain. Takes 2-3x/day. Sleeping better. Taking 1/2 dose of Miralax daily with good effect. Uses Curaleaf 1:1 2.5mg THC:CBD which helps her feel more alert. Extra Sinemet during the day is not really helping. RX Ritalin\par \par 2/24: Ritalin made her feel "jittery"- advised taper off and trial of Lexapro 5mg PO QD. \par \par 4/1: Reported increasing crying spells since starting medication, advised to stop\par \par 6/1: Presenting for routine follow up. Feels like "crap." Had many bug bites from an "arthropod attack," not improving, now s/p biopsy yesterday.. Feels afternoon "blahs" are 2/2 OFF periods of PD. No pain/nausea. Endorses leg pain, feels as though she may have neuropathy. Feet feel cold. Needs more assistance around the house (getting in and out of shower, etc).\par \par 8/10: Presenting for routine follow up. Have had frequent phone check ins (documenting) for either high periods of anxiety or periods of severe "lows" and fatigued. Now followed by Psych/Oncology Dr. Valentino. As of last visit 8/8,   Fluoxetine was increased to 20mg/day, and she was advised to continue with Lorazepam 0.5mg BID CONNER + Xanax 0.5mg PRN.\par \par Reports feeling better and having more energy, taking less naps. Has been taking Xanax PRN 2-3x/day. Feels "dyskinetic" at times.\par \par Constipated x 3-4 days then uses suppository and has diarrhea. Takes Miralax daily. \par \par Awaiting auto for MRI of pancreas \par \par Fidel (): [phone] / [phone]\par Jero (son): [phone] / [phone]

## 2022-08-11 ENCOUNTER — APPOINTMENT (OUTPATIENT)
Dept: HEMATOLOGY ONCOLOGY | Facility: CLINIC | Age: 72
End: 2022-08-11

## 2022-08-11 PROCEDURE — 99215 OFFICE O/P EST HI 40 MIN: CPT | Mod: 95

## 2022-08-12 ENCOUNTER — NON-APPOINTMENT (OUTPATIENT)
Age: 72
End: 2022-08-12

## 2022-08-15 ENCOUNTER — APPOINTMENT (OUTPATIENT)
Dept: MRI IMAGING | Facility: CLINIC | Age: 72
End: 2022-08-15

## 2022-08-15 ENCOUNTER — OUTPATIENT (OUTPATIENT)
Dept: OUTPATIENT SERVICES | Facility: HOSPITAL | Age: 72
LOS: 1 days | End: 2022-08-15
Payer: MEDICARE

## 2022-08-15 DIAGNOSIS — K86.2 CYST OF PANCREAS: ICD-10-CM

## 2022-08-15 DIAGNOSIS — R11.0 NAUSEA: ICD-10-CM

## 2022-08-15 PROCEDURE — A9585: CPT

## 2022-08-15 PROCEDURE — 74183 MRI ABD W/O CNTR FLWD CNTR: CPT

## 2022-08-15 PROCEDURE — 74183 MRI ABD W/O CNTR FLWD CNTR: CPT | Mod: 26

## 2022-08-19 ENCOUNTER — APPOINTMENT (OUTPATIENT)
Dept: GASTROENTEROLOGY | Facility: CLINIC | Age: 72
End: 2022-08-19

## 2022-08-19 VITALS
TEMPERATURE: 97.4 F | BODY MASS INDEX: 26.24 KG/M2 | HEIGHT: 61.02 IN | OXYGEN SATURATION: 98 % | WEIGHT: 139 LBS | HEART RATE: 100 BPM | DIASTOLIC BLOOD PRESSURE: 58 MMHG | SYSTOLIC BLOOD PRESSURE: 108 MMHG

## 2022-08-19 PROCEDURE — 99214 OFFICE O/P EST MOD 30 MIN: CPT

## 2022-08-19 NOTE — HISTORY OF PRESENT ILLNESS
[FreeTextEntry1] : Ms. Mcdowell is a pleasant 72F h/o metastatic lung adenocarcinoma (stage 4) with bone metastases, stage 1 breast cancer, Parkinson's, thyroid nodule who returns for f/u.\par \par She underwent a CTA chest on 7/28/22 for her lung cancer incidentally showing:\par Partially imaged 2.7 cm cystic lesion in the pancreatic neck/head\par \par Of note, she previously underwent an abd MRI 4/19 showing:\par 2.0 cm pancreatic neck cystic lesion which may reflect a side branch IPMN.\par \par She feels well, no abd complaints. Main complaints have been anxiety and movement disorders recently due to her Parkinson's.\par \par She is not experiencing jaundice or icterus.\par \par Due to her multiple comorbidities and her slowly advancing metastatic lung cancer, she wants to avoid additional procedures if at all possible.

## 2022-08-19 NOTE — PHYSICAL EXAM
[General Appearance - Alert] : alert [General Appearance - In No Acute Distress] : in no acute distress [Sclera] : the sclera and conjunctiva were normal [Outer Ear] : the ears and nose were normal in appearance [Neck Appearance] : the appearance of the neck was normal [] : no respiratory distress [Abdomen Soft] : soft [Skin Color & Pigmentation] : normal skin color and pigmentation [Oriented To Time, Place, And Person] : oriented to person, place, and time

## 2022-08-19 NOTE — ASSESSMENT
[FreeTextEntry1] : Likely slowly expanding IPMN.  Given her metastatic lung cancer and likely benign nature of her pancreatic cystic lesion, I would not plan on an EUS at this time.  When she is due for repeat chest imaging, I would plan on abdominal imaging at the same time (CT abd along with her next CT chest).\par \par If she changes her mind and would like an upper EUS for further evaluation, we could schedule her at that time.

## 2022-08-22 ENCOUNTER — LABORATORY RESULT (OUTPATIENT)
Age: 72
End: 2022-08-22

## 2022-08-24 ENCOUNTER — RESULT REVIEW (OUTPATIENT)
Age: 72
End: 2022-08-24

## 2022-08-26 ENCOUNTER — APPOINTMENT (OUTPATIENT)
Dept: GASTROENTEROLOGY | Facility: CLINIC | Age: 72
End: 2022-08-26

## 2022-08-26 ENCOUNTER — APPOINTMENT (OUTPATIENT)
Dept: HEMATOLOGY ONCOLOGY | Facility: CLINIC | Age: 72
End: 2022-08-26

## 2022-08-26 ENCOUNTER — NON-APPOINTMENT (OUTPATIENT)
Age: 72
End: 2022-08-26

## 2022-08-26 VITALS
HEART RATE: 99 BPM | OXYGEN SATURATION: 98 % | WEIGHT: 139 LBS | BODY MASS INDEX: 27.29 KG/M2 | DIASTOLIC BLOOD PRESSURE: 52 MMHG | HEIGHT: 60 IN | SYSTOLIC BLOOD PRESSURE: 102 MMHG

## 2022-08-26 PROCEDURE — 99215 OFFICE O/P EST HI 40 MIN: CPT | Mod: 95

## 2022-08-26 PROCEDURE — 99213 OFFICE O/P EST LOW 20 MIN: CPT

## 2022-08-26 NOTE — PHYSICAL EXAM
[General Appearance - Alert] : alert [General Appearance - In No Acute Distress] : in no acute distress [Sclera] : the sclera and conjunctiva were normal [Outer Ear] : the ears and nose were normal in appearance [Hearing Threshold Finger Rub Not [place]] : hearing was normal [Neck Appearance] : the appearance of the neck was normal [] : no respiratory distress [Apical Impulse] : the apical impulse was normal [Abdomen Soft] : soft [Abdomen Tenderness] : non-tender [Abnormal Walk] : normal gait [Skin Color & Pigmentation] : normal skin color and pigmentation [No Focal Deficits] : no focal deficits [Oriented To Time, Place, And Person] : oriented to person, place, and time [FreeTextEntry1] : deferred

## 2022-08-26 NOTE — REASON FOR VISIT
[Follow-Up: _____] : a [unfilled] follow-up visit [FreeTextEntry1] : for diarrhea. Seen at the request of Dr. Porsche Read

## 2022-08-26 NOTE — HISTORY OF PRESENT ILLNESS
[FreeTextEntry1] : 72 year old F, stage 4 lung ca on chemotherapy, Tagrisso, stage 1 breast ca  s/p left lumpectomy and XRT, now on anastrazole, Parkinson disease, GERD \par presents for evaluation of diarrhea follow up of for GIB\par \par She is joined today by her . \par \par Called with c/o acute onset diarrhea on 8/24/22. multiple episodes of nonbloody diarrhea per day. + FI, nocturnal stool. no fevers. \par no abd pain. tolerating PO. 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 \par \par She started Cipro/flagyl night of 8/24, last episode of diarrhea was in am on 8/25. \par no further episodes. \par feels back to her baseline now. tolerating PO She has had decrease appetite in the last month, sancuso controls her nausea very well. \par she has had 8 lbs weight loss in past 3-4 months, 40 lbs in last 4 years. GERD now better controlled off of meds. \par suffering with anxiety, seeing psych. will start seroquel. \par \par Seen by Dr. Munguia 08/19/2022 for evaluation with pancreatic cyst. \par She had CTA chest 7/28/22 for lung ca and it incidentally showed paritally imaged 2.7 cm cystic lesion in the pancreatic head/neck. \par \par MRI 04/2019- 2.0 cm pancreatic neck cystic lesion which may reflect a side branch IPMN\par \par She was not advised to have EUS. ADvised to have CT Abd at time of next chest CT. \par \par \par She was last seen in 04/2022 for rectal bleeding. \par monday 4/11, developed small amount of BRBPR a/w constipation. she was seen in the ED. admitted to hospital, colonoscopy with Dr. Munguia on 4/13/22, diverticulosis, hemorrhoids. H/H and vital signs remained stable. \par She was discharged. had f/u H/H with Dr. Shaffer and was stable. no further bleeding. \par \par She continues to suffer from constipation , 1 bm every 3 days. \par MiraLAX once a day, prunes, digestive advantage 1 x per day\par PD drugs have been in creased recently.  \par \par things are not tasting that great, 45 lbs weight loss over 3 years. 10-15 lbs in the last 2 months. \par nausea not bad. on granisetron patch. GERD controlled\par recently stopped dronabinol. and dizziness improved. \par \par She is very sensitive to medications and if she increases MiraLAX she can have  diarrhea. \par variable success with suppositories, enemas. \par if she increases MiraLAX. she gets diarrhea \par \par she has a rectocele\par \par EGD 7/22/19- for elevated CEA, abnormal PET, \par showed LA A esophagitis, gastritis, HH, \par GEJ bx- GIM, normal duodenum, stomach bx\par \par \par  egd/colonoscopy 2/1/2018 with Dr. Moore- 8 cm HH, mild reflux changes on bx\par ?poor right sided prep\par redundant colon \par no h/o colon polyps\par told to repeat in 5 years due to family hx crc (father rectal ca, dx 66)\par

## 2022-08-26 NOTE — PHYSICAL EXAM
Encounter Date: 8/26/2022    SCRIBE #1 NOTE: I, Elizabethmaira Hernandez, am scribing for, and in the presence of, Theo Taylor MD.       History     Chief Complaint   Patient presents with    Rectal Bleeding     Tiny bit of blood noticed when wiping after a bowel movement. No other complaints     Time seen by provider: 12:17 PM on 08/26/2022    Diana Barlow is a 8 y.o. female who presents to the ED with an onset of constipation that began 3 days ago. Patient's last bowel movement was yesterday at school and the school nurse noted bloody stool. Mom states the patient ate red beans at school 4 days ago and states that the patient usually does not feel good after eating school red beans. Mom states the patient had mushy stool according to the nurse. The patient denies abdominal pain, nausea, vomiting, fever, cough, congestion or any other symptoms at this time. Patient has a PMHx of GERD and no recorded PSHx.      The history is provided by the patient and the mother.     Review of patient's allergies indicates:   Allergen Reactions    Tylenol [acetaminophen] Rash     Past Medical History:   Diagnosis Date    GERD (gastroesophageal reflux disease)      No past surgical history on file.  No family history on file.  Social History     Tobacco Use    Smoking status: Passive Smoke Exposure - Never Smoker    Smokeless tobacco: Never Used   Substance Use Topics    Alcohol use: No    Drug use: No     Review of Systems   Constitutional: Negative for fever.   HENT: Negative for congestion.    Respiratory: Negative for cough.    Gastrointestinal: Positive for blood in stool and constipation. Negative for abdominal pain, nausea and vomiting.       Physical Exam     Initial Vitals [08/26/22 1206]   BP Pulse Resp Temp SpO2   110/75 (!) 112 20 98.7 °F (37.1 °C) 100 %      MAP       --         Physical Exam    Nursing note and vitals reviewed.  Constitutional: She appears well-developed and well-nourished. She is not  diaphoretic. No distress.   HENT:   Head: Normocephalic and atraumatic.   Eyes: Conjunctivae are normal.   Neck: Neck supple.   Cardiovascular: Regular rhythm. Exam reveals no gallop and no friction rub.    No murmur heard.  Abdominal: Abdomen is soft. Bowel sounds are normal. She exhibits no distension. There is no abdominal tenderness.   Patient has no visible hemorrhoid, fissures, or broken skin near the rectum. Patient has heme-negative stool. There is no rebound and no guarding.   Musculoskeletal:         General: Normal range of motion.      Cervical back: Neck supple.     Neurological: She is alert.   Skin: Skin is warm and dry. No rash noted. No erythema.         ED Course   Procedures  Labs Reviewed - No data to display       Imaging Results    None          Medications - No data to display  Medical Decision Making:   History:   Old Medical Records: I decided to obtain old medical records.  Initial Assessment:   8-year-old female presented with blood in her stool.  Differential Diagnosis:   Initial differential diagnosis included but not limited to rectal fissure, hemorrhoids, and food particles.  ED Management:  The patient was emergently evaluated in the emergency department, her evaluation was significant for a well-appearing young female with a benign abdominal exam.  A rectal exam done by the nurse practitioner shows no acute abnormalities or signs of bleeding Hemoccult testing.  The etiology of her symptoms could be a small internal hemorrhoid.  The patient is otherwise asymptomatic and I do not believe that she needs any lab work at this time.  She is stable for discharge to home.  The patient's mother will keep an eye on the patient's stools and she is to otherwise follow up with her PCP for further care.          Scribe Attestation:   Scribe #1: I performed the above scribed service and the documentation accurately describes the services I performed. I attest to the accuracy of the note.               I, Dr. Theo Taylor, personally performed the services described in this documentation. All medical record entries made by the scribe were at my direction and in my presence.  I have reviewed the chart and agree that the record reflects my personal performance and is accurate and complete. Theo Taylor MD.  2:59 PM 08/26/2022      Clinical Impression:   Final diagnoses:  [K92.1] Blood in stool - resolved (Primary)          ED Disposition Condition    Discharge Stable        ED Prescriptions     None        Follow-up Information     Follow up With Specialties Details Why Contact Info    Nestor Padilla NP Pediatrics Schedule an appointment as soon as possible for a visit   [address] [place], [place] [address]S INT'[facility] [id number]  [phone]             Theo Taylor MD  08/26/22 9882     [Alert] : alert [No Acute Distress] : no acute distress [Well Nourished] : well nourished [Well Developed] : well developed [Normal Sclera/Conjunctiva] : normal sclera/conjunctiva [No Proptosis] : no proptosis [EOMI] : extra ocular movement intact [Normal Oropharynx] : the oropharynx was normal [Thyroid Not Enlarged] : the thyroid was not enlarged [No Thyroid Nodules] : there were no palpable thyroid nodules [No Respiratory Distress] : no respiratory distress [No Accessory Muscle Use] : no accessory muscle use [Clear to Auscultation] : lungs were clear to auscultation bilaterally [Normal S1, S2] : normal S1 and S2 [Normal Rate] : heart rate was normal  [Pedal Pulses Normal] : the pedal pulses are present [Regular Rhythm] : with a regular rhythm [Not Tender] : non-tender [No Edema] : there was no peripheral edema [Normal Bowel Sounds] : normal bowel sounds [Soft] : abdomen soft [Not Distended] : not distended [Axillary Nodes] : axillary nodes [Anterior Cervical Nodes] : anterior cervical nodes [Post Cervical Nodes] : posterior cervical nodes [Normal] : normal and non tender [Spine Straight] : spine straight [No Spinal Tenderness] : no spinal tenderness [No Stigmata of Cushings Syndrome] : no stigmata of cushings syndrome [Normal Gait] : normal gait [No Rash] : no rash [Normal Strength/Tone] : muscle strength and tone were normal [Normal Reflexes] : deep tendon reflexes were 2+ and symmetric [No Tremors] : no tremors [Oriented x3] : oriented to person, place, and time [Acanthosis Nigricans] : no acanthosis nigricans

## 2022-08-26 NOTE — ASSESSMENT
[FreeTextEntry1] : Diarrhea\par  infectious vs. med side effect from Tagrisso, \par improved on abx\par cont  Cipro/flagyl x 5 days\par if recurrent, patient will provide stool sample (kit given to patient) and she will call. \par colonoscopy 04/2022 with Dr. Munguia- diverticulosis, hemorrhoids\par \par Constipation- hold bowel regimen, resume slowly if no bm for 2 days. \par \par GERD- controlled off of meds continue antireflux/diet lifestyle. \par  \par EGD path benign in 07/2019\par \par Nausea- due to chemo, controlled sancuso

## 2022-09-05 LAB
BACTERIA STL CULT: NORMAL
C DIFF TOX GENS STL QL NAA+PROBE: NORMAL
CDIFF BY PCR: NOT DETECTED

## 2022-09-06 ENCOUNTER — APPOINTMENT (OUTPATIENT)
Dept: HEMATOLOGY ONCOLOGY | Facility: CLINIC | Age: 72
End: 2022-09-06

## 2022-09-06 PROCEDURE — 99215 OFFICE O/P EST HI 40 MIN: CPT | Mod: 95

## 2022-09-08 RX ORDER — FLUOXETINE HYDROCHLORIDE 10 MG/1
10 TABLET ORAL
Qty: 60 | Refills: 5 | Status: DISCONTINUED | COMMUNITY
Start: 2022-08-08 | End: 2022-09-08

## 2022-09-08 RX ORDER — FLUOXETINE HYDROCHLORIDE 10 MG/1
10 CAPSULE ORAL DAILY
Qty: 90 | Refills: 5 | Status: DISCONTINUED | COMMUNITY
Start: 2022-09-06 | End: 2022-09-08

## 2022-09-13 ENCOUNTER — RESULT REVIEW (OUTPATIENT)
Age: 72
End: 2022-09-13

## 2022-09-13 ENCOUNTER — APPOINTMENT (OUTPATIENT)
Dept: GERIATRICS | Facility: HOME HEALTH | Age: 72
End: 2022-09-13

## 2022-09-13 ENCOUNTER — APPOINTMENT (OUTPATIENT)
Dept: HEMATOLOGY ONCOLOGY | Facility: CLINIC | Age: 72
End: 2022-09-13

## 2022-09-13 VITALS
OXYGEN SATURATION: 95 % | WEIGHT: 143.06 LBS | BODY MASS INDEX: 28.09 KG/M2 | HEART RATE: 106 BPM | SYSTOLIC BLOOD PRESSURE: 95 MMHG | DIASTOLIC BLOOD PRESSURE: 50 MMHG | TEMPERATURE: 96.5 F | RESPIRATION RATE: 16 BRPM | HEIGHT: 60 IN

## 2022-09-13 DIAGNOSIS — K21.9 GASTRO-ESOPHAGEAL REFLUX DISEASE W/OUT ESOPHAGITIS: ICD-10-CM

## 2022-09-13 PROCEDURE — 99214 OFFICE O/P EST MOD 30 MIN: CPT | Mod: 25

## 2022-09-13 PROCEDURE — 99215 OFFICE O/P EST HI 40 MIN: CPT

## 2022-09-13 PROCEDURE — 36415 COLL VENOUS BLD VENIPUNCTURE: CPT

## 2022-09-13 NOTE — HISTORY OF PRESENT ILLNESS
[Therapy: ___] : Therapy: [unfilled] [de-identified] : Mrs. Mcdowell is a 68 year old postmenopausal female who is referred by Dr.Alice Demarco for newly diagnosed breast cancer.\par She was found on routine mammography in November 2019 she you have a focal asymmetric density in the left upper outer breast at 1:00 access 8 cm from the nipple. A one year prior had been negative.  Ultrasound-guided core biopsy in November 21, 2018 revealed a grade 1/3 invasive ductal carcinoma ER positive at 98% and DC positive at 90%,Ki-67 was less than 10%. She underwent some left breast lumpectomy and sentinel node dissection on December 19, 2018. Pathology revealed an 8 mm grade 1/3 invasive ductal carcinoma zero of 3 sentinel lymph nodes were involved with tumor. Oncotype DX score was 11. She also underwent genetic testing which is negative.\par  [de-identified] : Mrs. Mcdowell presents for follow up on Anastrazole for breast cancer. Patient presents with her . Patient states that her anxiety has significantly improved since beginning on prozac.  states he feels she is doing remarkably better and he notices that her levels of confusion are much, much better. Patient has begun cooking again which she has been unable to do in months. Patient has f/u with GI since her last appointment. Patient states that she is still experiencing dizziness and intermittent diarrhea and constipation but she has seen improvements in both.

## 2022-09-13 NOTE — ASSESSMENT
[FreeTextEntry1] : 71 yo female \par \par # Stage 1 breast cancer 8 mm tumor, invasive ductal carcinoma, ER/ MA positive, HER2 not amplified with Oncotype Dx 11. diagnosed November 2018\par -continue with Anastrazole\par - left breast pain - c/w fat necrosis on mammogram up to date 12/21\par \par #Adenocarcinoma of lung- stage 4 diagnosed  April 2019\par -Repeated CEA -plateau at 11.5 (started out at 26.6)\par -CT scan showed mass 1.8 x 1.4 x 1.7 cm lesion.\par -Biopsy of lung nodule c/w adeno ca of the lung, EGFR mutated, T190 mutated, started on Tagrisso.  Side effects, -toxicities and benefits reviewed with pt. L4 vertebral body biopsy confirmed met adenoca of the lung.\par - PETCT  2/3/2020- decreased FDG uptake in primary lesion, sclerotic lesions in bones, thyroid nodule activity- under surveillance, duodenal uptake \par - plan for PETCT Feb 2021- SARAH- stable, uptake in right breast - c/w fat necrosis \par - repeat CEA\par -Jan 2022- PETCT - sclerotic bone lesions.\par - pain in the left femur 2-3/10 takes viocodin daily, s/p RT \par -decreased lower back pain \par - visit to ER for dyspnea - images reviewed, evaluated for PE, related to Parkinson disease.  Lung mass 1.1 cm on CT\par - patient with weight loss, struggles with Parkinson disease\par \par #benign thyroid nodule - followed by Dr. Park. plan for biopsy\par # constipation- daily miralax\par \par - continue Tagrisso 11 pm, nausea in am \par - Nausea is controlled with  Sancuso and Dronabinol 2.5mg bid for nausea- refill today I-stop checked\par - continue Xgeva monthly -neoplasm pain - continue with Vicodin prn rarely taking \par -established care with Dr. Hendrix for her Parkinsons\par lung cancer stage 4 on tagrisso \par Tagrisso/Xgeva- extend Xgeva to q 6 weeks \par Patient evaluated by neuro for new tx for Parkinsons, will hold Sancuso \par \par [facility] 360 no EGFR mutation detected 5/21\par CT c/a/p with bone scan ordered 9/22\par \par Still significant fatigue at 3 pm, under care of palliative care NP Noman. \par Nausea - on and off. \par CBC,Chem,CEA, CA 27.29  case and mgmt  to return in 1 month for treatment  \par \par # new pancreatic mass - order MRI with gadolinium, Ca19-9. Referred to Dr. Munguia for EUS.  IPMN suspected, observation vs biopsy.  \par \par # Episodes of chest tightness, tachycardia, SOB, feeling cold- was told it it anxiety attack.  It happens few times a day usually few times a day.  She had several trips to ER that didn’t show any other cardiopulmonary source. Attacks are debilitating. \par Improved after patient stopped Xanax\par Labs ordered, drawn in office

## 2022-09-13 NOTE — PHYSICAL EXAM
[Restricted in physically strenuous activity but ambulatory and able to carry out work of a light or sedentary nature] : Status 1- Restricted in physically strenuous activity but ambulatory and able to carry out work of a light or sedentary nature, e.g., light house work, office work [Normal] : affect appropriate [de-identified] : left breast scar

## 2022-09-13 NOTE — REVIEW OF SYSTEMS
[Fatigue] : fatigue [SOB on Exertion] : shortness of breath during exertion [Constipation] : constipation [Diarrhea] : diarrhea [Joint Pain] : joint pain [Muscle Pain] : muscle pain [Dizziness] : dizziness [Difficulty Walking] : difficulty walking [Anxiety] : anxiety [Depression] : depression [Muscle Weakness] : muscle weakness [Negative] : Allergic/Immunologic [Fever] : no fever [Eye Pain] : no eye pain [Vision Problems] : no vision problems [Dysphagia] : no dysphagia [Nosebleeds] : no nosebleeds [Chest Pain] : no chest pain [Lower Ext Edema] : no lower extremity edema [Shortness Of Breath] : no shortness of breath [Dysuria] : no dysuria [Easy Bleeding] : no tendency for easy bleeding [Easy Bruising] : no tendency for easy bruising [FreeTextEntry6] : intermittently  [FreeTextEntry9] : muscle spasms due to Parkinsons, left hip pain  [de-identified] : tremor [de-identified] : improving

## 2022-09-16 NOTE — ADDENDUM
[FreeTextEntry1] : pt called with episode of diarrhea, she spoke with GI c. dif prelim negative advised to take immodium by Gi\par spoke with pt to take immodium 4 mg x 1 at first diarrhea episode then followed by 2 mg prn x 3 days and stop if no BM in one day.\par f/u with GI\par also advised to take prozac 20 mg in am and 10 mg in afternoon until dose tolerated

## 2022-09-16 NOTE — ASSESSMENT
[Frailty-weight loss of >5%] : frailty-weight loss  [Fall precautions] : fall precautions [Social support] : social support [Living arrangements] : living arrangements [Medication Management] : medication management [Patient/Caregiver is not ready to engage in discussion] : Patient/caregiver is not ready to engage in discussion [FreeTextEntry1] : Mrs. Mcdowell is a very pleasant 68 y/o F with multiple medical problems including metastatic lung cancer with disease to bone on Tagrisso, history of Breast cancer s/p Mastectomy, radiation and on Arimidex with debilitating anxiety and panic attacks with frequent ER visits being followed by Dr. acosta \par \par Pain/ nausea/ decreased appetite/anxiety\par continue trial of medical marijuana --discussed CBD only no THC for anxiety symptoms, certificate renewed \par  lidoderm patch\par  tylenol \par  1/4 tab of vicodin QHS--discussed other opitons of opioids--pt very sensitive to AE\par Zofran prn as Ganisteron not tolerated and pt allergic to compazine\par continue prozac 30 mg rx sent will continue titration as per Dr. Saini recommendations goal is to reach 40 mg as tolerated\par continue ativan 0.5 mg BID prn\par seroquel 25 mg q6h prn panic attacks\par \par pt with significant improvement of symptoms, doing well, and in good spirits\par pt is participating in PT and is doing therapy and couples therapy with  as well\par f/u in one month for possible titration of anxiety medications as needed as Dr. Acosta is relocating.\par \par

## 2022-09-16 NOTE — HISTORY OF PRESENT ILLNESS
[FreeTextEntry1] : Mrs. Mcdowell is a very pleasant 70 y/o F with multiple medical problems including metastatic lung cancer with disease to bone on Tagrisso, history of Breast cancer s/p Mastectomy, radiation and on arimadex, also with Prothrombin gene mutation with vasculitis. \par \par pt with multiple ER visits for debilitating anxitey assocaited with shortness of breath, palpitations, and dizziness. pt with extensive work up negative for cardiac etiologies and no PE\par pt seen by psychiatry Dr. Acosta who started on benzodiazepines with increasing AE of amnesia currently on prozac being titrated up and seroquel with significant improvement in pt symptoms \par in regards to pain --pt has tried multiple medications and sensitive to side effects including oxycodone, morphine\par pt has been able to tolerate Vicodin well with minimal side effects \par \par Pt has tired MM with equivocal response, pt reports it worked at times and other times she was too "dopey" or increases her anxiety afterwards. discussed trial of CBD only with out THC.\par \par Pt pain in much improved s/p radiation and has not required medications regularly for pain control. \par \par \par \par

## 2022-09-16 NOTE — PHYSICAL EXAM
[General Appearance - Alert] : alert [General Appearance - In No Acute Distress] : in no acute distress [General Appearance - Well Nourished] : well nourished [General Appearance - Well-Appearing] : healthy appearing [Sclera] : the sclera and conjunctiva were normal [Extraocular Movements] : extraocular movements were intact [Outer Ear] : the ears and nose were normal in appearance [Hearing Threshold Finger Rub Not [place]] : hearing was normal [Neck Appearance] : the appearance of the neck was normal [Jugular Venous Distention Increased] : there was no jugular-venous distention [Respiration, Rhythm And Depth] : normal respiratory rhythm and effort [Exaggerated Use Of Accessory Muscles For Inspiration] : no accessory muscle use [Auscultation Breath Sounds / Voice Sounds] : lungs were clear to auscultation bilaterally [Heart Rate And Rhythm] : heart rate was normal and rhythm regular [Heart Sounds] : normal S1 and S2 [Murmurs] : no murmurs [Full Pulse] : the pedal pulses are present [Edema] : there was no peripheral edema [Bowel Sounds] : normal bowel sounds [Abdomen Soft] : soft [Abdomen Tenderness] : non-tender [Abnormal Walk] : normal gait [Nail Clubbing] : no clubbing  or cyanosis of the fingernails [Musculoskeletal - Swelling] : no joint swelling seen [Skin Color & Pigmentation] : normal skin color and pigmentation [] : no rash [Cranial Nerves] : cranial nerves 2-12 were intact [Oriented To Time, Place, And Person] : oriented to person, place, and time [Impaired Insight] : insight and judgment were intact [Affect] : the affect was normal [FreeTextEntry1] : BP 95/55 HR 98 RR 14 Spo2 96%

## 2022-09-18 LAB
C DIFF TOX GENS STL QL NAA+PROBE: NORMAL
CDIFF BY PCR: NOT DETECTED

## 2022-09-19 ENCOUNTER — NON-APPOINTMENT (OUTPATIENT)
Age: 72
End: 2022-09-19

## 2022-09-19 ENCOUNTER — APPOINTMENT (OUTPATIENT)
Dept: HEMATOLOGY ONCOLOGY | Facility: CLINIC | Age: 72
End: 2022-09-19

## 2022-09-19 PROCEDURE — 99215 OFFICE O/P EST HI 40 MIN: CPT | Mod: 95

## 2022-09-19 RX ORDER — FLUOXETINE HYDROCHLORIDE 20 MG/1
20 CAPSULE ORAL
Qty: 30 | Refills: 3 | Status: DISCONTINUED | COMMUNITY
Start: 2022-09-08 | End: 2022-09-19

## 2022-09-19 RX ORDER — FLUOXETINE HYDROCHLORIDE 10 MG/1
10 CAPSULE ORAL DAILY
Qty: 30 | Refills: 3 | Status: DISCONTINUED | COMMUNITY
Start: 2022-09-08 | End: 2022-09-19

## 2022-09-20 NOTE — HISTORY OF PRESENT ILLNESS
[de-identified] : Patient has benefitted from going up on prozac to 30mg daily and is having much less in the way of panic attacks and is taking less benzodiazepines.  She and  notice a considerable difference in her cognition.  Sleep is better.  Using seroquel 50mg 2-3x daily.   [No] : no [de-identified] : 73 yo F with h/o breast cancer IDC ER/FL + s/p lumpectomy 2018 on anastrazole, also has stage IV EGFR mut NSCLC on tagrisso and h/o parkinsons.  She reports excessive fatigue and anxiety, which is generalized and as panic attacks.  REports adherence with PD meds but feeling exasperated by the illness.  Denies anhedonia and depression, reports difficulties with sleep/wake cycle but denies hopelessness, AVH, SI/HI, and denies excessive guilt or PTSD symptoms.  States that she/s had some mood lability over the years and was started on lexapro at some point which made her PD worse-however she only took twice.  denies hospitalizations and no known family history.  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  states that ativan makes her excessively sleepy  [None] : none [Responsibility to family or others] : responsibility to family or others [Identifies reasons for living] : identifies reasons for living [None Known] : none known

## 2022-09-20 NOTE — DISCUSSION/SUMMARY
[FreeTextEntry1] : 71 yo F with h/o breast and lung cancer and PD with generalized anxiety versus panic disorder.  \par \par 1. Continue prn ativan/xanax to abort panic \par 2. increase prozac from 30mg to 40mg (Rx given) \par   Will discuss side effects at higher dose.  Target dose 40-60mg \par \par 3. Continue  seroquel 50mg q8 hour prn severe anxeity given its benefit  \par \par 4. Psychoeducation   \par \par 5. Reviewed termination and follow up appointment with Ghislaine as well as how to get in touch with me if needed. \par \par RTC 2 weeks (Ghislaine)_ \par \par Spent 40  min with patient and

## 2022-09-21 ENCOUNTER — RESULT REVIEW (OUTPATIENT)
Age: 72
End: 2022-09-21

## 2022-09-21 ENCOUNTER — APPOINTMENT (OUTPATIENT)
Dept: GERIATRICS | Facility: CLINIC | Age: 72
End: 2022-09-21

## 2022-09-21 VITALS
DIASTOLIC BLOOD PRESSURE: 76 MMHG | SYSTOLIC BLOOD PRESSURE: 123 MMHG | OXYGEN SATURATION: 97 % | RESPIRATION RATE: 16 BRPM | HEART RATE: 91 BPM | TEMPERATURE: 96.3 F | WEIGHT: 142 LBS | HEIGHT: 60 IN | BODY MASS INDEX: 27.88 KG/M2

## 2022-09-21 PROCEDURE — 99215 OFFICE O/P EST HI 40 MIN: CPT

## 2022-09-22 NOTE — HISTORY OF PRESENT ILLNESS
[FreeTextEntry1] : Rosamaria Mcdowell is a 71 y/o F w/ metastatic lung cancer to bone on tagrisso/xgyeva, localized breast cancer s/p lumpectomy and XRT on anastrazole, PD on Inbrija, vasculitis who was originally seen in 2019 for pain, persistent recurrent nausea and dizziness, restless leg syndrome, recent admission to [facility]  for GIB \par \par MRI 12/23 --> progression in L femur and pelvis lesions. S/p XRT to pelvis and femur 2/1/22-2/7/22\par \par 1/10: Does not feel better. Tried Dex 2mg and it didn't help stimulate her, only helped her rash.\par Tried Provigil- didn't notice a difference. Leg pain is worse. \par \par 1/24: Feels "a lot" better. Feels Dex at both doses (2 & 4mg) kept her up at night and made her restless leg worse. Tramadol is a "miracle" in terms of helping pain. Takes 2-3x/day. Sleeping better. Taking 1/2 dose of Miralax daily with good effect. Uses Curaleaf 1:1 2.5mg THC:CBD which helps her feel more alert. Extra Sinemet during the day is not really helping. RX Ritalin\par \par 2/24: Ritalin made her feel "jittery"- advised taper off and trial of Lexapro 5mg PO QD. \par \par 4/1: Reported increasing crying spells since starting medication, advised to stop\par \par 6/1: Presenting for routine follow up. Feels like "crap." Had many bug bites from an "arthropod attack," not improving, now s/p biopsy yesterday.. Feels afternoon "blahs" are 2/2 OFF periods of PD. No pain/nausea. Endorses leg pain, feels as though she may have neuropathy. Feet feel cold. Needs more assistance around the house (getting in and out of shower, etc).\par \par 8/10: Presenting for routine follow up. Have had frequent phone check ins (documenting) for either high periods of anxiety or periods of severe "lows" and fatigued. Now followed by Psych/Oncology Dr. Valentino. As of last visit 8/8,   Fluoxetine was increased to 20mg/day, and she was advised to continue with Lorazepam 0.5mg BID CONNER + Xanax 0.5mg PRN.\par \par 8/26: Reports feeling better and having more energy, taking less naps. Has been taking Xanax PRN 2-3x/day. Feels "dyskinetic" at times. Constipated x 3-4 days then uses suppository and has diarrhea. Takes Miralax daily. \par \par 9/19: Prozac increased and now off benzos 2/2 forgetfulness. Still having panic attacks 2-3x/day but feels better overall\par \par 9/21: Presents today for an urgent visit. Had panic-type symptoms all night (chest tightness, tachycardia to 120s); Seroquel did not really help. Ended up taking 1/2 Lorazepam which helped after an hour. Having panic attacks 2-3 times a day. Usually wakes up having one. Seroquel helps and causes less AMS-type symptoms. \par \par Fidel (): [phone] / [phone]\par Jero (son): [phone] / [phone]

## 2022-09-22 NOTE — REVIEW OF SYSTEMS
[As Noted in HPI] : as noted in HPI [Negative] : Integumentary [FreeTextEntry2] : Feels better now, no longer has chest tightness.

## 2022-09-22 NOTE — PHYSICAL EXAM
[General Appearance - Alert] : alert [General Appearance - In No Acute Distress] : in no acute distress [General Appearance - Well Nourished] : well nourished [General Appearance - Well-Appearing] : healthy appearing [Sclera] : the sclera and conjunctiva were normal [Extraocular Movements] : extraocular movements were intact [No Oral Pallor] : no oral pallor [Outer Ear] : the ears and nose were normal in appearance [Hearing Threshold Finger Rub Not [place]] : hearing was normal [Neck Appearance] : the appearance of the neck was normal [Neck Cervical Mass (___cm)] : no neck mass was observed [Jugular Venous Distention Increased] : there was no jugular-venous distention [Respiration, Rhythm And Depth] : normal respiratory rhythm and effort [Exaggerated Use Of Accessory Muscles For Inspiration] : no accessory muscle use [Auscultation Breath Sounds / Voice Sounds] : lungs were clear to auscultation bilaterally [Heart Rate And Rhythm] : heart rate was normal and rhythm regular [Heart Sounds] : normal S1 and S2 [Murmurs] : no murmurs [Full Pulse] : the pedal pulses are present [Edema] : there was no peripheral edema [Bowel Sounds] : normal bowel sounds [Abdomen Soft] : soft [Abdomen Tenderness] : non-tender [Abnormal Walk] : normal gait [Nail Clubbing] : no clubbing  or cyanosis of the fingernails [Musculoskeletal - Swelling] : no joint swelling seen [Skin Color & Pigmentation] : normal skin color and pigmentation [] : no rash [Cranial Nerves] : cranial nerves 2-12 were intact [Oriented To Time, Place, And Person] : oriented to person, place, and time [Impaired Insight] : insight and judgment were intact [Affect] : the affect was normal [Mood] : the mood was normal [FreeTextEntry1] : Frail, pale, feels better.

## 2022-09-22 NOTE — ASSESSMENT
[Frailty-weight loss of >5%] : frailty-weight loss  [Fall precautions] : fall precautions [Social support] : social support [Living arrangements] : living arrangements [Medication Management] : medication management [Patient/Caregiver is not ready to engage in discussion] : Patient/caregiver is not ready to engage in discussion [DNR] : Code Status: DNR [Comfort] : Treatment Guidelines: Comfort [DNI] : Intubation: DNI [[facility]: _____] [facility]last [facility]: [unfilled] [______] : HCP: [unfilled] [FreeTextEntry1] : 71 y/o F w/ metastatic lung cancer, localized breast CA, Parkinson's, panic disorder\par \par Pt has her intake with the [facility] wellness center tomorrow- they are going to schedule her with the Psychiatrist they are affiliated with (Dr. Yeimi Ellis)- unclear when apt will be. Reached out to Dr. Ellis to touch base- L/M.\par \par For now: \par \par - C/w Prozac 40mg PO QD\par - Start Seroquel 50/50/75 or 100mg- will try scheduling her seroquel to prevent attacks from happening, and try higher dose at night to reduce waking up in a panic \par - Enrolling in [facility] wellness program, to be referred to psychaistrist upon intake\par \par \par RTC in 4 weeks or PRN

## 2022-09-23 ENCOUNTER — NON-APPOINTMENT (OUTPATIENT)
Age: 72
End: 2022-09-23

## 2022-09-26 ENCOUNTER — NON-APPOINTMENT (OUTPATIENT)
Age: 72
End: 2022-09-26

## 2022-09-28 ENCOUNTER — APPOINTMENT (OUTPATIENT)
Dept: GASTROENTEROLOGY | Facility: CLINIC | Age: 72
End: 2022-09-28

## 2022-09-28 VITALS
WEIGHT: 142 LBS | HEIGHT: 60 IN | DIASTOLIC BLOOD PRESSURE: 62 MMHG | SYSTOLIC BLOOD PRESSURE: 118 MMHG | HEART RATE: 90 BPM | BODY MASS INDEX: 27.88 KG/M2 | OXYGEN SATURATION: 96 %

## 2022-09-28 PROCEDURE — 99214 OFFICE O/P EST MOD 30 MIN: CPT

## 2022-09-30 NOTE — PHYSICAL EXAM
[General Appearance - Alert] : alert [General Appearance - In No Acute Distress] : in no acute distress [Sclera] : the sclera and conjunctiva were normal [Outer Ear] : the ears and nose were normal in appearance [Hearing Threshold Finger Rub Not [place]] : hearing was normal [Neck Appearance] : the appearance of the neck was normal [] : no respiratory distress [Apical Impulse] : the apical impulse was normal [Abdomen Soft] : soft [Abdomen Tenderness] : non-tender [Abnormal Walk] : normal gait [Skin Color & Pigmentation] : normal skin color and pigmentation [No Focal Deficits] : no focal deficits [Oriented To Time, Place, And Person] : oriented to person, place, and time [FreeTextEntry1] : deferred

## 2022-09-30 NOTE — ASSESSMENT
[FreeTextEntry1] : Diarrhea- possible infectious, has responded to abx in the past. now recurrent, also consider medication side effect \par -additional stool studies ordered\par -colonoscopy 04/2022 with Dr. Munguia- diverticulosis, hemorrhoids\par -if stool studies unrevealing and persistent diarrhea, will consider flex sig vs. colonoscopy with biopsy r/o colitis. \par \par GERD- controlled off of meds continue antireflux/diet lifestyle. \par  \par EGD path benign in 07/2019\par \par Nausea- due to chemo, controlled sancuso

## 2022-10-03 ENCOUNTER — RESULT REVIEW (OUTPATIENT)
Age: 72
End: 2022-10-03

## 2022-10-03 LAB
DEPRECATED O AND P PREP STL: NORMAL
GI PCR PANEL: NOT DETECTED

## 2022-10-07 LAB — PANCREATIC ELASTASE, FECAL: 467

## 2022-10-10 LAB — CALPROTECTIN FECAL: 74 UG/G

## 2022-10-20 ENCOUNTER — APPOINTMENT (OUTPATIENT)
Dept: GERIATRICS | Facility: CLINIC | Age: 72
End: 2022-10-20

## 2022-10-20 PROCEDURE — 99214 OFFICE O/P EST MOD 30 MIN: CPT

## 2022-10-20 NOTE — HISTORY OF PRESENT ILLNESS
[FreeTextEntry1] : Rosamaria Mcdowell is a 73 y/o F w/ metastatic lung cancer to bone on tagrisso/xgyeva, localized breast cancer s/p lumpectomy and XRT on anastrazole, PD on Inbrija, vasculitis who was originally seen in 2019 for pain, persistent recurrent nausea and dizziness, restless leg syndrome, recent admission to [facility]  for GIB \par \par MRI 12/23 --> progression in L femur and pelvis lesions. S/p XRT to pelvis and femur 2/1/22-2/7/22\par \par 1/10: Does not feel better. Tried Dex 2mg and it didn't help stimulate her, only helped her rash.\par Tried Provigil- didn't notice a difference. Leg pain is worse. \par \par 1/24: Feels "a lot" better. Feels Dex at both doses (2 & 4mg) kept her up at night and made her restless leg worse. Tramadol is a "miracle" in terms of helping pain. Takes 2-3x/day. Sleeping better. Taking 1/2 dose of Miralax daily with good effect. Uses Curaleaf 1:1 2.5mg THC:CBD which helps her feel more alert. Extra Sinemet during the day is not really helping. RX Ritalin\par \par 2/24: Ritalin made her feel "jittery"- advised taper off and trial of Lexapro 5mg PO QD. \par \par 4/1: Reported increasing crying spells since starting medication, advised to stop\par \par 6/1: Presenting for routine follow up. Feels like "crap." Had many bug bites from an "arthropod attack," not improving, now s/p biopsy yesterday.. Feels afternoon "blahs" are 2/2 OFF periods of PD. No pain/nausea. Endorses leg pain, feels as though she may have neuropathy. Feet feel cold. Needs more assistance around the house (getting in and out of shower, etc).\par \par 8/10: Presenting for routine follow up. Have had frequent phone check ins (documenting) for either high periods of anxiety or periods of severe "lows" and fatigued. Now followed by Psych/Oncology Dr. Valentino. As of last visit 8/8,   Fluoxetine was increased to 20mg/day, and she was advised to continue with Lorazepam 0.5mg BID CONNER + Xanax 0.5mg PRN.\par \par 8/26: Reports feeling better and having more energy, taking less naps. Has been taking Xanax PRN 2-3x/day. Feels "dyskinetic" at times. Constipated x 3-4 days then uses suppository and has diarrhea. Takes Miralax daily. \par \par 9/19: Prozac increased and now off benzos 2/2 forgetfulness. Still having panic attacks 2-3x/day but feels better overall\par \par 9/21: Presents today for an urgent visit. Had panic-type symptoms all night (chest tightness, tachycardia to 120s); Seroquel did not really help. Ended up taking 1/2 Lorazepam which helped after an hour. Having panic attacks 2-3 times a day. Usually wakes up having one. Seroquel helps and causes less AMS-type symptoms. RX to continue Prozac 40, start Seroquel 50/50/75, and c/w [facility]\par \par 10/20: Taking Seroquel 50mg BID, Lorazepam 0.5mg daily in AM, and Prozac 40mg. Wakes up every morning in a "panic"- she no longer has breathing manifestations of panic attacks, but finds herself shivering. After the AM she is good for most of the day, and then starts feeling "cold" again toward the evening. Feels better after taking Seroquel. Tries not to take nighttime dose of Lorazepam. Wakes up at night to urinate 2-3x but is able to go back to sleep- 3-4 months ago, she was not able to go back to sleep at all. The "uncontrollable bouts of weepi-ness" have been gone for months. Apt with psychiatrist (Yeimi Ellis) via [facility] is on 10/28. Also taking Marinol before dinner to help with nausea. Appetite is fine. Participating in outpatient PT for her Parkinsons. \par \par Fidel (): [phone] / [phone]\danielle Nogueira (son): [phone] / [phone]

## 2022-10-20 NOTE — DISCUSSION/SUMMARY
[FreeTextEntry1] : S/w patient- She saw Dr. Valentino today who upper her Prozac to 40mg. Xanax/Ativan due to side effects (feeling "out of it," not remembering events, etc). She will take Seroquel PRN for panic attacks. \par \par Referred to [facility] @ [facility] so she could be referred to psychiatrist Dr. Yeimi Ellis. She only accepts new patients through the Wellness program. They cover 6 visits. \par \par L/M for wellness center giving referral as well

## 2022-10-20 NOTE — ASSESSMENT
[Frailty-weight loss of >5%] : frailty-weight loss  [Fall precautions] : fall precautions [Social support] : social support [Living arrangements] : living arrangements [Medication Management] : medication management [Patient/Caregiver is not ready to engage in discussion] : Patient/caregiver is not ready to engage in discussion [DNR] : Code Status: DNR [Comfort] : Treatment Guidelines: Comfort [DNI] : Intubation: DNI [Last Verification Date: _____] : [facility]last verification date: [unfilled] [______] : HCP: [unfilled] [FreeTextEntry1] : 71 y/o F w/ metastatic lung cancer, localized breast CA, Parkinson's, panic disorder\par \par - C/w Prozac 40mg PO QD + Seroquel 50mg TID CONNER + Ativan 0.5mg PO QAM\par - Seeing Dr. Ellis on 10/28\par \par RTC in 4 weeks or PRN

## 2022-10-20 NOTE — PHYSICAL EXAM
[General Appearance - Alert] : alert [General Appearance - In No Acute Distress] : in no acute distress [General Appearance - Well Nourished] : well nourished [General Appearance - Well-Appearing] : healthy appearing [Sclera] : the sclera and conjunctiva were normal [Extraocular Movements] : extraocular movements were intact [No Oral Pallor] : no oral pallor [Outer Ear] : the ears and nose were normal in appearance [Hearing Threshold Finger Rub Not Early] : hearing was normal [Neck Appearance] : the appearance of the neck was normal [Neck Cervical Mass (___cm)] : no neck mass was observed [Jugular Venous Distention Increased] : there was no jugular-venous distention [Respiration, Rhythm And Depth] : normal respiratory rhythm and effort [Exaggerated Use Of Accessory Muscles For Inspiration] : no accessory muscle use [Auscultation Breath Sounds / Voice Sounds] : lungs were clear to auscultation bilaterally [Heart Rate And Rhythm] : heart rate was normal and rhythm regular [Heart Sounds] : normal S1 and S2 [Murmurs] : no murmurs [Full Pulse] : the pedal pulses are present [Edema] : there was no peripheral edema [Bowel Sounds] : normal bowel sounds [Abdomen Soft] : soft [Abdomen Tenderness] : non-tender [Abnormal Walk] : normal gait [Nail Clubbing] : no clubbing  or cyanosis of the fingernails [Musculoskeletal - Swelling] : no joint swelling seen [Skin Color & Pigmentation] : normal skin color and pigmentation [] : no rash [Cranial Nerves] : cranial nerves 2-12 were intact [Oriented To Time, Place, And Person] : oriented to person, place, and time [Impaired Insight] : insight and judgment were intact [Affect] : the affect was normal [Mood] : the mood was normal [FreeTextEntry1] : Frail, pale, feels better.

## 2022-10-25 ENCOUNTER — APPOINTMENT (OUTPATIENT)
Dept: HEMATOLOGY ONCOLOGY | Facility: CLINIC | Age: 72
End: 2022-10-25

## 2022-10-25 ENCOUNTER — RESULT REVIEW (OUTPATIENT)
Age: 72
End: 2022-10-25

## 2022-10-25 VITALS
WEIGHT: 136.31 LBS | OXYGEN SATURATION: 97 % | SYSTOLIC BLOOD PRESSURE: 97 MMHG | BODY MASS INDEX: 26.76 KG/M2 | RESPIRATION RATE: 16 BRPM | DIASTOLIC BLOOD PRESSURE: 57 MMHG | HEIGHT: 60 IN | TEMPERATURE: 97.9 F | HEART RATE: 103 BPM

## 2022-10-25 DIAGNOSIS — T45.1X5A NAUSEA: ICD-10-CM

## 2022-10-25 DIAGNOSIS — R11.0 NAUSEA: ICD-10-CM

## 2022-10-25 DIAGNOSIS — R97.0 ELEVATED CARCINOEMBRYONIC ANTIGEN [CEA]: ICD-10-CM

## 2022-10-25 PROCEDURE — 99213 OFFICE O/P EST LOW 20 MIN: CPT | Mod: 25

## 2022-10-25 PROCEDURE — 36415 COLL VENOUS BLD VENIPUNCTURE: CPT

## 2022-10-25 RX ORDER — CIPROFLOXACIN HYDROCHLORIDE 500 MG/1
500 TABLET, FILM COATED ORAL TWICE DAILY
Qty: 14 | Refills: 0 | Status: COMPLETED | COMMUNITY
Start: 2022-08-24 | End: 2022-10-25

## 2022-10-25 NOTE — ASSESSMENT
[FreeTextEntry1] : 73 yo female \par \par # Stage 1 breast cancer 8 mm tumor, invasive ductal carcinoma, ER/ GA positive, HER2 not amplified with Oncotype Dx 11. diagnosed November 2018\par -continue with Anastrazole\par - left breast pain - c/w fat necrosis on mammogram up to date 12/21\par \par #Adenocarcinoma of lung- stage 4 diagnosed  April 2019\par -Repeated CEA -plateau at 11.5 (started out at 26.6)\par -CT scan showed mass 1.8 x 1.4 x 1.7 cm lesion.\par -Biopsy of lung nodule c/w adeno ca of the lung, EGFR mutated, T190 mutated, started on Tagrisso.  Side effects, -toxicities and benefits reviewed with pt. L4 vertebral body biopsy confirmed met adenoca of the lung.\par - PETCT  2/3/2020- decreased FDG uptake in primary lesion, sclerotic lesions in bones, thyroid nodule activity- under surveillance, duodenal uptake \par - plan for PETCT Feb 2021- SARAH- stable, uptake in right breast - c/w fat necrosis \par - repeat CEA\par -Jan 2022- PETCT - sclerotic bone lesions.\par - pain in the left femur 2-3/10 takes viocodin daily, s/p RT \par -decreased lower back pain \par - visit to ER for dyspnea - images reviewed, evaluated for PE, related to Parkinson disease.  Lung mass 1.1 cm on CT\par - patient with weight loss, struggles with Parkinson disease\par \par #benign thyroid nodule - followed by Dr. Park. plan for biopsy\par # constipation- daily miralax\par \par - continue Tagrisso 11 pm, nausea in am \par - Nausea is controlled with  Sancuso and Dronabinol 2.5mg bid for nausea- refill today I-stop checked\par - continue Xgeva monthly -neoplasm pain - continue with Vicodin prn rarely taking \par -established care with Dr. Hendrix for her Parkinsons\par lung cancer stage 4 on tagrisso \par Tagrisso/Xgeva- extend Xgeva to q 6 weeks \par Patient evaluated by neuro for new tx for Parkinsons, will hold Sancuso \par \par Guardant 360 no EGFR mutation detected 5/21\par CT c/a/p with bone scan ordered 9/22\par \par Still significant fatigue at 3 pm, under care of palliative care NP Noman. \par Nausea - on and off. \par CBC,Chem,CEA, CA 27.29  case and mgmt  to return in 1 month for treatment  \par \par # new pancreatic mass - order MRI with gadolinium, Ca19-9. Referred to Dr. Munguia for EUS.  IPMN suspected, observation vs biopsy.  \par \par # Episodes of chest tightness, tachycardia, SOB, feeling cold- was told it it anxiety attack.  It happens few times a day usually few times a day.  She had several trips to ER that didn’t show any other cardiopulmonary source. Attacks are debilitating. \par Improved after patient stopped Xanax, now Prozac \par weight loss, secondary to Parkinsons\par scans reviewed with pt and spouse- stable \par \par Has apt with psych Dr Yeimi Ellis [facility] this week \par \par Case and mgmt discussed with Dr. Shaffer, Labs ordered, drawn in office

## 2022-10-25 NOTE — HISTORY OF PRESENT ILLNESS
[de-identified] : Mrs. Mcdowell is a 68 year old postmenopausal female who is referred by Dr.Alice Demarco for newly diagnosed breast cancer.\par She was found on routine mammography in November 2019 she you have a focal asymmetric density in the left upper outer breast at 1:00 access 8 cm from the nipple. A one year prior had been negative.  Ultrasound-guided core biopsy in November 21, 2018 revealed a grade 1/3 invasive ductal carcinoma ER positive at 98% and AZ positive at 90%,Ki-67 was less than 10%. She underwent some left breast lumpectomy and sentinel node dissection on December 19, 2018. Pathology revealed an 8 mm grade 1/3 invasive ductal carcinoma zero of 3 sentinel lymph nodes were involved with tumor. Oncotype DX score was 11. She also underwent genetic testing which is negative.\par  [de-identified] : Mrs. Mcdowell presents for follow up on Anastrazole for breast cancer. Patient presents with her . Patient states that her anxiety has significantly improved since beginning on prozac.  states he feels she is doing remarkably better and he notices that her levels of confusion are much, much better. Patient has begun cooking again which she has been unable to do in months. Patient has f/u with GI since her last appointment. Patient states that she is still experiencing dizziness and intermittent diarrhea and constipation but she has seen improvements in both.

## 2022-10-25 NOTE — REVIEW OF SYSTEMS
[Fever] : no fever [Eye Pain] : no eye pain [Vision Problems] : no vision problems [Dysphagia] : no dysphagia [Nosebleeds] : no nosebleeds [Chest Pain] : no chest pain [Lower Ext Edema] : no lower extremity edema [Shortness Of Breath] : no shortness of breath [Dysuria] : no dysuria [Easy Bleeding] : no tendency for easy bleeding [Easy Bruising] : no tendency for easy bruising [FreeTextEntry6] : intermittently  [FreeTextEntry9] : muscle spasms due to Parkinsons, left hip pain  [de-identified] : tremor [de-identified] : improving

## 2022-11-02 ENCOUNTER — APPOINTMENT (OUTPATIENT)
Dept: NEUROLOGY | Facility: CLINIC | Age: 72
End: 2022-11-02

## 2022-11-02 PROCEDURE — 99215 OFFICE O/P EST HI 40 MIN: CPT | Mod: 95

## 2022-11-02 NOTE — HISTORY OF PRESENT ILLNESS
[FreeTextEntry1] : Rosamaria Mcdowell is a 72 year old F with a PMHx of Parkinson's disease, lung cancer (on targeted therapy), hx cataract surgery, history of breast cancer, GERD, thyroid nodules and tachycardia who presents for follow up in the [facility] Disorders Clinic at [facility] for Parkinson's disease. Positive Kranthi scan. She is accompanied by her  who provides collateral history.\par \par Interval history:\par Since the last visit, referred to Dr. Valentino (H[facility]/Psych), he moved, and she was referred to another psychiatrist. They are working on the panic attacks, which is better, but still not great. She was started on Seroquel and Prozac. She feels hot, but temperature is not elevated. Other times, she feels cold, but no one else notices it. She is also having some increase in tremor. She is in PT. Today, she was shuffling. She did not sleep well last night. She does get dyskinesias but most days she does not.\par \par Current meds:\par Sinemet 10-100mg taking 1.5 tabs at 10a-2p-6p \par Sinemet ER 25-100mg 1 tab BID 7a and 1 tabs at 11p \par Pramipexole 0.375mg IR in the evening\par Miralax and Fiber\par Ativan 0.5mg BID PRN\par Inbrija 42mg 4 times a day - has not been using\par Parcopa - using this for OFF periods - 2-3 times a day with the seroquel and helps with the tremor that is part of the panic attack - 8/9a - 3/4p - 10p\par Seroquel 50mg TID - 10a-6p-10p\par Prozac 40mg daily - since this, rare to have palpitations and shortness of breath\par \par Prior meds:\par Azilect 1 mg daily\par Melatonin gave her palpitations\par Modafinil did not help her fatigue\par Dexamethasone for the fatigue kept her up\par Olanzapine and her parkinsonism became worse\par Lyrica was stopped for urinary incontinence\par Zoloft\par Lexapro - she had increased tremors\par Xanax\par \par Initial history:\par She was diagnosed in 2014 at [place]. She has been seeing a general neurologist.  \par \par She thought she had essential tremor. Her tremors are mostly in the right hand, controlled on Sinemet. Her left leg has restless leg syndrome.\par She feels worse in the afternoon, around 3-4pm. She gets wobbly. If she takes her BP, it is lower. She has not been officially tested for orthostatic hypotension. Sometimes her systolic is in 100s. She was tried on midodrine but she had hair loss and UTI. She was also tried on Gabapentin. When she didn't take the Sinemet ER in the morning she felt worse. No trouble with buttons, zippers or shoelaces, cutting food. No significant stiffness. She gets muscle spasms in the feet. She thinks the Sinemet lasts about 4 hours.  \par \par No trouble turning or adjusting in bed at night.\par She talks in her sleep and has fallen out of bed before. She wakes up to go to the bathroom at night. She also has breakthrough tremors at night. \par She urinates every 4 hours. \par \par She was reduced off the pramipexole due to potential side effects, but she is not sure which ones. When she decreased it, around 10p for about an hour she would be walking up and down the halls and could not lay still and got spasms in her left leg. She was tried on Lyrica without relief. They tried adjusting the Sinemet doses instead.\par \par She has no sense of smell.\par No changes in loudness of her voice. 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 Therapy did not work. She can still sing.\par No trouble swallowing. \par She has constipation. She has a bowel movement every couple of days. She takes Miralax daily or every other day. She has increased her water intake.\par She has urinary frequency. No incontinence. \par Memory, having more trouble finding words.\par Mood is depressed, but generally okay.\par Dizziness in the afternoon when standing. No falls. Her BP is sometimes low. \par Hallucinations, none.\par \par Current meds:\par Sinemet 10-100mg taking 6 tabs daily - 7a-11-3-7-11-3a\par Sinemet ER 25-100mg 1 tab BID 7a-7p\par Pramipexole 0.75mg ER in the evening\par \par Prior meds:\par Azilect 1 mg daily\par \par Family history:\par Social history: retired nurse\par  \par No Kranthi scan.\par MRI brain with and without contrast was done in 4/2019.

## 2022-11-02 NOTE — REASON FOR VISIT
[Home] : at home, [unfilled] , at the time of the visit. [Medical Office: ([facility])___] : at the medical office located in  [Spouse] : spouse [Follow-Up: _____] : a [unfilled] follow-up visit

## 2022-11-02 NOTE — PHYSICAL EXAM
[Person] : oriented to person [Place] : oriented to place [Time] : oriented to time [Concentration Intact] : normal concentrating ability [Comprehension] : comprehension intact [Cranial Nerves Optic (II)] : visual acuity intact bilaterally,  visual fields full to confrontation, pupils equal round and reactive to light [Cranial Nerves Oculomotor (III)] : extraocular motion intact [Cranial Nerves Trigeminal (V)] : facial sensation intact symmetrically [Cranial Nerves Facial (VII)] : face symmetrical [Cranial Nerves Vestibulocochlear (VIII)] : hearing was intact bilaterally [Cranial Nerves Glossopharyngeal (IX)] : tongue and palate midline [Cranial Nerves Accessory (XI - Cranial And Spinal)] : head turning and shoulder shrug symmetric [Cranial Nerves Hypoglossal (XII)] : there was no tongue deviation with protrusion [Motor Strength] : muscle strength was normal in all four extremities [Involuntary Movements] : no involuntary movements were seen [Paresis Pronator Drift Right-Sided] : no pronator drift on the right [Paresis Pronator Drift Left-Sided] : no pronator drift on the left [Sensation Tactile Decrease] : light touch was intact [Romberg's Sign] : a positive Romberg's sign was present [Coordination - Dysmetria Impaired Finger-to-Nose Bilateral] : not present [1+] : Ankle jerk left 1+ [FreeTextEntry1] : Face mildly masked. Saccades were normal. Voice is normal.\par No significant tremors\par No significant rigidity.\par There was mild-moderate bradykinesia, left more than right, with finger tapping, rapid alternating and sequential movements.\par No trouble standing with arms crossed. Posture is normal.\par Gait is with good stride length and speed with mildly decreased right arm swing. Multistep turns. No freezing of gait. \par No dyskinesias

## 2022-11-02 NOTE — DISCUSSION/SUMMARY
[FreeTextEntry1] : Rosamaria Mcdowell is a 72 year old F with a PMHx of Parkinson's disease, lung cancer (on targeted therapy), hx cataract surgery, history of breast cancer, GERD, thyroid nodules and tachycardia who presents for follow up in the [facility] Disorders Clinic at [place] for Parkinson's disease via telehealth. Positive Kranthi scan. She is accompanied by her  who provides collateral history.\par \par The patient's history and physical examination are suggestive of mild idiopathic Parkinson's disease. She reports a good response to Sinemet, however, she has developed dyskinesias and severe panic attacks that have been affecting her. She is following with psychiatry who is titrating her regimen. \par Her dyskinesias are not daily. She is requiring Parcopa 2-3 times a day at least 3-4 times a week. A dose adjustment in recommended. Possible OFF anxiety. \par Discussed the rationale for Seroquel in the setting of Levodopa use\par \par Her RLS is controlled with Pramipexole IR 0.375mg but experienceing palpitations. Insurance would not cover the ER she was on previously. Lyrica discontinued.\par \par   EMG was previously done and not suggestive of a myopathy.\par \par  Recommendations: \par - Continue Pramipexole 0.125mg 2 tabs and monitor for improvement in palpitation\par - Adjust Sinemet to 1.5 tabs at 10a and 6p, and 2 tabs at 2p\par - Increase Sinemet ER 25-100mg from 1 tab to 2 tabs at 7a and 10p\par  - Continue Parcopa PRN off periods. Will monitor use. \par - Will consider Amantadine if needed for dyskinesias vs. Gocovri.\par - Continue Miralax for constipation , fiber and start probiotics\par - Mediterranean diet, antiinflammatory/antioxidant foods, probiotics, fiber, caffeine, and vitamins  - - \par Encouraged to increase exercise and physical activity and maintain an active social and intellectual life. \par - Depression and anxiety management per psychiatry\par - PT/OT\par \par   RTC 2 months  \par \par All questions were answered to her satisfaction. I spent 40 minutes with this patient.

## 2022-11-03 ENCOUNTER — TRANSCRIPTION ENCOUNTER (OUTPATIENT)
Age: 72
End: 2022-11-03

## 2022-12-03 PROBLEM — N81.6 RECTOCELE, FEMALE: Status: ACTIVE | Noted: 2020-12-09

## 2022-12-05 ENCOUNTER — APPOINTMENT (OUTPATIENT)
Dept: CARDIOLOGY | Facility: CLINIC | Age: 72
End: 2022-12-05

## 2022-12-05 ENCOUNTER — NON-APPOINTMENT (OUTPATIENT)
Age: 72
End: 2022-12-05

## 2022-12-05 VITALS
BODY MASS INDEX: 26.31 KG/M2 | HEART RATE: 108 BPM | SYSTOLIC BLOOD PRESSURE: 106 MMHG | DIASTOLIC BLOOD PRESSURE: 60 MMHG | WEIGHT: 134 LBS | OXYGEN SATURATION: 97 % | HEIGHT: 60 IN

## 2022-12-05 DIAGNOSIS — N81.6 RECTOCELE: ICD-10-CM

## 2022-12-05 PROCEDURE — 93000 ELECTROCARDIOGRAM COMPLETE: CPT

## 2022-12-05 PROCEDURE — 99215 OFFICE O/P EST HI 40 MIN: CPT

## 2022-12-05 PROCEDURE — 36415 COLL VENOUS BLD VENIPUNCTURE: CPT

## 2022-12-05 NOTE — HISTORY OF PRESENT ILLNESS
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  She was also followed for small pericardial effusion.\par \par She is treated for metastatic  breast cancer and adenocarcinoma  of  lung cancer with bony mets and parkinsons.\par \par When she is on a off period she gets "panic attacks"\par Her most recent one was this morning, she felt a heaviness over her chest and difficulty breathing in and a pounding heartbeat regular, she took her meds, one hour later it passed.\par She has had 2 er visits for this most recently april 2022. She is seeing Dr Acosta.\par \par \par \par \par \par

## 2022-12-05 NOTE — CARDIOLOGY SUMMARY
[No Ischemia] : no Ischemia [___] : [unfilled] [LVEF ___%] : LVEF [unfilled]% [de-identified] : josefina 12/2021- average hr 97, rare ectopy

## 2022-12-05 NOTE — REASON FOR VISIT
[Other: _____] : [unfilled] [Follow-Up - Clinic] : a clinic follow-up of [FreeTextEntry2] : 6 months [FreeTextEntry1] : sinus tachycardia

## 2022-12-06 ENCOUNTER — RESULT REVIEW (OUTPATIENT)
Age: 72
End: 2022-12-06

## 2022-12-06 ENCOUNTER — APPOINTMENT (OUTPATIENT)
Dept: HEMATOLOGY ONCOLOGY | Facility: CLINIC | Age: 72
End: 2022-12-06

## 2022-12-06 VITALS
OXYGEN SATURATION: 98 % | BODY MASS INDEX: 26.5 KG/M2 | DIASTOLIC BLOOD PRESSURE: 70 MMHG | TEMPERATURE: 96.9 F | HEIGHT: 60 IN | SYSTOLIC BLOOD PRESSURE: 125 MMHG | HEART RATE: 104 BPM | WEIGHT: 135 LBS | RESPIRATION RATE: 16 BRPM

## 2022-12-06 DIAGNOSIS — G62.89 OTHER SPECIFIED POLYNEUROPATHIES: ICD-10-CM

## 2022-12-06 PROCEDURE — 99213 OFFICE O/P EST LOW 20 MIN: CPT | Mod: 25

## 2022-12-06 PROCEDURE — 36415 COLL VENOUS BLD VENIPUNCTURE: CPT

## 2022-12-06 NOTE — PHYSICAL EXAM
[Restricted in physically strenuous activity but ambulatory and able to carry out work of a light or sedentary nature] : Status 1- Restricted in physically strenuous activity but ambulatory and able to carry out work of a light or sedentary nature, e.g., light house work, office work [Normal] : affect appropriate [de-identified] : left breast scar

## 2022-12-06 NOTE — REVIEW OF SYSTEMS
[Fatigue] : fatigue [SOB on Exertion] : shortness of breath during exertion [Constipation] : constipation [Diarrhea] : diarrhea [Joint Pain] : joint pain [Muscle Pain] : muscle pain [Dizziness] : dizziness [Difficulty Walking] : difficulty walking [Anxiety] : anxiety [Depression] : depression [Muscle Weakness] : muscle weakness [Negative] : Allergic/Immunologic [Fever] : no fever [Eye Pain] : no eye pain [Vision Problems] : no vision problems [Dysphagia] : no dysphagia [Nosebleeds] : no nosebleeds [Chest Pain] : no chest pain [Lower Ext Edema] : no lower extremity edema [Shortness Of Breath] : no shortness of breath [Dysuria] : no dysuria [Easy Bleeding] : no tendency for easy bleeding [Easy Bruising] : no tendency for easy bruising [FreeTextEntry6] : intermittently  [FreeTextEntry9] : muscle spasms due to Parkinsons, left hip pain  [de-identified] : tremor [de-identified] : improving

## 2022-12-06 NOTE — ASSESSMENT
[FreeTextEntry1] : 73 yo female \par \par # Stage 1 breast cancer 8 mm tumor, invasive ductal carcinoma, ER/ MA positive, HER2 not amplified with Oncotype Dx 11. diagnosed November 2018\par -continue with Anastrazole\par - left breast pain - c/w fat necrosis on mammogram up to date 12/21\par \par #Adenocarcinoma of lung- stage 4 diagnosed  April 2019\par -Repeated CEA -plateau at 11.5 (started out at 26.6)\par -CT scan showed mass 1.8 x 1.4 x 1.7 cm lesion.\par -Biopsy of lung nodule c/w adeno ca of the lung, EGFR mutated, T190 mutated, started on Tagrisso.  Side effects, -toxicities and benefits reviewed with pt. L4 vertebral body biopsy confirmed met adenoca of the lung.\par - PETCT  2/3/2020- decreased FDG uptake in primary lesion, sclerotic lesions in bones, thyroid nodule activity- under surveillance, duodenal uptake \par - plan for PETCT Feb 2021- SARAH- stable, uptake in right breast - c/w fat necrosis \par - repeat CEA\par -Jan 2022- PETCT - sclerotic bone lesions.\par - pain in the left femur 2-3/10 takes viocodin daily, s/p RT \par -decreased lower back pain \par - visit to ER for dyspnea - images reviewed, evaluated for PE, related to Parkinson disease.  Lung mass 1.1 cm on CT\par - patient with weight loss, struggles with Parkinson disease\par \par #benign thyroid nodule - followed by Dr. Park. plan for biopsy\par # constipation- daily miralax\par \par - continue Tagrisso 11 pm, nausea in am \par - Nausea is controlled with  Sancuso and Dronabinol 2.5mg bid for nausea- refill today I-stop checked\par - continue Xgeva monthly -neoplasm pain - continue with Vicodin prn rarely taking \par -established care with Dr. Hendrix for her Parkinsons\par lung cancer stage 4 on tagrisso \par Tagrisso/Xgeva- extend Xgeva to q 6 weeks \par Patient evaluated by neuro for new tx for Parkinsons, will hold Sancuso \par \par Guardant 360 no EGFR mutation detected 5/21\par CT c/a/p with bone scan ordered 9/22\par \par Still significant fatigue at 3 pm, under care of palliative care NP Noman. \par Nausea - on and off. \par CBC,Chem,CEA, CA 27.29  case and mgmt  to return in 1 month for treatment  \par \par # new pancreatic mass - order MRI with gadolinium, Ca19-9. Referred to Dr. Munguia for EUS.  IPMN suspected, observation vs biopsy.  \par \par # Episodes of chest tightness, tachycardia, SOB, feeling cold- was told it it anxiety attack.  It happens few times a day usually few times a day.  She had several trips to ER that didn’t show any other cardiopulmonary source. Attacks are debilitating. \par Improved after patient stopped Xanax, now Prozac \par weight loss, secondary to Parkinsons\par scans reviewed with pt and spouse- stable \par \par Met with psych Dr Yeimi Ellis [facility] meds adjusted \par \par Case and mgmt discussed with Dr. Shaffer, Labs ordered, drawn in office

## 2022-12-06 NOTE — HISTORY OF PRESENT ILLNESS
[Therapy: ___] : Therapy: [unfilled] [de-identified] : Mrs. Mcdowell is a 68 year old postmenopausal female who is referred by Dr.Alice Demarco for newly diagnosed breast cancer.\par She was found on routine mammography in November 2019 she you have a focal asymmetric density in the left upper outer breast at 1:00 access 8 cm from the nipple. A one year prior had been negative.  Ultrasound-guided core biopsy in November 21, 2018 revealed a grade 1/3 invasive ductal carcinoma ER positive at 98% and [place] positive at 90%,Ki-67 was less than 10%. She underwent some left breast lumpectomy and sentinel node dissection on December 19, 2018. Pathology revealed an 8 mm grade 1/3 invasive ductal carcinoma zero of 3 sentinel lymph nodes were involved with tumor. Oncotype DX score was 11. She also underwent genetic testing which is negative.\par  [de-identified] : Mrs. Mcdowell presents for follow up on Anastrazole for breast cancer. Patient presents with her . Patient states that her anxiety has significantly improved since beginning on prozac.  states he feels she is doing remarkably better and he notices that her levels of confusion are much, much better. Patient has begun cooking again which she has been unable to do in months. Patient has f/u with GI since her last appointment. Patient states that she is still experiencing dizziness and intermittent diarrhea and constipation but she has seen improvements in both.

## 2022-12-07 ENCOUNTER — RESULT REVIEW (OUTPATIENT)
Age: 72
End: 2022-12-07

## 2022-12-12 ENCOUNTER — RESULT REVIEW (OUTPATIENT)
Age: 72
End: 2022-12-12

## 2022-12-14 ENCOUNTER — APPOINTMENT (OUTPATIENT)
Dept: OBGYN | Facility: CLINIC | Age: 72
End: 2022-12-14

## 2022-12-14 ENCOUNTER — APPOINTMENT (OUTPATIENT)
Dept: GERIATRICS | Facility: CLINIC | Age: 72
End: 2022-12-14

## 2022-12-14 VITALS
HEIGHT: 60 IN | WEIGHT: 134 LBS | BODY MASS INDEX: 26.31 KG/M2 | SYSTOLIC BLOOD PRESSURE: 100 MMHG | DIASTOLIC BLOOD PRESSURE: 70 MMHG

## 2022-12-14 PROCEDURE — G0101: CPT

## 2022-12-14 PROCEDURE — 99397 PER PM REEVAL EST PAT 65+ YR: CPT

## 2022-12-14 PROCEDURE — 99214 OFFICE O/P EST MOD 30 MIN: CPT

## 2022-12-14 NOTE — PHYSICAL EXAM
[General Appearance - Alert] : alert [General Appearance - In No Acute Distress] : in no acute distress [General Appearance - Well Nourished] : well nourished [General Appearance - Well-Appearing] : healthy appearing [Sclera] : the sclera and conjunctiva were normal [Extraocular Movements] : extraocular movements were intact [No Oral Pallor] : no oral pallor [Outer Ear] : the ears and nose were normal in appearance [Hearing Threshold Finger Rub Not [place]] : hearing was normal [Neck Appearance] : the appearance of the neck was normal [Neck Cervical Mass (___cm)] : no neck mass was observed [Jugular Venous Distention Increased] : there was no jugular-venous distention [Respiration, Rhythm And Depth] : normal respiratory rhythm and effort [Exaggerated Use Of Accessory Muscles For Inspiration] : no accessory muscle use [Auscultation Breath Sounds / Voice Sounds] : lungs were clear to auscultation bilaterally [Heart Rate And Rhythm] : heart rate was normal and rhythm regular [Heart Sounds] : normal S1 and S2 [Murmurs] : no murmurs [Full Pulse] : the pedal pulses are present [Edema] : there was no peripheral edema [Bowel Sounds] : normal bowel sounds [Abdomen Soft] : soft [Abdomen Tenderness] : non-tender [Abnormal Walk] : normal gait [Nail Clubbing] : no clubbing  or cyanosis of the fingernails [Musculoskeletal - Swelling] : no joint swelling seen [Skin Color & Pigmentation] : normal skin color and pigmentation [] : no rash [Cranial Nerves] : cranial nerves 2-12 were intact [Oriented To Time, Place, And Person] : oriented to person, place, and time [Impaired Insight] : insight and judgment were intact [Affect] : the affect was normal [Mood] : the mood was normal [FreeTextEntry1] : Frail, pale, feels better.

## 2022-12-14 NOTE — HISTORY OF PRESENT ILLNESS
[FreeTextEntry1] : Rosamaria Mcdowell is a 73 y/o F w/ metastatic lung cancer to bone on tagrisso/xgyeva, localized breast cancer s/p lumpectomy and XRT on anastrazole, PD on Inbrija, vasculitis who was originally seen in 2019 for pain, persistent recurrent nausea and dizziness, restless leg syndrome, recent admission to [facility]  for GIB \par \par MRI 12/23 --> progression in L femur and pelvis lesions. S/p XRT to pelvis and femur 2/1/22-2/7/22\par \par 1/10: Does not feel better. Tried Dex 2mg and it didn't help stimulate her, only helped her rash.\par Tried Provigil- didn't notice a difference. Leg pain is worse. \par \par 1/24: Feels "a lot" better. Feels Dex at both doses (2 & 4mg) kept her up at night and made her restless leg worse. Tramadol is a "miracle" in terms of helping pain. Takes 2-3x/day. Sleeping better. Taking 1/2 dose of Miralax daily with good effect. Uses Curaleaf 1:1 2.5mg THC:CBD which helps her feel more alert. Extra Sinemet during the day is not really helping. RX Ritalin\par \par 2/24: Ritalin made her feel "jittery"- advised taper off and trial of Lexapro 5mg PO QD. \par \par 4/1: Reported increasing crying spells since starting medication, advised to stop\par \par 6/1: Presenting for routine follow up. Feels like "crap." Had many bug bites from an "arthropod attack," not improving, now s/p biopsy yesterday.. Feels afternoon "blahs" are 2/2 OFF periods of PD. No pain/nausea. Endorses leg pain, feels as though she may have neuropathy. Feet feel cold. Needs more assistance around the house (getting in and out of shower, etc).\par \par 8/10: Presenting for routine follow up. Have had frequent phone check ins (documenting) for either high periods of anxiety or periods of severe "lows" and fatigued. Now followed by Psych/Oncology Dr. Valentino. As of last visit 8/8,   Fluoxetine was increased to 20mg/day, and she was advised to continue with Lorazepam 0.5mg BID CONNER + Xanax 0.5mg PRN.\par \par 8/26: Reports feeling better and having more energy, taking less naps. Has been taking Xanax PRN 2-3x/day. Feels "dyskinetic" at times. Constipated x 3-4 days then uses suppository and has diarrhea. Takes Miralax daily. \par \par 9/19: Prozac increased and now off benzos 2/2 forgetfulness. Still having panic attacks 2-3x/day but feels better overall\par \par 9/21: Presents today for an urgent visit. Had panic-type symptoms all night (chest tightness, tachycardia to 120s); Seroquel did not really help. Ended up taking 1/2 Lorazepam which helped after an hour. Having panic attacks 2-3 times a day. Usually wakes up having one. Seroquel helps and causes less AMS-type symptoms. RX to continue Prozac 40, start Seroquel 50/50/75, and c/w [facility]\par \par 10/20: Taking Seroquel 50mg BID, Lorazepam 0.5mg daily in AM, and Prozac 40mg. Wakes up every morning in a "panic"- she no longer has breathing manifestations of panic attacks, but finds herself shivering. After the AM she is good for most of the day, and then starts feeling "cold" again toward the evening. Feels better after taking Seroquel. Tries not to take nighttime dose of Lorazepam. Wakes up at night to urinate 2-3x but is able to go back to sleep- 3-4 months ago, she was not able to go back to sleep at all. The "uncontrollable bouts of weepi-ness" have been gone for months. Apt with psychiatrist (Yeimi Ellis) [facility] is on 10/28. Also taking Marinol before dinner to help with nausea. Appetite is fine. Participating in outpatient PT for her Parkinsons. C/w Prozac 40mg PO QD + Seroquel 50mg TID CONNER + Ativan 0.5mg PO QAM\par \par 12/14: Fell at [employer] a week ago and hit her head, workup negative. Pain/nausea/mood improved. Following with Dr. Ellis now. Feels hyper at times after she takes the Prozac. Panic attacks "are there, but they are much more controllable." Still wakes up daily with panic attacks. Now on Seroquel 25/50/50 from 50/50/50 per Dr. Ellis recs. Not really taking Ativan in the morning anymore- only twice in the last week. Overall they both think that things are "substantially better" than a few months ago. Hasn't been going to PT due to dizziness. Re signing up for [employer]. Appetite is OK. Back on Marinol regularly for appetite. Participating in more socially. \par \par Fidel (): [phone] / [phone]\par Jero (son): [phone] / [phone]

## 2022-12-14 NOTE — ASSESSMENT
[Frailty-weight loss of >5%] : frailty-weight loss  [Fall precautions] : fall precautions [Social support] : social support [Living arrangements] : living arrangements [Medication Management] : medication management [Patient/Caregiver is not ready to engage in discussion] : Patient/caregiver is not ready to engage in discussion [DNR] : Code Status: DNR [DNI] : Intubation: DNI [[facility] Date: _____[facility]pletion/last verification date: [unfilled] [______] : HCP: [unfilled] [Limited] : Treatment Guidelines: Limited [FreeTextEntry1] : 71 y/o F w/ metastatic lung cancer, localized breast CA, Parkinson's, panic disorder\par \par - C/w Prozac 40mg PO QD + Seroquel 25/50/50mg TID CONNER + Ativan 0.5mg PO PRN\par - C/w Dr. Ellis for psych\par \par RTC in 8 weeks or PRN

## 2022-12-17 RX ORDER — METRONIDAZOLE 500 MG/1
500 TABLET ORAL 3 TIMES DAILY
Qty: 21 | Refills: 0 | Status: COMPLETED | COMMUNITY
Start: 2022-08-24 | End: 2022-12-17

## 2022-12-17 RX ORDER — MUPIROCIN 20 MG/G
2 OINTMENT TOPICAL
Qty: 22 | Refills: 0 | Status: COMPLETED | COMMUNITY
Start: 2022-05-03 | End: 2022-12-17

## 2022-12-17 RX ORDER — LORAZEPAM 0.5 MG/1
0.5 TABLET ORAL
Qty: 120 | Refills: 0 | Status: COMPLETED | COMMUNITY
Start: 2022-07-08 | End: 2022-12-17

## 2022-12-17 RX ORDER — NEOMYCIN SULFATE, POLYMYXIN B SULFATE AND DEXAMETHASONE 3.5; 10000; 1 MG/ML; [USP'U]/ML; MG/ML
3.5-10000-0.1 SUSPENSION OPHTHALMIC
Qty: 5 | Refills: 0 | Status: COMPLETED | COMMUNITY
Start: 2022-08-16 | End: 2022-12-17

## 2022-12-17 NOTE — HISTORY OF PRESENT ILLNESS
[FreeTextEntry1] : 73yo  S/P LAVH(ovaries in situ) here for annual Gyn exam.Pt is S/P left lumpectomy for invasive ductal Ca Grade 1/3 7mm. On Anastrazole. + family h/o breast Ca(Mother,cousins). Had genetic testing-> NEGATIVE. Pt was subsequently found to have Stage 4 lung Ca with bone metastasis and is currently being treated with Tagrisso (daily oral med) and Xgeva, She had cryoablation of lesions in femur and  RT to spine.  has bladder Ca(doing well, currently SARAH). Has Parkinson's Disease remains stable on meds.Rectocele remains "about the same" but problematic with constipation, has some fecal incontinence with soft or liquid stool(takes Miralax). Has pancreatic lesion which appears to be benign cyst. Granddaughter Eva, 14, now identifies as non-binary. \par  [Mammogramdate] : 12/12/22 [TextBox_19] : BIRADS 2 [TextBox_25] : BIRADS 2 [BreastSonogramDate] : 12/12/22 [PapSmeardate] : Vaginal Hysterectomy [BoneDensityDate] : 12/9/21 [TextBox_37] : T Score Spine and hip not valid, Forearm -1.1.  [ColonoscopyDate] : 4/13/22 [TextBox_43] : hemorrhoids, diverticulosis

## 2023-01-04 ENCOUNTER — APPOINTMENT (OUTPATIENT)
Dept: NEUROLOGY | Facility: CLINIC | Age: 73
End: 2023-01-04
Payer: MEDICARE

## 2023-01-04 ENCOUNTER — TRANSCRIPTION ENCOUNTER (OUTPATIENT)
Age: 73
End: 2023-01-04

## 2023-01-04 ENCOUNTER — LABORATORY RESULT (OUTPATIENT)
Age: 73
End: 2023-01-04

## 2023-01-04 VITALS
OXYGEN SATURATION: 96 % | WEIGHT: 133 LBS | DIASTOLIC BLOOD PRESSURE: 61 MMHG | HEART RATE: 108 BPM | SYSTOLIC BLOOD PRESSURE: 104 MMHG | BODY MASS INDEX: 26.11 KG/M2 | HEIGHT: 60 IN

## 2023-01-04 PROCEDURE — 99215 OFFICE O/P EST HI 40 MIN: CPT

## 2023-01-04 NOTE — PHYSICAL EXAM
[Person] : oriented to person [Place] : oriented to place [Time] : oriented to time [Concentration Intact] : normal concentrating ability [Comprehension] : comprehension intact [Cranial Nerves Optic (II)] : visual acuity intact bilaterally,  visual fields full to confrontation, pupils equal round and reactive to light [Cranial Nerves Oculomotor (III)] : extraocular motion intact [Cranial Nerves Trigeminal (V)] : facial sensation intact symmetrically [Cranial Nerves Facial (VII)] : face symmetrical [Cranial Nerves Vestibulocochlear (VIII)] : hearing was intact bilaterally [Cranial Nerves Glossopharyngeal (IX)] : tongue and palate midline [Cranial Nerves Accessory (XI - Cranial And Spinal)] : head turning and shoulder shrug symmetric [Cranial Nerves Hypoglossal (XII)] : there was no tongue deviation with protrusion [Motor Strength] : muscle strength was normal in all four extremities [Involuntary Movements] : no involuntary movements were seen [Sensation Tactile Decrease] : light touch was intact [Romberg's Sign] : a positive Romberg's sign was present [1+] : Ankle jerk left 1+ [Paresis Pronator Drift Right-Sided] : no pronator drift on the right [Paresis Pronator Drift Left-Sided] : no pronator drift on the left [Coordination - Dysmetria Impaired Finger-to-Nose Bilateral] : not present [FreeTextEntry1] : Face mildly masked. Saccades were normal. Voice is normal.\par No significant tremors\par No significant rigidity.\par There was slight bradykinesia, left more than right, with finger tapping, rapid alternating and sequential movements.\par No trouble standing with arms crossed. Posture is normal.\par Moderate-severe dyskinesias that interfere with her gait. Gait is cautious, mildly ataxic because of dyskinesias. No shuffling or freezing of gait. Multistep turns.\par Postural reflexes are slightly impaired because of dyskinesias.

## 2023-01-04 NOTE — HISTORY OF PRESENT ILLNESS
[FreeTextEntry1] : Rosamaria Mcdowell is a 72 year old F with a PMHx of Parkinson's disease, lung cancer (on targeted therapy), hx cataract surgery, history of breast cancer, GERD, thyroid nodules and tachycardia who presents for follow up in the [facility] at [place] for Parkinson's disease. Positive Kranthi scan. She is accompanied by her  who provides collateral history.\par \par Interval history:\par Since the last visit, she takes Xanax rarely, but was making her loopy. She is taking Ativan 0.5mg up to twice a day PRN, rarely requiring twice. She has significant dyskinesias for hours at a time. \par Constipation is being managed. \par She fell once since the last visit. She was feeling dizzy when she fell while at [employer]. She had a hematoma and black eye for a couple weeks. \par She has dizziness/lightheadedness when standing. She stays hydrated. She is generally on a low salt diet. \par Rarely talking in sleep/acting out dreams. Once every 3 weeks, mostly mumbling, rarely it is intelligible. \par She completed PT which was helpful. \par She notes that sometimes she feels as thought she is going backwards when she is standing. \par \par Current meds:\par Sinemet 10-100mg taking 1.5 tabs at 10a and 6p and 2 tabs at 2p \par Sinemet ER 25-100mg 2 tab BID 7a and 1op\par Pramipexole 0.375mg IR in the evening\par Miralax and Fiber\par Ativan 0.5mg BID PRN\par Parcopa - using this for OFF periods once a day \par Seroquel 50mg TID - 10a-6p-10p\par Prozac 40mg daily\par \par Prior meds:\par Azilect 1 mg daily\par Melatonin gave her palpitations\par Modafinil did not help her fatigue\par Dexamethasone for the fatigue kept her up\par Olanzapine and her parkinsonism became worse\par Lyrica was stopped for urinary incontinence\par Zoloft\par Lexapro - she had increased tremors\par Xanax\par Inbrija 42mg 4 times a day - has not been using\par \par Initial history:\par She was diagnosed in 2014 at [place]. She has been seeing a general neurologist.  \par \par She thought she had essential tremor. Her tremors are mostly in the right hand, controlled on Sinemet. Her left leg has restless leg syndrome.\par She feels worse in the afternoon, around 3-4pm. She gets wobbly. If she takes her BP, it is lower. She has not been officially tested for orthostatic hypotension. Sometimes her systolic is in 100s. She was tried on midodrine but she had hair loss and UTI. She was also tried on Gabapentin. When she didn't take the Sinemet ER in the morning she felt worse. No trouble with buttons, zippers or shoelaces, cutting food. No significant stiffness. She gets muscle spasms in the feet. She thinks the Sinemet lasts about 4 hours.  \par \par No trouble turning or adjusting in bed at night.\par She talks in her sleep and has fallen out of bed before. She wakes up to go to the bathroom at night. She also has breakthrough tremors at night. \par She urinates every 4 hours. \par \par She was reduced off the pramipexole due to potential side effects, but she is not sure which ones. When she decreased it, around 10p for about an hour she would be walking up and down the halls and could not lay still and got spasms in her left leg. She was tried on Lyrica without relief. They tried adjusting the Sinemet doses instead.\par \par She has no sense of smell.\par No changes in loudness of her voice. 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 Therapy did not work. She can still sing.\par No trouble swallowing. \par She has constipation. She has a bowel movement every couple of days. She takes Miralax daily or every other day. She has increased her water intake.\par She has urinary frequency. No incontinence. \par Memory, having more trouble finding words.\par Mood is depressed, but generally okay.\par Dizziness in the afternoon when standing. No falls. Her BP is sometimes low. \par Hallucinations, none.\par \par Current meds:\par Sinemet 10-100mg taking 6 tabs daily - 7a-11-3-7-11-3a\par Sinemet ER 25-100mg 1 tab BID 7a-7p\par Pramipexole 0.75mg ER in the evening\par \par Prior meds:\par Azilect 1 mg daily\par \par Family history:\par Social history: retired nurse\par  \par No Kranthi scan.\par MRI brain with and without contrast was done in 4/2019.

## 2023-01-04 NOTE — DISCUSSION/SUMMARY
[FreeTextEntry1] : Rosamaria Mcdowell is a 72 year old F with a PMHx of Parkinson's disease, lung cancer (on targeted therapy), hx cataract surgery, history of breast cancer, GERD, thyroid nodules and tachycardia who presents for follow up in the [facility] at [place] for Parkinson's disease. Positive Kranthi scan. She is accompanied by her  who provides collateral history.\par \par The patient's history and physical examination are suggestive of mild-moderate idiopathic Parkinson's disease. She reports a good response to Sinemet, however, she has developed severe dyskinesias. She also has symptoms of OH. Her anxiety is better controlled. She is following with psychiatry who is titrating her regimen. \par \par Her RLS is controlled with Pramipexole IR 0.375mg but occasionally takes an extra for sleep, which is not advised. Insurance would not cover the ER she was on previously. Lyrica discontinued.\par \par   EMG was previously done and not suggestive of a myopathy.\par \par  Recommendations: \par - Continue Pramipexole ER 0.375mg at 7p. Do not take additional doses. Can take Ativan PRN difficulty sleeping instead\par - Stop Sinemet 10-100mg due to dyskinesias\par - She will now take Sinemet 25-100mg 2 tabs at 7a-2p-10p and 1 tab at 10a and 6p\par - Continue Parcopa PRN off periods. Will monitor use. \par - Will consider Amantadine if needed for dyskinesias vs. Gocovri if lowering levodopa dose not help\par - Continue Miralax for constipation , fiber and start probiotics\par - Mediterranean diet, antiinflammatory/antioxidant foods, probiotics, fiber, caffeine, and vitamins  - - \par Encouraged to increase exercise and physical activity and maintain an active social and intellectual life. \par - Depression and anxiety management per psychiatry\par \par   RTC in 6 weeks via telehealth and next available in person\par \par All questions were answered to her satisfaction. I spent 40 minutes with this patient.

## 2023-01-05 ENCOUNTER — NON-APPOINTMENT (OUTPATIENT)
Age: 73
End: 2023-01-05

## 2023-01-05 ENCOUNTER — TRANSCRIPTION ENCOUNTER (OUTPATIENT)
Age: 73
End: 2023-01-05

## 2023-01-10 ENCOUNTER — RESULT REVIEW (OUTPATIENT)
Age: 73
End: 2023-01-10

## 2023-01-11 ENCOUNTER — APPOINTMENT (OUTPATIENT)
Dept: CARDIOLOGY | Facility: CLINIC | Age: 73
End: 2023-01-11
Payer: MEDICARE

## 2023-01-11 PROCEDURE — 93306 TTE W/DOPPLER COMPLETE: CPT

## 2023-01-11 NOTE — ASSESSMENT
[FreeTextEntry1] : 69 yo female referred by Dr. Nicole Demarco for evaluation of breast cancer.\par Patient has Stage 1 breast cancer 8 mm tumor, invasive ductal carcinoma, ER/ DE positive, HER2 not amplified with Oncotype Dx 11. diagnosed November 2018\par \par Patient started on Arimidex - tolerates well.\par Repeated CEA - increased level to 18, with hoarseness of the voice. 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  Side effects, toxicities and benefits reviewed with pt. L4 vertebral body biopsy confirmed met adenoca of the lung.\par \par \par Plan:\par - takes Tagrisso 11 pm, nausea in am \par - try Sancuso\par - Xgeva today 9/11/19\par - add Marinol - weight loss 16 lbs/ 8 kg  - > 10 % \par - PET/CT- interval decrease of SERGEY nodule, resolution of liver nodule and bone mets.  Thyroid nodule - US thyroid ordered \par - hypotension - responded to change of dose/ meds\par - CBC,Chem,CEA at next visit\par - B12 level > 1500 - on thyroid  \par  I personally performed the service described in the documentation recorded by the scribe in my presence, and it accurately and completely records my words and actions.

## 2023-01-17 ENCOUNTER — RESULT REVIEW (OUTPATIENT)
Age: 73
End: 2023-01-17

## 2023-01-17 ENCOUNTER — APPOINTMENT (OUTPATIENT)
Dept: HEMATOLOGY ONCOLOGY | Facility: CLINIC | Age: 73
End: 2023-01-17
Payer: MEDICARE

## 2023-01-17 VITALS
BODY MASS INDEX: 26.77 KG/M2 | TEMPERATURE: 97.6 F | WEIGHT: 136.38 LBS | HEART RATE: 106 BPM | SYSTOLIC BLOOD PRESSURE: 119 MMHG | OXYGEN SATURATION: 96 % | DIASTOLIC BLOOD PRESSURE: 59 MMHG | HEIGHT: 60 IN | RESPIRATION RATE: 18 BRPM

## 2023-01-17 PROCEDURE — 99213 OFFICE O/P EST LOW 20 MIN: CPT | Mod: 25

## 2023-01-17 PROCEDURE — 36415 COLL VENOUS BLD VENIPUNCTURE: CPT

## 2023-01-17 NOTE — ASSESSMENT
[FreeTextEntry1] : 73 yo female \par \par # Stage 1 breast cancer 8 mm tumor, invasive ductal carcinoma, ER/ TX positive, HER2 not amplified with Oncotype Dx 11. diagnosed November 2018\par -continue with Anastrazole\par - left breast pain - c/w fat necrosis on mammogram up to date 12/21\par \par #Adenocarcinoma of lung- stage 4 diagnosed  April 2019\par -Repeated CEA -plateau at 11.5 (started out at 26.6)\par -CT scan showed mass 1.8 x 1.4 x 1.7 cm lesion.\par -Biopsy of lung nodule c/w adeno ca of the lung, EGFR mutated, T190 mutated, started on Tagrisso.  Side effects, -toxicities and benefits reviewed with pt. L4 vertebral body biopsy confirmed met adenoca of the lung.\par - PETCT  2/3/2020- decreased FDG uptake in primary lesion, sclerotic lesions in bones, thyroid nodule activity- under surveillance, duodenal uptake \par - plan for PETCT Feb 2021- SARAH- stable, uptake in right breast - c/w fat necrosis \par - repeat CEA\par -Jan 2022- PETCT - sclerotic bone lesions.\par - pain in the left femur 2-3/10 takes viocodin daily, s/p RT \par -decreased lower back pain \par - visit to ER for dyspnea - images reviewed, evaluated for PE, related to Parkinson disease.  Lung mass 1.1 cm on CT\par - patient with weight loss, struggles with Parkinson disease\par \par #benign thyroid nodule - followed by Dr. Park. plan for biopsy\par # constipation- daily miralax\par \par - continue Tagrisso 11 pm, nausea in am \par - Nausea is controlled with  Sancuso and Dronabinol 2.5mg bid for nausea- refill today I-stop checked\par - continue Xgeva monthly -neoplasm pain - continue with Vicodin prn rarely taking \par -established care with Dr. Hendrix for her Parkinsons\par lung cancer stage 4 on tagrisso \par Tagrisso/Xgeva- extend Xgeva to q 6 weeks \par Patient evaluated by neuro for new tx for Parkinsons, will hold Sancuso \par \par [facility] no EGFR mutation detected 5/21\par CT c/a/p with bone scan ordered 9/22\par \par Still significant fatigue at 3 pm, under care of palliative care NP Noman. \par Nausea - on and off. \par CBC,Chem,CEA, CA 27.29  case and mgmt  to return in 1 month for treatment  \par \par # new pancreatic mass - order MRI with gadolinium, Ca19-9. Referred to Dr. Munguia for EUS.  IPMN suspected, observation vs biopsy.  \par \par # Episodes of chest tightness, tachycardia, SOB, feeling cold- was told it it anxiety attack.  It happens few times a day usually few times a day.  She had several trips to ER that didn’t show any other cardiopulmonary source. Attacks are debilitating. \par Improved after patient stopped Xanax, now Prozac \par weight loss, secondary to Parkinsons- TD improved with changes to Sinemet \par scans reviewed with pt and spouse- stable \par will hold Xgeva fro anticipated dental work beg of Feb\par \par Follows with psych Dr Yeimi Ellis [facility] meds adjusted \par \par Case and mgmt discussed with Dr. Shaffer, Labs ordered, drawn in office

## 2023-01-17 NOTE — REVIEW OF SYSTEMS
[Fatigue] : fatigue [SOB on Exertion] : shortness of breath during exertion [Constipation] : constipation [Diarrhea] : diarrhea [Joint Pain] : joint pain [Muscle Pain] : muscle pain [Dizziness] : dizziness [Difficulty Walking] : difficulty walking [Anxiety] : anxiety [Depression] : depression [Muscle Weakness] : muscle weakness [Negative] : Allergic/Immunologic [Fever] : no fever [Eye Pain] : no eye pain [Vision Problems] : no vision problems [Dysphagia] : no dysphagia [Nosebleeds] : no nosebleeds [Chest Pain] : no chest pain [Lower Ext Edema] : no lower extremity edema [Shortness Of Breath] : no shortness of breath [Dysuria] : no dysuria [Easy Bleeding] : no tendency for easy bleeding [Easy Bruising] : no tendency for easy bruising [FreeTextEntry6] : intermittently  [FreeTextEntry9] : muscle spasms due to Parkinsons, left hip pain  [de-identified] : tremor [de-identified] : improving

## 2023-01-17 NOTE — HISTORY OF PRESENT ILLNESS
[Therapy: ___] : Therapy: [unfilled] [de-identified] : Mrs. Mcdowell is a 68 year old postmenopausal female who is referred by Dr.Alice Demarco for newly diagnosed breast cancer.\par She was found on routine mammography in November 2019 she you have a focal asymmetric density in the left upper outer breast at 1:00 access 8 cm from the nipple. A one year prior had been negative.  Ultrasound-guided core biopsy in November 21, 2018 revealed a grade 1/3 invasive ductal carcinoma ER positive at 98% and SC positive at 90%,Ki-67 was less than 10%. She underwent some left breast lumpectomy and sentinel node dissection on December 19, 2018. Pathology revealed an 8 mm grade 1/3 invasive ductal carcinoma zero of 3 sentinel lymph nodes were involved with tumor. Oncotype DX score was 11. She also underwent genetic testing which is negative.\par  [de-identified] : Mrs. Mcdowell presents for follow up on Anastrazole for breast cancer. Patient presents with her . Patient states that her anxiety has significantly improved since beginning on prozac.  states he feels she is doing remarkably better and he notices that her levels of confusion are much, much better. Patient has begun cooking again which she has been unable to do in months. Patient has f/u with GI since her last appointment. Patient states that she is still experiencing dizziness and intermittent diarrhea and constipation but she has seen improvements in both.

## 2023-01-17 NOTE — PHYSICAL EXAM
[Restricted in physically strenuous activity but ambulatory and able to carry out work of a light or sedentary nature] : Status 1- Restricted in physically strenuous activity but ambulatory and able to carry out work of a light or sedentary nature, e.g., light house work, office work [Normal] : affect appropriate [de-identified] : left breast scar

## 2023-02-10 ENCOUNTER — APPOINTMENT (OUTPATIENT)
Dept: GERIATRICS | Facility: CLINIC | Age: 73
End: 2023-02-10
Payer: MEDICARE

## 2023-02-10 ENCOUNTER — RESULT REVIEW (OUTPATIENT)
Age: 73
End: 2023-02-10

## 2023-02-10 VITALS — SYSTOLIC BLOOD PRESSURE: 113 MMHG | OXYGEN SATURATION: 98 % | HEART RATE: 92 BPM | DIASTOLIC BLOOD PRESSURE: 73 MMHG

## 2023-02-10 PROCEDURE — 99214 OFFICE O/P EST MOD 30 MIN: CPT

## 2023-02-10 NOTE — HISTORY OF PRESENT ILLNESS
[FreeTextEntry1] : Rosamaria Mcdowell is a 73 y/o F w/ metastatic lung cancer to bone on tagrisso/xgyeva, localized breast cancer s/p lumpectomy and XRT on anastrazole, PD on Inbrija, vasculitis who was originally seen in 2019 for pain, persistent recurrent nausea and dizziness, restless leg syndrome, recent admission to [facility]  for GIB \par \par MRI 12/23 --> progression in L femur and pelvis lesions. S/p XRT to pelvis and femur 2/1/22-2/7/22\par \par 1/10: Does not feel better. Tried Dex 2mg and it didn't help stimulate her, only helped her rash.\par Tried Provigil- didn't notice a difference. Leg pain is worse. \par \par 1/24: Feels "a lot" better. Feels Dex at both doses (2 & 4mg) kept her up at night and made her restless leg worse. Tramadol is a "miracle" in terms of helping pain. Takes 2-3x/day. Sleeping better. Taking 1/2 dose of Miralax daily with good effect. Uses Curaleaf 1:1 2.5mg THC:CBD which helps her feel more alert. Extra Sinemet during the day is not really helping. RX Ritalin\par \par 2/24: Ritalin made her feel "jittery"- advised taper off and trial of Lexapro 5mg PO QD. \par \par 4/1: Reported increasing crying spells since starting medication, advised to stop\par \par 6/1: Presenting for routine follow up. Feels like "crap." Had many bug bites from an "arthropod attack," not improving, now s/p biopsy yesterday.. Feels afternoon "blahs" are 2/2 OFF periods of PD. No pain/nausea. Endorses leg pain, feels as though she may have neuropathy. Feet feel cold. Needs more assistance around the house (getting in and out of shower, etc).\par \par 8/10: Presenting for routine follow up. Have had frequent phone check ins (documenting) for either high periods of anxiety or periods of severe "lows" and fatigued. Now followed by Psych/Oncology Dr. Valentino. As of last visit 8/8,   Fluoxetine was increased to 20mg/day, and she was advised to continue with Lorazepam 0.5mg BID CONNER + Xanax 0.5mg PRN.\par \par 8/26: Reports feeling better and having more energy, taking less naps. Has been taking Xanax PRN 2-3x/day. Feels "dyskinetic" at times. Constipated x 3-4 days then uses suppository and has diarrhea. Takes Miralax daily. \par \par 9/19: Prozac increased and now off benzos 2/2 forgetfulness. Still having panic attacks 2-3x/day but feels better overall\par \par 9/21: Presents today for an urgent visit. Had panic-type symptoms all night (chest tightness, tachycardia to 120s); Seroquel did not really help. Ended up taking 1/2 Lorazepam which helped after an hour. Having panic attacks 2-3 times a day. Usually wakes up having one. Seroquel helps and causes less AMS-type symptoms. RX to continue Prozac 40, start Seroquel 50/50/75, and c/w [facility]\par \par 10/20: Taking Seroquel 50mg BID, Lorazepam 0.5mg daily in AM, and Prozac 40mg. Wakes up every morning in a "panic"- she no longer has breathing manifestations of panic attacks, but finds herself shivering. After the AM she is good for most of the day, and then starts feeling "cold" again toward the evening. Feels better after taking Seroquel. Tries not to take nighttime dose of Lorazepam. Wakes up at night to urinate 2-3x but is able to go back to sleep- 3-4 months ago, she was not able to go back to sleep at all. The "uncontrollable bouts of weepi-ness" have been gone for months. Apt with psychiatrist (Yeimi Ellis) [facility] is on 10/28. Also taking Marinol before dinner to help with nausea. Appetite is fine. Participating in outpatient PT for her Parkinsons. C/w Prozac 40mg PO QD + Seroquel 50mg TID CONNER + Ativan 0.5mg PO QAM\par \par 12/14: Fell at [employer] a week ago and hit her head, workup negative. Pain/nausea/mood improved. Following with Dr. Ellis now. Feels hyper at times after she takes the Prozac. Panic attacks "are there, but they are much more controllable." Still wakes up daily with panic attacks. Now on Seroquel 25/50/50 from 50/50/50 per Dr. Ellis recs. Not really taking Ativan in the morning anymore- only twice in the last week. Overall they both think that things are "substantially better" than a few months ago. Hasn't been going to PT due to dizziness. Re signing up for [employer]. Appetite is OK. Back on Marinol regularly for appetite. Participating in more socially. No changes.\par \par 2/10: Fell and hit head this AM. Takes baby ASA. Feels unilateral dizziness on R side of head\par \par Fidel (): [phone] / [phone]\par Jero (son): [phone] / [phone]

## 2023-02-10 NOTE — REVIEW OF SYSTEMS
[As Noted in HPI] : as noted in HPI [Negative] : Integumentary [Feeling Poorly] : feeling poorly [de-identified] : JOSE

## 2023-02-10 NOTE — PHYSICAL EXAM
[General Appearance - Alert] : alert [General Appearance - In No Acute Distress] : in no acute distress [General Appearance - Well Nourished] : well nourished [General Appearance - Well-Appearing] : healthy appearing [Sclera] : the sclera and conjunctiva were normal [Extraocular Movements] : extraocular movements were intact [No Oral Pallor] : no oral pallor [Outer Ear] : the ears and nose were normal in appearance [Hearing Threshold Finger Rub Not Kleberg] : hearing was normal [Neck Appearance] : the appearance of the neck was normal [Neck Cervical Mass (___cm)] : no neck mass was observed [Jugular Venous Distention Increased] : there was no jugular-venous distention [Respiration, Rhythm And Depth] : normal respiratory rhythm and effort [Exaggerated Use Of Accessory Muscles For Inspiration] : no accessory muscle use [Auscultation Breath Sounds / Voice Sounds] : lungs were clear to auscultation bilaterally [Heart Rate And Rhythm] : heart rate was normal and rhythm regular [Heart Sounds] : normal S1 and S2 [Murmurs] : no murmurs [Full Pulse] : the pedal pulses are present [Edema] : there was no peripheral edema [Bowel Sounds] : normal bowel sounds [Abdomen Soft] : soft [Abdomen Tenderness] : non-tender [Abnormal Walk] : normal gait [Nail Clubbing] : no clubbing  or cyanosis of the fingernails [Musculoskeletal - Swelling] : no joint swelling seen [Skin Color & Pigmentation] : normal skin color and pigmentation [] : no rash [Cranial Nerves] : cranial nerves 2-12 were intact [Oriented To Time, Place, And Person] : oriented to person, place, and time [Impaired Insight] : insight and judgment were intact [Affect] : the affect was normal [Mood] : the mood was normal [FreeTextEntry1] : Frail, pale, feels better.

## 2023-02-10 NOTE — ASSESSMENT
[Frailty-weight loss of >5%] : frailty-weight loss  [Fall precautions] : fall precautions [Social support] : social support [Living arrangements] : living arrangements [Medication Management] : medication management [Patient/Caregiver is not ready to engage in discussion] : Patient/caregiver is not ready to engage in discussion [DNR] : Code Status: DNR [Limited] : Treatment Guidelines: Limited [DNI] : Intubation: DNI [Last Verification Date: _____] : RUSTST Completion/last verification date: [unfilled] [______] : HCP: [unfilled] [FreeTextEntry1] : 73 y/o F w/ metastatic lung cancer, localized breast CA, Parkinson's, panic disorder, seen s/p fall at home + hit head\par \par - STAT head CT non-contrast- d/w Dr. WALTON

## 2023-02-11 ENCOUNTER — RESULT REVIEW (OUTPATIENT)
Age: 73
End: 2023-02-11

## 2023-02-13 ENCOUNTER — LABORATORY RESULT (OUTPATIENT)
Age: 73
End: 2023-02-13

## 2023-02-14 ENCOUNTER — LABORATORY RESULT (OUTPATIENT)
Age: 73
End: 2023-02-14

## 2023-02-15 ENCOUNTER — TRANSCRIPTION ENCOUNTER (OUTPATIENT)
Age: 73
End: 2023-02-15

## 2023-02-15 ENCOUNTER — LABORATORY RESULT (OUTPATIENT)
Age: 73
End: 2023-02-15

## 2023-02-21 ENCOUNTER — TRANSCRIPTION ENCOUNTER (OUTPATIENT)
Age: 73
End: 2023-02-21

## 2023-02-28 ENCOUNTER — APPOINTMENT (OUTPATIENT)
Dept: HEMATOLOGY ONCOLOGY | Facility: CLINIC | Age: 73
End: 2023-02-28
Payer: MEDICARE

## 2023-02-28 ENCOUNTER — RESULT REVIEW (OUTPATIENT)
Age: 73
End: 2023-02-28

## 2023-02-28 VITALS
RESPIRATION RATE: 16 BRPM | OXYGEN SATURATION: 99 % | BODY MASS INDEX: 26.74 KG/M2 | HEART RATE: 85 BPM | TEMPERATURE: 96.5 F | DIASTOLIC BLOOD PRESSURE: 56 MMHG | SYSTOLIC BLOOD PRESSURE: 115 MMHG | WEIGHT: 136.19 LBS | HEIGHT: 60 IN

## 2023-02-28 PROCEDURE — 36415 COLL VENOUS BLD VENIPUNCTURE: CPT

## 2023-02-28 PROCEDURE — 99214 OFFICE O/P EST MOD 30 MIN: CPT | Mod: 25

## 2023-02-28 NOTE — REVIEW OF SYSTEMS
[Fatigue] : fatigue [SOB on Exertion] : shortness of breath during exertion [Constipation] : constipation [Diarrhea] : diarrhea [Joint Pain] : joint pain [Muscle Pain] : muscle pain [Dizziness] : dizziness [Difficulty Walking] : difficulty walking [Anxiety] : anxiety [Depression] : depression [Muscle Weakness] : muscle weakness [Negative] : Allergic/Immunologic [Fever] : no fever [Eye Pain] : no eye pain [Vision Problems] : no vision problems [Dysphagia] : no dysphagia [Nosebleeds] : no nosebleeds [Chest Pain] : no chest pain [Lower Ext Edema] : no lower extremity edema [Shortness Of Breath] : no shortness of breath [Dysuria] : no dysuria [Easy Bleeding] : no tendency for easy bleeding [Easy Bruising] : no tendency for easy bruising [FreeTextEntry6] : intermittently  [FreeTextEntry9] : muscle spasms due to Parkinsons, left hip pain  [de-identified] : tremor [de-identified] : improving

## 2023-02-28 NOTE — HISTORY OF PRESENT ILLNESS
[Therapy: ___] : Therapy: [unfilled] [de-identified] : Mrs. Mcdowell is a 68 year old postmenopausal female who is referred by Dr.Alice Demarco for newly diagnosed breast cancer.\par She was found on routine mammography in November 2019 she you have a focal asymmetric density in the left upper outer breast at 1:00 access 8 cm from the nipple. A one year prior had been negative.  Ultrasound-guided core biopsy in November 21, 2018 revealed a grade 1/3 invasive ductal carcinoma ER positive at 98% and TN positive at 90%,Ki-67 was less than 10%. She underwent some left breast lumpectomy and sentinel node dissection on December 19, 2018. Pathology revealed an 8 mm grade 1/3 invasive ductal carcinoma zero of 3 sentinel lymph nodes were involved with tumor. Oncotype DX score was 11. She also underwent genetic testing which is negative.\par  [de-identified] : Mrs. Mcdowell presents for follow up on Anastrazole for breast cancer. Patient presents with her . Patient states that her anxiety has significantly improved since beginning on prozac.  states he feels she is doing remarkably better and he notices that her levels of confusion are much, much better. Patient has begun cooking again which she has been unable to do in months. Patient has f/u with GI since her last appointment. Patient states that she is still experiencing dizziness and intermittent diarrhea and constipation but she has seen improvements in both.

## 2023-02-28 NOTE — PHYSICAL EXAM
[Restricted in physically strenuous activity but ambulatory and able to carry out work of a light or sedentary nature] : Status 1- Restricted in physically strenuous activity but ambulatory and able to carry out work of a light or sedentary nature, e.g., light house work, office work [Normal] : affect appropriate [de-identified] : left breast scar

## 2023-02-28 NOTE — ASSESSMENT
[FreeTextEntry1] : 71 yo female \par \par # Stage 1 breast cancer 8 mm tumor, invasive ductal carcinoma, ER/ AR positive, HER2 not amplified with Oncotype Dx 11. diagnosed November 2018\par -continue with Anastrazole\par - left breast pain - c/w fat necrosis on mammogram up to date 12/22\par \par #Adenocarcinoma of lung- stage 4 diagnosed  April 2019\par -Repeated CEA -plateau at 11.5 (started out at 26.6)\par -CT scan showed mass 1.8 x 1.4 x 1.7 cm lesion.\par -Biopsy of lung nodule c/w adeno ca of the lung, EGFR mutated, T190 mutated, started on Tagrisso.  Side effects, -toxicities and benefits reviewed with pt. L4 vertebral body biopsy confirmed met adenoca of the lung.\par - PETCT  2/3/2020- decreased FDG uptake in primary lesion, sclerotic lesions in bones, thyroid nodule activity- under surveillance, duodenal uptake \par - plan for PETCT Feb 2021- SARAH- stable, uptake in right breast - c/w fat necrosis \par - repeat CEA\par -Jan 2022- PETCT - sclerotic bone lesions.\par - pain in the left femur 2-3/10 takes viocodin daily, s/p RT \par -decreased lower back pain \par - visit to ER for dyspnea - images reviewed, evaluated for PE, related to Parkinson disease.  Lung mass 1.1 cm on CT\par - patient with weight loss, struggles with Parkinson disease\par - PETCT - increased hoarsness of the voice, vocal cord nalysis\par \par #benign thyroid nodule - followed by Dr. Park. plan for biopsy\par # constipation- daily miralax\par \par - continue Tagrisso 11 pm, nausea in am \par - Nausea is controlled with  Sancuso and Dronabinol 2.5mg bid for nausea- refill today I-stop checked\par - continue Xgeva monthly -neoplasm pain - continue with Vicodin prn rarely taking \par -established care with Dr. Hendrix for her Parkinsons\par lung cancer stage 4 on tagrisso \par Tagrisso/Xgeva- extend Xgeva to q 6 weeks \par Patient evaluated by neuro for new tx for Parkinsons, will hold Sancuso \par \par [facility] 360 no EGFR mutation detected 5/21\par CT c/a/p with bone scan ordered 9/22\par \par Still significant fatigue at 3 pm, under care of palliative care NP Noman. \par Nausea - on and off. \par CBC,Chem,CEA, CA 27.29  case and mgmt  to return in 1 month for treatment  \par \par # new pancreatic mass - order MRI with gadolinium, Ca19-9. Referred to Dr. Munguia for EUS.  IPMN suspected, observation vs biopsy.  \par \par # Episodes of chest tightness, tachycardia, SOB, feeling cold- was told it it anxiety attack.  It happens few times a day usually few times a day.  She had several trips to ER that didn’t show any other cardiopulmonary source. Attacks are debilitating. \par Improved after patient stopped Xanax, now Prozac \par weight loss, secondary to Parkinsons- TD improved with changes to Sinemet \par scans reviewed with pt and spouse- stable \par On seroquel\par \par Follows with psych Dr Yeimi Ellis [facility] meds adjusted \par \par  Labs ordered, drawn in office

## 2023-03-03 ENCOUNTER — NON-APPOINTMENT (OUTPATIENT)
Age: 73
End: 2023-03-03

## 2023-03-13 ENCOUNTER — TRANSCRIPTION ENCOUNTER (OUTPATIENT)
Age: 73
End: 2023-03-13

## 2023-03-19 ENCOUNTER — RESULT REVIEW (OUTPATIENT)
Age: 73
End: 2023-03-19

## 2023-03-21 ENCOUNTER — RESULT REVIEW (OUTPATIENT)
Age: 73
End: 2023-03-21

## 2023-03-27 ENCOUNTER — APPOINTMENT (OUTPATIENT)
Dept: NEUROLOGY | Facility: CLINIC | Age: 73
End: 2023-03-27
Payer: MEDICARE

## 2023-03-27 ENCOUNTER — RX RENEWAL (OUTPATIENT)
Age: 73
End: 2023-03-27

## 2023-03-27 DIAGNOSIS — G24.01 DRUG INDUCED SUBACUTE DYSKINESIA: ICD-10-CM

## 2023-03-27 DIAGNOSIS — T42.8X5A DRUG INDUCED SUBACUTE DYSKINESIA: ICD-10-CM

## 2023-03-27 PROCEDURE — 99214 OFFICE O/P EST MOD 30 MIN: CPT | Mod: 95

## 2023-03-27 RX ORDER — CARBIDOPA AND LEVODOPA 10; 100 MG/1; MG/1
10-100 TABLET ORAL
Qty: 675 | Refills: 3 | Status: DISCONTINUED | COMMUNITY
Start: 2022-02-24 | End: 2023-03-27

## 2023-03-27 NOTE — PHYSICAL EXAM
[Person] : oriented to person [Place] : oriented to place [Time] : oriented to time [Concentration Intact] : normal concentrating ability [Comprehension] : comprehension intact [Cranial Nerves Oculomotor (III)] : extraocular motion intact [Cranial Nerves Facial (VII)] : face symmetrical [Cranial Nerves Vestibulocochlear (VIII)] : hearing was intact bilaterally [Cranial Nerves Accessory (XI - Cranial And Spinal)] : head turning and shoulder shrug symmetric [Cranial Nerves Hypoglossal (XII)] : there was no tongue deviation with protrusion [Motor Strength] : muscle strength was normal in all four extremities [Paresis Pronator Drift Right-Sided] : no pronator drift on the right [Paresis Pronator Drift Left-Sided] : no pronator drift on the left [Coordination - Dysmetria Impaired Finger-to-Nose Bilateral] : not present [FreeTextEntry1] : Face is not significantly masked. Voice is severely hypophonic and hoarse.\par Mild resting left thumb tremors, intermittent. \par No significant rigidity based on hand shaking and arm swing.\par There was slight bradykinesia, left more than right, with finger tapping, rapid alternating and sequential movements.\par No trouble standing with arms crossed. Posture is normal.\par No significant dyskinesias, but possibly slight right arm dyskinesias intermittently.\par Gait is with good stride length and speed, good arm swing bilaterally, no shuffling or freezing of gait. No ataxia. 1-2 step turns.\par \par Unable to adequately assess postural reflexes via telehealth, but patient was able to sit comfortably in the chair.

## 2023-03-27 NOTE — DISCUSSION/SUMMARY
[FreeTextEntry1] : Rosamaria Mcdowell is a 72 year old F with a PMHx of Parkinson's disease, lung cancer (on targeted therapy), hx cataract surgery, history of breast cancer, GERD, thyroid nodules and tachycardia who presents for follow up in the [facility] [place] for Parkinson's disease via telehealth. Positive Kranthi scan. She is accompanied by her  who provides collateral history.\par \par The patient's history and physical examination are suggestive of mild idiopathic Parkinson's disease. She reports a good response to Sinemet. Dyskinesias have resolved. No OH symptoms since adjusting her psych meds. Her anxiety is better controlled on mirtazapine. She is following with psychiatry who is titrating her regimen. \par \par Her RLS is controlled with Pramipexole IR 0.375mg. Insurance would not cover the ER she was on previously. Lyrica discontinued.\par \par   EMG was previously done and not suggestive of a myopathy.\par \par  Recommendations: \par - Continue Pramipexole ER 0.375mg at 7p. Do not take additional doses. Can take Ativan PRN difficulty sleeping instead\par - Continue Sinemet 25-100mg ER 2 tabs at 7a-2p-10p and 1 tab at 10a and 6p\par - Continue Parcopa PRN off periods. Will monitor use. \par - Continue Miralax for constipation , fiber and start probiotics\par - Mediterranean diet, antiinflammatory/antioxidant foods, probiotics, fiber, caffeine, and vitamins  - - \par Encouraged to increase exercise and physical activity and maintain an active social and intellectual life. \par - Depression and anxiety management per psychiatry\par \par   RTC as scheduled in July\par \par All questions were answered to her satisfaction. I spent 30 minutes with this patient.

## 2023-03-27 NOTE — REASON FOR VISIT
[Follow-Up: _____] : a [unfilled] follow-up visit [Home] : at home, [unfilled] , at the time of the visit. [Medical Office: ([facility])___] : at the medical office located in  [Spouse] : spouse

## 2023-04-11 ENCOUNTER — RESULT REVIEW (OUTPATIENT)
Age: 73
End: 2023-04-11

## 2023-04-11 ENCOUNTER — APPOINTMENT (OUTPATIENT)
Dept: HEMATOLOGY ONCOLOGY | Facility: CLINIC | Age: 73
End: 2023-04-11
Payer: MEDICARE

## 2023-04-11 VITALS
OXYGEN SATURATION: 99 % | HEIGHT: 62 IN | WEIGHT: 139.5 LBS | TEMPERATURE: 97.1 F | SYSTOLIC BLOOD PRESSURE: 113 MMHG | RESPIRATION RATE: 17 BRPM | HEART RATE: 93 BPM | BODY MASS INDEX: 25.67 KG/M2 | DIASTOLIC BLOOD PRESSURE: 64 MMHG

## 2023-04-11 PROCEDURE — 99214 OFFICE O/P EST MOD 30 MIN: CPT | Mod: 25

## 2023-04-11 PROCEDURE — 36415 COLL VENOUS BLD VENIPUNCTURE: CPT

## 2023-04-11 NOTE — REVIEW OF SYSTEMS
[Fatigue] : fatigue [SOB on Exertion] : shortness of breath during exertion [Constipation] : constipation [Diarrhea] : diarrhea [Joint Pain] : joint pain [Muscle Pain] : muscle pain [Dizziness] : dizziness [Difficulty Walking] : difficulty walking [Anxiety] : anxiety [Depression] : depression [Muscle Weakness] : muscle weakness [Negative] : Allergic/Immunologic [Fever] : no fever [Eye Pain] : no eye pain [Vision Problems] : no vision problems [Dysphagia] : no dysphagia [Nosebleeds] : no nosebleeds [Chest Pain] : no chest pain [Lower Ext Edema] : no lower extremity edema [Shortness Of Breath] : no shortness of breath [Dysuria] : no dysuria [Easy Bleeding] : no tendency for easy bleeding [Easy Bruising] : no tendency for easy bruising [FreeTextEntry6] : intermittently  [FreeTextEntry9] : muscle spasms due to Parkinsons, left hip pain  [de-identified] : tremor [de-identified] : improving

## 2023-04-11 NOTE — HISTORY OF PRESENT ILLNESS
[Therapy: ___] : Therapy: [unfilled] [de-identified] : Mrs. Mcdowell is a 68 year old postmenopausal female who is referred by Dr.Alice Demarco for newly diagnosed breast cancer.\par She was found on routine mammography in November 2019 she you have a focal asymmetric density in the left upper outer breast at 1:00 access 8 cm from the nipple. A one year prior had been negative.  Ultrasound-guided core biopsy in November 21, 2018 revealed a grade 1/3 invasive ductal carcinoma ER positive at 98% and OK positive at 90%,Ki-67 was less than 10%. She underwent some left breast lumpectomy and sentinel node dissection on December 19, 2018. Pathology revealed an 8 mm grade 1/3 invasive ductal carcinoma zero of 3 sentinel lymph nodes were involved with tumor. Oncotype DX score was 11. She also underwent genetic testing which is negative.\par  [de-identified] : Mrs. Mcdowell presents for follow up on Anastrazole for breast cancer. Patient presents with her . Patient states that her anxiety has significantly improved since beginning on prozac.  states he feels she is doing remarkably better and he notices that her levels of confusion are much, much better. Patient has begun cooking again which she has been unable to do in months. Patient has f/u with GI since her last appointment. Patient states that she is still experiencing dizziness and intermittent diarrhea and constipation but she has seen improvements in both.

## 2023-04-11 NOTE — PHYSICAL EXAM
[Restricted in physically strenuous activity but ambulatory and able to carry out work of a light or sedentary nature] : Status 1- Restricted in physically strenuous activity but ambulatory and able to carry out work of a light or sedentary nature, e.g., light house work, office work [Normal] : affect appropriate [de-identified] : left breast scar

## 2023-04-11 NOTE — ASSESSMENT
[FreeTextEntry1] : 73 yo female \par \par # Stage 1 breast cancer 8 mm tumor, invasive ductal carcinoma, ER/ [place] positive, HER2 not amplified with Oncotype Dx 11. diagnosed November 2018\par -continue with Anastrazole\par - left breast pain - c/w fat necrosis on mammogram up to date 12/22\par \par #Adenocarcinoma of lung- stage 4 diagnosed  April 2019\par -Repeated CEA -plateau at 11.5 (started out at 26.6)\par -CT scan showed mass 1.8 x 1.4 x 1.7 cm lesion.\par -Biopsy of lung nodule c/w adeno ca of the lung, EGFR mutated, T190 mutated, started on Tagrisso.  Side effects, -toxicities and benefits reviewed with pt. L4 vertebral body biopsy confirmed met adenoca of the lung.\par - PETCT  2/3/2020- decreased FDG uptake in primary lesion, sclerotic lesions in bones, thyroid nodule activity- under surveillance, duodenal uptake \par - plan for PETCT Feb 2021- SARAH- stable, uptake in right breast - c/w fat necrosis \par - repeat CEA\par -Jan 2022- PETCT - sclerotic bone lesions.\par - pain in the left femur 2-3/10 takes viocodin daily, s/p RT \par -decreased lower back pain \par - visit to ER for dyspnea - images reviewed, evaluated for PE, related to Parkinson disease.  Lung mass 1.1 cm on CT\par - patient with weight loss, struggles with Parkinson disease\par - PETCT -3/27/23 some progression of bone mets, no change \par - increased hoarseness of the voice, vocal cord paralysis - followed by Dr. Rodriguez \par \par #benign thyroid nodule - followed by Dr. Park. plan for biopsy\par \par # constipation- daily miralax\par \par # bone mets\par - Xgeva q 3 months\par \par # new pancreatic mass - order MRI with gadolinium, Ca19-9. Referred to Dr. Munguia for EUS.  IPMN suspected, observation vs biopsy.  \par \par Follows with psych Dr Yeimi Ellis [facility] meds adjusted \par \par  Labs ordered, drawn in office\par  CBC,Chem,CEA, CA 27.29  case and mgmt  to return in 1 month for treatment

## 2023-04-19 ENCOUNTER — APPOINTMENT (OUTPATIENT)
Dept: GERIATRICS | Facility: CLINIC | Age: 73
End: 2023-04-19

## 2023-04-19 ENCOUNTER — APPOINTMENT (OUTPATIENT)
Dept: GERIATRICS | Facility: CLINIC | Age: 73
End: 2023-04-19
Payer: MEDICARE

## 2023-04-19 PROCEDURE — 99214 OFFICE O/P EST MOD 30 MIN: CPT | Mod: 95

## 2023-04-19 NOTE — HISTORY OF PRESENT ILLNESS
[Home] : at home, [unfilled] , at the time of the visit. [Medical Office: ([facility])___] : at the medical office located in  [Verbal consent obtained from patient] : the patient, [unfilled] [FreeTextEntry1] : Rosamaria Mcdowell is a 73 y/o F w/ metastatic lung cancer to bone on tagrisso/xgyeva, localized breast cancer s/p lumpectomy and XRT on anastrazole, PD on Inbrija, vasculitis who was originally seen in 2019 for pain, persistent recurrent nausea and dizziness, restless leg syndrome, recent admission to [facility]  for GIB \par \par MRI 12/23 --> progression in L femur and pelvis lesions. S/p XRT to pelvis and femur 2/1/22-2/7/22\par \par 1/10: Does not feel better. Tried Dex 2mg and it didn't help stimulate her, only helped her rash.\par Tried Provigil- didn't notice a difference. Leg pain is worse. \par \par 1/24: Feels "a lot" better. Feels Dex at both doses (2 & 4mg) kept her up at night and made her restless leg worse. Tramadol is a "miracle" in terms of helping pain. Takes 2-3x/day. Sleeping better. Taking 1/2 dose of Miralax daily with good effect. Uses Curaleaf 1:1 2.5mg THC:CBD which helps her feel more alert. Extra Sinemet during the day is not really helping. RX Ritalin\par \par 2/24: Ritalin made her feel "jittery"- advised taper off and trial of Lexapro 5mg PO QD. \par \par 4/1: Reported increasing crying spells since starting medication, advised to stop\par \par 6/1: Presenting for routine follow up. Feels like "crap." Had many bug bites from an "arthropod attack," not improving, now s/p biopsy yesterday.. Feels afternoon "blahs" are 2/2 OFF periods of PD. No pain/nausea. Endorses leg pain, feels as though she may have neuropathy. Feet feel cold. Needs more assistance around the house (getting in and out of shower, etc).\par \par 8/10: Presenting for routine follow up. Have had frequent phone check ins (documenting) for either high periods of anxiety or periods of severe "lows" and fatigued. Now followed by Psych/Oncology Dr. Valentino. As of last visit 8/8,   Fluoxetine was increased to 20mg/day, and she was advised to continue with Lorazepam 0.5mg BID CONNER + Xanax 0.5mg PRN.\par \par 8/26: Reports feeling better and having more energy, taking less naps. Has been taking Xanax PRN 2-3x/day. Feels "dyskinetic" at times. Constipated x 3-4 days then uses suppository and has diarrhea. Takes Miralax daily. \par \par 9/19: Prozac increased and now off benzos 2/2 forgetfulness. Still having panic attacks 2-3x/day but feels better overall\par \par 9/21: Presents today for an urgent visit. Had panic-type symptoms all night (chest tightness, tachycardia to 120s); Seroquel did not really help. Ended up taking 1/2 Lorazepam which helped after an hour. Having panic attacks 2-3 times a day. Usually wakes up having one. Seroquel helps and causes less AMS-type symptoms. RX to continue Prozac 40, start Seroquel 50/50/75, and c/w [facility]\par \par 10/20: Taking Seroquel 50mg BID, Lorazepam 0.5mg daily in AM, and Prozac 40mg. Wakes up every morning in a "panic"- she no longer has breathing manifestations of panic attacks, but finds herself shivering. After the AM she is good for most of the day, and then starts feeling "cold" again toward the evening. Feels better after taking Seroquel. Tries not to take nighttime dose of Lorazepam. Wakes up at night to urinate 2-3x but is able to go back to sleep- 3-4 months ago, she was not able to go back to sleep at all. The "uncontrollable bouts of weepi-ness" have been gone for months. Apt with [facility] (Yeimi Ellis) [facility] is on 10/28. Also taking Marinol before dinner to help with nausea. Appetite is fine. Participating in outpatient PT for her Parkinsons. C/w Prozac 40mg PO QD + Seroquel 50mg TID CONNER + Ativan 0.5mg PO QAM\par \par 12/14: Fell at [employer] a week ago and hit her head, workup negative. Pain/nausea/mood improved. Following with Dr. Ellis now. Feels hyper at times after she takes the Prozac. Panic attacks "are there, but they are much more controllable." Still wakes up daily with panic attacks. Now on Seroquel 25/50/50 from 50/50/50 per Dr. Ellis recs. Not really taking Ativan in the morning anymore- only twice in the last week. Overall they both think that things are "substantially better" than a few months ago. Hasn't been going to PT due to dizziness. Re signing up for [employer]. Appetite is OK. Back on Marinol regularly for appetite. Participating in more socially. No changes.\par \par 2/10: Fell and hit head this AM. Takes baby ASA. Feels unilateral dizziness on R side of head. RX STAT head CT\par \par 4/19: Now only on Mirtazapine- Prozac wasn't working, Seroquel was affecting her gait. Having 1-2 anxiety attacks/day, sometimes in the morning. Taking Lorazepam 1/2 tab twice a day when she has an attack, kicks in in about an hour. TEAVERY w/ Dr. Ellis tomorrow. Feels that she is doing OK on Remeron but it makes her very sleepy/groggy. Wants to start driving again. No pain or nausea. Wants to not have to nap in the afternoon, and wants to wake up happy and energetic. \par \par Fidel (): [phone] / [phone]\par Jero (son): [phone] / [phone]

## 2023-04-19 NOTE — ASSESSMENT
[FreeTextEntry1] : 73 y/o F w/ metastatic lung cancer, localized breast CA, Parkinson's, panic disorder, seen s/p fall at home + hit head\par \par - C/w voice therapy for voice loss\par - F/u Dr. Ellis for psych meds titration- consider increase Remeron\par - No pain or nausea

## 2023-04-24 ENCOUNTER — RX RENEWAL (OUTPATIENT)
Age: 73
End: 2023-04-24

## 2023-05-23 ENCOUNTER — RESULT REVIEW (OUTPATIENT)
Age: 73
End: 2023-05-23

## 2023-05-23 ENCOUNTER — APPOINTMENT (OUTPATIENT)
Dept: HEMATOLOGY ONCOLOGY | Facility: CLINIC | Age: 73
End: 2023-05-23
Payer: MEDICARE

## 2023-05-23 ENCOUNTER — NON-APPOINTMENT (OUTPATIENT)
Age: 73
End: 2023-05-23

## 2023-05-23 VITALS
RESPIRATION RATE: 16 BRPM | BODY MASS INDEX: 27.29 KG/M2 | SYSTOLIC BLOOD PRESSURE: 111 MMHG | WEIGHT: 148.31 LBS | HEIGHT: 62 IN | OXYGEN SATURATION: 99 % | TEMPERATURE: 97 F | HEART RATE: 90 BPM | DIASTOLIC BLOOD PRESSURE: 60 MMHG

## 2023-05-23 PROCEDURE — 99213 OFFICE O/P EST LOW 20 MIN: CPT | Mod: 25

## 2023-05-23 PROCEDURE — 36415 COLL VENOUS BLD VENIPUNCTURE: CPT

## 2023-05-23 NOTE — REVIEW OF SYSTEMS
[Fatigue] : fatigue [SOB on Exertion] : shortness of breath during exertion [Constipation] : constipation [Joint Pain] : joint pain [Muscle Pain] : muscle pain [Dizziness] : dizziness [Difficulty Walking] : difficulty walking [Anxiety] : anxiety [Depression] : depression [Muscle Weakness] : muscle weakness [Negative] : Allergic/Immunologic [Fever] : no fever [Eye Pain] : no eye pain [Vision Problems] : no vision problems [Dysphagia] : no dysphagia [Nosebleeds] : no nosebleeds [Chest Pain] : no chest pain [Lower Ext Edema] : no lower extremity edema [Shortness Of Breath] : no shortness of breath [Dysuria] : no dysuria [Easy Bleeding] : no tendency for easy bleeding [Easy Bruising] : no tendency for easy bruising [FreeTextEntry6] : intermittently  [FreeTextEntry9] : muscle spasms due to Parkinsons, left hip pain  [de-identified] : tremor [de-identified] : improving

## 2023-05-23 NOTE — HISTORY OF PRESENT ILLNESS
[Therapy: ___] : Therapy: [unfilled] [de-identified] : Mrs. Mcdowell is a 68 year old postmenopausal female who is referred by Dr.Alice Demarco for newly diagnosed breast cancer.\par She was found on routine mammography in November 2019 she you have a focal asymmetric density in the left upper outer breast at 1:00 access 8 cm from the nipple. A one year prior had been negative.  Ultrasound-guided core biopsy in November 21, 2018 revealed a grade 1/3 invasive ductal carcinoma ER positive at 98% and GA positive at 90%,Ki-67 was less than 10%. She underwent some left breast lumpectomy and sentinel node dissection on December 19, 2018. Pathology revealed an 8 mm grade 1/3 invasive ductal carcinoma zero of 3 sentinel lymph nodes were involved with tumor. Oncotype DX score was 11. She also underwent genetic testing which is negative.\par  [de-identified] : Mrs. Mcdowell presents for follow up on Anastrazole for breast cancer and on Tagrisso for lung cancer- she continues to decline from Parkinsons.

## 2023-05-23 NOTE — ASSESSMENT
[FreeTextEntry1] : 71 yo female \par \par # Stage 1 breast cancer 8 mm tumor, invasive ductal carcinoma, ER/ [place] positive, HER2 not amplified with Oncotype Dx 11. diagnosed November 2018\par -continue with Anastrazole\par - left breast pain - c/w fat necrosis on mammogram up to date 12/22\par \par #Adenocarcinoma of lung- stage 4 diagnosed  April 2019\par -Repeated CEA -plateau at 11.5 (started out at 26.6)\par -CT scan showed mass 1.8 x 1.4 x 1.7 cm lesion.\par -Biopsy of lung nodule c/w adeno ca of the lung, EGFR mutated, T190 mutated, started on Tagrisso.  Side effects, -toxicities and benefits reviewed with pt. L4 vertebral body biopsy confirmed met adenoca of the lung.\par - PETCT  2/3/2020- decreased FDG uptake in primary lesion, sclerotic lesions in bones, thyroid nodule activity- under surveillance, duodenal uptake \par - plan for PETCT Feb 2021- SARAH- stable, uptake in right breast - c/w fat necrosis \par - repeat CEA\par -Jan 2022- PETCT - sclerotic bone lesions.\par - pain in the left femur 2-3/10 takes viocodin daily, s/p RT \par -decreased lower back pain \par - visit to ER for dyspnea - images reviewed, evaluated for PE, related to Parkinson disease.  Lung mass 1.1 cm on CT\par - patient with weight loss, struggles with Parkinson disease\par - PETCT -3/27/23 some progression of bone mets, no change \par - increased hoarseness of the voice, vocal cord paralysis - followed by Dr. Rodriguez \par \par #benign thyroid nodule - followed by Dr. Park. due for repeat thyroid US\par \par # constipation- daily miralax\par \par # bone mets\par - Xgeva q 3 months\par \par # new pancreatic mass - order MRI with gadolinium, Ca19-9. Referred to Dr. Munguia for EUS.  IPMN suspected, observation vs biopsy.  \par \par Follows with psych Dr Yeimi Ellis [facility] meds adjusted \par \par  Labs ordered, drawn in office\par  CBC,Chem,CEA, CA 27.29  case and mgmt  to return in 6 weeks for treatment- case and mgmt discussed with Dr. Shaffer

## 2023-05-23 NOTE — PHYSICAL EXAM
[Restricted in physically strenuous activity but ambulatory and able to carry out work of a light or sedentary nature] : Status 1- Restricted in physically strenuous activity but ambulatory and able to carry out work of a light or sedentary nature, e.g., light house work, office work [Normal] : affect appropriate [de-identified] : left breast scar

## 2023-05-24 ENCOUNTER — TRANSCRIPTION ENCOUNTER (OUTPATIENT)
Age: 73
End: 2023-05-24

## 2023-06-04 ENCOUNTER — NON-APPOINTMENT (OUTPATIENT)
Age: 73
End: 2023-06-04

## 2023-06-04 DIAGNOSIS — S09.90XA UNSPECIFIED INJURY OF HEAD, INITIAL ENCOUNTER: ICD-10-CM

## 2023-06-04 DIAGNOSIS — H81.399 OTHER PERIPHERAL VERTIGO, UNSPECIFIED EAR: ICD-10-CM

## 2023-06-07 ENCOUNTER — APPOINTMENT (OUTPATIENT)
Dept: CARDIOLOGY | Facility: CLINIC | Age: 73
End: 2023-06-07
Payer: MEDICARE

## 2023-06-07 ENCOUNTER — NON-APPOINTMENT (OUTPATIENT)
Age: 73
End: 2023-06-07

## 2023-06-07 VITALS
WEIGHT: 145.25 LBS | RESPIRATION RATE: 18 BRPM | OXYGEN SATURATION: 98 % | HEIGHT: 62 IN | TEMPERATURE: 97.1 F | BODY MASS INDEX: 26.73 KG/M2 | SYSTOLIC BLOOD PRESSURE: 108 MMHG | DIASTOLIC BLOOD PRESSURE: 64 MMHG | HEART RATE: 101 BPM

## 2023-06-07 PROCEDURE — 93000 ELECTROCARDIOGRAM COMPLETE: CPT

## 2023-06-07 PROCEDURE — 99215 OFFICE O/P EST HI 40 MIN: CPT

## 2023-06-07 NOTE — HISTORY OF PRESENT ILLNESS
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  She was also followed for small pericardial effusion.\par \par She is treated for metastatic  breast cancer and adenocarcinoma  of  lung cancer with bony mets and parkinsons.\par \par She has panic attacks twice daily like she cant get enough air, no chest pain, feels the heart is beating faster, no syncope.\par She has had 2 er visits for this most recently april 2022. Dr Yeimi Ellis, [facility]\par \par \par \par \par \par

## 2023-06-13 ENCOUNTER — NON-APPOINTMENT (OUTPATIENT)
Age: 73
End: 2023-06-13

## 2023-06-15 ENCOUNTER — NON-APPOINTMENT (OUTPATIENT)
Age: 73
End: 2023-06-15

## 2023-06-15 ENCOUNTER — APPOINTMENT (OUTPATIENT)
Dept: SURGERY | Facility: CLINIC | Age: 73
End: 2023-06-15
Payer: MEDICARE

## 2023-06-15 VITALS
HEART RATE: 53 BPM | WEIGHT: 147 LBS | SYSTOLIC BLOOD PRESSURE: 123 MMHG | DIASTOLIC BLOOD PRESSURE: 79 MMHG | BODY MASS INDEX: 27.05 KG/M2 | RESPIRATION RATE: 16 BRPM | HEIGHT: 62 IN | OXYGEN SATURATION: 97 %

## 2023-06-15 PROCEDURE — 99204 OFFICE O/P NEW MOD 45 MIN: CPT | Mod: 25

## 2023-06-15 PROCEDURE — 46600 DIAGNOSTIC ANOSCOPY SPX: CPT

## 2023-06-21 ENCOUNTER — APPOINTMENT (OUTPATIENT)
Dept: COLORECTAL SURGERY | Facility: CLINIC | Age: 73
End: 2023-06-21
Payer: MEDICARE

## 2023-06-21 VITALS
HEIGHT: 62 IN | WEIGHT: 147 LBS | BODY MASS INDEX: 27.05 KG/M2 | DIASTOLIC BLOOD PRESSURE: 76 MMHG | SYSTOLIC BLOOD PRESSURE: 125 MMHG | HEART RATE: 95 BPM

## 2023-06-21 PROCEDURE — 99204 OFFICE O/P NEW MOD 45 MIN: CPT

## 2023-06-21 NOTE — REVIEW OF SYSTEMS
[Feeling Poorly] : feeling poorly [Feeling Tired] : feeling tired [Shortness Of Breath] : shortness of breath [As Noted in HPI] : as noted in HPI [Constipation] : constipation [Anxiety] : anxiety [Muscle Weakness] : muscle weakness [Negative] : Heme/Lymph [FreeTextEntry2] : Has stage 4 lung cancer and Parkinson disease has made the last 4 years very difficult [FreeTextEntry6] : Has panic attacks that make her short of breath.  On ativan [FreeTextEntry7] : nausea [de-identified] : Has Parkinson disease [de-identified] : panic attacks

## 2023-06-21 NOTE — HISTORY OF PRESENT ILLNESS
[FreeTextEntry1] : 73 year old female pt here with complaints of rectal prolapse\par PMH of stage IV lung cancer followed by Dr. Shaffer, Parkinson, rectocele and anxiety & panic attacks\par \par Pt reports rectal prolapse since last Tuesday \par Her GI is Dr. Geo Armijo and has been treated for constipation\par Diagnosed with a rectocele 10 years ago never repaired\par Last Tuesday she felt something on the outside, Dr. Geo Rai advised her to go to ER and when she went there they didn't know what it was. Prolapse wasn't evident\par \par Saw Dr. Landin in the office in [place]  last Thursday and was told she had a prolapsed rectum and needs to be repaired\par \par On Friday it became painful to walk.  Sunday morning she called the office and spoke to Dr. Landin and reported she had not had a BM.  He advised her to go on a clear liquid diet and increase Miralax.\par Sunday afternoon started passing stool but still had painful prolapse of rectum\par Called Monday and spoke to Nely about the scheduling of surgery. Was advised that Dr. Landin is on vacation. Was advised next appointment would be with Dr. Reyes on Thursday.  This morning they were told they could come this afternoon to see Dr. Jones.\par \par Interested in scheduling surgery to take away the pain \par \par Has enormous pain when she tries to walk or stand\par Small amount of bleeding\par Has little control of her stool, passes stool 6 times a day small amounts\par Incontinence of stool is new since the prolapse 9 days ago\par \par Diagnosed with Parkinson 2015 and Lung cancer 2019 with mets to bones \par Had breast cancer in 11/2018

## 2023-06-21 NOTE — ASSESSMENT
[FreeTextEntry1] : 73 F w/ stage 4 lung cancer and parkinsons; has a reducible large rectal prolapse which is symptomatic.\par Plan:\par Tylenol for pain.\par Laxatives and stool softeners as needed for constipation.\par Dr Landin will consult with Dr Shaffer regarding surgery.\par All questions answered.

## 2023-06-22 ENCOUNTER — APPOINTMENT (OUTPATIENT)
Dept: COLORECTAL SURGERY | Facility: CLINIC | Age: 73
End: 2023-06-22

## 2023-06-27 ENCOUNTER — APPOINTMENT (OUTPATIENT)
Dept: SURGERY | Facility: CLINIC | Age: 73
End: 2023-06-27
Payer: MEDICARE

## 2023-06-27 VITALS
DIASTOLIC BLOOD PRESSURE: 70 MMHG | SYSTOLIC BLOOD PRESSURE: 143 MMHG | HEIGHT: 62 IN | BODY MASS INDEX: 27.05 KG/M2 | HEART RATE: 104 BPM | WEIGHT: 147 LBS | OXYGEN SATURATION: 99 %

## 2023-06-27 PROCEDURE — 99213 OFFICE O/P EST LOW 20 MIN: CPT

## 2023-06-29 ENCOUNTER — RESULT REVIEW (OUTPATIENT)
Age: 73
End: 2023-06-29

## 2023-06-29 NOTE — PROCEDURE
[FreeTextEntry1] : Anoscopy: Anoscopic exam performed lighted anoscope.  Mild internal hemorrhoids without active bleeding.  No other anorectal mucosal abnormalities noted.

## 2023-06-29 NOTE — ASSESSMENT
[FreeTextEntry1] : 73-year-old female with full-thickness rectal prolapse.\par \par Discussed operative approaches to rectal prolapse repair.  Discussed open laparoscopic robotic approaches.  Discussed with perineal and abdominal approaches.  Given patient's for ongoing medical problems including metastatic lung cancer recommend proceeding with robotic assisted suture rectopexy.  This will likely give her the quickest recovery with minimal risk for future infection due to any mesh with a ventral mesh rectopexy.  For the most likely for the best functions compared to peritoneal approach which would require resection of the rectum.\par \par Patient would like to proceed with surgery as discussed.  We will discuss with Dr. Shaffer and then choose a date to proceed.

## 2023-06-29 NOTE — HISTORY OF PRESENT ILLNESS
FreeTextEntry1] : External female here for initial evaluation for possible hemorrhoids versus prolapse.  Patient noted a large bump protruding the past week or 2.  Has been somewhat uncomfortable.  Denies significant bleeding.  Denies significant issues with constipation or incontinence.  Abdominal surgical history significant for hysterectomy.  Patient previously underwent colonoscopy with Dr. Bernabe in 2020 which noted diverticulosis and internal hemorrhoids.

## 2023-06-29 NOTE — PHYSICAL EXAM
[Abdomen Masses] : No abdominal masses [Abdomen Tenderness] : ~T No ~M abdominal tenderness [Excoriation] : no perianal excoriation [Tender, Swollen] : nontender, non-swollen [Normal] : was normal [None] : there was no rectal abscess [No Rash or Lesion] : No rash or lesion [Alert] : alert [Calm] : calm [de-identified] : No obvious prolapse on initial exam.  This patient examined on the toilet with Valsalva and subsequently noted full-thickness prolapse [de-identified] : No acute distress

## 2023-06-30 ENCOUNTER — APPOINTMENT (OUTPATIENT)
Dept: SURGERY | Facility: HOSPITAL | Age: 73
End: 2023-06-30

## 2023-07-05 ENCOUNTER — APPOINTMENT (OUTPATIENT)
Dept: GERIATRICS | Facility: CLINIC | Age: 73
End: 2023-07-05
Payer: MEDICARE

## 2023-07-05 PROCEDURE — 99214 OFFICE O/P EST MOD 30 MIN: CPT

## 2023-07-06 ENCOUNTER — RESULT REVIEW (OUTPATIENT)
Age: 73
End: 2023-07-06

## 2023-07-06 ENCOUNTER — NON-APPOINTMENT (OUTPATIENT)
Age: 73
End: 2023-07-06

## 2023-07-06 ENCOUNTER — APPOINTMENT (OUTPATIENT)
Dept: HEMATOLOGY ONCOLOGY | Facility: CLINIC | Age: 73
End: 2023-07-06
Payer: MEDICARE

## 2023-07-06 VITALS
OXYGEN SATURATION: 97 % | HEIGHT: 62 IN | RESPIRATION RATE: 16 BRPM | BODY MASS INDEX: 25.97 KG/M2 | WEIGHT: 141.13 LBS | HEART RATE: 98 BPM | SYSTOLIC BLOOD PRESSURE: 119 MMHG | DIASTOLIC BLOOD PRESSURE: 62 MMHG | TEMPERATURE: 98.1 F

## 2023-07-06 PROCEDURE — 36415 COLL VENOUS BLD VENIPUNCTURE: CPT

## 2023-07-06 PROCEDURE — 99213 OFFICE O/P EST LOW 20 MIN: CPT | Mod: 25

## 2023-07-06 NOTE — REVIEW OF SYSTEMS
[Fatigue] : fatigue [SOB on Exertion] : shortness of breath during exertion [Constipation] : constipation [Joint Pain] : joint pain [Muscle Pain] : muscle pain [Dizziness] : dizziness [Difficulty Walking] : difficulty walking [Anxiety] : anxiety [Depression] : depression [Muscle Weakness] : muscle weakness [Negative] : Allergic/Immunologic [Fever] : no fever [Recent Change In Weight] : ~T recent weight change [Eye Pain] : no eye pain [Vision Problems] : no vision problems [Dysphagia] : no dysphagia [Nosebleeds] : no nosebleeds [Chest Pain] : no chest pain [Lower Ext Edema] : no lower extremity edema [Shortness Of Breath] : no shortness of breath [Dysuria] : no dysuria [Easy Bleeding] : no tendency for easy bleeding [Easy Bruising] : no tendency for easy bruising [FreeTextEntry2] : weight loss [FreeTextEntry6] : intermittently  [FreeTextEntry9] : muscle spasms due to Parkinsons, left hip pain  [de-identified] : tremor [de-identified] : improving

## 2023-07-06 NOTE — HISTORY OF PRESENT ILLNESS
[Therapy: ___] : Therapy: [unfilled] [de-identified] : Mrs. Mcdowell is a 68 year old postmenopausal female who is referred by Dr.Alice Demarco for newly diagnosed breast cancer.\par She was found on routine mammography in November 2019 she you have a focal asymmetric density in the left upper outer breast at 1:00 access 8 cm from the nipple. A one year prior had been negative.  Ultrasound-guided core biopsy in November 21, 2018 revealed a grade 1/3 invasive ductal carcinoma ER positive at 98% and RI positive at 90%,Ki-67 was less than 10%. She underwent some left breast lumpectomy and sentinel node dissection on December 19, 2018. Pathology revealed an 8 mm grade 1/3 invasive ductal carcinoma zero of 3 sentinel lymph nodes were involved with tumor. Oncotype DX score was 11. She also underwent genetic testing which is negative.\par  [de-identified] : Mrs. Mcdowell presents for follow up on Anastrazole for breast cancer and on Tagrisso for lung cancer- she continues to decline from Parkinsons.

## 2023-07-06 NOTE — ASSESSMENT
[FreeTextEntry1] : 72 yo female \par \par # Stage 1 breast cancer 8 mm tumor, invasive ductal carcinoma, ER/ [place] positive, HER2 not amplified with Oncotype Dx 11. diagnosed November 2018\par -continue with Anastrazole\par - left breast pain - c/w fat necrosis on mammogram up to date 12/22\par \par #Adenocarcinoma of lung- stage 4 diagnosed  April 2019\par -Repeated CEA -plateau at 11.5 (started out at 26.6)\par -CT scan showed mass 1.8 x 1.4 x 1.7 cm lesion.\par -Biopsy of lung nodule c/w adeno ca of the lung, EGFR mutated, T190 mutated, started on Tagrisso.  Side effects, -toxicities and benefits reviewed with pt. L4 vertebral body biopsy confirmed met adenoca of the lung.\par - PETCT  2/3/2020- decreased FDG uptake in primary lesion, sclerotic lesions in bones, thyroid nodule activity- under surveillance, duodenal uptake \par - plan for PETCT Feb 2021- SARAH- stable, uptake in right breast - c/w fat necrosis \par - repeat CEA\par -Jan 2022- PETCT - sclerotic bone lesions.\par - pain in the left femur 2-3/10 takes viocodin daily, s/p RT \par -decreased lower back pain \par - visit to ER for dyspnea - images reviewed, evaluated for PE, related to Parkinson disease.  Lung mass 1.1 cm on CT\par - patient with weight loss, struggles with Parkinson disease\par - PETCT -3/27/23 some progression of bone mets, no change \par - increased hoarseness of the voice, vocal cord paralysis - followed by Dr. Rodriguez \par \par #benign thyroid nodule - followed by Dr. Park. due for repeat thyroid US\par \par # constipation- daily miralax\par \par # bone mets\par - Xgeva q 6 weeks\par \par # new pancreatic mass - order MRI with gadolinium, Ca19-9. Referred to Dr. Munguia for EUS.  IPMN suspected, observation vs biopsy.  \par \par Follows with psych Dr Yeimi Ellis [facility] meds adjusted \par \par S/p scheduled for robotic assist rectopexy for large symptomatic rectal prolapse with  on 6/30/23.\par \par  Labs ordered, drawn in office\par \par CBC,Chem,CEA, CA 27.29  case and mgmt  to return in 6 weeks for treatment- case and mgmt discussed with Dr. hSaffer

## 2023-07-06 NOTE — PHYSICAL EXAM
[Restricted in physically strenuous activity but ambulatory and able to carry out work of a light or sedentary nature] : Status 1- Restricted in physically strenuous activity but ambulatory and able to carry out work of a light or sedentary nature, e.g., light house work, office work [Normal] : affect appropriate [de-identified] : left breast scar

## 2023-07-10 VITALS — SYSTOLIC BLOOD PRESSURE: 106 MMHG | DIASTOLIC BLOOD PRESSURE: 70 MMHG | RESPIRATION RATE: 16 BRPM | HEART RATE: 97 BPM

## 2023-07-10 NOTE — PHYSICAL EXAM
[Alert] : alert [Sclera] : the sclera and conjunctiva were normal [Normal Appearance] : the appearance of the neck was normal [No Neck Mass] : no neck mass was observed [Supple] : the neck was supple [No Respiratory Distress] : no respiratory distress [No Acc Muscle Use] : no accessory muscle use [Respiration, Rhythm And Depth] : normal respiratory rhythm and effort [Bowel Sounds] : normal bowel sounds [de-identified] : anxious [de-identified] : tachycardic

## 2023-07-10 NOTE — ASSESSMENT
[FreeTextEntry1] : presenting with \par and even coming to the hospital is eliciting anxiety\par discussed that Dr Ellis is managing anxiety and panic\par and choosing drugs that she believes will better control her symptoms\par Rx sent for rash - stronger topical steroid\par \par

## 2023-07-10 NOTE — HISTORY OF PRESENT ILLNESS
[FreeTextEntry1] : presenting with  Fidel\par under the care of a psychiatrist - Dr Yeimi Ellis who is slowly adjusting medications\par On clonazepam and ativan  will call back later to confirm specific dosing\par continues treatment with oncology\par goal is longevity\par \par \par  [Any fall with injury in past year] : Patient reported fall with injury in the past year [Completely Dependent] : Completely dependent. [Wheelchair] : wheelchair [Smoke Detector] : smoke detector [1] : 2) Feeling down, depressed, or hopeless for several days (1) [PHQ-2 Negative - No further assessment needed] : PHQ-2 Negative - No further assessment needed [[id number]] : 2

## 2023-07-12 ENCOUNTER — APPOINTMENT (OUTPATIENT)
Dept: SURGERY | Facility: CLINIC | Age: 73
End: 2023-07-12

## 2023-07-12 VITALS
HEART RATE: 97 BPM | SYSTOLIC BLOOD PRESSURE: 111 MMHG | DIASTOLIC BLOOD PRESSURE: 64 MMHG | HEIGHT: 62 IN | OXYGEN SATURATION: 99 % | RESPIRATION RATE: 18 BRPM

## 2023-07-12 NOTE — ASSESSMENT
[FreeTextEntry1] : 73-year-old female returns for follow-up for rectal prolapse.  Patient has become more symptomatic since last visit.  We will schedule her for surgery this Friday for robotic assisted suture rectopexy.  Risks and benefits of surgery discussed.  Alternative approaches discussed including ventral mesh rectopexy and perineal approaches.  Given patient's additional medical issues we will plan for robotic assisted suture rectopexy as this will likely have the least risk for complications while still giving a good repair.\par \par Bowel prep instruction provided.  Patient will get PST testing prior to surgery

## 2023-07-12 NOTE — HISTORY OF PRESENT ILLNESS
[FreeTextEntry1] : External female here for initial evaluation for possible hemorrhoids versus prolapse.  Patient noted a large bump protruding the past week or 2.  Has been somewhat uncomfortable.  Denies significant bleeding.  Denies significant issues with constipation or incontinence.  Abdominal surgical history significant for hysterectomy.  Patient previously underwent colonoscopy with Dr. Bernabe in 2020 which noted diverticulosis and internal hemorrhoids.  \par \par Update–patient returns for follow-up with worsening of prolapse symptoms.  States she would like to schedule surgery more urgently.  Having significant discomfort in the flap seems to have an increase in size.  Seen last week by Dr. Jones urgently who once again noted approximate 10 cm prolapse

## 2023-07-12 NOTE — PHYSICAL EXAM
[Abdomen Masses] : No abdominal masses [Abdomen Tenderness] : ~T No ~M abdominal tenderness [Excoriation] : no perianal excoriation [Tender, Swollen] : nontender, non-swollen [Normal] : was normal [None] : there was no rectal abscess [No Rash or Lesion] : No rash or lesion [Alert] : alert [Calm] : calm [de-identified] : No obvious prolapse on initial exam.  This patient examined on the toilet with Valsalva and subsequently noted full-thickness prolapse [de-identified] : No acute distress

## 2023-07-19 ENCOUNTER — APPOINTMENT (OUTPATIENT)
Dept: NEUROLOGY | Facility: CLINIC | Age: 73
End: 2023-07-19
Payer: MEDICARE

## 2023-07-19 VITALS
DIASTOLIC BLOOD PRESSURE: 80 MMHG | OXYGEN SATURATION: 100 % | HEIGHT: 62 IN | WEIGHT: 141 LBS | SYSTOLIC BLOOD PRESSURE: 131 MMHG | HEART RATE: 103 BPM | BODY MASS INDEX: 25.95 KG/M2

## 2023-07-19 DIAGNOSIS — G25.81 RESTLESS LEGS SYNDROME: ICD-10-CM

## 2023-07-19 PROCEDURE — 99215 OFFICE O/P EST HI 40 MIN: CPT

## 2023-07-19 NOTE — DISCUSSION/SUMMARY
[FreeTextEntry1] : Rosamaria Mcdowell is a 73 year old F with a PMHx of Parkinson's disease, Iron deficiency anemia, RLS, lung cancer (on targeted therapy), hx cataract surgery, history of breast cancer, GERD, thyroid nodules, tachycardia, panic disorder, and rectal prolapse s/p surgical correction, who presents for follow up in the [facility] at [place] for Parkinson's disease. Positive Kranthi scan. She is accompanied by her  who provides collateral history.\par \par The patient's history and physical examination are suggestive of mild idiopathic Parkinson's disease. She reports a good response to Sinemet. Dyskinesias have resolved. She has dizziness and fatigue, but her recent blood work revealed iron deficiency anemia which is likely contributing. Her anxiety is still a challenge and she is working with Dr. Ellis on this.  \par \par Her RLS was previously controlled on Pramipexole IR 0.375mg, however, it seems to have become exacerbated in the setting of iron deficiency. Insurance would not cover the ER she was on previously. Lyrica discontinued.\par \par   EMG was previously done and not suggestive of a myopathy.\par \par  Recommendations: \par - Continue Pramipexole ER 0.375mg at 7p. Do not take additional doses \par - Continue Sinemet 25-100mg ER 2 tabs at 7a-2p-10p and 1 tab at 10a and 6p\par - Continue Parcopa PRN off periods.\par - Continue Miralax for constipation , fiber and start probiotics\par - Mediterranean diet, antiinflammatory/antioxidant foods, probiotics, fiber, caffeine, and vitamins  - - \par Encouraged to increase exercise and physical activity and maintain an active social and intellectual life. \par - Depression and anxiety management per psychiatry\par - Anemia management per Hematology. Likely infusion would be best for iron supplementation as she has severe constipation.\par - Continue SLP\par - Once anemia treated, will consider PT/OT again as she is getting exertional fatigue at present\par \par   RTC 4 months via telehealth and 6 months in person\par \par All questions were answered to her satisfaction. I spent 40 minutes with this patient.

## 2023-07-19 NOTE — PHYSICAL EXAM
[Person] : oriented to person [Place] : oriented to place [Time] : oriented to time [Concentration Intact] : normal concentrating ability [Comprehension] : comprehension intact [Cranial Nerves Oculomotor (III)] : extraocular motion intact [Cranial Nerves Facial (VII)] : face symmetrical [Cranial Nerves Vestibulocochlear (VIII)] : hearing was intact bilaterally [Cranial Nerves Accessory (XI - Cranial And Spinal)] : head turning and shoulder shrug symmetric [Cranial Nerves Hypoglossal (XII)] : there was no tongue deviation with protrusion [Motor Strength] : muscle strength was normal in all four extremities [Paresis Pronator Drift Right-Sided] : no pronator drift on the right [Paresis Pronator Drift Left-Sided] : no pronator drift on the left [Coordination - Dysmetria Impaired Finger-to-Nose Bilateral] : not present [FreeTextEntry1] : Face is not significantly masked, but affect is flat. Voice is mild-moderately hypophonic and hoarse.\par Mild-moderate resting tremors of the right hand, and with distraction, mild tremors of the left hand. \par No significant rigidity.\par There was slight-mild bradykinesia, right more than left, with finger tapping, rapid alternating and sequential movements.\par No trouble standing with arms crossed. Posture is normal.\par No significant dyskinesias.\par Gait is with mildly shortened stride length and speed, decreased arm swing, cautious gait without walker, no shuffling or freezing of gait. No ataxia. 3-4 step turns.\par Postural reflexes are intact.

## 2023-07-19 NOTE — HISTORY OF PRESENT ILLNESS
[FreeTextEntry1] : Rosamaria Mcdowell is a 73 year old F with a PMHx of Parkinson's disease, Iron deficiency anemia, RLS, lung cancer (on targeted therapy), hx cataract surgery, history of breast cancer, GERD, thyroid nodules, tachycardia, panic disorder, and rectal prolapse s/p surgical correction, who presents for follow up in the [facility] at [place] for Parkinson's disease. Positive Kranthi scan. She is accompanied by her  who provides collateral history.\par \par Interval history:\par Since the last visit, she reports that her right hand tremor is worse. \par She has trouble sleeping. She was started on Clonazepam 0.125mg at bedtime. She has fragmented sleep.\par She has significant panic attacks still. She is using Ativan. She is having them around noon, and around 3-4p. She feels like she cannot breathe and her heart is beating rapidly. \par Initially when she started Clonazepam, at a higher dose she felt palpitations. Seeing Dr. Ellis regularly for psychiatric care. She has had increased stress due to her 's medical conditions.\par She had surgery in June for prolapsed rectum. She had trouble moving and pain because of the prolapsed rectum.  The surgery seems to be successful. \par She feels dizziness when sitting and standing, she feels lightheaded. She has not LOC. She is staying hydrated. \par She has constipation, daily or every other day, and taking Miralax, Fiber, prunes, and fruits. \par When she gets into bed, she feels not comfortable and then gets out of bed and goes somewhere and sometimes she has to walk around.  \par She does have iron deficiency anemia. \par She is shuffling more. She is not in PT anymore. She is not practicing her exercises. She started using a walker. \par She has a paralyzed vocal cord. They saw Dr. Rodriguez. She is in SLP.\par \par Current meds:\par Sinemet 25-100mg ER 2 tabs at 7a-2p-10p and 1 tab at 10a and 6p\par Clonazepam 0.125mg at bedtime\par Pramipexole 0.375mg IR in the evening\par Miralax and Fiber\par Parcopa - rarely using\par Ativan 0.5mg BID PRN for panic attacks - she takes 1/2 tab but sometimes more. \par \par Prior meds:\par Azilect 1 mg daily\par Melatonin gave her palpitations\par Modafinil did not help her fatigue\par Dexamethasone for the fatigue kept her up\par Olanzapine and her parkinsonism became worse\par Lyrica was stopped for urinary incontinence\par Zoloft\par Lexapro - she had increased tremors\par Xanax\par Inbrija 42mg 4 times a day - has not been using\par Prozac 40mg daily\par Seroquel 50mg TID - 10a-6p-10p\par Mirtazapine 7.5mg at bedtime\par Wellbutrin makes her itch\par \par Initial history:\par She was diagnosed in 2014 at [place]. She has been seeing a general neurologist.  \par \par She thought she had essential tremor. Her tremors are mostly in the right hand, controlled on Sinemet. Her left leg has restless leg syndrome.\par She feels worse in the afternoon, around 3-4pm. She gets wobbly. If she takes her BP, it is lower. She has not been officially tested for orthostatic hypotension. Sometimes her systolic is in 100s. She was tried on midodrine but she had hair loss and UTI. She was also tried on Gabapentin. When she didn't take the Sinemet ER in the morning she felt worse. No trouble with buttons, zippers or shoelaces, cutting food. No significant stiffness. She gets muscle spasms in the feet. She thinks the Sinemet lasts about 4 hours.  \par \par No trouble turning or adjusting in bed at night.\par She talks in her sleep and has fallen out of bed before. She wakes up to go to the bathroom at night. She also has breakthrough tremors at night. \par She urinates every 4 hours. \par \par She was reduced off the pramipexole due to potential side effects, but she is not sure which ones. When she decreased it, around 10p for about an hour she would be walking up and down the halls and could not lay still and got spasms in her left leg. She was tried on Lyrica without relief. They tried adjusting the Sinemet doses instead.\par \par She has no sense of smell.\par No changes in loudness of her voice. 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 Therapy did not work. She can still sing.\par No trouble swallowing. \par She has constipation. She has a bowel movement every couple of days. She takes Miralax daily or every other day. She has increased her water intake.\par She has urinary frequency. No incontinence. \par Memory, having more trouble finding words.\par Mood is depressed, but generally okay.\par Dizziness in the afternoon when standing. No falls. Her BP is sometimes low. \par Hallucinations, none.\par \par Current meds:\par Sinemet 10-100mg taking 6 tabs daily - 7a-11-3-7-11-3a\par Sinemet ER 25-100mg 1 tab BID 7a-7p\par Pramipexole 0.75mg ER in the evening\par \par Prior meds:\par Azilect 1 mg daily\par \par Family history:\par Social history: retired nurse\par  \par No Kranthi scan.\par MRI brain with and without contrast was done in 4/2019.

## 2023-07-26 ENCOUNTER — APPOINTMENT (OUTPATIENT)
Dept: GERIATRICS | Facility: CLINIC | Age: 73
End: 2023-07-26
Payer: MEDICARE

## 2023-07-26 VITALS
BODY MASS INDEX: 25.79 KG/M2 | WEIGHT: 141 LBS | HEART RATE: 89 BPM | DIASTOLIC BLOOD PRESSURE: 71 MMHG | SYSTOLIC BLOOD PRESSURE: 115 MMHG | TEMPERATURE: 98.7 F | OXYGEN SATURATION: 99 %

## 2023-07-26 PROCEDURE — 99215 OFFICE O/P EST HI 40 MIN: CPT

## 2023-07-26 RX ORDER — METRONIDAZOLE 500 MG/1
500 TABLET ORAL
Qty: 6 | Refills: 0 | Status: DISCONTINUED | COMMUNITY
Start: 2023-06-28 | End: 2023-07-26

## 2023-07-26 RX ORDER — METRONIDAZOLE 500 MG/1
500 TABLET ORAL
Qty: 6 | Refills: 0 | Status: DISCONTINUED | COMMUNITY
Start: 2023-06-27 | End: 2023-07-26

## 2023-07-26 NOTE — HISTORY OF PRESENT ILLNESS
[Home] : at home, [unfilled] , at the time of the visit. [Medical Office: ([facility])___] : at the medical office located in  [Verbal consent obtained from patient] : the patient, [unfilled] [FreeTextEntry1] : Rosamaria Mcdowell is a 71 y/o F w/ metastatic lung cancer to bone on tagrisso/xgyeva, localized breast cancer s/p lumpectomy and XRT on anastrazole, PD on Inbrija, vasculitis who was originally seen in 2019 for pain, persistent recurrent nausea and dizziness, restless leg syndrome, recent admission to [facility]  for GIB \par \par MRI 12/23 --> progression in L femur and pelvis lesions. S/p XRT to pelvis and femur 2/1/22-2/7/22\par \par 1/10: Does not feel better. Tried Dex 2mg and it didn't help stimulate her, only helped her rash.\par Tried Provigil- didn't notice a difference. Leg pain is worse. \par \par 1/24: Feels "a lot" better. Feels Dex at both doses (2 & 4mg) kept her up at night and made her restless leg worse. Tramadol is a "miracle" in terms of helping pain. Takes 2-3x/day. Sleeping better. Taking 1/2 dose of Miralax daily with good effect. Uses Curaleaf 1:1 2.5mg THC:CBD which helps her feel more alert. Extra Sinemet during the day is not really helping. RX Ritalin\par \par 2/24: Ritalin made her feel "jittery"- advised taper off and trial of Lexapro 5mg PO QD. \par \par 4/1: Reported increasing crying spells since starting medication, advised to stop\par \par 6/1: Presenting for routine follow up. Feels like "crap." Had many bug bites from an "arthropod attack," not improving, now s/p biopsy yesterday.. Feels afternoon "blahs" are 2/2 OFF periods of PD. No pain/nausea. Endorses leg pain, feels as though she may have neuropathy. Feet feel cold. Needs more assistance around the house (getting in and out of shower, etc).\par \par 8/10: Presenting for routine follow up. Have had frequent phone check ins (documenting) for either high periods of anxiety or periods of severe "lows" and fatigued. Now followed by Psych/Oncology Dr. Valentino. As of last visit 8/8,   Fluoxetine was increased to 20mg/day, and she was advised to continue with Lorazepam 0.5mg BID CONNER + Xanax 0.5mg PRN.\par \par 8/26: Reports feeling better and having more energy, taking less naps. Has been taking Xanax PRN 2-3x/day. Feels "dyskinetic" at times. Constipated x 3-4 days then uses suppository and has diarrhea. Takes Miralax daily. \par \par 9/19: Prozac increased and now off benzos 2/2 forgetfulness. Still having panic attacks 2-3x/day but feels better overall\par \par 9/21: Presents today for an urgent visit. Had panic-type symptoms all night (chest tightness, tachycardia to 120s); Seroquel did not really help. Ended up taking 1/2 Lorazepam which helped after an hour. Having panic attacks 2-3 times a day. Usually wakes up having one. Seroquel helps and causes less AMS-type symptoms. RX to continue Prozac 40, start Seroquel 50/50/75, and c/w [facility]\par \par 10/20: Taking Seroquel 50mg BID, Lorazepam 0.5mg daily in AM, and Prozac 40mg. Wakes up every morning in a "panic"- she no longer has breathing manifestations of panic attacks, but finds herself shivering. After the AM she is good for most of the day, and then starts feeling "cold" again toward the evening. Feels better after taking Seroquel. Tries not to take nighttime dose of Lorazepam. Wakes up at night to urinate 2-3x but is able to go back to sleep- 3-4 months ago, she was not able to go back to sleep at all. The "uncontrollable bouts of weepi-ness" have been gone for months. [facility] ([facility]imi Ellis[facility] is [facility] 10/28. Also taking Marinol before dinner to help with nausea. Appetite is fine. Participating in outpatient PT for her Parkinsons. C/w Prozac 40mg PO QD + Seroquel 50mg TID CONNER + Ativan 0.5mg PO QAM\par \par 12/14: Fell at [employer] a week ago and hit her head, workup negative. Pain/nausea/mood improved. Following with Dr. Ellis now. Feels hyper at times after she takes the Prozac. Panic attacks "are there, but they are much more controllable." Still wakes up daily with panic attacks. Now on Seroquel 25/50/50 from 50/50/50 per Dr. Ellis recs. Not really taking Ativan in the morning anymore- only twice in the last week. Overall they both think that things are "substantially better" than a few months ago. Hasn't been going to PT due to dizziness. Re signing up for [employer]. Appetite is OK. Back on Marinol regularly for appetite. Participating in more socially. No changes.\par \par 2/10: Fell and hit head this AM. Takes baby ASA. Feels unilateral dizziness on R side of head. RX STAT head CT\par \par 4/19: Now only on Mirtazapine- Prozac wasn't working, Seroquel was affecting her gait. Having 1-2 anxiety attacks/day, sometimes in the morning. Taking Lorazepam 1/2 tab twice a day when she has an attack, kicks in in about an hour. TEB w/ Dr. Ellis tomorrow. Feels that she is doing OK on Remeron but it makes her very sleepy/groggy. Wants to start driving again. No pain or nausea. Wants to not have to nap in the afternoon, and wants to wake up happy and energetic. \par RX- f/u psych for med titration\par \par 7/26 (f/u): Pt and  Fidel both voiced frustration over difficulties in managing various consultants and patient persistently not feeling well. Her biggest complaint is her panic attacks which are occurring 3x/day at times. Ativan helps but takes an hour to kick in. Fidel is dealing with the side effects of treatment for his own health issues and is finding it hard to help. Overall they find the unpredictability of Rosamaria's symptoms quite difficult to manage and make plans around. Not sleeping well at night due to restlessness. \par \par \par Fidel (): [phone] / [phone]\danielle Nogueira (son): [phone] / [phone]

## 2023-07-26 NOTE — ASSESSMENT
[Frailty-weight loss of >5%] : frailty-weight loss  [Fall precautions] : fall precautions [Social support] : social support [Living arrangements] : living arrangements [Medication Management] : medication management [Patient/Caregiver is not ready to engage in discussion] : Patient/caregiver is not ready to engage in discussion [DNR] : Code Status: DNR [Limited] : Treatment Guidelines: Limited [DNI] : Intubation: DNI [[facility]: _____] [facility]: [unfilled] [______] : HCP: [unfilled] [FreeTextEntry1] : 74 y/o F w/ metastatic lung cancer, localized breast CA, Parkinson's, panic disorder\par \par Anxiety, Insomnia\par - Klonapin 0.125mg QHS\par - Lorazepam 0.25mg PRN for anxiety attacks\par - Off SSRI\par - Reportedly did not tolerate Remeron- made her too sleepy\par - C/w outpatient therapy\par - F/w Dr. Ellis- L/M requesting callback\par - Re-certified for cannabis- will start a low-dose CBD -dominant regimen and gradually build up based on tolerance\par \par Parkinson's \par - F/w Dr. Butler- L/M requesting callback\par \par \par

## 2023-07-26 NOTE — PHYSICAL EXAM
[General Appearance - Alert] : alert [General Appearance - In No Acute Distress] : in no acute distress [General Appearance - Well Nourished] : well nourished [General Appearance - Well-Appearing] : healthy appearing [Sclera] : the sclera and conjunctiva were normal [Extraocular Movements] : extraocular movements were intact [No Oral Pallor] : no oral pallor [Outer Ear] : the ears and nose were normal in appearance [Hearing Threshold Finger Rub Not Banks] : hearing was normal [Neck Appearance] : the appearance of the neck was normal [Neck Cervical Mass (___cm)] : no neck mass was observed [Jugular Venous Distention Increased] : there was no jugular-venous distention [Respiration, Rhythm And Depth] : normal respiratory rhythm and effort [Exaggerated Use Of Accessory Muscles For Inspiration] : no accessory muscle use [Auscultation Breath Sounds / Voice Sounds] : lungs were clear to auscultation bilaterally [Heart Rate And Rhythm] : heart rate was normal and rhythm regular [Heart Sounds] : normal S1 and S2 [Murmurs] : no murmurs [Full Pulse] : the pedal pulses are present [Edema] : there was no peripheral edema [Bowel Sounds] : normal bowel sounds [Abdomen Soft] : soft [Abdomen Tenderness] : non-tender [Abnormal Walk] : normal gait [Nail Clubbing] : no clubbing  or cyanosis of the fingernails [Musculoskeletal - Swelling] : no joint swelling seen [Skin Color & Pigmentation] : normal skin color and pigmentation [] : no rash [Cranial Nerves] : cranial nerves 2-12 were intact [Oriented To Time, Place, And Person] : oriented to person, place, and time [Impaired Insight] : insight and judgment were intact [Affect] : the affect was normal [Mood] : the mood was normal [FreeTextEntry1] : Frail, pale, feels better.

## 2023-07-26 NOTE — REVIEW OF SYSTEMS
[Feeling Poorly] : feeling poorly [As Noted in HPI] : as noted in HPI [Negative] : Integumentary [de-identified] : JOSE

## 2023-07-27 ENCOUNTER — RESULT REVIEW (OUTPATIENT)
Age: 73
End: 2023-07-27

## 2023-07-28 ENCOUNTER — TRANSCRIPTION ENCOUNTER (OUTPATIENT)
Age: 73
End: 2023-07-28

## 2023-07-31 ENCOUNTER — TRANSCRIPTION ENCOUNTER (OUTPATIENT)
Age: 73
End: 2023-07-31

## 2023-07-31 RX ORDER — CARBIDOPA AND LEVODOPA 25; 100 MG/1; MG/1
25-100 TABLET, EXTENDED RELEASE ORAL
Qty: 720 | Refills: 3 | Status: ACTIVE | COMMUNITY
Start: 2020-09-22 | End: 1900-01-01

## 2023-08-02 ENCOUNTER — APPOINTMENT (OUTPATIENT)
Dept: CARDIOLOGY | Facility: CLINIC | Age: 73
End: 2023-08-02

## 2023-08-09 ENCOUNTER — APPOINTMENT (OUTPATIENT)
Dept: GERIATRICS | Facility: CLINIC | Age: 73
End: 2023-08-09
Payer: MEDICARE

## 2023-08-09 DIAGNOSIS — G47.19 OTHER HYPERSOMNIA: ICD-10-CM

## 2023-08-09 PROCEDURE — 99215 OFFICE O/P EST HI 40 MIN: CPT

## 2023-08-09 NOTE — HISTORY OF PRESENT ILLNESS
[FreeTextEntry1] : Rosamaria Mcdowell is a 71 y/o F w/ metastatic lung cancer to bone on tagrisso/xgyeva, localized breast cancer s/p lumpectomy and XRT on anastrazole, PD on Inbrija, vasculitis who was originally seen in 2019 for pain, persistent recurrent nausea and dizziness, restless leg syndrome, recent admission to [facility]  for GIB   MRI 12/23 --> progression in L femur and pelvis lesions. S/p XRT to pelvis and femur 2/1/22-2/7/22  1/10: Does not feel better. Tried Dex 2mg and it didn't help stimulate her, only helped her rash. Tried Provigil- didn't notice a difference. Leg pain is worse.   1/24: Feels "a lot" better. Feels Dex at both doses (2 & 4mg) kept her up at night and made her restless leg worse. Tramadol is a "miracle" in terms of helping pain. Takes 2-3x/day. Sleeping better. Taking 1/2 dose of Miralax daily with good effect. Uses Curaleaf 1:1 2.5mg THC:CBD which helps her feel more alert. Extra Sinemet during the day is not really helping. RX Ritalin  2/24: Ritalin made her feel "jittery"- advised taper off and trial of Lexapro 5mg PO QD.   4/1: Reported increasing crying spells since starting medication, advised to stop  6/1: Presenting for routine follow up. Feels like "crap." Had many bug bites from an "arthropod attack," not improving, now s/p biopsy yesterday.. Feels afternoon "blahs" are 2/2 OFF periods of PD. No pain/nausea. Endorses leg pain, feels as though she may have neuropathy. Feet feel cold. Needs more assistance around the house (getting in and out of shower, etc).  8/10: Presenting for routine follow up. Have had frequent phone check ins (documenting) for either high periods of anxiety or periods of severe "lows" and fatigued. Now followed by Psych/Oncology Dr. Valentino. As of last visit 8/8,   Fluoxetine was increased to 20mg/day, and she was advised to continue with Lorazepam 0.5mg BID CONNER + Xanax 0.5mg PRN.  8/26: Reports feeling better and having more energy, taking less naps. Has been taking Xanax PRN 2-3x/day. Feels "dyskinetic" at times. Constipated x 3-4 days then uses suppository and has diarrhea. Takes Miralax daily.   9/19: Prozac increased and now off benzos 2/2 forgetfulness. Still having panic attacks 2-3x/day but feels better overall  9/21: Presents today for an urgent visit. Had panic-type symptoms all night (chest tightness, tachycardia to 120s); Seroquel did not really help. Ended up taking 1/2 Lorazepam which helped after an hour. Having panic attacks 2-3 times a day. Usually wakes up having one. Seroquel helps and causes less AMS-type symptoms. RX to continue Prozac 40, start Seroquel 50/50/75, and c/w [facility]  10/20: Taking Seroquel 50mg BID, Lorazepam 0.5mg daily in AM, and Prozac 40mg. Wakes up every morning in a "panic"- she no longer has breathing manifestations of panic attacks, but finds herself shivering. After the AM she is good for most of the day, and then starts feeling "cold" again toward the evening. Feels better after taking Seroquel. Tries not to take nighttime dose of Lorazepam. Wakes up at night to urinate 2-3x but is able to go back to sleep- 3-4 months ago, she was not able to go back to sleep at all. The "uncontrollable bouts of weepi-ness" have been gone for months. Apt with [facility] ([facility] Ellis[facility] is [facility] 10/[facility]. Also taking Marinol before dinner to help with nausea. Appetite is fine. Participating in outpatient PT for her Parkinsons. C/w Prozac 40mg PO QD + Seroquel 50mg TID CONNER + Ativan 0.5mg PO QAM  12/14: Fell at [employer] a week ago and hit her head, workup negative. Pain/nausea/mood improved. Following with Dr. Ellis now. Feels hyper at times after she takes the Prozac. Panic attacks "are there, but they are much more controllable." Still wakes up daily with panic attacks. Now on Seroquel 25/50/50 from 50/50/50 per Dr. Ellis recs. Not really taking Ativan in the morning anymore- only twice in the last week. Overall they both think that things are "substantially better" than a few months ago. Hasn't been going to PT due to dizziness. Re signing up for [employer]. Appetite is OK. Back on Marinol regularly for appetite. Participating in more socially. No changes.  2/10: Fell and hit head this AM. Takes baby ASA. Feels unilateral dizziness on R side of head. RX STAT head CT  4/19: Now only on Mirtazapine- Prozac wasn't working, Seroquel was affecting her gait. Having 1-2 anxiety attacks/day, sometimes in the morning. Taking Lorazepam 1/2 tab twice a day when she has an attack, kicks in in about an hour. TEB w/ Dr. Ellis tomorrow. Feels that she is doing OK on Remeron but it makes her very sleepy/groggy. Wants to start driving again. No pain or nausea. Wants to not have to nap in the afternoon, and wants to wake up happy and energetic.  RX- f/u psych for med titration  7/26 (f/u): Pt and  Fidel both voiced frustration over difficulties in managing various consultants and patient persistently not feeling well. Her biggest complaint is her panic attacks which are occurring 3x/day at times. Ativan helps but takes an hour to kick in. Fidel is dealing with the side effects of treatment for his own health issues and is finding it hard to help. Overall they find the unpredictability of Rosamaria's symptoms quite difficult to manage and make plans around. Not sleeping well at night due to restlessness.  RX- certified for cannabis, s/w Dr. Ellis  8/9 (f/u): Initially was started on 0.5mL tincture of cannabis BID (0.25mg THC : 5mg CBD), but it made her shake. She s/w [place] [facility] pharmacist today who advised her to take 0.25mg daily (0.125mg THC : 2.5mg CBD), which she will do. She is still taking Klonapin 0.125mg PO QHS and has been sleeping a bit better. Continues to take Lorazepam 0.5mg PRN for her panic attacks, which occur 1-2x/day at seemingly random times. [facility] and she has an Joana; she is concerned about having a panic attack on the day of.   Fidel (): [phone] / [phone] Jero (son): [phone] / [phone]

## 2023-08-09 NOTE — ASSESSMENT
[Frailty-weight loss of >5%] : frailty-weight loss  [Fall precautions] : fall precautions [Social support] : social support [Living arrangements] : living arrangements [Medication Management] : medication management [Patient/Caregiver is not ready to engage in discussion] : Patient/caregiver is not ready to engage in discussion [DNR] : Code Status: DNR [Limited] : Treatment Guidelines: Limited [DNI] : Intubation: DNI [[facility]: _____] [facility]last [facility] date: [unfilled] [______] : HCP: [unfilled] [FreeTextEntry1] : 74 y/o F w/ metastatic lung cancer, localized breast CA, Parkinson's, panic disorder  Anxiety, Insomnia - C/w Klonapin 0.125mg QHS (managed by Dr. Ellis) - C/w Lorazepam 0.25mg PRN for anxiety attacks (managed by Dr. Ellis) - Off SSRI - F/w Dr. Ellis- L/M requesting callback - C/w low-dose CBD -dominant regimen and gradually build up based on tolerance- to try 1/4 mL daily and f/u [place] [facility] for further recs- may benefit from a product also containing CBG for anxiety  Parkinson's  - F/w  Persuad  RTC in 1 month or PRN sooner

## 2023-08-09 NOTE — PHYSICAL EXAM
[General Appearance - Alert] : alert [General Appearance - In No Acute Distress] : in no acute distress [General Appearance - Well Nourished] : well nourished [General Appearance - Well-Appearing] : healthy appearing [Sclera] : the sclera and conjunctiva were normal [Extraocular Movements] : extraocular movements were intact [No Oral Pallor] : no oral pallor [Outer Ear] : the ears and nose were normal in appearance [Hearing Threshold Finger Rub Not Kingfisher] : hearing was normal [Neck Appearance] : the appearance of the neck was normal [Neck Cervical Mass (___cm)] : no neck mass was observed [Jugular Venous Distention Increased] : there was no jugular-venous distention [Respiration, Rhythm And Depth] : normal respiratory rhythm and effort [Exaggerated Use Of Accessory Muscles For Inspiration] : no accessory muscle use [Auscultation Breath Sounds / Voice Sounds] : lungs were clear to auscultation bilaterally [Heart Rate And Rhythm] : heart rate was normal and rhythm regular [Heart Sounds] : normal S1 and S2 [Murmurs] : no murmurs [Full Pulse] : the pedal pulses are present [Edema] : there was no peripheral edema [Bowel Sounds] : normal bowel sounds [Abdomen Soft] : soft [Abdomen Tenderness] : non-tender [Abnormal Walk] : normal gait [Nail Clubbing] : no clubbing  or cyanosis of the fingernails [Musculoskeletal - Swelling] : no joint swelling seen [Skin Color & Pigmentation] : normal skin color and pigmentation [] : no rash [Cranial Nerves] : cranial nerves 2-12 were intact [Oriented To Time, Place, And Person] : oriented to person, place, and time [Impaired Insight] : insight and judgment were intact [Affect] : the affect was normal [Mood] : the mood was normal [FreeTextEntry1] : Frail, pale

## 2023-08-09 NOTE — REVIEW OF SYSTEMS
[Feeling Poorly] : feeling poorly [As Noted in HPI] : as noted in HPI [Negative] : Integumentary [de-identified] : JOSE

## 2023-08-15 ENCOUNTER — RESULT REVIEW (OUTPATIENT)
Age: 73
End: 2023-08-15

## 2023-08-15 ENCOUNTER — APPOINTMENT (OUTPATIENT)
Dept: HEMATOLOGY ONCOLOGY | Facility: CLINIC | Age: 73
End: 2023-08-15
Payer: MEDICARE

## 2023-08-15 VITALS
DIASTOLIC BLOOD PRESSURE: 58 MMHG | OXYGEN SATURATION: 99 % | HEART RATE: 91 BPM | RESPIRATION RATE: 18 BRPM | HEIGHT: 62 IN | SYSTOLIC BLOOD PRESSURE: 108 MMHG | WEIGHT: 139.13 LBS | TEMPERATURE: 96.1 F | BODY MASS INDEX: 25.6 KG/M2

## 2023-08-15 PROCEDURE — 99214 OFFICE O/P EST MOD 30 MIN: CPT | Mod: 25

## 2023-08-15 PROCEDURE — 36415 COLL VENOUS BLD VENIPUNCTURE: CPT

## 2023-08-15 RX ORDER — NEOMYCIN SULFATE 500 MG/1
500 TABLET ORAL
Qty: 6 | Refills: 0 | Status: COMPLETED | COMMUNITY
Start: 2023-06-28 | End: 2023-08-15

## 2023-08-15 RX ORDER — NEOMYCIN SULFATE 500 MG/1
500 TABLET ORAL
Qty: 6 | Refills: 0 | Status: COMPLETED | COMMUNITY
Start: 2023-06-27 | End: 2023-08-15

## 2023-08-15 RX ORDER — CLONAZEPAM 0.12 MG/1
0.12 TABLET, ORALLY DISINTEGRATING ORAL TWICE DAILY
Refills: 0 | Status: COMPLETED | COMMUNITY
Start: 2023-07-05 | End: 2023-08-15

## 2023-08-18 RX ORDER — OSIMERTINIB 80 1/1
80 TABLET, FILM COATED ORAL
Qty: 30 | Refills: 11 | Status: ACTIVE | COMMUNITY
Start: 2019-04-30 | End: 1900-01-01

## 2023-08-20 NOTE — ASSESSMENT
[FreeTextEntry1] : 74 yo female   # Stage 1 breast cancer 8 mm tumor, invasive ductal carcinoma, ER/ IL positive, HER2 not amplified with Oncotype Dx 11. diagnosed November 2018 -continue with Anastrazole - left breast pain - c/w fat necrosis on mammogram up to date 12/22  #Adenocarcinoma of lung- stage 4 diagnosed  April 2019 -Repeated CEA -plateau at 11.5 (started out at 26.6) -CT scan showed mass 1.8 x 1.4 x 1.7 cm lesion. -Biopsy of lung nodule c/w adeno ca of the lung, EGFR mutated, T190 mutated, started on Tagrisso.  Side effects, -toxicities and benefits reviewed with pt. L4 vertebral body biopsy confirmed met adenoca of the lung. - PETCT  2/3/2020- decreased FDG uptake in primary lesion, sclerotic lesions in bones, thyroid nodule activity- under surveillance, duodenal uptake  - plan for PETCT Feb 2021- SARAH- stable, uptake in right breast - c/w fat necrosis  - repeat CEA -Jan 2022- PETCT - sclerotic bone lesions. - pain in the left femur 2-3/10 takes viocodin daily, s/p RT  -decreased lower back pain  - visit to ER for dyspnea - images reviewed, evaluated for PE, related to Parkinson disease.  Lung mass 1.1 cm on CT - patient with weight loss, struggles with Parkinson disease - PETCT -3/27/23 some progression of bone mets, no change  - increased hoarseness of the voice, vocal cord paralysis - followed by Dr. Rodriguez  - PETCT ordered- restaging   # Weight lost - unintentional  - nutritional evaluation  #benign thyroid nodule - followed by Dr. Park. due for repeat thyroid US  # constipation- daily miralax  # bone mets, dental cleaning due, last 6 months ago  - Xgeva q 6 weeks  # new pancreatic mass - order MRI with gadolinium, Ca19-9. Referred to Dr. Munguia for EUS.  IPMN suspected, observation vs biopsy.  Under observation  Follows with psych Dr Yeimi Ellis [facility] meds adjusted   # S/p  robotic assist rectopexy for large symptomatic rectal prolapse with  on 6/30/23.   Labs ordered, drawn in office  CBC,Chem,CEA, CA 27.29  case and mgmt  to return in 6 weeks for treatment-

## 2023-08-20 NOTE — HISTORY OF PRESENT ILLNESS
[Therapy: ___] : Therapy: [unfilled] [de-identified] : Mrs. Mcdowell is a 68 year old postmenopausal female who is referred by Dr.Alice Demarco for newly diagnosed breast cancer.\par  She was found on routine mammography in November 2019 she you have a focal asymmetric density in the left upper outer breast at 1:00 access 8 cm from the nipple. A one year prior had been negative.  Ultrasound-guided core biopsy in November 21, 2018 revealed a grade 1/3 invasive ductal carcinoma ER positive at 98% and HI positive at 90%,Ki-67 was less than 10%. She underwent some left breast lumpectomy and sentinel node dissection on December 19, 2018. Pathology revealed an 8 mm grade 1/3 invasive ductal carcinoma zero of 3 sentinel lymph nodes were involved with tumor. Oncotype DX score was 11. She also underwent genetic testing which is negative.\par   [de-identified] : Mrs. Mcdowell presents for follow up on Anastrazole for breast cancer and on Tagrisso for lung cancer. Patient overall feels well but still continues to decline due to the Parkinson's. She is experiencing fatigue and dizziness.   Denies any new scans or tests since the last time she was in the office.

## 2023-08-20 NOTE — PHYSICAL EXAM
[Restricted in physically strenuous activity but ambulatory and able to carry out work of a light or sedentary nature] : Status 1- Restricted in physically strenuous activity but ambulatory and able to carry out work of a light or sedentary nature, e.g., light house work, office work [Normal] : affect appropriate [de-identified] : left breast scar

## 2023-08-20 NOTE — REVIEW OF SYSTEMS
[Fatigue] : fatigue [Recent Change In Weight] : ~T recent weight change [Constipation] : constipation [Joint Pain] : joint pain [Muscle Pain] : muscle pain [Dizziness] : dizziness [Difficulty Walking] : difficulty walking [Anxiety] : anxiety [Depression] : depression [Muscle Weakness] : muscle weakness [FreeTextEntry2] : weight loss [de-identified] : improving [SOB on Exertion] : shortness of breath during exertion [Insomnia] : insomnia [Negative] : Constitutional [Fever] : no fever [Eye Pain] : no eye pain [Vision Problems] : no vision problems [Dysphagia] : no dysphagia [Nosebleeds] : no nosebleeds [Chest Pain] : no chest pain [Lower Ext Edema] : no lower extremity edema [Shortness Of Breath] : no shortness of breath [Dysuria] : no dysuria [Easy Bleeding] : no tendency for easy bleeding [Easy Bruising] : no tendency for easy bruising [FreeTextEntry6] : improved [FreeTextEntry9] : muscle spasms due to Parkinsons, left hip pain  [de-identified] : tremor

## 2023-09-03 ENCOUNTER — NON-APPOINTMENT (OUTPATIENT)
Age: 73
End: 2023-09-03

## 2023-09-19 ENCOUNTER — APPOINTMENT (OUTPATIENT)
Dept: GERIATRICS | Facility: CLINIC | Age: 73
End: 2023-09-19
Payer: MEDICARE

## 2023-09-19 VITALS
DIASTOLIC BLOOD PRESSURE: 65 MMHG | BODY MASS INDEX: 23.92 KG/M2 | WEIGHT: 130 LBS | HEIGHT: 62 IN | OXYGEN SATURATION: 98 % | HEART RATE: 92 BPM | RESPIRATION RATE: 16 BRPM | TEMPERATURE: 97.7 F | SYSTOLIC BLOOD PRESSURE: 99 MMHG

## 2023-09-19 DIAGNOSIS — K86.2 CYST OF PANCREAS: ICD-10-CM

## 2023-09-19 PROCEDURE — 99215 OFFICE O/P EST HI 40 MIN: CPT

## 2023-09-22 ENCOUNTER — TRANSCRIPTION ENCOUNTER (OUTPATIENT)
Age: 73
End: 2023-09-22

## 2023-09-23 ENCOUNTER — NON-APPOINTMENT (OUTPATIENT)
Age: 73
End: 2023-09-23

## 2023-09-23 ENCOUNTER — TRANSCRIPTION ENCOUNTER (OUTPATIENT)
Age: 73
End: 2023-09-23

## 2023-09-26 ENCOUNTER — RESULT REVIEW (OUTPATIENT)
Age: 73
End: 2023-09-26

## 2023-09-26 ENCOUNTER — APPOINTMENT (OUTPATIENT)
Dept: GERIATRICS | Facility: CLINIC | Age: 73
End: 2023-09-26
Payer: MEDICARE

## 2023-09-26 ENCOUNTER — APPOINTMENT (OUTPATIENT)
Dept: HEMATOLOGY ONCOLOGY | Facility: CLINIC | Age: 73
End: 2023-09-26
Payer: MEDICARE

## 2023-09-26 VITALS
HEART RATE: 92 BPM | WEIGHT: 137 LBS | OXYGEN SATURATION: 99 % | DIASTOLIC BLOOD PRESSURE: 62 MMHG | HEIGHT: 61.02 IN | TEMPERATURE: 97.2 F | BODY MASS INDEX: 25.86 KG/M2 | SYSTOLIC BLOOD PRESSURE: 108 MMHG

## 2023-09-26 VITALS
TEMPERATURE: 97.2 F | BODY MASS INDEX: 25.4 KG/M2 | HEIGHT: 62 IN | OXYGEN SATURATION: 97 % | HEART RATE: 98 BPM | SYSTOLIC BLOOD PRESSURE: 114 MMHG | WEIGHT: 138 LBS | RESPIRATION RATE: 16 BRPM | DIASTOLIC BLOOD PRESSURE: 67 MMHG
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
## 2023-09-26 PROCEDURE — 36415 COLL VENOUS BLD VENIPUNCTURE: CPT

## 2023-09-26 PROCEDURE — 99214 OFFICE O/P EST MOD 30 MIN: CPT | Mod: 25

## 2023-09-26 PROCEDURE — 99214 OFFICE O/P EST MOD 30 MIN: CPT

## 2023-09-26 RX ORDER — DRONABINOL 2.5 MG/1
2.5 CAPSULE ORAL
Qty: 30 | Refills: 0 | Status: DISCONTINUED | COMMUNITY
Start: 2022-02-24 | End: 2023-09-26

## 2023-09-28 ENCOUNTER — RESULT REVIEW (OUTPATIENT)
Age: 73
End: 2023-09-28

## 2023-10-09 ENCOUNTER — APPOINTMENT (OUTPATIENT)
Dept: GERIATRICS | Facility: CLINIC | Age: 73
End: 2023-10-09
Payer: MEDICARE

## 2023-10-09 VITALS
BODY MASS INDEX: 25.3 KG/M2 | SYSTOLIC BLOOD PRESSURE: 104 MMHG | OXYGEN SATURATION: 98 % | WEIGHT: 134 LBS | HEART RATE: 100 BPM | TEMPERATURE: 98.1 F | DIASTOLIC BLOOD PRESSURE: 60 MMHG

## 2023-10-09 PROCEDURE — 99215 OFFICE O/P EST HI 40 MIN: CPT

## 2023-10-12 ENCOUNTER — NON-APPOINTMENT (OUTPATIENT)
Age: 73
End: 2023-10-12

## 2023-10-16 ENCOUNTER — APPOINTMENT (OUTPATIENT)
Dept: RADIATION ONCOLOGY | Facility: CLINIC | Age: 73
End: 2023-10-16
Payer: MEDICARE

## 2023-10-16 VITALS
SYSTOLIC BLOOD PRESSURE: 121 MMHG | RESPIRATION RATE: 18 BRPM | DIASTOLIC BLOOD PRESSURE: 59 MMHG | OXYGEN SATURATION: 98 % | HEART RATE: 84 BPM

## 2023-10-16 PROCEDURE — 99213 OFFICE O/P EST LOW 20 MIN: CPT | Mod: 25

## 2023-10-19 ENCOUNTER — NON-APPOINTMENT (OUTPATIENT)
Age: 73
End: 2023-10-19

## 2023-11-03 ENCOUNTER — NON-APPOINTMENT (OUTPATIENT)
Age: 73
End: 2023-11-03

## 2023-11-09 ENCOUNTER — APPOINTMENT (OUTPATIENT)
Dept: HEMATOLOGY ONCOLOGY | Facility: CLINIC | Age: 73
End: 2023-11-09

## 2023-11-09 ENCOUNTER — RESULT REVIEW (OUTPATIENT)
Age: 73
End: 2023-11-09

## 2023-11-09 VITALS
BODY MASS INDEX: 25.22 KG/M2 | WEIGHT: 133.56 LBS | TEMPERATURE: 96.4 F | RESPIRATION RATE: 15 BRPM | HEART RATE: 94 BPM | SYSTOLIC BLOOD PRESSURE: 113 MMHG | OXYGEN SATURATION: 99 % | DIASTOLIC BLOOD PRESSURE: 66 MMHG | HEIGHT: 61 IN

## 2023-11-12 ENCOUNTER — NON-APPOINTMENT (OUTPATIENT)
Age: 73
End: 2023-11-12

## 2023-11-20 ENCOUNTER — APPOINTMENT (OUTPATIENT)
Dept: GERIATRICS | Facility: CLINIC | Age: 73
End: 2023-11-20
Payer: MEDICARE

## 2023-11-20 DIAGNOSIS — J38.01 PARALYSIS OF VOCAL CORDS AND LARYNX, UNILATERAL: ICD-10-CM

## 2023-11-20 PROCEDURE — 99443: CPT

## 2023-12-07 ENCOUNTER — APPOINTMENT (OUTPATIENT)
Dept: GERIATRICS | Facility: CLINIC | Age: 73
End: 2023-12-07

## 2023-12-07 ENCOUNTER — APPOINTMENT (OUTPATIENT)
Dept: GERIATRICS | Facility: CLINIC | Age: 73
End: 2023-12-07
Payer: MEDICARE

## 2023-12-07 VITALS
DIASTOLIC BLOOD PRESSURE: 60 MMHG | SYSTOLIC BLOOD PRESSURE: 100 MMHG | HEART RATE: 101 BPM | TEMPERATURE: 97.9 F | HEIGHT: 61 IN | OXYGEN SATURATION: 100 % | BODY MASS INDEX: 24.55 KG/M2 | WEIGHT: 130 LBS

## 2023-12-07 DIAGNOSIS — Z23 ENCOUNTER FOR IMMUNIZATION: ICD-10-CM

## 2023-12-07 PROCEDURE — 99215 OFFICE O/P EST HI 40 MIN: CPT | Mod: 25

## 2023-12-07 PROCEDURE — G0444 DEPRESSION SCREEN ANNUAL: CPT | Mod: 59

## 2023-12-07 RX ORDER — HYDROCODONE BITARTRATE AND ACETAMINOPHEN 5; 325 MG/1; MG/1
5-325 TABLET ORAL
Qty: 60 | Refills: 0 | Status: DISCONTINUED | COMMUNITY
Start: 2019-05-22 | End: 2023-12-07

## 2023-12-07 RX ORDER — LORAZEPAM 1 MG/1
1 TABLET ORAL TWICE DAILY
Refills: 0 | Status: DISCONTINUED | COMMUNITY
Start: 2023-07-05 | End: 2023-12-07

## 2023-12-07 RX ORDER — LACTOSE-REDUCED FOOD
LIQUID (ML) ORAL DAILY
Qty: 90 | Refills: 0 | Status: ACTIVE | COMMUNITY
Start: 2023-12-07 | End: 1900-01-01

## 2023-12-14 ENCOUNTER — NON-APPOINTMENT (OUTPATIENT)
Age: 73
End: 2023-12-14

## 2023-12-15 ENCOUNTER — NON-APPOINTMENT (OUTPATIENT)
Age: 73
End: 2023-12-15

## 2023-12-22 ENCOUNTER — APPOINTMENT (OUTPATIENT)
Dept: GERIATRICS | Facility: CLINIC | Age: 73
End: 2023-12-22
Payer: MEDICARE

## 2023-12-22 VITALS
BODY MASS INDEX: 24.75 KG/M2 | WEIGHT: 131 LBS | SYSTOLIC BLOOD PRESSURE: 112 MMHG | OXYGEN SATURATION: 100 % | DIASTOLIC BLOOD PRESSURE: 62 MMHG | HEART RATE: 88 BPM | TEMPERATURE: 97.2 F

## 2023-12-22 PROCEDURE — 99214 OFFICE O/P EST MOD 30 MIN: CPT

## 2023-12-22 NOTE — HISTORY OF PRESENT ILLNESS
[FreeTextEntry1] :  SULLY CORNELIUS is a 73 year yo White female with PMHx of lung cancer (follows with Dr. 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  Her  passed away 2 weeks ago. Still getting anxiety attacks. using clonopin 3x a day. Had appointment with psych 1 week ago. Prescription given by psych. Parveen was making her shake after 20min so she stopped taking it.   Psychologist on board. they talk to her once a week.  what matters to her most is not being dizzy. She says its all her meds.  Patient's mobility is limited by Parkinson's disease that significantly impairs her ability to participate in ADLs at home patient is able to safely use the walker and the current functional mobility deficits can be sufficiently resolved by using a walker.  Patient also has high risk of falls due to her frailty and Parkinson's disease leading to loss of balance.  Home style rail system will help for to prevent falls and she is safely able to utilize home style rail system.   #Weightloss weight 1 lb up today  has not started ensure. She is eating food people brought over after her 's death  #Parkinson's disease levodopa-carbidopa Working with PT/OT and speech  #Anxiety Also has anxiety and now is on clonopin 0.25 TID and Vortioxetine by psychiatry started couple of weeks ago SALINA-7 today, 16 points Severe anxiety disorder.  #Restless leg Taking pramipexole for restless leg  #Osteoporosis On xgeva  #Lung ca stage 4 taking tagrisso since 5 yrs follow oncology, palliative care  #immunization due for shingles   Reviewed prior lab work and notes.  10/09/2023 today having pain since last 2 days on the right lower side of her back in the thoracic region. She has tried hot pack 4-5 times a day, diclofenac gel, tylenol 1000mg q6h . No BM in 2 days. Oncology gave her xanax for anxiety, ativan 0.5mg day as needed. she is not taking xanax anymore as it made her hallucinate. She has been taking miralax and 5 prunes a day with little benefit.w  Xray chest: Acute nondisplaced fracture posterolateral right 10th rib and possibly ninth rib with small amount  of adjacent extra-pleural fluid and air on the right posterolateral chest wall.    Off robaxin, dronabinol   Mentation: Parkinson's disease  #Restless leg has anemia, iron deficiency?; last ferritin <75 in 2023 takes pramipexole 0.375 ER QD and carbidopa-levodopa ER

## 2023-12-22 NOTE — PHYSICAL EXAM
[No Respiratory Distress] : no respiratory distress [No Acc Muscle Use] : no accessory muscle use [Respiration, Rhythm And Depth] : normal respiratory rhythm and effort [Auscultation Breath Sounds / Voice Sounds] : lungs were clear to auscultation bilaterally [Normal S1, S2] : normal S1 and S2 [Heart Rate And Rhythm] : heart rate was normal and rhythm regular

## 2023-12-26 LAB — TSH SERPL-ACNC: 1.09 UIU/ML

## 2023-12-27 ENCOUNTER — RX RENEWAL (OUTPATIENT)
Age: 73
End: 2023-12-27

## 2023-12-27 RX ORDER — PRAMIPEXOLE DIHYDROCHLORIDE 0.38 MG/1
0.38 TABLET, EXTENDED RELEASE ORAL
Qty: 90 | Refills: 2 | Status: ACTIVE | COMMUNITY
Start: 2021-10-04 | End: 1900-01-01

## 2023-12-28 ENCOUNTER — APPOINTMENT (OUTPATIENT)
Dept: OBGYN | Facility: CLINIC | Age: 73
End: 2023-12-28

## 2023-12-28 ENCOUNTER — NON-APPOINTMENT (OUTPATIENT)
Age: 73
End: 2023-12-28

## 2023-12-28 ENCOUNTER — RESULT REVIEW (OUTPATIENT)
Age: 73
End: 2023-12-28

## 2023-12-28 ENCOUNTER — APPOINTMENT (OUTPATIENT)
Dept: HEMATOLOGY ONCOLOGY | Facility: CLINIC | Age: 73
End: 2023-12-28
Payer: MEDICARE

## 2023-12-28 VITALS
SYSTOLIC BLOOD PRESSURE: 113 MMHG | TEMPERATURE: 97.6 F | HEIGHT: 61 IN | RESPIRATION RATE: 16 BRPM | OXYGEN SATURATION: 99 % | WEIGHT: 135.5 LBS | HEART RATE: 95 BPM | BODY MASS INDEX: 25.58 KG/M2 | DIASTOLIC BLOOD PRESSURE: 59 MMHG

## 2023-12-28 DIAGNOSIS — C79.51 SECONDARY MALIGNANT NEOPLASM OF BONE: ICD-10-CM

## 2023-12-28 PROCEDURE — 36415 COLL VENOUS BLD VENIPUNCTURE: CPT

## 2023-12-28 PROCEDURE — 99214 OFFICE O/P EST MOD 30 MIN: CPT | Mod: 25

## 2024-01-02 ENCOUNTER — RX RENEWAL (OUTPATIENT)
Age: 74
End: 2024-01-02

## 2024-01-03 ENCOUNTER — TRANSCRIPTION ENCOUNTER (OUTPATIENT)
Age: 74
End: 2024-01-03

## 2024-01-08 ENCOUNTER — TRANSCRIPTION ENCOUNTER (OUTPATIENT)
Age: 74
End: 2024-01-08

## 2024-01-08 NOTE — ASSESSMENT
[FreeTextEntry1] : 74 yo female # Stage 1 breast cancer 8 mm tumor, invasive ductal carcinoma, ER/ [place] positive, HER2 not amplified with Oncotype Dx 11. diagnosed November 2018 - d/c Anastrozole 12/23 ( completed 5 years) - left breast pain - c/w fat necrosis on mammogram up to date 12/22  #Adenocarcinoma of lung- stage 4 diagnosed April 2019 -Repeated CEA -plateau at 11.5 (started out at 26.6) -CT scan showed mass 1.8 x 1.4 x 1.7 cm lesion. -Biopsy of lung nodule c/w adeno ca of the lung, EGFR mutated, T190 mutated, started on Tagrisso. Side effects, -toxicities and benefits reviewed with pt. L4 vertebral body biopsy confirmed met adenoca of the lung. - PETCT 2/3/2020- decreased FDG uptake in primary lesion, sclerotic lesions in bones, thyroid nodule activity- under surveillance, duodenal uptake - plan for PETCT Feb 2021- SARAH- stable, uptake in right breast - c/w fat necrosis - repeat CEA -Jan 2022- PETCT - sclerotic bone lesions. - pain in the left femur 2-3/10 takes viocodin daily, s/p RT -decreased lower back pain - visit to ER for dyspnea - images reviewed, evaluated for PE, related to Parkinson disease. Lung mass 1.1 cm on CT - patient with weight loss, struggles with Parkinson disease - PETCT -3/27/23 some progression of bone mets, no change - increased hoarseness of the voice, vocal cord paralysis - followed by Dr. Rodriguez - CT chest - ER 9/23- SARAH - PETCT 9/23- new fx in the ribs, new axillary LN post vaccine,- uptake, pancreatic head lesion.  - patient deferred RT - denies pain now, decline in functional status due to Parkinsons - lost  recently - declining PS- due to Parkinsons- The patient has a mobility limitation that significantly impairs their ability to participate in one or more mobility- related activities of daily living in the home. The patient is able to safely use the walker.  The functional mobility deficit can be sufficiently resolved by use of a walker.    # Weight lost - unintentional - nutritional evaluation  #benign thyroid nodule - followed by Dr. Park. due for repeat thyroid US  # constipation- daily miralax  # bone mets, dental cleaning due, last 6 months ago - Xgeva q 6 weeks  # new pancreatic mass - order MRI with gadolinium, Ca19-9. Referred to Dr. Munguia for EUS. IPMN suspected, observation vs biopsy. Under observation  Follows with psych Dr Yeimi Ellis [facility] meds adjusted  # S/p robotic assist rectopexy for large symptomatic rectal prolapse with  on 6/30/23.  # Anxiety - unable to take ativan.   Labs ordered, drawn in office  CBC,Chem,CEA, CA 27.29 case and mgmt to return in 6 weeks for treatment.

## 2024-01-08 NOTE — HISTORY OF PRESENT ILLNESS
[Therapy: ___] : Therapy: [unfilled] [de-identified] : Mrs. Mcdowell presents for follow up on Anastrazole for breast cancer and on Tagrisso for lung cancer. Patient overall feels well but still continues to decline due to the Parkinson's. She is experiencing fatigue and persistent panic attacks. She states her medications are not working and that her panic attacks are getting worse.  She states that she saw  who wants to perform radiation in her right hip. She is currently undergoing PT at home.  [de-identified] : Mrs. Mcdowell is a 68 year old postmenopausal female who is referred by Dr.Alice Demarco for newly diagnosed breast cancer.\par  She was found on routine mammography in November 2019 she you have a focal asymmetric density in the left upper outer breast at 1:00 access 8 cm from the nipple. A one year prior had been negative.  Ultrasound-guided core biopsy in November 21, 2018 revealed a grade 1/3 invasive ductal carcinoma ER positive at 98% and HI positive at 90%,Ki-67 was less than 10%. She underwent some left breast lumpectomy and sentinel node dissection on December 19, 2018. Pathology revealed an 8 mm grade 1/3 invasive ductal carcinoma zero of 3 sentinel lymph nodes were involved with tumor. Oncotype DX score was 11. She also underwent genetic testing which is negative.\par

## 2024-01-08 NOTE — REVIEW OF SYSTEMS
[Fatigue] : fatigue [Recent Change In Weight] : ~T recent weight change [SOB on Exertion] : shortness of breath during exertion [Constipation] : constipation [Joint Pain] : joint pain [Muscle Pain] : muscle pain [Dizziness] : dizziness [Difficulty Walking] : difficulty walking [Insomnia] : insomnia [Anxiety] : anxiety [Depression] : depression [Muscle Weakness] : muscle weakness [Negative] : Allergic/Immunologic [Fever] : no fever [Eye Pain] : no eye pain [Vision Problems] : no vision problems [Dysphagia] : no dysphagia [Nosebleeds] : no nosebleeds [Chest Pain] : no chest pain [Lower Ext Edema] : no lower extremity edema [Shortness Of Breath] : no shortness of breath [Dysuria] : no dysuria [Easy Bleeding] : no tendency for easy bleeding [Easy Bruising] : no tendency for easy bruising [FreeTextEntry6] : improved [FreeTextEntry9] : muscle spasms due to Parkinsons, left hip pain  [de-identified] : tremor [de-identified] : worsensing

## 2024-01-08 NOTE — PHYSICAL EXAM
[Restricted in physically strenuous activity but ambulatory and able to carry out work of a light or sedentary nature] : Status 1- Restricted in physically strenuous activity but ambulatory and able to carry out work of a light or sedentary nature, e.g., light house work, office work [Normal] : affect appropriate [de-identified] : left breast scar

## 2024-01-11 ENCOUNTER — TRANSCRIPTION ENCOUNTER (OUTPATIENT)
Age: 74
End: 2024-01-11

## 2024-01-19 ENCOUNTER — APPOINTMENT (OUTPATIENT)
Dept: PODIATRY | Facility: CLINIC | Age: 74
End: 2024-01-19
Payer: MEDICARE

## 2024-01-19 ENCOUNTER — NON-APPOINTMENT (OUTPATIENT)
Age: 74
End: 2024-01-19

## 2024-01-19 VITALS — BODY MASS INDEX: 25.49 KG/M2 | HEIGHT: 61 IN | WEIGHT: 135 LBS

## 2024-01-19 DIAGNOSIS — M20.41 OTHER HAMMER TOE(S) (ACQUIRED), RIGHT FOOT: ICD-10-CM

## 2024-01-19 DIAGNOSIS — M20.42 OTHER HAMMER TOE(S) (ACQUIRED), LEFT FOOT: ICD-10-CM

## 2024-01-19 PROCEDURE — 11056 PARNG/CUTG B9 HYPRKR LES 2-4: CPT | Mod: Q8

## 2024-01-19 PROCEDURE — 11721 DEBRIDE NAIL 6 OR MORE: CPT | Mod: 59

## 2024-01-19 PROCEDURE — 99203 OFFICE O/P NEW LOW 30 MIN: CPT | Mod: 25

## 2024-01-19 NOTE — PHYSICAL EXAM
[General Appearance - In No Acute Distress] : in no acute distress [General Appearance - Alert] : alert [FreeTextEntry3] : The vascular exam reveals decreased pedal pulses bilateral, a capillary fill time of 3-5 seconds,  mild atrophic skin changes, mild varicosities absence of hair growth, but no cyanosis, clubbing or mottling seen. [de-identified] : overall muscle strength testing is decreased, but consistant with the patients age and medical problems. Mild atrophy and overall weakness present. [FreeTextEntry1] : The patient has all contributing factors to onychomycosis including but not limited to thickness, subungual debris, discoloration and partial lysis and they are brittle when cut. Painful hyperkeratotic lesions noted tips of toes 2/4 both, pre HK lesions PIPj 2nd toes bilateral, 2/2 to ill fitting shoes [Sensation] : the sensory exam was normal to light touch and pinprick [No Focal Deficits] : no focal deficits [Motor Exam] : the motor exam was normal [Deep Tendon Reflexes (DTR)] : deep tendon reflexes were 2+ and symmetric [Oriented To Time, Place, And Person] : oriented to person, place, and time [Impaired Insight] : insight and judgment were intact [Affect] : the affect was normal

## 2024-01-19 NOTE — HISTORY OF PRESENT ILLNESS
[FreeTextEntry1] : The above-named patient presents to the office with a chief complaint of pain to the second digits on both feet as well as dystrophic and painful nails and mild hyperkeratosis to several toes on both feet.

## 2024-01-19 NOTE — PROCEDURE
[FreeTextEntry1] : Using sterile instrumentation debridement of all nails manually and electrically to decrease thickness, pain and girth and make shoe gear more comfortable with "slant back" procedure of any bordering spicules causing pain Utilizing a scalpel and sterile aseptic technique sharp debridement of multiple hyperkeratotic lesions performed. Appropriate padding were necessary. I have applied offloading pads to the patient's foot and have given them several samples to continue to use. I have also advised the patient that they can certainly purchase these from an outside vendor and if they are willing to do that and the name and number was supplied I have had a lengthy discussion with the patient regarding overall skincare. The importance of the type of socks, the type of shoes, and the type of overall foot hygiene is important to help control and prevent eruptions especially over extreme weather changes. This included but was not limited to hydration and lubrication, dove soap, triple rinse clothing and linens. I also explained the importance of thorough drying of both feet especially the web spaces. Given the extreme temperatures back in a car I also reviewed the type of shoes that would help reduce the chances of cracking of the skin especially leading to fissuring of the heels. Overall skincare precautions were reviewed education literature dispensed in the patient's questions asked and answered appropriately. A complete and thorough evaluation of the type of shoes they should be wearing and type of shoes for this time of year was discussed with patient.  follow up prn

## 2024-01-19 NOTE — REVIEW OF SYSTEMS
[Leg Claudication] : no intermittent leg claudication [Lower Ext Edema] : no lower extremity edema [Joint Swelling] : no joint swelling [Joint Stiffness] : no joint stiffness [Limb Pain] : no limb pain [Limb Swelling] : no limb swelling [As Noted in HPI] : as noted in HPI [Skin Lesions] : skin lesion [Skin Wound] : no skin wound [Negative] : Endocrine

## 2024-01-30 ENCOUNTER — NON-APPOINTMENT (OUTPATIENT)
Age: 74
End: 2024-01-30

## 2024-01-30 VITALS
DIASTOLIC BLOOD PRESSURE: 79 MMHG | RESPIRATION RATE: 14 BRPM | HEART RATE: 86 BPM | SYSTOLIC BLOOD PRESSURE: 140 MMHG | OXYGEN SATURATION: 99 %

## 2024-01-30 NOTE — DISEASE MANAGEMENT
[Clinical] : TNM Stage: c [IV] : IV [FreeTextEntry4] : Metastatic Lung Cancer [TTNM] : X [NTNM] : X [MTNM] : X [de-identified] : 1600 cGy [de-identified] : 2000 cGy [de-identified] : right hip and right ribs

## 2024-01-30 NOTE — HISTORY OF PRESENT ILLNESS
[FreeTextEntry1] : Mrs. Mcdowell is a 73-year-old postmenopausal female of Ashkenazi descent, who was diagnosed with left breast invasive ductal carcinoma s/p lumpectomy and IORT in 2018. Subsequently, also later diagnosed with Stage IV lung adenocarcinoma.    She is present today for her on treatment visit. She is status post fraction 4 / 5 of radiation to the right hip and right ribs. She is scheduled to completed treatment tomorrow on 1/31/2024. She states she is experiencing no pain in rip area and pain at level 1 in right hip. Ambulates with assist and gait is somewhat unsteady (baseline). Patient using Aquaphor to treated areas as instructed. Denies GI/ complaints. Continue RT as prescribed.

## 2024-01-30 NOTE — VITALS
[Maximal Pain Intensity: 0/10] : 0/10 [Least Pain Intensity: 0/10] : 0/10 [NoTreatment Scheduled] : no treatment scheduled [80: Normal activity with effort; some signs or symptoms of disease.] : 80: Normal activity with effort; some signs or symptoms of disease.  [Maximal Pain Intensity: 1/10] : 1/10

## 2024-02-01 ENCOUNTER — APPOINTMENT (OUTPATIENT)
Dept: ENDOCRINOLOGY | Facility: CLINIC | Age: 74
End: 2024-02-01
Payer: MEDICARE

## 2024-02-01 ENCOUNTER — APPOINTMENT (OUTPATIENT)
Dept: ENDOCRINOLOGY | Facility: CLINIC | Age: 74
End: 2024-02-01

## 2024-02-01 VITALS
SYSTOLIC BLOOD PRESSURE: 130 MMHG | HEIGHT: 61 IN | WEIGHT: 135 LBS | OXYGEN SATURATION: 99 % | BODY MASS INDEX: 25.49 KG/M2 | HEART RATE: 115 BPM | DIASTOLIC BLOOD PRESSURE: 68 MMHG

## 2024-02-01 PROCEDURE — 99214 OFFICE O/P EST MOD 30 MIN: CPT

## 2024-02-02 ENCOUNTER — NON-APPOINTMENT (OUTPATIENT)
Age: 74
End: 2024-02-02

## 2024-02-07 ENCOUNTER — APPOINTMENT (OUTPATIENT)
Dept: NEUROLOGY | Facility: CLINIC | Age: 74
End: 2024-02-07
Payer: MEDICARE

## 2024-02-07 VITALS
OXYGEN SATURATION: 99 % | WEIGHT: 132 LBS | HEART RATE: 90 BPM | BODY MASS INDEX: 24.92 KG/M2 | SYSTOLIC BLOOD PRESSURE: 104 MMHG | HEIGHT: 61 IN | DIASTOLIC BLOOD PRESSURE: 66 MMHG

## 2024-02-07 PROCEDURE — 99215 OFFICE O/P EST HI 40 MIN: CPT

## 2024-02-07 NOTE — HISTORY OF PRESENT ILLNESS
[FreeTextEntry1] : Rosamaria Mcdowell is a 73 year old F with a PMHx of Parkinson's disease, Iron deficiency anemia, RLS, lung cancer (on targeted therapy), hx cataract surgery, history of breast cancer, GERD, thyroid nodules, tachycardia, panic disorder, and rectal prolapse s/p surgical correction, who presents for follow up in the [facility] [place] for Parkinson's disease. Positive Kranthi scan. She is accompanied by her two sons who provide collateral history.  Interval history: Since the last visit, she has been having dyskinesias and anxiety attacks. She is having virtual therapy sessions and psychotherapy.  She fell in September. Her  passed away in early December.  She is constipated. She is using MiraLAX daily. She is staying hydrated. She is eating dried fruit.   She gets dizziness/lightheadedness when standing.  She is rarely sleeping through the night. She has to wake up at urinate at night.  When she has a panic attack she gets paranoid.   Current meds: Sinemet 25-100mg ER 2 tabs at 7a-2p-10p and 1 tab at 10a and 6p Clonazepam 0.25mg TID Pramipexole 0.375mg IR in the evening Miralax and Fiber  Prior meds: Azilect 1 mg daily Melatonin gave her palpitations Modafinil did not help her fatigue Dexamethasone for the fatigue kept her up Olanzapine and her parkinsonism became worse Lyrica was stopped for urinary incontinence Zoloft Lexapro - she had increased tremors Xanax Inbrija 42mg 4 times a day - has not been using Prozac 40mg daily Seroquel 50mg TID - 10a-6p-10p Mirtazapine 7.5mg at bedtime Wellbutrin makes her itch Parcopa - rarely using Ativan 0.5mg BID PRN for panic attacks - she takes 1/2 tab but sometimes more.   Initial history: She was diagnosed in 2014 at [facility]. She has been seeing a general neurologist.    She thought she had essential tremor. Her tremors are mostly in the right hand, controlled on Sinemet. Her left leg has restless leg syndrome. She feels worse in the afternoon, around 3-4pm. She gets wobbly. If she takes her BP, it is lower. She has not been officially tested for orthostatic hypotension. Sometimes her systolic is in 100s. She was tried on midodrine but she had hair loss and UTI. She was also tried on Gabapentin. When she didn't take the Sinemet ER in the morning she felt worse. No trouble with buttons, zippers or shoelaces, cutting food. No significant stiffness. She gets muscle spasms in the feet. She thinks the Sinemet lasts about 4 hours.    No trouble turning or adjusting in bed at night. She talks in her sleep and has fallen out of bed before. She wakes up to go to the bathroom at night. She also has breakthrough tremors at night.  She urinates every 4 hours.   She was reduced off the pramipexole due to potential side effects, but she is not sure which ones. When she decreased it, around 10p for about an hour she would be walking up and down the halls and could not lay still and got spasms in her left leg. She was tried on Lyrica without relief. They tried adjusting the Sinemet doses instead.  She has no sense of smell. No changes in loudness of her voice. 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 Therapy did not work. She can still sing. No trouble swallowing.  She has constipation. She has a bowel movement every couple of days. She takes Miralax daily or every other day. She has increased her water intake. She has urinary frequency. No incontinence.  Memory, having more trouble finding words. Mood is depressed, but generally okay. Dizziness in the afternoon when standing. No falls. Her BP is sometimes low.  Hallucinations, none.  Current meds: Sinemet 10-100mg taking 6 tabs daily - 7a-11-3-7-11-3a Sinemet ER 25-100mg 1 tab BID 7a-7p Pramipexole 0.75mg ER in the evening  Prior meds: Azilect 1 mg daily  Family history: Social history: retired nurse   No Kranthi scan. MRI brain with and without contrast was done in 4/2019.

## 2024-02-07 NOTE — PHYSICAL EXAM
[Person] : oriented to person [Place] : oriented to place [Time] : oriented to time [Concentration Intact] : normal concentrating ability [Comprehension] : comprehension intact [Cranial Nerves Oculomotor (III)] : extraocular motion intact [Cranial Nerves Facial (VII)] : face symmetrical [Cranial Nerves Vestibulocochlear (VIII)] : hearing was intact bilaterally [Cranial Nerves Accessory (XI - Cranial And Spinal)] : head turning and shoulder shrug symmetric [Cranial Nerves Hypoglossal (XII)] : there was no tongue deviation with protrusion [Motor Strength] : muscle strength was normal in all four extremities [Paresis Pronator Drift Right-Sided] : no pronator drift on the right [Paresis Pronator Drift Left-Sided] : no pronator drift on the left [Coordination - Dysmetria Impaired Finger-to-Nose Bilateral] : not present [FreeTextEntry1] : Face is not significantly masked, but affect is flat. Voice is moderately hypophonic and hoarse. No tremors. No rigidity. There was slight-mild bradykinesia, right more than left, with finger tapping, rapid alternating and sequential movements. No trouble standing with arms crossed. Posture is normal. Slight upper body dyskinesias. Patient with significant ataxia on examination, staggering gait. Postural reflexes are intact.

## 2024-02-07 NOTE — DISCUSSION/SUMMARY
[FreeTextEntry1] : Rosamaria Mcdowell is a 73 year old F with a PMHx of Parkinson's disease, Iron deficiency anemia, RLS, lung cancer (on targeted therapy), hx cataract surgery, history of breast cancer, iron deficiency anemia, GERD, thyroid nodules, tachycardia, panic disorder, and rectal prolapse s/p surgical correction, who presents for follow up in the [facility] at [place] for Parkinson's disease. Positive Kranthi scan. She is accompanied by her two sons who provide collateral history.  The patient's history and physical examination are suggestive of mild idiopathic Parkinson's disease. She reports a good response to Sinemet. Dyskinesias are slightly present, intermittently, worse when anxious.  Her anxiety is still a challenge and she is working with Dr. Ellis on this. Her clonazepam dosing may be contributing to her ataxia on examination, as she has a history of sensitivity to medications.   Her RLS was previously controlled on Pramipexole IR 0.375mg, however, iron deficiency may have been contributing to this. Insurance would not cover the ER she was on previously. Lyrica discontinued. Advised that this medication can cause leg swelling and ICDs.  EMG was previously done and not suggestive of a myopathy.  Recommendations:  - Continue Pramipexole ER 0.375mg at 7p. Do not take additional doses. Can consider tapering off. - Continue Sinemet 25-100mg ER 2 tabs at 7a-2p-10p and 1 tab at 10a and 6p. Can consider tapering slowly to 1 tab at the same times. Dyskinesias would likely improve. - Continue Parcopa PRN off periods. - Continue Miralax for constipation , fiber and start probiotics - Mediterranean diet, antiinflammatory/antioxidant foods, probiotics, fiber, caffeine, and vitamins  - Consider decreasing or discontinuing daytime doses of clonazepam as this may be contributing to her ataxic gait. Can consider low dose seroquel at night for sleep and mood symptoms. Encouraged to increase exercise and physical activity and maintain an active social and intellectual life. - Anemia management per Hematology. Likely infusion would be best for iron supplementation as she has severe constipation. - Continue following with psychiatry. - PT/OT/SLP  Patient will follow up with Movement Disorders  All questions were answered to her satisfaction. I spent 40 minutes with this patient.

## 2024-02-08 ENCOUNTER — RESULT REVIEW (OUTPATIENT)
Age: 74
End: 2024-02-08

## 2024-02-08 ENCOUNTER — NON-APPOINTMENT (OUTPATIENT)
Age: 74
End: 2024-02-08

## 2024-02-08 ENCOUNTER — APPOINTMENT (OUTPATIENT)
Dept: HEMATOLOGY ONCOLOGY | Facility: CLINIC | Age: 74
End: 2024-02-08
Payer: MEDICARE

## 2024-02-08 VITALS
WEIGHT: 134.06 LBS | SYSTOLIC BLOOD PRESSURE: 102 MMHG | OXYGEN SATURATION: 100 % | DIASTOLIC BLOOD PRESSURE: 53 MMHG | HEART RATE: 95 BPM | TEMPERATURE: 97.4 F | BODY MASS INDEX: 25.31 KG/M2 | HEIGHT: 61 IN | RESPIRATION RATE: 16 BRPM

## 2024-02-08 DIAGNOSIS — R19.7 DIARRHEA, UNSPECIFIED: ICD-10-CM

## 2024-02-08 DIAGNOSIS — E04.1 NONTOXIC SINGLE THYROID NODULE: ICD-10-CM

## 2024-02-08 PROCEDURE — 99213 OFFICE O/P EST LOW 20 MIN: CPT | Mod: 25

## 2024-02-08 PROCEDURE — 36415 COLL VENOUS BLD VENIPUNCTURE: CPT

## 2024-02-12 ENCOUNTER — RESULT REVIEW (OUTPATIENT)
Age: 74
End: 2024-02-12

## 2024-02-14 ENCOUNTER — TRANSCRIPTION ENCOUNTER (OUTPATIENT)
Age: 74
End: 2024-02-14

## 2024-02-23 ENCOUNTER — NON-APPOINTMENT (OUTPATIENT)
Age: 74
End: 2024-02-23

## 2024-02-28 NOTE — PHYSICAL EXAM
[Alert] : alert [Well Nourished] : well nourished [No Acute Distress] : no acute distress [Well Developed] : well developed [Normal Sclera/Conjunctiva] : normal sclera/conjunctiva [EOMI] : extra ocular movement intact [No Proptosis] : no proptosis [Normal Oropharynx] : the oropharynx was normal [No Respiratory Distress] : no respiratory distress [No Accessory Muscle Use] : no accessory muscle use [Clear to Auscultation] : lungs were clear to auscultation bilaterally [Normal S1, S2] : normal S1 and S2 [Normal Rate] : heart rate was normal [Regular Rhythm] : with a regular rhythm [No Edema] : no peripheral edema [Normal Bowel Sounds] : normal bowel sounds [Not Tender] : non-tender [Not Distended] : not distended [Soft] : abdomen soft [Normal Anterior Cervical Nodes] : no anterior cervical lymphadenopathy [Normal Posterior Cervical Nodes] : no posterior cervical lymphadenopathy [No Spinal Tenderness] : no spinal tenderness [Spine Straight] : spine straight [No Stigmata of Cushings Syndrome] : no stigmata of Cushings Syndrome [Normal Gait] : normal gait [Normal Strength/Tone] : muscle strength and tone were normal [No Rash] : no rash [Normal Reflexes] : deep tendon reflexes were 2+ and symmetric [No Tremors] : no tremors [Oriented x3] : oriented to person, place, and time [Kyphosis] : kyphosis present [de-identified] : Mobile with swallowing, mildly increasing in consistency  [Acanthosis Nigricans] : no acanthosis nigricans [de-identified] : Mild kyphosis. [de-identified] : Anxious

## 2024-02-28 NOTE — REVIEW OF SYSTEMS
[Recent Weight Loss (___ Lbs)] : recent weight loss: [unfilled] lbs [Dysphonia] : dysphonia [Palpitations] : palpitations [Negative] : Heme/Lymph [Dysphagia] : no dysphagia [Shortness Of Breath] : no shortness of breath

## 2024-02-28 NOTE — HISTORY OF PRESENT ILLNESS
[FreeTextEntry1] : Ms. 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 Right thyroid upper, ultrasound guided fine needle aspiration: BENIGN ([place] Category II) - Benign follicular cells dispersed in sheets and clusters in a background of thin colloid  B. Left thyroid lower, ultrasound guided fine needle aspiration: BENIGN ([place] Category II) - Benign follicular cells dispersed in sheets and clusters in a background of thin colloid and hemosiderin-laden macrophages  2/1/2024  Last seen by me in 11/2021. Referred back by Dr Shaffer for thyroid nodule.  Last US Sept 2021 showed stable bilateral thyroid nodules. On right, biggest one was 1.1 cm. Previously was biopsied. On left, biggest one was 3.1 cm. Stable, previously biopsied.   No dysphagia.  No dyspnea.  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  No fmhx of thyroid cancer. No history of radiation to neck. Lost 60 lbs from cancer.  Pt is currently anxious.

## 2024-02-29 ENCOUNTER — APPOINTMENT (OUTPATIENT)
Dept: GERIATRICS | Facility: CLINIC | Age: 74
End: 2024-02-29

## 2024-03-01 ENCOUNTER — APPOINTMENT (OUTPATIENT)
Dept: GERIATRICS | Facility: CLINIC | Age: 74
End: 2024-03-01
Payer: MEDICARE

## 2024-03-01 VITALS
HEART RATE: 118 BPM | TEMPERATURE: 97.2 F | BODY MASS INDEX: 25.49 KG/M2 | HEIGHT: 61 IN | OXYGEN SATURATION: 99 % | SYSTOLIC BLOOD PRESSURE: 122 MMHG | WEIGHT: 135 LBS | DIASTOLIC BLOOD PRESSURE: 78 MMHG

## 2024-03-01 PROCEDURE — G2211 COMPLEX E/M VISIT ADD ON: CPT

## 2024-03-01 PROCEDURE — 99215 OFFICE O/P EST HI 40 MIN: CPT

## 2024-03-01 NOTE — PHYSICAL EXAM
[No Acc Muscle Use] : no accessory muscle use [No Respiratory Distress] : no respiratory distress [Respiration, Rhythm And Depth] : normal respiratory rhythm and effort [Auscultation Breath Sounds / Voice Sounds] : lungs were clear to auscultation bilaterally [Normal S1, S2] : normal S1 and S2 [de-identified] : slight tachycardia noted

## 2024-03-01 NOTE — HISTORY OF PRESENT ILLNESS
[FreeTextEntry1] : horrible burning in her urine since hospital admission. Had to be catheterized, retaining urine. has adult briefs, not changed adequately  In [facility] admitted to [place] for 02/11-02/20; unwitnessed fall. Had agitation episodes 2/2 urinary retention/constipation.  Parkinsons disease with dementia partially dependent with ADLs, dependent with iADLs  #Lung ca stage 4 taking tagrisso since 5 yrs follow oncology, palliative care  feeling depressed everyday since past 2 weeks she feels like she is being ignored at hospital and at facility   4AT score: 4 A's Test for Delirium Screening from [employer]  on 3/1/2024 ** All calculations should be rechecked by clinician prior to use **  RESULT SUMMARY: 4 points Possible delirium and/or cognitive impairment INPUTS: Alertness --> 0 = Normal AMT 4 > 0 = No mistakes Attention > 0 = Lists 7 months correctly Acute change or fluctuating course > 4 = Yes   Burning urination:  Urine/fecal symptoms: 2-3 bms a week; dulcolax suppository; not having accidents; not getting help going to bathroom. son observed that they come on time. urinating about 6x a day. cant hold it. on milk of mag liquid and dulcolax suppository PRN Medications: clonazepam withdrawl: Pain: right side middle back. radiation treatment to ribs Depression screen:  Sleep: 6hrs or less Sensory deficits: no hearing aids, hearing evaluation not done in  while, eyes checks  Level of activity: low Hydration: 2 cups a day,  Nutrition: horrible meals, 3x  a day, not good apetite. 1 ensure a day PE/MI: no   using wheelchair. some times walker to bathroom Dr. Ellis psych managing her medications  did not get clonazepam for 4 days at SNF but apprently that was restarted. also getting seroquel at hospital but not since hospitalization clonazepam 0.5mg BID now instead of 0.25 TID lived at home before the fall  Will check BMP,   miralax everyday.  recommend starting clonazepam 0.5 BID. No anxiety attacks. maybe gets up every other night to has outside caretaker at facility    02/29/24 SULLY CORNELIUS is a 73 year yo White female with PMHx of lung cancer (follows with Dr. 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  Her  passed away 2 weeks ago. Still getting anxiety attacks. using clonopin 3x a day. Had appointment with psych 1 week ago. Prescription given by psych. [employer] was making her shake after 20min so she stopped taking it.   Psychologist on board. they talk to her once a week.  what matters to her most is not being dizzy. She says its all her meds.  Patient's mobility is limited by Parkinson's disease that significantly impairs her ability to participate in ADLs at home patient is able to safely use the walker and the current functional mobility deficits can be sufficiently resolved by using a walker.  Patient also has high risk of falls due to her frailty and Parkinson's disease leading to loss of balance.  Home style rail system will help for to prevent falls and she is safely able to utilize home style rail system.   #Weightloss weight 1 lb up today  has not started ensure. She is eating food people brought over after her 's death  #Parkinson's disease levodopa-carbidopa Working with PT/OT and speech  #Anxiety Also has anxiety and now is on clonopin 0.25 TID and Vortioxetine by psychiatry started couple of weeks ago SALINA-7 today, 16 points Severe anxiety disorder.  #Restless leg Taking pramipexole for restless leg  #Osteoporosis On xgeva  #Lung ca stage 4 taking tagrisso since 5 yrs follow oncology, palliative care  #immunization due for shingles   Reviewed prior lab work and notes.  10/09/2023 today having pain since last 2 days on the right lower side of her back in the thoracic region. She has tried hot pack 4-5 times a day, diclofenac gel, tylenol 1000mg q6h . No BM in 2 days. Oncology gave her xanax for anxiety, ativan 0.5mg day as needed. she is not taking xanax anymore as it made her hallucinate. She has been taking miralax and 5 prunes a day with little benefit.w  Xray chest: Acute nondisplaced fracture posterolateral right 10th rib and possibly ninth rib with small amount  of adjacent extra-pleural fluid and air on the right posterolateral chest wall.    Off robaxin, dronabinol   Mentation: Parkinson's disease  #Restless leg has anemia, iron deficiency?; last ferritin <75 in 2023 takes pramipexole 0.375 ER QD and carbidopa-levodopa ER

## 2024-03-01 NOTE — REASON FOR VISIT
[Acute] : an acute visit [Family Member] : family member [FreeTextEntry1] : getting confused [FreeTextEntry3] : lorna Harris

## 2024-03-14 ENCOUNTER — APPOINTMENT (OUTPATIENT)
Dept: GERIATRICS | Facility: CLINIC | Age: 74
End: 2024-03-14
Payer: MEDICARE

## 2024-03-14 VITALS
TEMPERATURE: 97.9 F | OXYGEN SATURATION: 99 % | RESPIRATION RATE: 14 BRPM | DIASTOLIC BLOOD PRESSURE: 67 MMHG | SYSTOLIC BLOOD PRESSURE: 102 MMHG | HEART RATE: 98 BPM

## 2024-03-14 DIAGNOSIS — K59.00 CONSTIPATION, UNSPECIFIED: ICD-10-CM

## 2024-03-14 PROCEDURE — 99215 OFFICE O/P EST HI 40 MIN: CPT

## 2024-03-15 ENCOUNTER — APPOINTMENT (OUTPATIENT)
Dept: GERIATRICS | Facility: CLINIC | Age: 74
End: 2024-03-15
Payer: MEDICARE

## 2024-03-15 VITALS
WEIGHT: 134 LBS | OXYGEN SATURATION: 97 % | TEMPERATURE: 97.6 F | SYSTOLIC BLOOD PRESSURE: 110 MMHG | BODY MASS INDEX: 25.32 KG/M2 | HEART RATE: 102 BPM | DIASTOLIC BLOOD PRESSURE: 62 MMHG

## 2024-03-15 PROCEDURE — 99214 OFFICE O/P EST MOD 30 MIN: CPT

## 2024-03-15 NOTE — HISTORY OF PRESENT ILLNESS
[FreeTextEntry1] : #Mood disorder anxiety/depression Dr. Ellis psych managing her medications 03/15: on clonazepam weaning per palliative care; per psych, also on mirtazepine 7.5mg QHS  #Parkinsons disease with dementia partially dependent with ADLs, dependent with iADLs Delirium trigger: pain, hearing loss, low activity levels, dehydration, medications 03/15: pain is better, activity increased, clonazepam tapering off   #Constipation 03/15 Started miralax PRN, 5 prunes every night. having Bm at night as well. can try switching to day.  #UTI no more burning in Urine but has the urgnecy . finished abx on tuesday. Palliative care checking u/a again today  Waiting home PT to start. No one has called.   #THyroid nodule 03/15: getting CT scan of thyroid per Dr. Park endocrinology  #Unintentional weight loss 03/15 restart ensure. has it at home, not taking   #Lung ca stage 4 taking tagrisso since 5 yrs follow oncology, palliative care  03/01/2024 horrible burning in her urine since hospital admission. Had to be catheterized, retaining urine. has adult briefs, not changed adequately  In [facility] admitted to [place] for 02/11-02/20; unwitnessed fall. Had agitation episodes 2/2 urinary retention/constipation.  Parkinsons disease with dementia partially dependent with ADLs, dependent with iADLs  #Lung ca stage 4 taking tagrisso since 5 yrs follow oncology, palliative care  feeling depressed everyday since past 2 weeks she feels like she is being ignored at hospital and at facility   4AT score: 4 A's Test for Delirium Screening from [employer]  on 3/1/2024 ** All calculations should be rechecked by clinician prior to use **  RESULT SUMMARY: 4 points Possible delirium and/or cognitive impairment INPUTS: Alertness --> 0 = Normal AMT 4 > 0 = No mistakes Attention > 0 = Lists 7 months correctly Acute change or fluctuating course > 4 = Yes   Burning urination:  Urine/fecal symptoms: 2-3 bms a week; dulcolax suppository; not having accidents; not getting help going to bathroom. son observed that they come on time. urinating about 6x a day. cant hold it. on milk of mag liquid and dulcolax suppository PRN Medications: clonazepam withdrawl: Pain: right side middle back. radiation treatment to ribs Depression screen:  Sleep: 6hrs or less Sensory deficits: no hearing aids, hearing evaluation not done in  while, eyes checks  Level of activity: low Hydration: 2 cups a day,  Nutrition: horrible meals, 3x  a day, not good apetite. 1 ensure a day PE/MI: no   using wheelchair. some times walker to bathroom Dr. Caleb shukla managing her medications  did not get clonazepam for 4 days at SNF but apprently that was restarted. also getting seroquel at hospital but not since hospitalization clonazepam 0.5mg BID now instead of 0.25 TID lived at home before the fall  Will check BMP,   miralax everyday.  recommend starting clonazepam 0.5 BID. No anxiety attacks. maybe gets up every other night to has outside caretaker at facility    02/29/24 SULLY CORNELIUS is a 73 year yo White female with PMHx of lung cancer (follows with Dr. 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  Her  passed away 2 weeks ago. Still getting anxiety attacks. using clonopin 3x a day. Had appointment with psych 1 week ago. Prescription given by psych. [employer] was making her shake after 20min so she stopped taking it.   Psychologist on board. they talk to her once a week.  what matters to her most is not being dizzy. She says its all her meds.  Patient's mobility is limited by Parkinson's disease that significantly impairs her ability to participate in ADLs at home patient is able to safely use the walker and the current functional mobility deficits can be sufficiently resolved by using a walker.  Patient also has high risk of falls due to her frailty and Parkinson's disease leading to loss of balance.  Home style rail system will help for to prevent falls and she is safely able to utilize home style rail system.   #Weightloss weight 1 lb up today  has not started ensure. She is eating food people brought over after her 's death  #Parkinson's disease levodopa-carbidopa Working with PT/OT and speech  #Anxiety Also has anxiety and now is on clonopin 0.25 TID and Vortioxetine by psychiatry started couple of weeks ago SALINA-7 today, 16 points Severe anxiety disorder.  #Restless leg Taking pramipexole for restless leg  #Osteoporosis On xgeva  #Lung ca stage 4 taking tagrisso since 5 yrs follow oncology, palliative care  #immunization due for shingles   Reviewed prior lab work and notes.  10/09/2023 today having pain since last 2 days on the right lower side of her back in the thoracic region. She has tried hot pack 4-5 times a day, diclofenac gel, tylenol 1000mg q6h . No BM in 2 days. Oncology gave her xanax for anxiety, ativan 0.5mg day as needed. she is not taking xanax anymore as it made her hallucinate. She has been taking miralax and 5 prunes a day with little benefit.w  Xray chest: Acute nondisplaced fracture posterolateral right 10th rib and possibly ninth rib with small amount  of adjacent extra-pleural fluid and air on the right posterolateral chest wall.    Off robaxin, dronabinol   Mentation: Parkinson's disease  #Restless leg has anemia, iron deficiency?; last ferritin <75 in 2023 takes pramipexole 0.375 ER QD and carbidopa-levodopa ER

## 2024-03-15 NOTE — PHYSICAL EXAM
[No Respiratory Distress] : no respiratory distress [Respiration, Rhythm And Depth] : normal respiratory rhythm and effort [No Acc Muscle Use] : no accessory muscle use [Auscultation Breath Sounds / Voice Sounds] : lungs were clear to auscultation bilaterally [Normal S1, S2] : normal S1 and S2 [Heart Rate And Rhythm] : heart rate was normal and rhythm regular

## 2024-03-18 ENCOUNTER — RESULT REVIEW (OUTPATIENT)
Age: 74
End: 2024-03-18

## 2024-03-18 PROBLEM — K59.00 CONSTIPATION, UNSPECIFIED CONSTIPATION TYPE: Status: ACTIVE | Noted: 2022-04-28

## 2024-03-18 NOTE — DATA REVIEWED
[FreeTextEntry1] : echo 2/24  1. Left ventricular cavity is small. Left ventricular wall thickness is normal. Left ventricular systolic function is normal with an ejection fraction visually estimated at 65 to 70 %. There are no regional wall motion abnormalities seen. 2. Normal left ventricular diastolic function, with normal filling pressure. 3. Normal right ventricular cavity size, wall thickness, and normal systolic function. 4. Normal left and right atrial size. 5. No significant valvular disease. 6. Mild aortic regurgitation. 7. No pericardial effusion seen. 8. Estimated pulmonary artery systolic pressure is 17 mmHg, consistent with normal pulmonary artery pressure.  CT lumbar spine IMPRESSION:  1.  No evidence of acute osseous fracture or edwin dislocation. 2.  Multilevel degenerative change of the lumbar spine as discussed above, most significantly at the L4-L5 level where there is moderate to severe narrowing of the spinal canal. In the setting of radiculopathy, recommend MRI of the lumbar spine for further evaluation. 3.  Innumerable sclerotic lesions throughout the visualized osseous structures, compatible with diffuse osseous metastasis.  IMPRESSION:   CT HEAD: 1.  No evidence of acute intracranial hemorrhage or midline shift. 2.  Chronic small vessel disease. 3.  Multiple sclerotic lesions throughout the calvarium, likely bony metastatic disease.  CT CERVICAL SPINE: 1.  No evidence of acute osseous fracture or edwin dislocation. 2.  Multilevel degenerative change of the cervical spine. 3.  Innumerable sclerotic lesions throughout the osseous structures in the cervical spine, compatible with bony metastatic disease. 4.  Soft tissue fullness and calcification about the left vocal fold. Recommend direct visual inspection. 5.  Prominent nodule in the left lobe of the thyroid. Recommend nonemergent thyroid ultrasound for further characterization if clinically indicated. 6.  1.1 cm spiculated lesion in the left lung apex, correlating with patient's known lung cancer.

## 2024-03-18 NOTE — HISTORY OF PRESENT ILLNESS
[FreeTextEntry1] : Mrs. Mcdowell is a very pleasant 70 y/o F with multiple medical problems including metastatic lung cancer with disease to bone on Tagrisso, history of Breast cancer s/p Mastectomy, radiation and on arimadex, also with Prothrombin gene mutation with vasculitis.   pt with multiple ER visits for debilitating anxitey assocaited with shortness of breath, palpitations, and dizziness. pt with extensive work up negative for cardiac etiologies and no PE pt seen by psychiatry Dr. Acosta who started on benzodiazepines with increasing AE of amnesia currently on prozac being titrated up and seroquel with significant improvement in pt symptoms  in regards to pain --pt has tried multiple medications and sensitive to side effects including oxycodone, morphine pt has been able to tolerate Vicodin well with minimal side effects   Pt has tired MM with equivocal response, pt reports it worked at times and other times she was too "dopey" or increases her anxiety afterwards. discussed trial of CBD only with out THC.  Pt pain in much improved s/p radiation and has not required medications regularly for pain control.    OFFICE 03/14/24 pt seen for f/u s/p hospitalization with short stay at rehab with increased episodes of confusion, currently d/c home, reports urinary frequency and urgency since her d/c from hospital and rehab pt eating well,  here with her HHA deshawn and son Steven on telephone doing well overall, no other concerns, getting back to a "routine" still grieving death of her . pt ambulate here to this appointment with a wheelchair.

## 2024-03-18 NOTE — REVIEW OF SYSTEMS
[Feeling Poorly] : feeling poorly [FreeTextEntry2] : as noted in HPI, all other ROS negative [Feeling Tired] : feeling tired

## 2024-03-18 NOTE — PHYSICAL EXAM
[General Appearance - Alert] : alert [General Appearance - In No Acute Distress] : in no acute distress [Sclera] : the sclera and conjunctiva were normal [Normal Oral Mucosa] : normal oral mucosa [Extraocular Movements] : extraocular movements were intact [No Oral Cyanosis] : no oral cyanosis [No Oral Pallor] : no oral pallor [Examination Of The Oral Cavity] : the lips and gums were normal [Outer Ear] : the ears and nose were normal in appearance [Hearing Threshold Finger Rub Not Cobb] : hearing was normal [Oropharynx] : The oropharynx was normal [Neck Appearance] : the appearance of the neck was normal [Respiration, Rhythm And Depth] : normal respiratory rhythm and effort [Exaggerated Use Of Accessory Muscles For Inspiration] : no accessory muscle use [Auscultation Breath Sounds / Voice Sounds] : lungs were clear to auscultation bilaterally [Heart Sounds] : normal S1 and S2 [Heart Rate And Rhythm] : heart rate was normal and rhythm regular [Edema] : there was no peripheral edema [Full Pulse] : the pedal pulses are present [Veins - Varicosity Changes] : there were no varicosital changes [Bowel Sounds] : normal bowel sounds [Abdomen Soft] : soft [] : no hepato-splenomegaly [Abdomen Tenderness] : non-tender [No CVA Tenderness] : no ~M costovertebral angle tenderness [Abdomen Mass (___ Cm)] : no abdominal mass palpated [No Spinal Tenderness] : no spinal tenderness [Skin Color & Pigmentation] : normal skin color and pigmentation [Skin Turgor] : normal skin turgor [Oriented To Time, Place, And Person] : oriented to person, place, and time

## 2024-03-18 NOTE — ASSESSMENT
[FreeTextEntry1] : pt with Lung ca, breast cancer, parkinson' disease anxiety/depression here to f/u with palliative , pt still grieving recetn death of her  s/p recent hospitalization for fall s/p recent rehab stay now d/c home wtih 24/7 HHA   will wean off clonazepam--currently taking 0.25 mg BID discussed weekly titration schedule as tolerated 0.25 in pm with 0.125 mg in am x 1 week then 0.125 mg BID x 1 week than 1/2 of 0.125 mg in am with 1 tab pm  then 1/2 tab of 0.125 mg BID and f/u urine studies--will repeat due to persistent LUTS--unable to urinate in office, given u/a cup to drop off at ambulatory lab mirtazapine 7.5 mg 1/2 tab at night f/u with Dr. Bueno and therapist f/u with hem/onc and PCP  f/u in one month or sooner as needed  [Frailty-weight loss of >5%] : frailty-weight loss  [Depression] : depression [Social support] : social support [Fall precautions] : fall precautions [Living arrangements] : living arrangements [Pain Management] : pain management [Medication Management] : medication management

## 2024-03-21 ENCOUNTER — RESULT REVIEW (OUTPATIENT)
Age: 74
End: 2024-03-21

## 2024-03-21 ENCOUNTER — APPOINTMENT (OUTPATIENT)
Dept: GERIATRICS | Facility: CLINIC | Age: 74
End: 2024-03-21

## 2024-03-21 ENCOUNTER — APPOINTMENT (OUTPATIENT)
Dept: HEMATOLOGY ONCOLOGY | Facility: CLINIC | Age: 74
End: 2024-03-21
Payer: MEDICARE

## 2024-03-21 VITALS
HEIGHT: 61 IN | OXYGEN SATURATION: 99 % | SYSTOLIC BLOOD PRESSURE: 110 MMHG | WEIGHT: 128.5 LBS | DIASTOLIC BLOOD PRESSURE: 57 MMHG | TEMPERATURE: 97.9 F | RESPIRATION RATE: 16 BRPM | HEART RATE: 92 BPM | BODY MASS INDEX: 24.26 KG/M2

## 2024-03-21 DIAGNOSIS — Z91.89 OTHER SPECIFIED PERSONAL RISK FACTORS, NOT ELSEWHERE CLASSIFIED: ICD-10-CM

## 2024-03-21 DIAGNOSIS — E53.8 DEFICIENCY OF OTHER SPECIFIED B GROUP VITAMINS: ICD-10-CM

## 2024-03-21 PROCEDURE — 99213 OFFICE O/P EST LOW 20 MIN: CPT | Mod: 25

## 2024-03-21 PROCEDURE — 36415 COLL VENOUS BLD VENIPUNCTURE: CPT

## 2024-03-21 RX ORDER — ANASTROZOLE TABLETS 1 MG/1
1 TABLET ORAL DAILY
Qty: 90 | Refills: 3 | Status: ACTIVE | COMMUNITY
Start: 2019-01-28 | End: 1900-01-01

## 2024-03-21 NOTE — HISTORY OF PRESENT ILLNESS
[Therapy: ___] : Therapy: [unfilled] [de-identified] : Mrs. Mcdowell is a 68 year old postmenopausal female who is referred by Dr.Alice Demarco for newly diagnosed breast cancer.\par  She was found on routine mammography in November 2019 she you have a focal asymmetric density in the left upper outer breast at 1:00 access 8 cm from the nipple. A one year prior had been negative.  Ultrasound-guided core biopsy in November 21, 2018 revealed a grade 1/3 invasive ductal carcinoma ER positive at 98% and RI positive at 90%,Ki-67 was less than 10%. She underwent some left breast lumpectomy and sentinel node dissection on December 19, 2018. Pathology revealed an 8 mm grade 1/3 invasive ductal carcinoma zero of 3 sentinel lymph nodes were involved with tumor. Oncotype DX score was 11. She also underwent genetic testing which is negative.\par   [de-identified] : Mrs. Mcdowell presents for follow up on Anastrazole for breast cancer and on Tagrisso for lung cancer. She is s/p fall and discahrged from [facility]- med adjustments made to clonopin

## 2024-03-21 NOTE — REVIEW OF SYSTEMS
[Fatigue] : fatigue [Recent Change In Weight] : ~T recent weight change [SOB on Exertion] : shortness of breath during exertion [Constipation] : constipation [Joint Pain] : joint pain [Muscle Pain] : muscle pain [Dizziness] : dizziness [Difficulty Walking] : difficulty walking [Anxiety] : anxiety [Insomnia] : insomnia [Depression] : depression [Muscle Weakness] : muscle weakness [Negative] : Allergic/Immunologic [Fever] : no fever [Eye Pain] : no eye pain [Vision Problems] : no vision problems [Nosebleeds] : no nosebleeds [Dysphagia] : no dysphagia [Chest Pain] : no chest pain [Shortness Of Breath] : no shortness of breath [Lower Ext Edema] : no lower extremity edema [Dysuria] : no dysuria [Easy Bleeding] : no tendency for easy bleeding [FreeTextEntry6] : improved [Easy Bruising] : no tendency for easy bruising [de-identified] : tremor [FreeTextEntry9] : muscle spasms due to Parkinsons, left hip pain  [de-identified] : worsensing

## 2024-03-21 NOTE — ASSESSMENT
[FreeTextEntry1] : 72 yo female # Stage 1 breast cancer 8 mm tumor, invasive ductal carcinoma, ER/ [place] positive, HER2 not amplified with Oncotype Dx 11. diagnosed November 2018 - d/c Anastrozole 12/23 ( completed 5 years) - left breast pain - c/w fat necrosis on mammogram up to date 12/22  #Adenocarcinoma of lung- stage 4 diagnosed April 2019 -Repeated CEA -plateau at 11.5 (started out at 26.6) -CT scan showed mass 1.8 x 1.4 x 1.7 cm lesion. -Biopsy of lung nodule c/w adeno ca of the lung, EGFR mutated, T190 mutated, started on Tagrisso. Side effects, -toxicities and benefits reviewed with pt. L4 vertebral body biopsy confirmed met adenoca of the lung. - PETCT 2/3/2020- decreased FDG uptake in primary lesion, sclerotic lesions in bones, thyroid nodule activity- under surveillance, duodenal uptake - plan for PETCT Feb 2021- SARAH- stable, uptake in right breast - c/w fat necrosis - repeat CEA -Jan 2022- PETCT - sclerotic bone lesions. - pain in the left femur 2-3/10 takes viocodin daily, s/p RT -decreased lower back pain - visit to ER for dyspnea - images reviewed, evaluated for PE, related to Parkinson disease. Lung mass 1.1 cm on CT - patient with weight loss, struggles with Parkinson disease - PETCT -3/27/23 some progression of bone mets, no change - increased hoarseness of the voice, vocal cord paralysis - followed by Dr. Rodriguez - CT chest - ER 9/23- SARAH - PETCT 9/23- new fx in the ribs, new axillary LN post vaccine,- uptake, pancreatic head lesion.  - patient deferred RT - denies pain now, decline in functional status due to Parkinsons - lost  recently - declining PS- due to Parkinsons- The patient has a mobility limitation that significantly impairs their ability to participate in one or more mobility- related activities of daily living in the home. The patient is able to safely use the walker.  The functional mobility deficit can be sufficiently resolved by use of a walker.    # Weight lost - unintentional - nutritional evaluation  #benign thyroid nodule - followed by Dr. Park. due for repeat thyroid US  # constipation- daily miralax  # bone mets, dental cleaning due, last 6 months ago - Xgeva q 6 weeks  # new pancreatic mass - order MRI with gadolinium, Ca19-9. Referred to Dr. Munguia for EUS. IPMN suspected, observation vs biopsy. Under observation  Follows with psych Dr Yeimi Ellis [facility] meds adjusted  # S/p robotic assist rectopexy for large symptomatic rectal prolapse with  on 6/30/23.  # Anxiety - unable to take ativan.   Labs ordered, drawn in office  CBC,Chem,CEA, CA 27.29 case and mgmt to return in 6 weeks for treatment.

## 2024-03-21 NOTE — PHYSICAL EXAM
[Restricted in physically strenuous activity but ambulatory and able to carry out work of a light or sedentary nature] : Status 1- Restricted in physically strenuous activity but ambulatory and able to carry out work of a light or sedentary nature, e.g., light house work, office work [Normal] : affect appropriate [de-identified] : left breast scar

## 2024-03-22 ENCOUNTER — APPOINTMENT (OUTPATIENT)
Dept: RADIATION ONCOLOGY | Facility: CLINIC | Age: 74
End: 2024-03-22
Payer: MEDICARE

## 2024-03-22 PROCEDURE — 99024 POSTOP FOLLOW-UP VISIT: CPT

## 2024-03-23 PROBLEM — R19.7 DIARRHEA, UNSPECIFIED TYPE: Status: ACTIVE | Noted: 2022-08-24

## 2024-03-23 PROBLEM — E04.1 THYROID NODULE: Status: ACTIVE | Noted: 2019-09-11

## 2024-03-23 NOTE — REVIEW OF SYSTEMS
[Fatigue] : fatigue [Recent Change In Weight] : ~T recent weight change [SOB on Exertion] : shortness of breath during exertion [Constipation] : constipation [Joint Pain] : joint pain [Muscle Pain] : muscle pain [Dizziness] : dizziness [Difficulty Walking] : difficulty walking [Insomnia] : insomnia [Anxiety] : anxiety [Depression] : depression [Muscle Weakness] : muscle weakness [Negative] : Allergic/Immunologic [Fever] : no fever [Vision Problems] : no vision problems [Eye Pain] : no eye pain [Dysphagia] : no dysphagia [Nosebleeds] : no nosebleeds [Chest Pain] : no chest pain [Lower Ext Edema] : no lower extremity edema [Dysuria] : no dysuria [Shortness Of Breath] : no shortness of breath [Easy Bleeding] : no tendency for easy bleeding [Easy Bruising] : no tendency for easy bruising [FreeTextEntry6] : improved [FreeTextEntry9] : muscle spasms due to Parkinsons, left hip pain  [de-identified] : worsensing  [de-identified] : tremor

## 2024-03-23 NOTE — HISTORY OF PRESENT ILLNESS
[Therapy: ___] : Therapy: [unfilled] [de-identified] : Mrs. Mcdowell is a 68 year old postmenopausal female who is referred by Dr.Alice Demarco for newly diagnosed breast cancer.\par  She was found on routine mammography in November 2019 she you have a focal asymmetric density in the left upper outer breast at 1:00 access 8 cm from the nipple. A one year prior had been negative.  Ultrasound-guided core biopsy in November 21, 2018 revealed a grade 1/3 invasive ductal carcinoma ER positive at 98% and IL positive at 90%,Ki-67 was less than 10%. She underwent some left breast lumpectomy and sentinel node dissection on December 19, 2018. Pathology revealed an 8 mm grade 1/3 invasive ductal carcinoma zero of 3 sentinel lymph nodes were involved with tumor. Oncotype DX score was 11. She also underwent genetic testing which is negative.\par   [de-identified] : Mrs. Mcdowell presents for follow up on Anastrazole for breast cancer and on Tagrisso for lung cancer. Patient overall feels well but still continues to decline due to the Parkinson's. She is experiencing fatigue and persistent panic attacks. She states her medications are not working and that her panic attacks are getting worse.  She states that she saw  who wants to perform radiation in her right hip. She is currently undergoing PT at home.

## 2024-03-23 NOTE — PHYSICAL EXAM
[Restricted in physically strenuous activity but ambulatory and able to carry out work of a light or sedentary nature] : Status 1- Restricted in physically strenuous activity but ambulatory and able to carry out work of a light or sedentary nature, e.g., light house work, office work [Normal] : affect appropriate [de-identified] : left breast scar

## 2024-03-23 NOTE — ASSESSMENT
[FreeTextEntry1] : 74 yo female # Stage 1 breast cancer 8 mm tumor, invasive ductal carcinoma, ER/ [place] positive, HER2 not amplified with Oncotype Dx 11. diagnosed November 2018 - d/c Anastrozole 12/23 ( completed 5 years) - left breast pain - c/w fat necrosis on mammogram up to date 12/22  #Adenocarcinoma of lung- stage 4 diagnosed April 2019 -Repeated CEA -plateau at 11.5 (started out at 26.6) -CT scan showed mass 1.8 x 1.4 x 1.7 cm lesion. -Biopsy of lung nodule c/w adeno ca of the lung, EGFR mutated, T190 mutated, started on Tagrisso. Side effects, -toxicities and benefits reviewed with pt. L4 vertebral body biopsy confirmed met adenoca of the lung. - PETCT 2/3/2020- decreased FDG uptake in primary lesion, sclerotic lesions in bones, thyroid nodule activity- under surveillance, duodenal uptake - plan for PETCT Feb 2021- SARAH- stable, uptake in right breast - c/w fat necrosis - repeat CEA -Jan 2022- PETCT - sclerotic bone lesions. - pain in the left femur 2-3/10 takes viocodin daily, s/p RT -decreased lower back pain - visit to ER for dyspnea - images reviewed, evaluated for PE, related to Parkinson disease. Lung mass 1.1 cm on CT - patient with weight loss, struggles with Parkinson disease - PETCT -3/27/23 some progression of bone mets, no change - increased hoarseness of the voice, vocal cord paralysis - followed by Dr. Rodriguez - CT chest - ER 9/23- SARAH - PETCT 9/23- new fx in the ribs, new axillary LN post vaccine,- uptake, pancreatic head lesion.  - patient deferred RT - denies pain now, decline in functional status due to Parkinsons - lost  recently - declining PS- due to Parkinsons- The patient has a mobility limitation that significantly impairs their ability to participate in one or more mobility- related activities of daily living in the home. The patient is able to safely use the walker.  The functional mobility deficit can be sufficiently resolved by use of a walker.    # Weight lost - unintentional - nutritional evaluation  #benign thyroid nodule - followed by Dr. Park. due for repeat thyroid US  # constipation- daily miralax  # bone mets, dental cleaning due, last 6 months ago - Xgeva q 6 weeks  # new pancreatic mass - order MRI with gadolinium, Ca19-9. Referred to Dr. Munguia for EUS. IPMN suspected, observation vs biopsy. Under observation  Follows with psych Dr Yeimi Ellis [facility] meds adjusted  # S/p robotic assist rectopexy for large symptomatic rectal prolapse with  on 6/30/23.  # Anxiety - unable to take ativan.   Labs ordered, drawn in office  CBC,Chem,CEA, CA 27.29 case and mgmt to return in 6 weeks for treatment.

## 2024-03-27 ENCOUNTER — NON-APPOINTMENT (OUTPATIENT)
Age: 74
End: 2024-03-27

## 2024-03-27 ENCOUNTER — APPOINTMENT (OUTPATIENT)
Dept: GERIATRICS | Facility: CLINIC | Age: 74
End: 2024-03-27
Payer: MEDICARE

## 2024-03-27 ENCOUNTER — APPOINTMENT (OUTPATIENT)
Dept: SURGERY | Facility: CLINIC | Age: 74
End: 2024-03-27
Payer: MEDICARE

## 2024-03-27 VITALS
BODY MASS INDEX: 24.17 KG/M2 | HEART RATE: 95 BPM | WEIGHT: 128 LBS | DIASTOLIC BLOOD PRESSURE: 50 MMHG | HEIGHT: 61 IN | SYSTOLIC BLOOD PRESSURE: 113 MMHG | OXYGEN SATURATION: 99 %

## 2024-03-27 PROCEDURE — 99442: CPT

## 2024-03-27 PROCEDURE — 99214 OFFICE O/P EST MOD 30 MIN: CPT

## 2024-03-27 NOTE — END OF VISIT
How Severe Is Your Skin Lesion?: mild
Have Your Skin Lesions Been Treated?: not been treated
[Time Spent: ___ minutes] : I have spent [unfilled] minutes of time on the encounter.
Is This A New Presentation, Or A Follow-Up?: Skin Lesions

## 2024-03-27 NOTE — HISTORY OF PRESENT ILLNESS
[FreeTextEntry1] : 73-year-old female here for follow-up visit previous underwent robotic assisted suture rectopexy for rectal prolapse.  Patient did well after the surgery and subsequently recovered.  Since that time her  has passed away due to metastatic bladder cancer.  She subsequently had a fall and was in rehab for a few months where she felt that she was nonreactive and lost some of her baseline function.  She is now home again but requiring significant assistance and has an aide.  She has noted some episodes of fecal incontinence with the stool in the bed or in the diaper.  Occasionally she does not have sensation of the need to go.  She has been using MiraLAX daily or every other day to avoid constipation which makes for looser bowel movements.  No prior procedures for fecal incontinence.  No prior pelvic floor therapy.

## 2024-03-27 NOTE — ASSESSMENT
[FreeTextEntry1] : 73-year-old female previously underwent robotic assisted rectopexy for rectal prolapse now with episodes of fecal incontinence.  Sphincter tone is lax on exam.  Discussed management options for fecal incontinence.  Recommend starting with medical management including physical therapy.  Recommend cutting back on MiraLAX use as this will make looser stools are harder to control.  Can increase fiber supplements to bulk up the stool which will allow for better sensation and control of formed stools.  Will refer to rehab for pelvic floor therapy with Kegel exercises.  Recommend follow-up in about 8 weeks to reassess.  Briefly discussed procedures to improve incontinence including Eclipse vaginal insert and sacral nerve stimulator.  Will reserve these options if she is not significantly improved with pelvic floor therapy and bulking of stools.

## 2024-03-27 NOTE — PHYSICAL EXAM
[Abdomen Masses] : No abdominal masses [Excoriation] : no perianal excoriation [Lax] : was lax [None] : no [JVD] : no jugular venous distention  [Respiratory Effort] : normal respiratory effort [No Rash or Lesion] : No rash or lesion [Alert] : alert [de-identified] : No evidence of prolapse.  No significant external hemorrhoids. [de-identified] : No acute distress

## 2024-04-09 ENCOUNTER — LABORATORY RESULT (OUTPATIENT)
Age: 74
End: 2024-04-09

## 2024-04-09 ENCOUNTER — APPOINTMENT (OUTPATIENT)
Dept: GERIATRICS | Facility: CLINIC | Age: 74
End: 2024-04-09

## 2024-04-10 ENCOUNTER — APPOINTMENT (OUTPATIENT)
Dept: GERIATRICS | Facility: CLINIC | Age: 74
End: 2024-04-10
Payer: MEDICARE

## 2024-04-10 VITALS
BODY MASS INDEX: 25.11 KG/M2 | SYSTOLIC BLOOD PRESSURE: 104 MMHG | OXYGEN SATURATION: 99 % | HEART RATE: 97 BPM | HEIGHT: 61 IN | DIASTOLIC BLOOD PRESSURE: 60 MMHG | WEIGHT: 133 LBS

## 2024-04-10 PROCEDURE — 99213 OFFICE O/P EST LOW 20 MIN: CPT

## 2024-04-11 LAB
APPEARANCE: CLEAR
BILIRUBIN URINE: NEGATIVE
BLOOD URINE: ABNORMAL
COLOR: YELLOW
GLUCOSE QUALITATIVE U: NEGATIVE MG/DL
KETONES URINE: NEGATIVE MG/DL
LEUKOCYTE ESTERASE URINE: ABNORMAL
NITRITE URINE: NEGATIVE
PH URINE: 6
PROTEIN URINE: NEGATIVE MG/DL
SPECIFIC GRAVITY URINE: 1.02
UROBILINOGEN URINE: 0.2 MG/DL

## 2024-04-12 NOTE — PHYSICAL EXAM
[Alert] : alert [No Acute Distress] : in no acute distress [Normal Outer Ear/Nose] : the ears and nose were normal in appearance [Normal Appearance] : the appearance of the neck was normal [Supple] : the neck was supple [No Respiratory Distress] : no respiratory distress [No Acc Muscle Use] : no accessory muscle use [Respiration, Rhythm And Depth] : normal respiratory rhythm and effort [Auscultation Breath Sounds / Voice Sounds] : lungs were clear to auscultation bilaterally [Heart Rate And Rhythm] : heart rate was normal and rhythm regular [Bowel Sounds] : normal bowel sounds [Abdomen Tenderness] : non-tender [Abdomen Soft] : soft [No Spinal Tenderness] : no spinal tenderness [Normal Color / Pigmentation] : normal skin color and pigmentation [Normal Turgor] : normal skin turgor [No Focal Deficits] : no focal deficits [Normal Affect] : the affect was normal [Normal Mood] : the mood was normal [de-identified] : bilateral pale erythematous macular rash on both hips. tiny 0.2x0.2 superficial opening on lower right side sacrum

## 2024-04-12 NOTE — HISTORY OF PRESENT ILLNESS
[FreeTextEntry1] : Left rehab 3/8 had pressure ulcer in rehab using triple antibiotic and covering with dpd  also has a rash on bilateral hip area at the level  of the pullup  has been taking daily showers

## 2024-04-17 ENCOUNTER — APPOINTMENT (OUTPATIENT)
Dept: OBGYN | Facility: CLINIC | Age: 74
End: 2024-04-17

## 2024-04-19 ENCOUNTER — APPOINTMENT (OUTPATIENT)
Dept: GERIATRICS | Facility: CLINIC | Age: 74
End: 2024-04-19
Payer: MEDICARE

## 2024-04-19 VITALS
TEMPERATURE: 97.3 F | OXYGEN SATURATION: 100 % | SYSTOLIC BLOOD PRESSURE: 120 MMHG | BODY MASS INDEX: 25.11 KG/M2 | DIASTOLIC BLOOD PRESSURE: 68 MMHG | WEIGHT: 133 LBS | HEIGHT: 61 IN | HEART RATE: 97 BPM

## 2024-04-19 DIAGNOSIS — Z12.31 ENCOUNTER FOR SCREENING MAMMOGRAM FOR MALIGNANT NEOPLASM OF BREAST: ICD-10-CM

## 2024-04-19 PROCEDURE — G2211 COMPLEX E/M VISIT ADD ON: CPT

## 2024-04-19 PROCEDURE — 99214 OFFICE O/P EST MOD 30 MIN: CPT

## 2024-04-19 RX ORDER — MIRTAZAPINE 7.5 MG/1
7.5 TABLET, FILM COATED ORAL
Qty: 30 | Refills: 3 | Status: ACTIVE | COMMUNITY
Start: 2024-03-15

## 2024-04-19 RX ORDER — CLONAZEPAM 0.5 MG/1
0.5 TABLET ORAL
Qty: 90 | Refills: 0 | Status: DISCONTINUED | COMMUNITY
Start: 2023-11-08 | End: 2024-04-19

## 2024-04-19 RX ORDER — SULFAMETHOXAZOLE AND TRIMETHOPRIM 800; 160 MG/1; MG/1
800-160 TABLET ORAL
Qty: 10 | Refills: 0 | Status: DISCONTINUED | COMMUNITY
Start: 2024-03-18 | End: 2024-04-19

## 2024-04-19 NOTE — HISTORY OF PRESENT ILLNESS
[FreeTextEntry1] : #recurrent UTI This has bladder inflammation. prescribed vibegron by urogyn. will need cystoscopy in future.  getting up now about 3-4 times  taking phenazopyridine(pyridium) for bladder irritaiton. helping a lot  #Mood disorder anxiety/depression Dr. Ellis psych managing her medications 03/15: on clonazepam weaning per palliative care; per psych, also on mirtazepine 7.5mg QHS 1 anxity attack a day  #Parkinsons seeing Dr. Julio Cesar Waggoner neurology  getting PT, pelvic floor, balance therapy   03/15/2024 #Mood disorder anxiety/depression Dr. Ellis psych managing her medications 03/15: on clonazepam weaning per palliative care; per psych, also on mirtazepine 7.5mg QHS  #Parkinsons disease with dementia partially dependent with ADLs, dependent with iADLs Delirium trigger: pain, hearing loss, low activity levels, dehydration, medications 03/15: pain is better, activity increased, clonazepam tapering off  #Weight loss  stable  #Constipation 03/15 Started miralax PRN, 5 prunes every night. having Bm at night as well. can try switching to day.  #UTI no more burning in Urine but has the urgnecy . finished abx on tuesday. Palliative care checking u/a again today  Waiting home PT to start. No one has called.   #THyroid nodule 03/15: getting CT scan of thyroid per Dr. Park endocrinology  #Unintentional weight loss 03/15 restart ensure. has it at home, not taking   #Lung ca stage 4 taking tagrisso since 5 yrs follow oncology, palliative care  03/01/2024 horrible burning in her urine since hospital admission. Had to be catheterized, retaining urine. has adult briefs, not changed adequately  In [facility] admitted to [facility] for 02/11-02/20; unwitnessed fall. Had agitation episodes 2/2 urinary retention/constipation.  Parkinsons disease with dementia partially dependent with ADLs, dependent with iADLs  #Lung ca stage 4 taking tagrisso since 5 yrs follow oncology, palliative care  feeling depressed everyday since past 2 weeks she feels like she is being ignored at hospital and at facility   4AT score: 4 A's Test for Delirium Screening from [employer]  on 3/1/2024 ** All calculations should be rechecked by clinician prior to use **  RESULT SUMMARY: 4 points Possible delirium and/or cognitive impairment INPUTS: Alertness --> 0 = Normal AMT 4 > 0 = No mistakes Attention > 0 = Lists 7 months correctly Acute change or fluctuating course > 4 = Yes   Burning urination:  Urine/fecal symptoms: 2-3 bms a week; dulcolax suppository; not having accidents; not getting help going to bathroom. son observed that they come on time. urinating about 6x a day. cant hold it. on milk of mag liquid and dulcolax suppository PRN Medications: clonazepam withdrawl: Pain: right side middle back. radiation treatment to ribs Depression screen:  Sleep: 6hrs or less Sensory deficits: no hearing aids, hearing evaluation not done in  while, eyes checks  Level of activity: low Hydration: 2 cups a day,  Nutrition: horrible meals, 3x  a day, not good apetite. 1 ensure a day PE/MI: no   using wheelchair. some times walker to bathroom Dr. Caelb shukla managing her medications  did not get clonazepam for 4 days at SNF but apprently that was restarted. also getting seroquel at hospital but not since hospitalization clonazepam 0.5mg BID now instead of 0.25 TID lived at home before the fall  Will check BMP,   miralax everyday.  recommend starting clonazepam 0.5 BID. No anxiety attacks. maybe gets up every other night to has outside caretaker at facility    02/29/24 SULLY CORNELIUS is a 73 year yo White female with PMHx of lung cancer (follows with Dr. 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  Her  passed away 2 weeks ago. Still getting anxiety attacks. using clonopin 3x a day. Had appointment with psych 1 week ago. Prescription given by psych. Parveen was making her shake after 20min so she stopped taking it.   Psychologist on board. they talk to her once a week.  what matters to her most is not being dizzy. She says its all her meds.  Patient's mobility is limited by Parkinson's disease that significantly impairs her ability to participate in ADLs at home patient is able to safely use the walker and the current functional mobility deficits can be sufficiently resolved by using a walker.  Patient also has high risk of falls due to her frailty and Parkinson's disease leading to loss of balance.  Home style rail system will help for to prevent falls and she is safely able to utilize home style rail system.   #Weightloss weight 1 lb up today  has not started ensure. She is eating food people brought over after her 's death  #Parkinson's disease levodopa-carbidopa Working with PT/OT and speech  #Anxiety Also has anxiety and now is on clonopin 0.25 TID and Vortioxetine by psychiatry started couple of weeks ago SALINA-7 today, 16 points Severe anxiety disorder.  #Restless leg Taking pramipexole for restless leg  #Osteoporosis On xgeva  #Lung ca stage 4 taking tagrisso since 5 yrs follow oncology, palliative care  #immunization due for shingles   Reviewed prior lab work and notes.  10/09/2023 today having pain since last 2 days on the right lower side of her back in the thoracic region. She has tried hot pack 4-5 times a day, diclofenac gel, tylenol 1000mg q6h . No BM in 2 days. Oncology gave her xanax for anxiety, ativan 0.5mg day as needed. she is not taking xanax anymore as it made her hallucinate. She has been taking miralax and 5 prunes a day with little benefit.w  Xray chest: Acute nondisplaced fracture posterolateral right 10th rib and possibly ninth rib with small amount  of adjacent extra-pleural fluid and air on the right posterolateral chest wall.    Off robaxin, dronabinol   Mentation: Parkinson's disease  #Restless leg has anemia, iron deficiency?; last ferritin <75 in 2023 takes pramipexole 0.375 ER QD and carbidopa-levodopa ER  [BreastSonogramDate] : 12/2021 [TextBox_25] : osteopenia [Mammogramdate] : 04/2022 [TextBox_19] : diverticulosis; followed by Dr. Novoa [FreeTextEntry7] : h/o lung ca stage 4 stable [FreeTextEntry3] : 12/2022 [de-identified] : lost balance, trips and fell

## 2024-04-22 ENCOUNTER — APPOINTMENT (OUTPATIENT)
Dept: GERIATRICS | Facility: CLINIC | Age: 74
End: 2024-04-22

## 2024-05-01 ENCOUNTER — RESULT REVIEW (OUTPATIENT)
Age: 74
End: 2024-05-01

## 2024-05-01 NOTE — PHYSICAL EXAM
[Restricted in physically strenuous activity but ambulatory and able to carry out work of a light or sedentary nature] : Status 1- Restricted in physically strenuous activity but ambulatory and able to carry out work of a light or sedentary nature, e.g., light house work, office work [Normal] : affect appropriate [de-identified] : left breast scar

## 2024-05-01 NOTE — REVIEW OF SYSTEMS
[Fever] : no fever [Fatigue] : fatigue [Recent Change In Weight] : ~T recent weight change [Eye Pain] : no eye pain [Vision Problems] : no vision problems [Dysphagia] : no dysphagia [Nosebleeds] : no nosebleeds [Chest Pain] : no chest pain [Lower Ext Edema] : no lower extremity edema [Shortness Of Breath] : no shortness of breath [SOB on Exertion] : shortness of breath during exertion [Constipation] : constipation [Dysuria] : no dysuria [Joint Pain] : joint pain [Muscle Pain] : muscle pain [Dizziness] : dizziness [Difficulty Walking] : difficulty walking [Insomnia] : insomnia [Anxiety] : anxiety [Depression] : depression [Muscle Weakness] : muscle weakness [Easy Bleeding] : no tendency for easy bleeding [Easy Bruising] : no tendency for easy bruising [Negative] : Allergic/Immunologic [FreeTextEntry6] : improved [FreeTextEntry9] : muscle spasms due to Parkinsons, left hip pain  [de-identified] : tremor [de-identified] : worsensing

## 2024-05-01 NOTE — ASSESSMENT
[FreeTextEntry1] : 74 yo female # Stage 1 breast cancer 8 mm tumor, invasive ductal carcinoma, ER/ OK positive, HER2 not amplified with Oncotype Dx 11. diagnosed November 2018 - d/c Anastrozole 12/23 ( completed 5 years) - left breast pain - c/w fat necrosis on mammogram up to date 12/22  #Adenocarcinoma of lung- stage 4 diagnosed April 2019 -Repeated CEA -plateau at 11.5 (started out at 26.6) -CT scan showed mass 1.8 x 1.4 x 1.7 cm lesion. -Biopsy of lung nodule c/w adeno ca of the lung, EGFR mutated, T190 mutated, started on Tagrisso. Side effects, -toxicities and benefits reviewed with pt. L4 vertebral body biopsy confirmed met adenoca of the lung. - PETCT 2/3/2020- decreased FDG uptake in primary lesion, sclerotic lesions in bones, thyroid nodule activity- under surveillance, duodenal uptake - plan for PETCT Feb 2021- SARAH- stable, uptake in right breast - c/w fat necrosis - repeat CEA -Jan 2022- PETCT - sclerotic bone lesions. - pain in the left femur 2-3/10 takes viocodin daily, s/p RT -decreased lower back pain - visit to ER for dyspnea - images reviewed, evaluated for PE, related to Parkinson disease. Lung mass 1.1 cm on CT - patient with weight loss, struggles with Parkinson disease - PETCT -3/27/23 some progression of bone mets, no change - increased hoarseness of the voice, vocal cord paralysis - followed by Dr. Rodriguez - CT chest - ER 9/23- SARAH - PETCT 9/23- new fx in the ribs, new axillary LN post vaccine,- uptake, pancreatic head lesion.  - patient deferred RT - denies pain now, decline in functional status due to Parkinsons - lost  recently - declining PS- due to Parkinsons- The patient has a mobility limitation that significantly impairs their ability to participate in one or more mobility- related activities of daily living in the home. The patient is able to safely use the walker.  The functional mobility deficit can be sufficiently resolved by use of a walker.    # Weight lost - unintentional - nutritional evaluation  #benign thyroid nodule - followed by Dr. Park. due for repeat thyroid US  # constipation- daily miralax  # bone mets, dental cleaning due, last 6 months ago - Xgeva q 6 weeks  # new pancreatic mass - order MRI with gadolinium, Ca19-9. Referred to Dr. Munguia for EUS. IPMN suspected, observation vs biopsy. Under observation  Follows with psych Dr Yeimi Ellis [facility] meds adjusted  # S/p robotic assist rectopexy for large symptomatic rectal prolapse with  on 6/30/23.  # Anxiety - unable to take ativan.   Labs ordered, drawn in office  CBC,Chem,CEA, CA 27.29 case and mgmt to return in 6 weeks for treatment.

## 2024-05-01 NOTE — HISTORY OF PRESENT ILLNESS
[de-identified] : Mrs. Mcdowell is a 68 year old postmenopausal female who is referred by Dr.Alice Demarco for newly diagnosed breast cancer.\par  She was found on routine mammography in November 2019 she you have a focal asymmetric density in the left upper outer breast at 1:00 access 8 cm from the nipple. A one year prior had been negative.  Ultrasound-guided core biopsy in November 21, 2018 revealed a grade 1/3 invasive ductal carcinoma ER positive at 98% and [place] positive at 90%,Ki-67 was less than 10%. She underwent some left breast lumpectomy and sentinel node dissection on December 19, 2018. Pathology revealed an 8 mm grade 1/3 invasive ductal carcinoma zero of 3 sentinel lymph nodes were involved with tumor. Oncotype DX score was 11. She also underwent genetic testing which is negative.\par   [Therapy: ___] : Therapy: [unfilled] [de-identified] : Mrs. Mcdowell presents for follow up on Anastrazole for breast cancer and on Tagrisso for lung cancer. She is s/p fall and discahrged from [facility]- med adjustments made to clonopin

## 2024-05-02 ENCOUNTER — APPOINTMENT (OUTPATIENT)
Dept: GERIATRICS | Facility: CLINIC | Age: 74
End: 2024-05-02
Payer: MEDICARE

## 2024-05-02 VITALS
RESPIRATION RATE: 16 BRPM | TEMPERATURE: 97.6 F | OXYGEN SATURATION: 97 % | SYSTOLIC BLOOD PRESSURE: 108 MMHG | DIASTOLIC BLOOD PRESSURE: 69 MMHG | HEART RATE: 89 BPM

## 2024-05-02 DIAGNOSIS — F32.A ANXIETY DISORDER, UNSPECIFIED: ICD-10-CM

## 2024-05-02 DIAGNOSIS — F41.9 ANXIETY DISORDER, UNSPECIFIED: ICD-10-CM

## 2024-05-02 DIAGNOSIS — G89.3 NEOPLASM RELATED PAIN (ACUTE) (CHRONIC): ICD-10-CM

## 2024-05-02 PROCEDURE — 99215 OFFICE O/P EST HI 40 MIN: CPT

## 2024-05-06 ENCOUNTER — NON-APPOINTMENT (OUTPATIENT)
Age: 74
End: 2024-05-06

## 2024-05-06 ENCOUNTER — RESULT REVIEW (OUTPATIENT)
Age: 74
End: 2024-05-06

## 2024-05-06 PROBLEM — G89.3 NEOPLASM RELATED PAIN: Status: ACTIVE | Noted: 2019-05-22

## 2024-05-06 PROBLEM — F41.9 ANXIETY AND DEPRESSION: Status: ACTIVE | Noted: 2021-09-30

## 2024-05-06 NOTE — ASSESSMENT
[Fall precautions] : fall precautions [Social support] : social support [Living arrangements] : living arrangements [FreeTextEntry1] : pt with Lung ca, breast cancer, parkinson' disease anxiety/depression here to f/u with palliative, pt still grieving recent death of her  s/p recent hospitalization for fall s/p recent rehab stay now d/c home with 24/7 HHA, pt with allergic reaction s/p eyebrow waxing  clonazepam--0.125 mg BID worked well for pt mirtazapine 7.5 mg QHS  1/2 tab at night f/u with Dr. Nava [phone] and therapist, discussed with dr. nava and updated on pt panic attacks at 2 pm and current dose, pt was rx 0.5 mg, discussed pt weaned down to 0.125 mg which has worked well however with persistent panic at 2 pm, although improved from last visit  allergic reaction benadryl 25 mg x 2 doses, one tonight and tomorrow dexamethasone 2 mg BID x 2 days Er precaution f/u with hem/onc and PCP  f/u in one month or sooner as needed

## 2024-05-06 NOTE — HISTORY OF PRESENT ILLNESS
[FreeTextEntry1] : Mrs. Mcdowell is a very pleasant 70 y/o F with multiple medical problems including metastatic lung cancer with disease to bone on Tagrisso, history of Breast cancer s/p Mastectomy, radiation and on arimadex, also with Prothrombin gene mutation with vasculitis.   pt with multiple ER visits for debilitating anxitey assocaited with shortness of breath, palpitations, and dizziness. pt with extensive work up negative for cardiac etiologies and no PE pt seen by psychiatry Dr. Acosta who started on benzodiazepines with increasing AE of amnesia currently on prozac being titrated up and seroquel with significant improvement in pt symptoms  in regards to pain --pt has tried multiple medications and sensitive to side effects including oxycodone, morphine pt has been able to tolerate Vicodin well with minimal side effects   Pt has tired MM with equivocal response, pt reports it worked at times and other times she was too "dopey" or increases her anxiety afterwards. discussed trial of CBD only with out THC.  Pt pain in much improved s/p radiation and has not required medications regularly for pain control.    OFFICE 03/14/24 pt seen for f/u s/p hospitalization with short stay at rehab with increased episodes of confusion, currently d/c home, reports urinary frequency and urgency since her d/c from hospital and rehab pt eating well,  here with her HHA deshawn and son Steven on telephone doing well overall, no other concerns, getting back to a "routine" still grieving death of her . pt ambulate here to this appointment with a wheelchair.    office 05/2/24  pt seen for f/u in office pt reports has eyebrows done and having allergic reaction to wax, with eyes swollen, no shortness of breath, no wheeze, no other symptoms + bm  eating well, remains with panic attacks at 2 pm,

## 2024-05-06 NOTE — PHYSICAL EXAM
[General Appearance - Alert] : alert [General Appearance - In No Acute Distress] : in no acute distress [Normal Oral Mucosa] : normal oral mucosa [No Oral Pallor] : no oral pallor [No Oral Cyanosis] : no oral cyanosis [Outer Ear] : the ears and nose were normal in appearance [Hearing Threshold Finger Rub Not [place]] : hearing was normal [Examination Of The Oral Cavity] : the lips and gums were normal [Oropharynx] : The oropharynx was normal [Neck Appearance] : the appearance of the neck was normal [] : no respiratory distress [Respiration, Rhythm And Depth] : normal respiratory rhythm and effort [Exaggerated Use Of Accessory Muscles For Inspiration] : no accessory muscle use [Auscultation Breath Sounds / Voice Sounds] : lungs were clear to auscultation bilaterally [Heart Rate And Rhythm] : heart rate was normal and rhythm regular [Heart Sounds] : normal S1 and S2 [Full Pulse] : the pedal pulses are present [Edema] : there was no peripheral edema [Veins - Varicosity Changes] : there were no varicosital changes [Abnormal Walk] : normal gait [Nail Clubbing] : no clubbing  or cyanosis of the fingernails [Musculoskeletal - Swelling] : no joint swelling seen [Skin Color & Pigmentation] : normal skin color and pigmentation [Skin Turgor] : normal skin turgor [FreeTextEntry1] : no rashes no hives [Oriented To Time, Place, And Person] : oriented to person, place, and time [Impaired Insight] : insight and judgment were intact [Affect] : the affect was normal

## 2024-05-06 NOTE — ASSESSMENT
[Fall precautions] : fall precautions [Social support] : social support [Living arrangements] : living arrangements [FreeTextEntry1] : pt with Lung ca, breast cancer, parkinson' disease anxiety/depression here to f/u with palliative, pt still grieving recent death of her  s/p recent hospitalization for fall s/p recent rehab stay now d/c home with 24/7 HHA, pt with allergic reaction s/p eyebrow waxing  clonazepam--0.125 mg BID worked well for pt mirtazapine 7.5 mg QHS  1/2 tab at night f/u with Dr. Nava [phone] and therapist, discussed with dr. nava and updated on pt panic attacks at 2 pm and current dose, pt was rx 0.5 mg, discussed pt weaned down to 0.125 mg which has worked well however with persistent panic at 2 pm, although improved from last visit  allergic reaction benadryl 25 mg x 2 doses, one tonight and tomorrow dexamethasone 2 mg BID x 2 days Er precaution f/u with hem/onc and PCP  f/u in one month or sooner as needed

## 2024-05-06 NOTE — PHYSICAL EXAM
[General Appearance - Alert] : alert [General Appearance - In No Acute Distress] : in no acute distress [Normal Oral Mucosa] : normal oral mucosa [No Oral Pallor] : no oral pallor [No Oral Cyanosis] : no oral cyanosis [Outer Ear] : the ears and nose were normal in appearance [Hearing Threshold Finger Rub Not [place]] : hearing was normal [Examination Of The Oral Cavity] : the lips and gums were normal [Oropharynx] : The oropharynx was normal [Neck Appearance] : the appearance of the neck was normal [] : no respiratory distress [Respiration, Rhythm And Depth] : normal respiratory rhythm and effort [Exaggerated Use Of Accessory Muscles For Inspiration] : no accessory muscle use [Auscultation Breath Sounds / Voice Sounds] : lungs were clear to auscultation bilaterally [Heart Rate And Rhythm] : heart rate was normal and rhythm regular [Heart Sounds] : normal S1 and S2 [Full Pulse] : the pedal pulses are present [Edema] : there was no peripheral edema [Veins - Varicosity Changes] : there were no varicosital changes [Abnormal Walk] : normal gait [Nail Clubbing] : no clubbing  or cyanosis of the fingernails [Musculoskeletal - Swelling] : no joint swelling seen [Skin Color & Pigmentation] : normal skin color and pigmentation [Skin Turgor] : normal skin turgor [FreeTextEntry1] : no rashes no hives [Oriented To Time, Place, And Person] : oriented to person, place, and time [Impaired Insight] : insight and judgment were intact [Affect] : the affect was normal

## 2024-05-07 ENCOUNTER — RESULT REVIEW (OUTPATIENT)
Age: 74
End: 2024-05-07

## 2024-05-07 ENCOUNTER — APPOINTMENT (OUTPATIENT)
Dept: HEMATOLOGY ONCOLOGY | Facility: CLINIC | Age: 74
End: 2024-05-07

## 2024-05-07 VITALS
HEART RATE: 98 BPM | OXYGEN SATURATION: 97 % | HEIGHT: 61 IN | DIASTOLIC BLOOD PRESSURE: 66 MMHG | WEIGHT: 136 LBS | RESPIRATION RATE: 16 BRPM | SYSTOLIC BLOOD PRESSURE: 119 MMHG | BODY MASS INDEX: 25.68 KG/M2 | TEMPERATURE: 97.7 F

## 2024-05-07 PROCEDURE — 99213 OFFICE O/P EST LOW 20 MIN: CPT | Mod: 25

## 2024-05-07 PROCEDURE — 36415 COLL VENOUS BLD VENIPUNCTURE: CPT

## 2024-05-08 ENCOUNTER — APPOINTMENT (OUTPATIENT)
Dept: HEMATOLOGY ONCOLOGY | Facility: CLINIC | Age: 74
End: 2024-05-08

## 2024-05-08 ENCOUNTER — RESULT REVIEW (OUTPATIENT)
Age: 74
End: 2024-05-08

## 2024-05-08 VITALS
TEMPERATURE: 97.1 F | HEART RATE: 91 BPM | HEIGHT: 61 IN | OXYGEN SATURATION: 96 % | DIASTOLIC BLOOD PRESSURE: 53 MMHG | SYSTOLIC BLOOD PRESSURE: 116 MMHG | RESPIRATION RATE: 16 BRPM

## 2024-05-22 ENCOUNTER — APPOINTMENT (OUTPATIENT)
Dept: SURGERY | Facility: CLINIC | Age: 74
End: 2024-05-22
Payer: MEDICARE

## 2024-05-22 VITALS
SYSTOLIC BLOOD PRESSURE: 114 MMHG | DIASTOLIC BLOOD PRESSURE: 58 MMHG | HEIGHT: 61 IN | OXYGEN SATURATION: 98 % | HEART RATE: 92 BPM

## 2024-05-22 PROCEDURE — 99213 OFFICE O/P EST LOW 20 MIN: CPT

## 2024-05-24 NOTE — ASSESSMENT
[FreeTextEntry1] : 9-year-old female with incontinence of feces that is improved with bulking of the stool and pelvic physical floor therapy.  Recommend continuing with pelvic floor therapy to continue strengthening muscles.  She is having some constipation she has better control.  Discussed trials of different doses of MiraLAX and stool softeners to make it easier to pass without getting too loose making difficult to control.  Consider starting with every other day dose of MiraLAX to see how her body reacts and can increase or decrease as needed based on subsequent constipation or loose stools.  Follow-up in 6 to 8 weeks to reassess and discuss further therapy as needed.

## 2024-05-24 NOTE — PHYSICAL EXAM
[Abdomen Masses] : No abdominal masses [Abdomen Tenderness] : ~T No ~M abdominal tenderness [Exam Deferred] : exam was deferred [JVD] : no jugular venous distention  [Respiratory Effort] : normal respiratory effort [Alert] : alert [Calm] : calm [de-identified] : No acute distress

## 2024-05-24 NOTE — HISTORY OF PRESENT ILLNESS
[FreeTextEntry1] : 73-year-old female here for follow-up visit previous underwent robotic assisted suture rectopexy for rectal prolapse.  Patient did well after the surgery and subsequently recovered.  Since that time her  has passed away due to metastatic bladder cancer.  She subsequently had a fall and was in rehab for a few months where she felt that she was nonreactive and lost some of her baseline function.  She is now home again but requiring significant assistance and has an aide.  She has noted some episodes of fecal incontinence with the stool in the bed or in the diaper.  Occasionally she does not have sensation of the need to go.  She has been using MiraLAX daily or every other day to avoid constipation which makes for looser bowel movements.  No prior procedures for fecal incontinence.  No prior pelvic floor therapy.  Update-patient returns for follow-up for fecal incontinence.  Her control is improved as she is cut back on stool softeners.  Now having some episodes of constipation.  Has been following up with pelvic floor physical therapy and feels that this is not helping.  No longer having full episodes of incontinence.

## 2024-05-29 ENCOUNTER — RESULT REVIEW (OUTPATIENT)
Age: 74
End: 2024-05-29

## 2024-06-06 NOTE — DISEASE MANAGEMENT
[Clinical] : TNM Stage: c [IV] : IV [FreeTextEntry4] : Metastatic Lung Cancer [TTNM] : X [NTNM] : X [MTNM] : X

## 2024-06-06 NOTE — REVIEW OF SYSTEMS
[Joint Pain] : joint pain [Negative] : Constitutional [Chest Pain] : no chest pain [Shortness Of Breath] : no shortness of breath [Cough] : no cough [Muscle Pain] : no muscle pain [Muscle Weakness] : no muscle weakness [Gait Disturbance] : no gait disturbance

## 2024-06-06 NOTE — VITALS
[Maximal Pain Intensity: 0/10] : 0/10 [Least Pain Intensity: 0/10] : 0/10 [NoTreatment Scheduled] : no treatment scheduled [80: Normal activity with effort; some signs or symptoms of disease.] : 80: Normal activity with effort; some signs or symptoms of disease.  [Maximal Pain Intensity: 3/10] : 3/10

## 2024-06-06 NOTE — HISTORY OF PRESENT ILLNESS
[FreeTextEntry1] : Mrs. Mcdowell is a 73-year-old postmenopausal female of Ashkenazi descent, who was diagnosed with left breast invasive ductal carcinoma s/p lumpectomy and IORT in 2018. Subsequently, also later diagnosed with Stage IV lung adenocarcinoma. She is present for her PTE status post completion of palliative radiation therapy.   Mrs. Mcdowell underwent left breast lumpectomy and SLN excision by Dr. Demarco and IORT at [facility] in 2018. Surgical pathology revealed a 0.8 cm IDC, grade 1, No DCIS or LCIS, no LVI, margins negative (closest 12 mm anterior). 0/3 were positive for metastatic carcinoma. Pathological stage pT1b N0 (sn) (i-). Biomarkers were ER + 98% / VT + 90% / and Her2 negative, Ki-67 <10%. She was started on Anastrozole. Follow-up mammograms on 5/2019 and 11/2019 were negative.  In April 2019, she was noted to have elevated tumor markers (CEA 26.6) and vocal hoarseness for which she was under the care of Dr. Rodriguez. He sent her for CT NECK (4/5/19) that demonstrated a 1.8 spiculated left upper lobe lung nodule suspicious for neoplasm and left posterior 4th rib sclerotic focus. PET/CT (4/13/19) demonstrated possible recurrent left breast cancer, left upper and medial lung spiculated nodule and opacity suspicious for metastatic disease, left perihilar LNs, osseous lesions, right hepatic lobe minimally FDG avid, focal esophageal hypermetabolism, including base of tongue, and asymmetric right vocal cord - paralysis.  She underwent biopsy (4/16/19) and pathology was consistent with acinar adenocarcinoma of the lung. Bone biopsy of L4 and left iliac were positive for metastatic adenocarcinoma, consistent with lung primary. She was started on Tagrisso in May 2019. NM PET/CT (2/2021) demonstrated no change in the pulmonary nodule / thyroid nodule. Distal left femur sclerotic metastasis not imaged on prior PET/CT (interval cryoablation by Dr. Carvajal). She reports she was having pain in the left femur, which resolved status post cryoablation. More recently she reports left iliac / sacral pain, rating it a 6-7 /10 on the pain scale. Further evaluation with MRI Lumbar spine (6/4/21) demonstrated multiple lesions in the lumbar sacral spine. prominent L4 lesions, and multiple areas of soft tissue enhancement and suspicious epidural involvement.  She received palliative radiation to the lumbar spine to a total of 20 Gy in 5 fractions, which she completed on 6/30/21. She continued on Xgeva and Tagrisso.  MRI Femur (12/22/22) demonstrated bony metastatic disease, with progression in size of several left femur and pelvic lesions. Enlargement of the distal femoral shaft lesion measured 1.5 cm and left posterior acetabular and ischial lesion.   Ms. Mcdowell received radiation therapy to the pelvis and left femur to a total of 20 Gy, completed on 2/5/22. MRI of the brain was completed on 3/19/23 and showed no evidence for intracranial mass, acute territorial infarct, acute intracranial hemorrhage, or midline shift. Stable 1 cm enhancing lesion inferior to the right internal auditory canal, at the level of the jugular foramen. Findings favor the diagnosis of meningioma.  PET scan completed on 3/21/23 showed innumerable sclerotic lesions many of which demonstrate mild FDG uptake compatible with patient's known metastasis. There has been progression of the sclerotic lesions involving the sternum although not significantly changed from prior CT C/A/P. Stable non-FDG avid left upper lobe pulmonary nodule. No new pulmonary nodules. Again demonstrated in FDG avid left lower lobe pulmonary nodule. No additional sites of pathologic FDG uptake to suggest biologic tumor activity.  Another PET scan performed on 9/28/23 showed persistent scattered osseous metastases with new fractures at the posterior right lower ribs, possibly pathologic fractures. Unchanged, mildly FDG avid subcentimeter nodule in the anterior left upper lobe. New uptake in a normal-sized right axillary node, possibly representing metastatic disease. Persistent mild uptake in an aortopulmonary node. Persistent uptake in a left thyroid nodule. 2.5 cm photopenic cystic lesion at the pancreatic head, possibly an intraductal papillary mucinous neoplasm, increased in size since 2019.    Ms. Mcdowell presents today for re-evaluation. She states she feels well overall. She relates she had a fall recently on 9/20/23 and sustained some right rib fractures, the pain from the fall resolved but has no represented and worsened. She was referred for consideration of radiation therapy by and Dr. Shaffer following her most recent PET scan. She reports her pain at this time is a 3/10 and notes the pain is located in her right posterior ribs, and her right hip. She takes Vicodin for the pain with moderate relief.   Ms. Santos completed radiation therapy to the right hip and right ribs to a total dose of 2000 cGy (each site) on 1/31/24. She reports her pain to be a 3/10 on the pain scale. She had follow-up by medical oncology on 3/21/24 and continues of Anastrozole for the breast cancer and Tagrisso for the lung cancer. She had a recent fall and was in Subacute rehab. She reports some improvement with radiation therapy regarding the discomfort.

## 2024-06-07 RX ORDER — GRANISETRON 3.1 MG/24H
3.1 PATCH TRANSDERMAL
Qty: 4 | Refills: 6 | Status: ACTIVE | COMMUNITY
Start: 2019-09-16 | End: 1900-01-01

## 2024-06-10 ENCOUNTER — NON-APPOINTMENT (OUTPATIENT)
Age: 74
End: 2024-06-10

## 2024-06-10 DIAGNOSIS — M62.81 MUSCLE WEAKNESS (GENERALIZED): ICD-10-CM

## 2024-06-10 DIAGNOSIS — K62.3 RECTAL PROLAPSE: ICD-10-CM

## 2024-06-10 DIAGNOSIS — R15.9 FULL INCONTINENCE OF FECES: ICD-10-CM

## 2024-06-10 DIAGNOSIS — E46 UNSPECIFIED PROTEIN-CALORIE MALNUTRITION: ICD-10-CM

## 2024-06-10 DIAGNOSIS — B35.1 TINEA UNGUIUM: ICD-10-CM

## 2024-06-10 DIAGNOSIS — R21 RASH AND OTHER NONSPECIFIC SKIN ERUPTION: ICD-10-CM

## 2024-06-10 DIAGNOSIS — L85.1 ACQUIRED KERATOSIS [KERATODERMA] PALMARIS ET PLANTARIS: ICD-10-CM

## 2024-06-10 DIAGNOSIS — Z88.9 ALLERGY STATUS TO UNSPECIFIED DRUGS, MEDICAMENTS AND BIOLOGICAL SUBSTANCES: ICD-10-CM

## 2024-06-10 DIAGNOSIS — M84.48XA PATHOLOGICAL FRACTURE, OTHER SITE, INITIAL ENCOUNTER FOR FRACTURE: ICD-10-CM

## 2024-06-10 DIAGNOSIS — R92.30 DENSE BREASTS, UNSPECIFIED: ICD-10-CM

## 2024-06-10 DIAGNOSIS — S22.41XA MULTIPLE FRACTURES OF RIBS, RIGHT SIDE, INITIAL ENCOUNTER FOR CLOSED FRACTURE: ICD-10-CM

## 2024-06-10 DIAGNOSIS — G89.29 PAIN IN RIGHT FOOT: ICD-10-CM

## 2024-06-10 DIAGNOSIS — N39.0 URINARY TRACT INFECTION, SITE NOT SPECIFIED: ICD-10-CM

## 2024-06-10 DIAGNOSIS — M79.671 PAIN IN RIGHT FOOT: ICD-10-CM

## 2024-06-10 DIAGNOSIS — M85.80 OTHER SPECIFIED DISORDERS OF BONE DENSITY AND STRUCTURE, UNSPECIFIED SITE: ICD-10-CM

## 2024-06-10 DIAGNOSIS — G89.29 PAIN IN LEFT FOOT: ICD-10-CM

## 2024-06-10 DIAGNOSIS — M24.575 CONTRACTURE, LEFT FOOT: ICD-10-CM

## 2024-06-10 DIAGNOSIS — R63.4 ABNORMAL WEIGHT LOSS: ICD-10-CM

## 2024-06-10 DIAGNOSIS — R53.83 OTHER FATIGUE: ICD-10-CM

## 2024-06-10 DIAGNOSIS — R26.81 UNSTEADINESS ON FEET: ICD-10-CM

## 2024-06-10 DIAGNOSIS — Z71.89 OTHER SPECIFIED COUNSELING: ICD-10-CM

## 2024-06-10 DIAGNOSIS — M79.672 PAIN IN LEFT FOOT: ICD-10-CM

## 2024-06-10 DIAGNOSIS — M24.574 CONTRACTURE, RIGHT FOOT: ICD-10-CM

## 2024-06-10 DIAGNOSIS — Z79.899 OTHER LONG TERM (CURRENT) DRUG THERAPY: ICD-10-CM

## 2024-06-12 ENCOUNTER — APPOINTMENT (OUTPATIENT)
Dept: CARDIOLOGY | Facility: CLINIC | Age: 74
End: 2024-06-12
Payer: MEDICARE

## 2024-06-12 VITALS
WEIGHT: 136 LBS | HEIGHT: 61 IN | BODY MASS INDEX: 25.68 KG/M2 | DIASTOLIC BLOOD PRESSURE: 72 MMHG | SYSTOLIC BLOOD PRESSURE: 126 MMHG

## 2024-06-12 DIAGNOSIS — R00.2 PALPITATIONS: ICD-10-CM

## 2024-06-12 DIAGNOSIS — E78.5 HYPERLIPIDEMIA, UNSPECIFIED: ICD-10-CM

## 2024-06-12 DIAGNOSIS — R55 SYNCOPE AND COLLAPSE: ICD-10-CM

## 2024-06-12 DIAGNOSIS — Z86.79 PERSONAL HISTORY OF OTHER DISEASES OF THE CIRCULATORY SYSTEM: ICD-10-CM

## 2024-06-12 DIAGNOSIS — D68.52 PROTHROMBIN GENE MUTATION: ICD-10-CM

## 2024-06-12 DIAGNOSIS — I45.10 UNSPECIFIED RIGHT BUNDLE-BRANCH BLOCK: ICD-10-CM

## 2024-06-12 DIAGNOSIS — I65.29 OCCLUSION AND STENOSIS OF UNSPECIFIED CAROTID ARTERY: ICD-10-CM

## 2024-06-12 PROCEDURE — 93000 ELECTROCARDIOGRAM COMPLETE: CPT

## 2024-06-12 PROCEDURE — G2211 COMPLEX E/M VISIT ADD ON: CPT

## 2024-06-12 PROCEDURE — 99215 OFFICE O/P EST HI 40 MIN: CPT

## 2024-06-12 RX ORDER — HYDROCORTISONE 25 MG/G
2.5 OINTMENT TOPICAL TWICE DAILY
Qty: 2 | Refills: 3 | Status: DISCONTINUED | COMMUNITY
Start: 2023-07-05 | End: 2024-06-12

## 2024-06-12 RX ORDER — DEXAMETHASONE 2 MG/1
2 TABLET ORAL TWICE DAILY
Qty: 4 | Refills: 0 | Status: DISCONTINUED | COMMUNITY
Start: 2024-05-02 | End: 2024-06-12

## 2024-06-12 RX ORDER — CLONAZEPAM 0.12 MG/1
0.12 TABLET, ORALLY DISINTEGRATING ORAL TWICE DAILY
Qty: 60 | Refills: 0 | Status: ACTIVE | COMMUNITY
Start: 2024-03-14

## 2024-06-12 RX ORDER — CLOTRIMAZOLE AND BETAMETHASONE DIPROPIONATE 10; .5 MG/G; MG/G
1-0.05 CREAM TOPICAL TWICE DAILY
Qty: 1 | Refills: 1 | Status: DISCONTINUED | COMMUNITY
Start: 2024-04-10 | End: 2024-06-12

## 2024-06-12 NOTE — HISTORY OF PRESENT ILLNESS
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  She was also followed for small pericardial effusion.  She is treated for metastatic breast cancer and adenocarcinoma  of  lung cancer with bony mets and parkinsons.  She is grieving recent death of her  s/p recent hospitalization for fall s/p recent rehab stay now d/c home with 24/7 HHA,  She denies chest pain, dyspnea, palpitations or syncope.  She does have some symptoms when she has a panic attack which she does have frequently where she does feel short of breath and like her heart is racing.

## 2024-06-17 ENCOUNTER — APPOINTMENT (OUTPATIENT)
Dept: HEMATOLOGY ONCOLOGY | Facility: CLINIC | Age: 74
End: 2024-06-17
Payer: MEDICARE

## 2024-06-17 ENCOUNTER — RESULT REVIEW (OUTPATIENT)
Age: 74
End: 2024-06-17

## 2024-06-17 VITALS
WEIGHT: 138 LBS | RESPIRATION RATE: 16 BRPM | DIASTOLIC BLOOD PRESSURE: 62 MMHG | HEIGHT: 61 IN | SYSTOLIC BLOOD PRESSURE: 124 MMHG | BODY MASS INDEX: 26.06 KG/M2 | HEART RATE: 91 BPM | OXYGEN SATURATION: 99 % | TEMPERATURE: 97.4 F

## 2024-06-17 DIAGNOSIS — C50.912 MALIGNANT NEOPLASM OF UNSPECIFIED SITE OF LEFT FEMALE BREAST: ICD-10-CM

## 2024-06-17 DIAGNOSIS — C79.51 SECONDARY MALIGNANT NEOPLASM OF BONE: ICD-10-CM

## 2024-06-17 DIAGNOSIS — D63.8 ANEMIA IN OTHER CHRONIC DISEASES CLASSIFIED ELSEWHERE: ICD-10-CM

## 2024-06-17 DIAGNOSIS — G20.C PARKINSONISM, UNSPECIFIED: ICD-10-CM

## 2024-06-17 DIAGNOSIS — G20.A1 PARKINSON'S DISEASE WITHOUT DYSKINESIA, WITHOUT MENTION OF FLUCTUATIONS: ICD-10-CM

## 2024-06-17 DIAGNOSIS — F02.B2 PARKINSON'S DISEASE WITHOUT DYSKINESIA, WITHOUT MENTION OF FLUCTUATIONS: ICD-10-CM

## 2024-06-17 DIAGNOSIS — C34.90 MALIGNANT NEOPLASM OF UNSPECIFIED PART OF UNSPECIFIED BRONCHUS OR LUNG: ICD-10-CM

## 2024-06-17 PROCEDURE — 99214 OFFICE O/P EST MOD 30 MIN: CPT

## 2024-06-17 PROCEDURE — 36415 COLL VENOUS BLD VENIPUNCTURE: CPT

## 2024-06-17 NOTE — HISTORY OF PRESENT ILLNESS
[Therapy: ___] : Therapy: [unfilled] [de-identified] : Mrs. Mcdowell is a 68 year old postmenopausal female who is referred by Dr.Alice Demarco for newly diagnosed breast cancer.\par  She was found on routine mammography in November 2019 she you have a focal asymmetric density in the left upper outer breast at 1:00 access 8 cm from the nipple. A one year prior had been negative.  Ultrasound-guided core biopsy in November 21, 2018 revealed a grade 1/3 invasive ductal carcinoma ER positive at 98% and NM positive at 90%,Ki-67 was less than 10%. She underwent some left breast lumpectomy and sentinel node dissection on December 19, 2018. Pathology revealed an 8 mm grade 1/3 invasive ductal carcinoma zero of 3 sentinel lymph nodes were involved with tumor. Oncotype DX score was 11. She also underwent genetic testing which is negative.\par   [de-identified] : Mrs. Mcdowell presents for follow up on Anastrazole for breast cancer and on Tagrisso for lung cancer. Patient overall feels well. She had a mammogram on May 8th, 2024 and MRI of the femur on May 1st 2024 with a follow PET scan on May 29th, 2024.

## 2024-06-17 NOTE — PHYSICAL EXAM
[Restricted in physically strenuous activity but ambulatory and able to carry out work of a light or sedentary nature] : Status 1- Restricted in physically strenuous activity but ambulatory and able to carry out work of a light or sedentary nature, e.g., light house work, office work [Normal] : affect appropriate [de-identified] : left breast scar

## 2024-06-17 NOTE — REVIEW OF SYSTEMS
[Fatigue] : fatigue [Recent Change In Weight] : ~T recent weight change [SOB on Exertion] : shortness of breath during exertion [Constipation] : constipation [Joint Pain] : joint pain [Muscle Pain] : muscle pain [Dizziness] : dizziness [Difficulty Walking] : difficulty walking [Insomnia] : insomnia [Anxiety] : anxiety [Depression] : depression [Muscle Weakness] : muscle weakness [Negative] : Allergic/Immunologic [Fever] : no fever [Eye Pain] : no eye pain [Vision Problems] : no vision problems [Dysphagia] : no dysphagia [Nosebleeds] : no nosebleeds [Chest Pain] : no chest pain [Lower Ext Edema] : no lower extremity edema [Shortness Of Breath] : no shortness of breath [Dysuria] : no dysuria [Easy Bleeding] : no tendency for easy bleeding [Easy Bruising] : no tendency for easy bruising [FreeTextEntry6] : improved [FreeTextEntry9] : muscle spasms due to Parkinsons, left hip pain  [de-identified] : tremor [de-identified] : worsensing

## 2024-06-17 NOTE — ASSESSMENT
[Patient/Caregiver not ready to engage] : Patient/Caregiver not ready to engage [FreeTextEntry1] : 75 yo female # Stage 1 breast cancer 8 mm tumor, invasive ductal carcinoma, ER/ MD positive, HER2 not amplified with Oncotype Dx 11. diagnosed November 2018 - d/c Anastrozole 12/23 ( completed 5 years) - left breast pain - c/w fat necrosis on mammogram up to date 12/22  #Adenocarcinoma of lung- stage 4 diagnosed April 2019 -Repeated CEA -plateau at 11.5 (started out at 26.6) -CT scan showed mass 1.8 x 1.4 x 1.7 cm lesion. -Biopsy of lung nodule c/w adeno ca of the lung, EGFR mutated, T190 mutated, started on Tagrisso. Side effects, -toxicities and benefits reviewed with pt. L4 vertebral body biopsy confirmed met adenoca of the lung. - PETCT 2/3/2020- decreased FDG uptake in primary lesion, sclerotic lesions in bones, thyroid nodule activity- under surveillance, duodenal uptake - plan for PETCT Feb 2021- SARAH- stable, uptake in right breast - c/w fat necrosis - repeat CEA -Jan 2022- PETCT - sclerotic bone lesions. - pain in the left femur 2-3/10 takes viocodin daily, s/p RT -decreased lower back pain - visit to ER for dyspnea - images reviewed, evaluated for PE, related to Parkinson disease. Lung mass 1.1 cm on CT - patient with weight loss, struggles with Parkinson disease - PETCT -3/27/23 some progression of bone mets, no change - increased hoarseness of the voice, vocal cord paralysis - followed by Dr. Rodriguez - CT chest - ER 9/23- SARAH - PETCT 9/23- new fx in the ribs, new axillary LN post vaccine,- uptake, pancreatic head lesion.  - patient deferred RT - denies pain now, decline in functional status due to Parkinsons - lost  recently - declining PS- due to Parkinsons- The patient has a mobility limitation that significantly impairs their ability to participate in one or more mobility- related activities of daily living in the home. The patient is able to safely use the walker.  The functional mobility deficit can be sufficiently resolved by use of a walker.   - PETCT reviewed- referred to ortho for evaluation and will d/w Dr. Carvajal biopsy as Guardant 360 shows CODIE muation, negative EGFR  # Weight lost - unintentional - nutritional evaluation  #benign thyroid nodule - followed by Dr. Park. due for repeat thyroid US  # constipation- daily miralax  # bone mets, dental cleaning due, last 6 months ago - Xgeva q 6 weeks  # new pancreatic mass - order MRI with gadolinium, Ca19-9. Referred to Dr. Munguia for EUS. IPMN suspected, observation vs biopsy. Under observation  Follows with psych Dr Yeimi Ellis [facility] meds adjusted  # S/p robotic assist rectopexy for large symptomatic rectal prolapse with  on 6/30/23.  # Anxiety - unable to take ativan.   Labs ordered, drawn in office  CBC,Chem,CEA, CA 27.29 case and mgmt to return in 6 weeks for treatment.  PETCT - reviewed - will d/w Dr. Carvajal- Guardant 360 showing CODIE no EGFR.  Patient has h/o breast ca- will d/w IR biopsy  - Left femut pain - mid thigh - ortho evalation Dr. Matta        [AdvancecareDate] : 06/17/2024

## 2024-07-01 ENCOUNTER — NON-APPOINTMENT (OUTPATIENT)
Age: 74
End: 2024-07-01

## 2024-07-08 ENCOUNTER — APPOINTMENT (OUTPATIENT)
Dept: GERIATRICS | Facility: CLINIC | Age: 74
End: 2024-07-08
Payer: MEDICARE

## 2024-07-08 VITALS
TEMPERATURE: 97.1 F | HEART RATE: 94 BPM | DIASTOLIC BLOOD PRESSURE: 68 MMHG | SYSTOLIC BLOOD PRESSURE: 100 MMHG | HEIGHT: 61 IN | OXYGEN SATURATION: 97 % | BODY MASS INDEX: 26.06 KG/M2 | WEIGHT: 138 LBS

## 2024-07-08 DIAGNOSIS — G47.00 INSOMNIA, UNSPECIFIED: ICD-10-CM

## 2024-07-08 DIAGNOSIS — R25.1 TREMOR, UNSPECIFIED: ICD-10-CM

## 2024-07-08 DIAGNOSIS — G20.C PARKINSONISM, UNSPECIFIED: ICD-10-CM

## 2024-07-08 PROCEDURE — 99214 OFFICE O/P EST MOD 30 MIN: CPT

## 2024-07-08 RX ORDER — MAGNESIUM OXIDE/MAG AA CHELATE 300 MG
300 CAPSULE ORAL
Refills: 0 | Status: ACTIVE | COMMUNITY
Start: 2024-07-08

## 2024-07-08 RX ORDER — MELATONIN 2.5 MG
2.5 TABLET,CHEWABLE ORAL
Refills: 0 | Status: ACTIVE | COMMUNITY
Start: 2024-07-08

## 2024-07-08 RX ORDER — IPRATROPIUM BROMIDE 21 UG/1
0.03 SPRAY NASAL 3 TIMES DAILY
Qty: 1 | Refills: 0 | Status: ACTIVE | COMMUNITY
Start: 2024-07-08 | End: 1900-01-01

## 2024-07-08 RX ORDER — GUAIFENESIN 600 MG/1
600 TABLET, EXTENDED RELEASE ORAL
Qty: 10 | Refills: 0 | Status: COMPLETED | COMMUNITY
Start: 2024-07-08 | End: 2024-07-13

## 2024-07-08 RX ORDER — BENZONATATE 100 MG/1
100 CAPSULE ORAL
Qty: 15 | Refills: 0 | Status: ACTIVE | COMMUNITY
Start: 2024-07-08 | End: 1900-01-01

## 2024-07-09 ENCOUNTER — RESULT REVIEW (OUTPATIENT)
Age: 74
End: 2024-07-09

## 2024-07-09 LAB
INFLUENZA A RESULT: NOT DETECTED
INFLUENZA B RESULT: NOT DETECTED
RESP SYN VIRUS RESULT: NOT DETECTED

## 2024-07-10 ENCOUNTER — RESULT REVIEW (OUTPATIENT)
Age: 74
End: 2024-07-10

## 2024-07-12 ENCOUNTER — APPOINTMENT (OUTPATIENT)
Dept: GERIATRICS | Facility: CLINIC | Age: 74
End: 2024-07-12
Payer: MEDICARE

## 2024-07-12 VITALS — HEART RATE: 95 BPM | OXYGEN SATURATION: 96 % | RESPIRATION RATE: 18 BRPM

## 2024-07-12 DIAGNOSIS — J20.9 ACUTE BRONCHITIS, UNSPECIFIED: ICD-10-CM

## 2024-07-12 DIAGNOSIS — J06.9 ACUTE UPPER RESPIRATORY INFECTION, UNSPECIFIED: ICD-10-CM

## 2024-07-12 PROCEDURE — 99213 OFFICE O/P EST LOW 20 MIN: CPT

## 2024-07-12 RX ORDER — GUAIFENESIN 600 MG/1
600 TABLET, EXTENDED RELEASE ORAL
Qty: 10 | Refills: 0 | Status: COMPLETED | COMMUNITY
Start: 2024-07-12 | End: 2024-07-17

## 2024-07-12 RX ORDER — AZITHROMYCIN 250 MG/1
250 TABLET, FILM COATED ORAL
Qty: 1 | Refills: 0 | Status: ACTIVE | COMMUNITY
Start: 2024-07-12

## 2024-07-30 ENCOUNTER — RESULT REVIEW (OUTPATIENT)
Age: 74
End: 2024-07-30

## 2024-07-30 ENCOUNTER — APPOINTMENT (OUTPATIENT)
Dept: HEMATOLOGY ONCOLOGY | Facility: CLINIC | Age: 74
End: 2024-07-30
Payer: MEDICARE

## 2024-07-30 ENCOUNTER — NON-APPOINTMENT (OUTPATIENT)
Age: 74
End: 2024-07-30

## 2024-07-30 VITALS
TEMPERATURE: 97.5 F | HEIGHT: 61 IN | OXYGEN SATURATION: 97 % | SYSTOLIC BLOOD PRESSURE: 119 MMHG | WEIGHT: 134.56 LBS | BODY MASS INDEX: 25.41 KG/M2 | RESPIRATION RATE: 17 BRPM | HEART RATE: 90 BPM | DIASTOLIC BLOOD PRESSURE: 66 MMHG

## 2024-07-30 DIAGNOSIS — M62.81 MUSCLE WEAKNESS (GENERALIZED): ICD-10-CM

## 2024-07-30 DIAGNOSIS — G89.3 NEOPLASM RELATED PAIN (ACUTE) (CHRONIC): ICD-10-CM

## 2024-07-30 DIAGNOSIS — C34.90 MALIGNANT NEOPLASM OF UNSPECIFIED PART OF UNSPECIFIED BRONCHUS OR LUNG: ICD-10-CM

## 2024-07-30 DIAGNOSIS — C79.51 SECONDARY MALIGNANT NEOPLASM OF BONE: ICD-10-CM

## 2024-07-30 DIAGNOSIS — C50.912 MALIGNANT NEOPLASM OF UNSPECIFIED SITE OF LEFT FEMALE BREAST: ICD-10-CM

## 2024-07-30 DIAGNOSIS — M85.80 OTHER SPECIFIED DISORDERS OF BONE DENSITY AND STRUCTURE, UNSPECIFIED SITE: ICD-10-CM

## 2024-07-30 PROCEDURE — 36415 COLL VENOUS BLD VENIPUNCTURE: CPT

## 2024-07-30 PROCEDURE — 99213 OFFICE O/P EST LOW 20 MIN: CPT | Mod: 25

## 2024-07-30 NOTE — REVIEW OF SYSTEMS
[Fatigue] : fatigue [Recent Change In Weight] : ~T recent weight change [SOB on Exertion] : shortness of breath during exertion [Constipation] : constipation [Joint Pain] : joint pain [Muscle Pain] : muscle pain [Dizziness] : dizziness [Difficulty Walking] : difficulty walking [Insomnia] : insomnia [Anxiety] : anxiety [Depression] : depression [Muscle Weakness] : muscle weakness [Negative] : Allergic/Immunologic [Fever] : no fever [Eye Pain] : no eye pain [Vision Problems] : no vision problems [Dysphagia] : no dysphagia [Nosebleeds] : no nosebleeds [Chest Pain] : no chest pain [Lower Ext Edema] : no lower extremity edema [Shortness Of Breath] : no shortness of breath [Dysuria] : no dysuria [Easy Bleeding] : no tendency for easy bleeding [Easy Bruising] : no tendency for easy bruising [FreeTextEntry6] : improved [FreeTextEntry9] : muscle spasms due to Parkinsons, left hip pain  [de-identified] : tremor [de-identified] : worsensing

## 2024-07-30 NOTE — PHYSICAL EXAM
[Restricted in physically strenuous activity but ambulatory and able to carry out work of a light or sedentary nature] : Status 1- Restricted in physically strenuous activity but ambulatory and able to carry out work of a light or sedentary nature, e.g., light house work, office work [Normal] : affect appropriate [de-identified] : left breast scar

## 2024-07-30 NOTE — PHYSICAL EXAM
[Restricted in physically strenuous activity but ambulatory and able to carry out work of a light or sedentary nature] : Status 1- Restricted in physically strenuous activity but ambulatory and able to carry out work of a light or sedentary nature, e.g., light house work, office work [Normal] : affect appropriate [de-identified] : left breast scar

## 2024-07-30 NOTE — HISTORY OF PRESENT ILLNESS
[Therapy: ___] : Therapy: [unfilled] [de-identified] : Mrs. Mcdowell is a 68 year old postmenopausal female who is referred by Dr.Alice Demarco for newly diagnosed breast cancer.\par  She was found on routine mammography in November 2019 she you have a focal asymmetric density in the left upper outer breast at 1:00 access 8 cm from the nipple. A one year prior had been negative.  Ultrasound-guided core biopsy in November 21, 2018 revealed a grade 1/3 invasive ductal carcinoma ER positive at 98% and FL positive at 90%,Ki-67 was less than 10%. She underwent some left breast lumpectomy and sentinel node dissection on December 19, 2018. Pathology revealed an 8 mm grade 1/3 invasive ductal carcinoma zero of 3 sentinel lymph nodes were involved with tumor. Oncotype DX score was 11. She also underwent genetic testing which is negative.\par   [de-identified] : Mrs. Mcdowell presents for follow up on Anastrazole for breast cancer and on Tagrisso for lung cancer. Patient overall feels well. She had a mammogram on May 8th, 2024 and MRI of the femur on May 1st 2024 with a follow PET scan on May 29th, 2024.

## 2024-07-30 NOTE — ASSESSMENT
[Patient/Caregiver not ready to engage] : Patient/Caregiver not ready to engage [FreeTextEntry1] : 75 yo female # Stage 1 breast cancer 8 mm tumor, invasive ductal carcinoma, ER/ [place] positive, HER2 not amplified with Oncotype Dx 11. diagnosed November 2018 - d/c Anastrozole 12/23 ( completed 5 years) - left breast pain - c/w fat necrosis on mammogram up to date 12/22  #Adenocarcinoma of lung- stage 4 diagnosed April 2019 -Repeated CEA -plateau at 11.5 (started out at 26.6) -CT scan showed mass 1.8 x 1.4 x 1.7 cm lesion. -Biopsy of lung nodule c/w adeno ca of the lung, EGFR mutated, T190 mutated, started on Tagrisso. Side effects, -toxicities and benefits reviewed with pt. L4 vertebral body biopsy confirmed met adenoca of the lung. - PETCT 2/3/2020- decreased FDG uptake in primary lesion, sclerotic lesions in bones, thyroid nodule activity- under surveillance, duodenal uptake - plan for PETCT Feb 2021- SARAH- stable, uptake in right breast - c/w fat necrosis - repeat CEA -Jan 2022- PETCT - sclerotic bone lesions. - pain in the left femur 2-3/10 takes viocodin daily, s/p RT -decreased lower back pain - visit to ER for dyspnea - images reviewed, evaluated for PE, related to Parkinson disease. Lung mass 1.1 cm on CT - patient with weight loss, struggles with Parkinson disease - PETCT -3/27/23 some progression of bone mets, no change - increased hoarseness of the voice, vocal cord paralysis - followed by Dr. Rodriguez - CT chest - ER 9/23- SARAH - PETCT 9/23- new fx in the ribs, new axillary LN post vaccine,- uptake, pancreatic head lesion.  - patient deferred RT - denies pain now, decline in functional status due to Parkinsons - lost  recently - declining PS- due to Parkinsons- The patient has a mobility limitation that significantly impairs their ability to participate in one or more mobility- related activities of daily living in the home. The patient is able to safely use the walker.  The functional mobility deficit can be sufficiently resolved by use of a walker.   - PETCT reviewed- referred to ortho for evaluation and will d/w Dr. Carvajal biopsy as Guardant 360 shows CODIE muation, negative EGFR  # Weight lost - unintentional - nutritional evaluation  #benign thyroid nodule - followed by Dr. Park. due for repeat thyroid US  # constipation- daily miralax  # bone mets, dental cleaning due, last 6 months ago - Xgeva q 6 weeks  # new pancreatic mass - order MRI with gadolinium, Ca19-9. Referred to Dr. Munguia for EUS. IPMN suspected, observation vs biopsy. Under observation  Follows with psych Dr Yeimi Ellis [facility] meds adjusted  # S/p robotic assist rectopexy for large symptomatic rectal prolapse with  on 6/30/23.  # Anxiety - unable to take ativan.   Labs ordered, drawn in office  CBC,Chem,CEA, CA 27.29 case and mgmt to return in 6 weeks for treatment.  PETCT - reviewed - will d/w Dr. Carvajal- Guardant 360 showing CODIE no EGFR.  Patient has h/o breast ca-Biopsy right sacrum- no carcinoma   - Left femur pain - mid thigh - ortho evaluation Dr. Matta        [AdvancecareDate] : 06/17/2024

## 2024-07-30 NOTE — REVIEW OF SYSTEMS
[Fatigue] : fatigue [Recent Change In Weight] : ~T recent weight change [SOB on Exertion] : shortness of breath during exertion [Constipation] : constipation [Joint Pain] : joint pain [Muscle Pain] : muscle pain [Dizziness] : dizziness [Difficulty Walking] : difficulty walking [Insomnia] : insomnia [Anxiety] : anxiety [Depression] : depression [Muscle Weakness] : muscle weakness [Negative] : Allergic/Immunologic [Fever] : no fever [Eye Pain] : no eye pain [Vision Problems] : no vision problems [Dysphagia] : no dysphagia [Nosebleeds] : no nosebleeds [Chest Pain] : no chest pain [Lower Ext Edema] : no lower extremity edema [Shortness Of Breath] : no shortness of breath [Dysuria] : no dysuria [Easy Bleeding] : no tendency for easy bleeding [Easy Bruising] : no tendency for easy bruising [FreeTextEntry6] : improved [FreeTextEntry9] : muscle spasms due to Parkinsons, left hip pain  [de-identified] : tremor [de-identified] : worsensing

## 2024-07-30 NOTE — HISTORY OF PRESENT ILLNESS
[Therapy: ___] : Therapy: [unfilled] [de-identified] : Mrs. Mcdowell is a 68 year old postmenopausal female who is referred by Dr.Alice Demarco for newly diagnosed breast cancer.\par  She was found on routine mammography in November 2019 she you have a focal asymmetric density in the left upper outer breast at 1:00 access 8 cm from the nipple. A one year prior had been negative.  Ultrasound-guided core biopsy in November 21, 2018 revealed a grade 1/3 invasive ductal carcinoma ER positive at 98% and AR positive at 90%,Ki-67 was less than 10%. She underwent some left breast lumpectomy and sentinel node dissection on December 19, 2018. Pathology revealed an 8 mm grade 1/3 invasive ductal carcinoma zero of 3 sentinel lymph nodes were involved with tumor. Oncotype DX score was 11. She also underwent genetic testing which is negative.\par   [de-identified] : Mrs. Mcdowell presents for follow up on Anastrazole for breast cancer and on Tagrisso for lung cancer. Patient overall feels well. She had a mammogram on May 8th, 2024 and MRI of the femur on May 1st 2024 with a follow PET scan on May 29th, 2024.

## 2024-07-30 NOTE — ASSESSMENT
[Patient/Caregiver not ready to engage] : Patient/Caregiver not ready to engage [FreeTextEntry1] : 73 yo female # Stage 1 breast cancer 8 mm tumor, invasive ductal carcinoma, ER/ [place] positive, HER2 not amplified with Oncotype Dx 11. diagnosed November 2018 - d/c Anastrozole 12/23 ( completed 5 years) - left breast pain - c/w fat necrosis on mammogram up to date 12/22  #Adenocarcinoma of lung- stage 4 diagnosed April 2019 -Repeated CEA -plateau at 11.5 (started out at 26.6) -CT scan showed mass 1.8 x 1.4 x 1.7 cm lesion. -Biopsy of lung nodule c/w adeno ca of the lung, EGFR mutated, T190 mutated, started on Tagrisso. Side effects, -toxicities and benefits reviewed with pt. L4 vertebral body biopsy confirmed met adenoca of the lung. - PETCT 2/3/2020- decreased FDG uptake in primary lesion, sclerotic lesions in bones, thyroid nodule activity- under surveillance, duodenal uptake - plan for PETCT Feb 2021- SARAH- stable, uptake in right breast - c/w fat necrosis - repeat CEA -Jan 2022- PETCT - sclerotic bone lesions. - pain in the left femur 2-3/10 takes viocodin daily, s/p RT -decreased lower back pain - visit to ER for dyspnea - images reviewed, evaluated for PE, related to Parkinson disease. Lung mass 1.1 cm on CT - patient with weight loss, struggles with Parkinson disease - PETCT -3/27/23 some progression of bone mets, no change - increased hoarseness of the voice, vocal cord paralysis - followed by Dr. Rodriguez - CT chest - ER 9/23- SARAH - PETCT 9/23- new fx in the ribs, new axillary LN post vaccine,- uptake, pancreatic head lesion.  - patient deferred RT - denies pain now, decline in functional status due to Parkinsons - lost  recently - declining PS- due to Parkinsons- The patient has a mobility limitation that significantly impairs their ability to participate in one or more mobility- related activities of daily living in the home. The patient is able to safely use the walker.  The functional mobility deficit can be sufficiently resolved by use of a walker.   - PETCT reviewed- referred to ortho for evaluation and will d/w Dr. Carvajal biopsy as Guardant 360 shows CODIE muation, negative EGFR  # Weight lost - unintentional - nutritional evaluation  #benign thyroid nodule - followed by Dr. Park. due for repeat thyroid US  # constipation- daily miralax  # bone mets, dental cleaning due, last 6 months ago - Xgeva q 6 weeks  # new pancreatic mass - order MRI with gadolinium, Ca19-9. Referred to Dr. Munguia for EUS. IPMN suspected, observation vs biopsy. Under observation  Follows with psych Dr Yeimi Ellis [facility] meds adjusted  # S/p robotic assist rectopexy for large symptomatic rectal prolapse with  on 6/30/23.  # Anxiety - unable to take ativan.   Labs ordered, drawn in office  CBC,Chem,CEA, CA 27.29 case and mgmt to return in 6 weeks for treatment.  PETCT - reviewed - will d/w Dr. Carvajal- Guardant 360 showing CODIE no EGFR.  Patient has h/o breast ca-Biopsy right sacrum- no carcinoma   - Left femur pain - mid thigh - ortho evaluation Dr. Matta        [AdvancecareDate] : 06/17/2024

## 2024-09-10 ENCOUNTER — RESULT REVIEW (OUTPATIENT)
Age: 74
End: 2024-09-10

## 2024-09-10 ENCOUNTER — APPOINTMENT (OUTPATIENT)
Dept: HEMATOLOGY ONCOLOGY | Facility: CLINIC | Age: 74
End: 2024-09-10
Payer: MEDICARE

## 2024-09-10 VITALS
TEMPERATURE: 97.5 F | DIASTOLIC BLOOD PRESSURE: 55 MMHG | HEIGHT: 61 IN | RESPIRATION RATE: 16 BRPM | BODY MASS INDEX: 25.16 KG/M2 | SYSTOLIC BLOOD PRESSURE: 117 MMHG | OXYGEN SATURATION: 100 % | WEIGHT: 133.25 LBS | HEART RATE: 87 BPM

## 2024-09-10 DIAGNOSIS — F32.A ANXIETY DISORDER, UNSPECIFIED: ICD-10-CM

## 2024-09-10 DIAGNOSIS — D68.52 PROTHROMBIN GENE MUTATION: ICD-10-CM

## 2024-09-10 DIAGNOSIS — F41.9 ANXIETY DISORDER, UNSPECIFIED: ICD-10-CM

## 2024-09-10 DIAGNOSIS — R97.0 ELEVATED CARCINOEMBRYONIC ANTIGEN [CEA]: ICD-10-CM

## 2024-09-10 DIAGNOSIS — C34.90 MALIGNANT NEOPLASM OF UNSPECIFIED PART OF UNSPECIFIED BRONCHUS OR LUNG: ICD-10-CM

## 2024-09-10 DIAGNOSIS — C50.912 MALIGNANT NEOPLASM OF UNSPECIFIED SITE OF LEFT FEMALE BREAST: ICD-10-CM

## 2024-09-10 PROCEDURE — 99214 OFFICE O/P EST MOD 30 MIN: CPT

## 2024-09-10 PROCEDURE — 36415 COLL VENOUS BLD VENIPUNCTURE: CPT

## 2024-09-10 RX ORDER — LEVODOPA AND CARBIDOPA 245; 61.25 MG/1; MG/1
CAPSULE, EXTENDED RELEASE ORAL
Refills: 0 | Status: ACTIVE | COMMUNITY

## 2024-09-10 NOTE — REVIEW OF SYSTEMS
[Fatigue] : fatigue [Recent Change In Weight] : ~T recent weight change [Constipation] : constipation [Joint Pain] : joint pain [Muscle Pain] : muscle pain [Dizziness] : dizziness [Difficulty Walking] : difficulty walking [Insomnia] : insomnia [Anxiety] : anxiety [Depression] : depression [Muscle Weakness] : muscle weakness [Negative] : Allergic/Immunologic [FreeTextEntry6] : improved [Fever] : no fever [Eye Pain] : no eye pain [Vision Problems] : no vision problems [Dysphagia] : no dysphagia [Nosebleeds] : no nosebleeds [Chest Pain] : no chest pain [Lower Ext Edema] : no lower extremity edema [Shortness Of Breath] : no shortness of breath [SOB on Exertion] : no shortness of breath during exertion [Dysuria] : no dysuria [Easy Bleeding] : no tendency for easy bleeding [Easy Bruising] : no tendency for easy bruising [FreeTextEntry9] : muscle spasms due to Parkinsons, left hip pain  [de-identified] : improved tremor [de-identified] : worsensing

## 2024-09-10 NOTE — HISTORY OF PRESENT ILLNESS
[Therapy: ___] : Therapy: [unfilled] [de-identified] : Mrs. Mcdowell is a 68 year old postmenopausal female who is referred by Dr.Alice Demarco for newly diagnosed breast cancer.\par  She was found on routine mammography in November 2019 she you have a focal asymmetric density in the left upper outer breast at 1:00 access 8 cm from the nipple. A one year prior had been negative.  Ultrasound-guided core biopsy in November 21, 2018 revealed a grade 1/3 invasive ductal carcinoma ER positive at 98% and IA positive at 90%,Ki-67 was less than 10%. She underwent some left breast lumpectomy and sentinel node dissection on December 19, 2018. Pathology revealed an 8 mm grade 1/3 invasive ductal carcinoma zero of 3 sentinel lymph nodes were involved with tumor. Oncotype DX score was 11. She also underwent genetic testing which is negative.\par   [de-identified] : Mrs. Mcdowell presents for follow up on Anastrazole for breast cancer and on Tagrisso for lung cancer. Patient overall feels well except she is having urinary frequency. She is following up with  at [facility] who tried medications which she reports hasn't worked. There is a discussion on whether to insert an electrical stimulator but the patient needs to fail on the medication first. She is schedule in Nov 2024 for her mammogram.

## 2024-09-10 NOTE — ASSESSMENT
[Patient/Caregiver not ready to engage] : Patient/Caregiver not ready to engage [FreeTextEntry1] : 75 yo female # Stage 1 breast cancer 8 mm tumor, invasive ductal carcinoma, ER/ [place] positive, HER2 not amplified with Oncotype Dx 11. diagnosed November 2018 - d/c Anastrozole 12/23 ( completed 5 years) - left breast pain - c/w fat necrosis on mammogram up to date 12/22  #Adenocarcinoma of lung- stage 4 diagnosed April 2019 -Repeated CEA -plateau at 11.5 (started out at 26.6) -CT scan showed mass 1.8 x 1.4 x 1.7 cm lesion. -Biopsy of lung nodule c/w adeno ca of the lung, EGFR mutated, T190 mutated, started on Tagrisso. Side effects, -toxicities and benefits reviewed with pt. L4 vertebral body biopsy confirmed met adenoca of the lung. - PETCT 2/3/2020- decreased FDG uptake in primary lesion, sclerotic lesions in bones, thyroid nodule activity- under surveillance, duodenal uptake - plan for PETCT Feb 2021- SARAH- stable, uptake in right breast - c/w fat necrosis - repeat CEA -Jan 2022- PETCT - sclerotic bone lesions. - pain in the left femur 2-3/10 takes viocodin daily, s/p RT -decreased lower back pain - visit to ER for dyspnea - images reviewed, evaluated for PE, related to Parkinson disease. Lung mass 1.1 cm on CT - patient with weight loss, struggles with Parkinson disease - PETCT -3/27/23 some progression of bone mets, no change - increased hoarseness of the voice, vocal cord paralysis - followed by Dr. Rodriguez - CT chest - ER 9/23- SARAH - PETCT 9/23- new fx in the ribs, new axillary LN post vaccine,- uptake, pancreatic head lesion.  - patient deferred RT - denies pain now, decline in functional status due to Parkinsons - lost  recently - declining PS- due to Parkinsons- The patient has a mobility limitation that significantly impairs their ability to participate in one or more mobility- related activities of daily living in the home. The patient is able to safely use the walker.  The functional mobility deficit can be sufficiently resolved by use of a walker.   - PETCT 5/24 reviewed- referred to ortho for evaluation and will d/w Dr. Carvajal biopsy as Guardant 360 shows CODIE muation, negative EGFR - plan for Guardant and PETCT - Fall 2024  # Weight lost - unintentional - nutritional evaluation  #benign thyroid nodule - followed by Dr. Park. due for repeat thyroid US  # constipation- daily miralax  # bone mets, dental cleaning due, last 6 months ago - Xgeva q 6 weeks  # new pancreatic mass - order MRI with gadolinium, Ca19-9. Referred to Dr. Munguia for EUS. IPMN suspected, observation vs biopsy. Under observation  Follows with psych Dr Yeimi Ellis [facility] meds adjusted  # S/p robotic assist rectopexy for large symptomatic rectal prolapse with  on 6/30/23.  # Anxiety - unable to take ativan.   Labs ordered, drawn in office  CBC,Chem,CEA, CA 27.29 case and mgmt to return in 6 weeks for treatment.  PETCT - reviewed - will d/w Dr. Carvajal- Guardant 360 showing CODIE no EGFR.  Patient has h/o breast ca-Biopsy right sacrum- no carcinoma   - Left femur pain - mid thigh - ortho evaluation Dr. Matta        [AdvancecareDate] : 06/17/2024

## 2024-09-10 NOTE — PHYSICAL EXAM
[Restricted in physically strenuous activity but ambulatory and able to carry out work of a light or sedentary nature] : Status 1- Restricted in physically strenuous activity but ambulatory and able to carry out work of a light or sedentary nature, e.g., light house work, office work [Normal] : affect appropriate [de-identified] : left breast scar

## 2024-09-10 NOTE — PHYSICAL EXAM
[Restricted in physically strenuous activity but ambulatory and able to carry out work of a light or sedentary nature] : Status 1- Restricted in physically strenuous activity but ambulatory and able to carry out work of a light or sedentary nature, e.g., light house work, office work [Normal] : affect appropriate [de-identified] : left breast scar

## 2024-09-10 NOTE — BEGINNING OF VISIT
[1] : 2) Feeling down, depressed, or hopeless for several days (1) [PHQ-2 Negative] : PHQ-2 Negative [Pain Scale: ___] : On a scale of 1-10, today the patient's pain is a(n) [unfilled]. [Never] : Never [Date Discussed (MM/DD/YY): ___] : Discussed: [unfilled] [Reviewed, no changes] : Reviewed, no changes [[id number]] : 2

## 2024-09-10 NOTE — REVIEW OF SYSTEMS
[Fatigue] : fatigue [Recent Change In Weight] : ~T recent weight change [Constipation] : constipation [Joint Pain] : joint pain [Muscle Pain] : muscle pain [Dizziness] : dizziness [Difficulty Walking] : difficulty walking [Insomnia] : insomnia [Anxiety] : anxiety [Depression] : depression [Muscle Weakness] : muscle weakness [Negative] : Allergic/Immunologic [FreeTextEntry6] : improved [Fever] : no fever [Eye Pain] : no eye pain [Vision Problems] : no vision problems [Dysphagia] : no dysphagia [Nosebleeds] : no nosebleeds [Chest Pain] : no chest pain [Lower Ext Edema] : no lower extremity edema [Shortness Of Breath] : no shortness of breath [SOB on Exertion] : no shortness of breath during exertion [Dysuria] : no dysuria [Easy Bleeding] : no tendency for easy bleeding [Easy Bruising] : no tendency for easy bruising [FreeTextEntry9] : muscle spasms due to Parkinsons, left hip pain  [de-identified] : improved tremor [de-identified] : worsensing

## 2024-09-10 NOTE — PHYSICAL EXAM
[Restricted in physically strenuous activity but ambulatory and able to carry out work of a light or sedentary nature] : Status 1- Restricted in physically strenuous activity but ambulatory and able to carry out work of a light or sedentary nature, e.g., light house work, office work [Normal] : affect appropriate [de-identified] : left breast scar

## 2024-09-10 NOTE — ASSESSMENT
[Patient/Caregiver not ready to engage] : Patient/Caregiver not ready to engage [FreeTextEntry1] : 75 yo female # Stage 1 breast cancer 8 mm tumor, invasive ductal carcinoma, ER/ [place] positive, HER2 not amplified with Oncotype Dx 11. diagnosed November 2018 - d/c Anastrozole 12/23 ( completed 5 years) - left breast pain - c/w fat necrosis on mammogram up to date 12/22  #Adenocarcinoma of lung- stage 4 diagnosed April 2019 -Repeated CEA -plateau at 11.5 (started out at 26.6) -CT scan showed mass 1.8 x 1.4 x 1.7 cm lesion. -Biopsy of lung nodule c/w adeno ca of the lung, EGFR mutated, T190 mutated, started on Tagrisso. Side effects, -toxicities and benefits reviewed with pt. L4 vertebral body biopsy confirmed met adenoca of the lung. - PETCT 2/3/2020- decreased FDG uptake in primary lesion, sclerotic lesions in bones, thyroid nodule activity- under surveillance, duodenal uptake - plan for PETCT Feb 2021- SARAH- stable, uptake in right breast - c/w fat necrosis - repeat CEA -Jan 2022- PETCT - sclerotic bone lesions. - pain in the left femur 2-3/10 takes viocodin daily, s/p RT -decreased lower back pain - visit to ER for dyspnea - images reviewed, evaluated for PE, related to Parkinson disease. Lung mass 1.1 cm on CT - patient with weight loss, struggles with Parkinson disease - PETCT -3/27/23 some progression of bone mets, no change - increased hoarseness of the voice, vocal cord paralysis - followed by Dr. Rodriguez - CT chest - ER 9/23- SARAH - PETCT 9/23- new fx in the ribs, new axillary LN post vaccine,- uptake, pancreatic head lesion.  - patient deferred RT - denies pain now, decline in functional status due to Parkinsons - lost  recently - declining PS- due to Parkinsons- The patient has a mobility limitation that significantly impairs their ability to participate in one or more mobility- related activities of daily living in the home. The patient is able to safely use the walker.  The functional mobility deficit can be sufficiently resolved by use of a walker.   - PETCT 5/24 reviewed- referred to ortho for evaluation and will d/w Dr. Carvajal biopsy as Guardant 360 shows CODIE muation, negative EGFR - plan for Guardant and PETCT - Fall 2024  # Weight lost - unintentional - nutritional evaluation  #benign thyroid nodule - followed by Dr. Park. due for repeat thyroid US  # constipation- daily miralax  # bone mets, dental cleaning due, last 6 months ago - Xgeva q 6 weeks  # new pancreatic mass - order MRI with gadolinium, Ca19-9. Referred to Dr. Munguia for EUS. IPMN suspected, observation vs biopsy. Under observation  Follows with psych Dr Yeimi Ellis [facility] meds adjusted  # S/p robotic assist rectopexy for large symptomatic rectal prolapse with  on 6/30/23.  # Anxiety - unable to take ativan.   Labs ordered, drawn in office  CBC,Chem,CEA, CA 27.29 case and mgmt to return in 6 weeks for treatment.  PETCT - reviewed - will d/w Dr. Carvajal- Guardant 360 showing CODIE no EGFR.  Patient has h/o breast ca-Biopsy right sacrum- no carcinoma   - Left femur pain - mid thigh - ortho evaluation Dr. Matta        [AdvancecareDate] : 06/17/2024

## 2024-09-10 NOTE — HISTORY OF PRESENT ILLNESS
[Therapy: ___] : Therapy: [unfilled] [de-identified] : Mrs. Mcdowell is a 68 year old postmenopausal female who is referred by Dr.Alice Demarco for newly diagnosed breast cancer.\par  She was found on routine mammography in November 2019 she you have a focal asymmetric density in the left upper outer breast at 1:00 access 8 cm from the nipple. A one year prior had been negative.  Ultrasound-guided core biopsy in November 21, 2018 revealed a grade 1/3 invasive ductal carcinoma ER positive at 98% and KS positive at 90%,Ki-67 was less than 10%. She underwent some left breast lumpectomy and sentinel node dissection on December 19, 2018. Pathology revealed an 8 mm grade 1/3 invasive ductal carcinoma zero of 3 sentinel lymph nodes were involved with tumor. Oncotype DX score was 11. She also underwent genetic testing which is negative.\par   [de-identified] : Mrs. Mcdowell presents for follow up on Anastrazole for breast cancer and on Tagrisso for lung cancer. Patient overall feels well except she is having urinary frequency. She is following up with  at [facility] who tried medications which she reports hasn't worked. There is a discussion on whether to insert an electrical stimulator but the patient needs to fail on the medication first. She is schedule in Nov 2024 for her mammogram.

## 2024-09-10 NOTE — REVIEW OF SYSTEMS
[Fatigue] : fatigue [Recent Change In Weight] : ~T recent weight change [Constipation] : constipation [Joint Pain] : joint pain [Muscle Pain] : muscle pain [Dizziness] : dizziness [Difficulty Walking] : difficulty walking [Insomnia] : insomnia [Anxiety] : anxiety [Depression] : depression [Muscle Weakness] : muscle weakness [Negative] : Allergic/Immunologic [FreeTextEntry6] : improved [Fever] : no fever [Eye Pain] : no eye pain [Vision Problems] : no vision problems [Dysphagia] : no dysphagia [Nosebleeds] : no nosebleeds [Chest Pain] : no chest pain [Lower Ext Edema] : no lower extremity edema [Shortness Of Breath] : no shortness of breath [SOB on Exertion] : no shortness of breath during exertion [Dysuria] : no dysuria [Easy Bleeding] : no tendency for easy bleeding [Easy Bruising] : no tendency for easy bruising [FreeTextEntry9] : muscle spasms due to Parkinsons, left hip pain  [de-identified] : improved tremor [de-identified] : worsensing

## 2024-09-10 NOTE — BEGINNING OF VISIT
[1] : 2) Feeling down, depressed, or hopeless for several days (1) [PHQ-2 Negative] : PHQ-2 Negative [Pain Scale: ___] : On a scale of 1-10, today the patient's pain is a(n) [unfilled]. [Never] : Never [Date Discussed (MM/DD/YY): ___] : Discussed: [unfilled] [Reviewed, no changes] : Reviewed, no changes [[id number]] : 2

## 2024-09-10 NOTE — BEGINNING OF VISIT
[1] : 2) Feeling down, depressed, or hopeless for several days (1) [PHQ-2 Negative] : PHQ-2 Negative [Pain Scale: ___] : On a scale of 1-10, today the patient's pain is a(n) [unfilled]. [Never] : Never [Date Discussed (MM/DD/YY): ___] : Discussed: [unfilled] [Reviewed, no changes] : Reviewed, no changes [[id number]] : 2

## 2024-09-10 NOTE — ASSESSMENT
[Patient/Caregiver not ready to engage] : Patient/Caregiver not ready to engage [FreeTextEntry1] : 73 yo female # Stage 1 breast cancer 8 mm tumor, invasive ductal carcinoma, ER/ [place] positive, HER2 not amplified with Oncotype Dx 11. diagnosed November 2018 - d/c Anastrozole 12/23 ( completed 5 years) - left breast pain - c/w fat necrosis on mammogram up to date 12/22  #Adenocarcinoma of lung- stage 4 diagnosed April 2019 -Repeated CEA -plateau at 11.5 (started out at 26.6) -CT scan showed mass 1.8 x 1.4 x 1.7 cm lesion. -Biopsy of lung nodule c/w adeno ca of the lung, EGFR mutated, T190 mutated, started on Tagrisso. Side effects, -toxicities and benefits reviewed with pt. L4 vertebral body biopsy confirmed met adenoca of the lung. - PETCT 2/3/2020- decreased FDG uptake in primary lesion, sclerotic lesions in bones, thyroid nodule activity- under surveillance, duodenal uptake - plan for PETCT Feb 2021- SARAH- stable, uptake in right breast - c/w fat necrosis - repeat CEA -Jan 2022- PETCT - sclerotic bone lesions. - pain in the left femur 2-3/10 takes viocodin daily, s/p RT -decreased lower back pain - visit to ER for dyspnea - images reviewed, evaluated for PE, related to Parkinson disease. Lung mass 1.1 cm on CT - patient with weight loss, struggles with Parkinson disease - PETCT -3/27/23 some progression of bone mets, no change - increased hoarseness of the voice, vocal cord paralysis - followed by Dr. Rodriguez - CT chest - ER 9/23- SARAH - PETCT 9/23- new fx in the ribs, new axillary LN post vaccine,- uptake, pancreatic head lesion.  - patient deferred RT - denies pain now, decline in functional status due to Parkinsons - lost  recently - declining PS- due to Parkinsons- The patient has a mobility limitation that significantly impairs their ability to participate in one or more mobility- related activities of daily living in the home. The patient is able to safely use the walker.  The functional mobility deficit can be sufficiently resolved by use of a walker.   - PETCT 5/24 reviewed- referred to ortho for evaluation and will d/w Dr. Carvajal biopsy as Guardant 360 shows CODIE muation, negative EGFR - plan for Guardant and PETCT - Fall 2024  # Weight lost - unintentional - nutritional evaluation  #benign thyroid nodule - followed by Dr. Park. due for repeat thyroid US  # constipation- daily miralax  # bone mets, dental cleaning due, last 6 months ago - Xgeva q 6 weeks  # new pancreatic mass - order MRI with gadolinium, Ca19-9. Referred to Dr. Munguia for EUS. IPMN suspected, observation vs biopsy. Under observation  Follows with psych Dr Yeimi Ellis [facility] meds adjusted  # S/p robotic assist rectopexy for large symptomatic rectal prolapse with  on 6/30/23.  # Anxiety - unable to take ativan.   Labs ordered, drawn in office  CBC,Chem,CEA, CA 27.29 case and mgmt to return in 6 weeks for treatment.  PETCT - reviewed - will d/w Dr. Carvajal- Guardant 360 showing CODIE no EGFR.  Patient has h/o breast ca-Biopsy right sacrum- no carcinoma   - Left femur pain - mid thigh - ortho evaluation Dr. Matta        [AdvancecareDate] : 06/17/2024

## 2024-09-10 NOTE — HISTORY OF PRESENT ILLNESS
[Therapy: ___] : Therapy: [unfilled] [de-identified] : Mrs. Mcdowell is a 68 year old postmenopausal female who is referred by Dr.Alice Demarco for newly diagnosed breast cancer.\par  She was found on routine mammography in November 2019 she you have a focal asymmetric density in the left upper outer breast at 1:00 access 8 cm from the nipple. A one year prior had been negative.  Ultrasound-guided core biopsy in November 21, 2018 revealed a grade 1/3 invasive ductal carcinoma ER positive at 98% and CT positive at 90%,Ki-67 was less than 10%. She underwent some left breast lumpectomy and sentinel node dissection on December 19, 2018. Pathology revealed an 8 mm grade 1/3 invasive ductal carcinoma zero of 3 sentinel lymph nodes were involved with tumor. Oncotype DX score was 11. She also underwent genetic testing which is negative.\par   [de-identified] : Mrs. Mcdowell presents for follow up on Anastrazole for breast cancer and on Tagrisso for lung cancer. Patient overall feels well except she is having urinary frequency. She is following up with  at [facility] who tried medications which she reports hasn't worked. There is a discussion on whether to insert an electrical stimulator but the patient needs to fail on the medication first. She is schedule in Nov 2024 for her mammogram.

## 2024-10-07 ENCOUNTER — TRANSCRIPTION ENCOUNTER (OUTPATIENT)
Age: 74
End: 2024-10-07

## 2024-10-08 ENCOUNTER — NON-APPOINTMENT (OUTPATIENT)
Age: 74
End: 2024-10-08

## 2024-10-10 ENCOUNTER — APPOINTMENT (OUTPATIENT)
Dept: UROLOGY | Facility: CLINIC | Age: 74
End: 2024-10-10
Payer: MEDICARE

## 2024-10-10 VITALS
HEIGHT: 61 IN | RESPIRATION RATE: 16 BRPM | HEART RATE: 91 BPM | DIASTOLIC BLOOD PRESSURE: 72 MMHG | WEIGHT: 133 LBS | SYSTOLIC BLOOD PRESSURE: 119 MMHG | TEMPERATURE: 98 F | OXYGEN SATURATION: 99 % | BODY MASS INDEX: 25.11 KG/M2

## 2024-10-10 DIAGNOSIS — N39.0 URINARY TRACT INFECTION, SITE NOT SPECIFIED: ICD-10-CM

## 2024-10-10 DIAGNOSIS — N30.10 INTERSTITIAL CYSTITIS (CHRONIC) W/OUT HEMATURIA: ICD-10-CM

## 2024-10-10 DIAGNOSIS — N81.6 RECTOCELE: ICD-10-CM

## 2024-10-10 PROCEDURE — 99205 OFFICE O/P NEW HI 60 MIN: CPT

## 2024-10-10 RX ORDER — MIRTAZAPINE 7.5 MG/1
7.5 TABLET, FILM COATED ORAL
Refills: 0 | Status: ACTIVE | COMMUNITY

## 2024-10-11 ENCOUNTER — NON-APPOINTMENT (OUTPATIENT)
Age: 74
End: 2024-10-11

## 2024-10-11 LAB
APPEARANCE: CLEAR
BACTERIA: NEGATIVE /HPF
BILIRUBIN URINE: NEGATIVE
BLOOD URINE: ABNORMAL
CAST: 1 /LPF
COLOR: YELLOW
EPITHELIAL CELLS: 2 /HPF
GLUCOSE QUALITATIVE U: NEGATIVE MG/DL
KETONES URINE: ABNORMAL MG/DL
LEUKOCYTE ESTERASE URINE: ABNORMAL
MICROSCOPIC-UA: NORMAL
NITRITE URINE: NEGATIVE
PH URINE: 6.5
PROTEIN URINE: 100 MG/DL
RED BLOOD CELLS URINE: 30 /HPF
SPECIFIC GRAVITY URINE: 1.01
UROBILINOGEN URINE: 0.2 MG/DL
WHITE BLOOD CELLS URINE: 32 /HPF

## 2024-10-15 ENCOUNTER — NON-APPOINTMENT (OUTPATIENT)
Age: 74
End: 2024-10-15

## 2024-10-15 LAB — BACTERIA UR CULT: NORMAL

## 2024-10-21 ENCOUNTER — APPOINTMENT (OUTPATIENT)
Dept: GERIATRICS | Facility: CLINIC | Age: 74
End: 2024-10-21
Payer: MEDICARE

## 2024-10-21 VITALS
OXYGEN SATURATION: 100 % | DIASTOLIC BLOOD PRESSURE: 62 MMHG | BODY MASS INDEX: 24.55 KG/M2 | HEART RATE: 109 BPM | TEMPERATURE: 98 F | HEIGHT: 61 IN | SYSTOLIC BLOOD PRESSURE: 100 MMHG | WEIGHT: 130 LBS

## 2024-10-21 DIAGNOSIS — Z91.81 HISTORY OF FALLING: ICD-10-CM

## 2024-10-21 PROCEDURE — 99495 TRANSJ CARE MGMT MOD F2F 14D: CPT

## 2024-10-22 ENCOUNTER — RESULT REVIEW (OUTPATIENT)
Age: 74
End: 2024-10-22

## 2024-10-22 ENCOUNTER — APPOINTMENT (OUTPATIENT)
Dept: HEMATOLOGY ONCOLOGY | Facility: CLINIC | Age: 74
End: 2024-10-22
Payer: MEDICARE

## 2024-10-22 ENCOUNTER — APPOINTMENT (OUTPATIENT)
Dept: GERIATRICS | Facility: CLINIC | Age: 74
End: 2024-10-22

## 2024-10-22 VITALS
HEART RATE: 94 BPM | HEIGHT: 61 IN | OXYGEN SATURATION: 99 % | RESPIRATION RATE: 16 BRPM | TEMPERATURE: 97.5 F | SYSTOLIC BLOOD PRESSURE: 108 MMHG | DIASTOLIC BLOOD PRESSURE: 64 MMHG | BODY MASS INDEX: 24.62 KG/M2 | WEIGHT: 130.38 LBS
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
## 2024-10-22 DIAGNOSIS — F32.A ANXIETY DISORDER, UNSPECIFIED: ICD-10-CM

## 2024-10-22 DIAGNOSIS — M85.80 OTHER SPECIFIED DISORDERS OF BONE DENSITY AND STRUCTURE, UNSPECIFIED SITE: ICD-10-CM
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
## 2024-10-22 DIAGNOSIS — G89.3 NEOPLASM RELATED PAIN (ACUTE) (CHRONIC): ICD-10-CM

## 2024-10-22 DIAGNOSIS — C79.51 SECONDARY MALIGNANT NEOPLASM OF BONE: ICD-10-CM

## 2024-10-22 DIAGNOSIS — C34.90 MALIGNANT NEOPLASM OF UNSPECIFIED PART OF UNSPECIFIED BRONCHUS OR LUNG: ICD-10-CM

## 2024-10-22 DIAGNOSIS — C50.912 MALIGNANT NEOPLASM OF UNSPECIFIED SITE OF LEFT FEMALE BREAST: ICD-10-CM

## 2024-10-22 DIAGNOSIS — F41.9 ANXIETY DISORDER, UNSPECIFIED: ICD-10-CM

## 2024-10-22 DIAGNOSIS — R53.83 OTHER FATIGUE: ICD-10-CM

## 2024-10-22 DIAGNOSIS — D63.8 ANEMIA IN OTHER CHRONIC DISEASES CLASSIFIED ELSEWHERE: ICD-10-CM

## 2024-10-22 PROCEDURE — 36415 COLL VENOUS BLD VENIPUNCTURE: CPT

## 2024-10-22 PROCEDURE — 99213 OFFICE O/P EST LOW 20 MIN: CPT

## 2024-10-31 DIAGNOSIS — N30.10 INTERSTITIAL CYSTITIS (CHRONIC) W/OUT HEMATURIA: ICD-10-CM

## 2024-11-01 ENCOUNTER — APPOINTMENT (OUTPATIENT)
Dept: UROLOGY | Facility: CLINIC | Age: 74
End: 2024-11-01

## 2024-11-12 DIAGNOSIS — R92.1 MAMMOGRAPHIC CALCIFICATION FOUND ON DIAGNOSTIC IMAGING OF BREAST: ICD-10-CM

## 2024-11-13 ENCOUNTER — RESULT REVIEW (OUTPATIENT)
Age: 74
End: 2024-11-13

## 2024-11-21 ENCOUNTER — APPOINTMENT (OUTPATIENT)
Dept: UROLOGY | Facility: CLINIC | Age: 74
End: 2024-11-21

## 2024-11-23 NOTE — HISTORY OF PRESENT ILLNESS
[FreeTextEntry1] : Rosamaria Mcdowell is a 72 y/o F w/ metastatic lung cancer to bone on chemo, localized breast cancer s/p lumpectomy and XRT on anastrazole, PD, vasculitis who was originally seen in 2019 for pain, persistent recurrent nausea and dizziness, restless leg syndrome who now presents for a primary palliative care follow up.\par \par MRI 12/23 --> progression in L femur and pelvis lesions. RX Tramadol 50mg Q6H PRN+ increased Dex from 2 --> 4mg\par \par 1/10: Does not feel better. Tried Dex 2mg and it didn't help stimulate her, only helped her rash.\par Tried Provigil- didn't notice a difference. Leg pain is worse. \par \par 1/24: Feels "a lot" better. Feels Dex at both doses (2 & 4mg) kept her up at night and made her restless leg worse. Tramadol is a "miracle" in terms of helping pain. Takes 2-3x/day. Sleeping better. Taking 1/2 dose of Miralax daily with good effect. Uses Curaleaf 1:1 2.5mg THC:CBD which helps her feel more alert. Extra Sinemet during the day is not really helping.
94

## 2024-12-02 ENCOUNTER — NON-APPOINTMENT (OUTPATIENT)
Age: 74
End: 2024-12-02

## 2024-12-02 ENCOUNTER — APPOINTMENT (OUTPATIENT)
Dept: HEMATOLOGY ONCOLOGY | Facility: CLINIC | Age: 74
End: 2024-12-02

## 2024-12-03 ENCOUNTER — RESULT REVIEW (OUTPATIENT)
Age: 74
End: 2024-12-03

## 2024-12-03 ENCOUNTER — APPOINTMENT (OUTPATIENT)
Dept: HEMATOLOGY ONCOLOGY | Facility: CLINIC | Age: 74
End: 2024-12-03
Payer: MEDICARE

## 2024-12-03 VITALS
SYSTOLIC BLOOD PRESSURE: 110 MMHG | HEART RATE: 92 BPM | DIASTOLIC BLOOD PRESSURE: 66 MMHG | OXYGEN SATURATION: 100 % | TEMPERATURE: 97.9 F | BODY MASS INDEX: 24.39 KG/M2 | HEIGHT: 61 IN | RESPIRATION RATE: 16 BRPM | WEIGHT: 129.19 LBS

## 2024-12-03 DIAGNOSIS — C79.51 SECONDARY MALIGNANT NEOPLASM OF BONE: ICD-10-CM

## 2024-12-03 DIAGNOSIS — C34.90 MALIGNANT NEOPLASM OF UNSPECIFIED PART OF UNSPECIFIED BRONCHUS OR LUNG: ICD-10-CM

## 2024-12-03 DIAGNOSIS — G24.01 DRUG INDUCED SUBACUTE DYSKINESIA: ICD-10-CM

## 2024-12-03 DIAGNOSIS — E04.1 NONTOXIC SINGLE THYROID NODULE: ICD-10-CM

## 2024-12-03 DIAGNOSIS — G47.00 INSOMNIA, UNSPECIFIED: ICD-10-CM

## 2024-12-03 DIAGNOSIS — R26.81 UNSTEADINESS ON FEET: ICD-10-CM

## 2024-12-03 DIAGNOSIS — R63.4 ABNORMAL WEIGHT LOSS: ICD-10-CM

## 2024-12-03 DIAGNOSIS — T42.8X5A DRUG INDUCED SUBACUTE DYSKINESIA: ICD-10-CM

## 2024-12-03 PROCEDURE — 99213 OFFICE O/P EST LOW 20 MIN: CPT

## 2024-12-03 PROCEDURE — 36415 COLL VENOUS BLD VENIPUNCTURE: CPT

## 2024-12-03 RX ORDER — CIPROFLOXACIN HYDROCHLORIDE 500 MG/1
500 TABLET, FILM COATED ORAL
Qty: 6 | Refills: 0 | Status: ACTIVE | COMMUNITY
Start: 2024-12-03 | End: 1900-01-01

## 2024-12-13 ENCOUNTER — APPOINTMENT (OUTPATIENT)
Dept: UROLOGY | Facility: CLINIC | Age: 74
End: 2024-12-13
Payer: MEDICARE

## 2024-12-13 VITALS
OXYGEN SATURATION: 97 % | TEMPERATURE: 97.4 F | SYSTOLIC BLOOD PRESSURE: 128 MMHG | DIASTOLIC BLOOD PRESSURE: 86 MMHG | HEART RATE: 98 BPM

## 2024-12-13 VITALS — SYSTOLIC BLOOD PRESSURE: 151 MMHG | DIASTOLIC BLOOD PRESSURE: 82 MMHG

## 2024-12-13 VITALS — DIASTOLIC BLOOD PRESSURE: 74 MMHG | SYSTOLIC BLOOD PRESSURE: 122 MMHG | OXYGEN SATURATION: 97 %

## 2024-12-13 DIAGNOSIS — N30.10 INTERSTITIAL CYSTITIS (CHRONIC) W/OUT HEMATURIA: ICD-10-CM

## 2024-12-13 DIAGNOSIS — C79.51 SECONDARY MALIGNANT NEOPLASM OF BONE: ICD-10-CM

## 2024-12-13 DIAGNOSIS — G20.C PARKINSONISM, UNSPECIFIED: ICD-10-CM

## 2024-12-13 DIAGNOSIS — G62.89 OTHER SPECIFIED POLYNEUROPATHIES: ICD-10-CM

## 2024-12-13 PROCEDURE — 99214 OFFICE O/P EST MOD 30 MIN: CPT | Mod: 25

## 2024-12-13 PROCEDURE — 51797 INTRAABDOMINAL PRESSURE TEST: CPT

## 2024-12-13 PROCEDURE — 51784 ANAL/URINARY MUSCLE STUDY: CPT

## 2024-12-13 PROCEDURE — 51741 ELECTRO-UROFLOWMETRY FIRST: CPT

## 2024-12-13 PROCEDURE — 52000 CYSTOURETHROSCOPY: CPT

## 2024-12-13 PROCEDURE — 51728 CYSTOMETROGRAM W/VP: CPT

## 2024-12-13 RX ORDER — RASAGILINE MESYLATE 1 MG/1
1 TABLET ORAL
Refills: 0 | Status: ACTIVE | COMMUNITY

## 2024-12-13 RX ORDER — CEFPODOXIME PROXETIL 100 MG/1
100 TABLET, FILM COATED ORAL
Refills: 0 | Status: ACTIVE | COMMUNITY

## 2024-12-13 RX ORDER — ASPIRIN 81 MG/81MG
81 CAPSULE ORAL
Refills: 0 | Status: ACTIVE | COMMUNITY

## 2024-12-13 RX ORDER — VIBEGRON 75 MG/1
75 TABLET, FILM COATED ORAL
Refills: 0 | Status: ACTIVE | COMMUNITY

## 2024-12-13 RX ORDER — PRAMIPEXOLE DIHYDROCHLORIDE 0.38 MG/1
0.38 TABLET, EXTENDED RELEASE ORAL
Refills: 0 | Status: ACTIVE | COMMUNITY

## 2024-12-13 RX ORDER — CIMETIDINE 300 MG/1
300 TABLET, FILM COATED ORAL
Refills: 0 | Status: ACTIVE | COMMUNITY

## 2024-12-13 RX ORDER — CARBIDOPA AND LEVODOPA 25; 100 MG/1; MG/1
25-100 TABLET, EXTENDED RELEASE ORAL
Refills: 0 | Status: ACTIVE | COMMUNITY

## 2024-12-13 RX ORDER — EZETIMIBE 10 MG/1
10 TABLET ORAL
Refills: 0 | Status: ACTIVE | COMMUNITY

## 2024-12-13 RX ORDER — SULFAMETHOXAZOLE AND TRIMETHOPRIM 800; 160 MG/1; MG/1
800-160 TABLET ORAL
Qty: 14 | Refills: 3 | Status: ACTIVE | COMMUNITY
Start: 2024-12-13 | End: 1900-01-01

## 2024-12-26 ENCOUNTER — RESULT REVIEW (OUTPATIENT)
Age: 74
End: 2024-12-26

## 2025-01-02 ENCOUNTER — NON-APPOINTMENT (OUTPATIENT)
Age: 75
End: 2025-01-02

## 2025-01-07 ENCOUNTER — RESULT REVIEW (OUTPATIENT)
Age: 75
End: 2025-01-07

## 2025-01-07 ENCOUNTER — APPOINTMENT (OUTPATIENT)
Dept: HEMATOLOGY ONCOLOGY | Facility: CLINIC | Age: 75
End: 2025-01-07
Payer: MEDICARE

## 2025-01-07 VITALS
TEMPERATURE: 97.3 F | WEIGHT: 125.56 LBS | RESPIRATION RATE: 16 BRPM | DIASTOLIC BLOOD PRESSURE: 65 MMHG | SYSTOLIC BLOOD PRESSURE: 105 MMHG | BODY MASS INDEX: 23.7 KG/M2 | HEIGHT: 61 IN | OXYGEN SATURATION: 99 % | HEART RATE: 89 BPM

## 2025-01-07 DIAGNOSIS — C79.51 SECONDARY MALIGNANT NEOPLASM OF BONE: ICD-10-CM

## 2025-01-07 DIAGNOSIS — M62.81 MUSCLE WEAKNESS (GENERALIZED): ICD-10-CM

## 2025-01-07 DIAGNOSIS — G20.A1 PARKINSON'S DISEASE WITHOUT DYSKINESIA, WITHOUT MENTION OF FLUCTUATIONS: ICD-10-CM

## 2025-01-07 DIAGNOSIS — F41.9 ANXIETY DISORDER, UNSPECIFIED: ICD-10-CM

## 2025-01-07 DIAGNOSIS — F32.A ANXIETY DISORDER, UNSPECIFIED: ICD-10-CM

## 2025-01-07 DIAGNOSIS — F02.B2 PARKINSON'S DISEASE WITHOUT DYSKINESIA, WITHOUT MENTION OF FLUCTUATIONS: ICD-10-CM

## 2025-01-07 DIAGNOSIS — Z91.89 OTHER SPECIFIED PERSONAL RISK FACTORS, NOT ELSEWHERE CLASSIFIED: ICD-10-CM

## 2025-01-07 DIAGNOSIS — C34.90 MALIGNANT NEOPLASM OF UNSPECIFIED PART OF UNSPECIFIED BRONCHUS OR LUNG: ICD-10-CM

## 2025-01-07 DIAGNOSIS — G20.C PARKINSONISM, UNSPECIFIED: ICD-10-CM

## 2025-01-07 DIAGNOSIS — G89.3 NEOPLASM RELATED PAIN (ACUTE) (CHRONIC): ICD-10-CM

## 2025-01-07 PROCEDURE — 99213 OFFICE O/P EST LOW 20 MIN: CPT

## 2025-01-07 PROCEDURE — 36415 COLL VENOUS BLD VENIPUNCTURE: CPT

## 2025-01-08 ENCOUNTER — APPOINTMENT (OUTPATIENT)
Dept: NEUROLOGY | Facility: CLINIC | Age: 75
End: 2025-01-08

## 2025-01-24 ENCOUNTER — RESULT REVIEW (OUTPATIENT)
Age: 75
End: 2025-01-24

## 2025-02-07 ENCOUNTER — APPOINTMENT (OUTPATIENT)
Dept: ENDOCRINOLOGY | Facility: CLINIC | Age: 75
End: 2025-02-07
Payer: MEDICARE

## 2025-02-07 ENCOUNTER — NON-APPOINTMENT (OUTPATIENT)
Age: 75
End: 2025-02-07

## 2025-02-07 VITALS
OXYGEN SATURATION: 98 % | WEIGHT: 125 LBS | DIASTOLIC BLOOD PRESSURE: 66 MMHG | SYSTOLIC BLOOD PRESSURE: 100 MMHG | HEART RATE: 76 BPM | BODY MASS INDEX: 23.6 KG/M2 | HEIGHT: 61 IN

## 2025-02-07 DIAGNOSIS — E05.90 THYROTOXICOSIS, UNSPECIFIED W/OUT THYROTOXIC CRISIS OR STORM: ICD-10-CM

## 2025-02-07 DIAGNOSIS — F32.9 MAJOR DEPRESSIVE DISORDER, SINGLE EPISODE, UNSPECIFIED: ICD-10-CM

## 2025-02-07 DIAGNOSIS — E04.1 NONTOXIC SINGLE THYROID NODULE: ICD-10-CM

## 2025-02-07 LAB
ALBUMIN SERPL ELPH-MCNC: 4.2 G/DL
ALP BLD-CCNC: 120 U/L
ALT SERPL-CCNC: <5 U/L
ANION GAP SERPL CALC-SCNC: 12 MMOL/L
AST SERPL-CCNC: 8 U/L
BILIRUB SERPL-MCNC: 0.2 MG/DL
BUN SERPL-MCNC: 24 MG/DL
CALCIUM SERPL-MCNC: 8.9 MG/DL
CHLORIDE SERPL-SCNC: 106 MMOL/L
CO2 SERPL-SCNC: 23 MMOL/L
CREAT SERPL-MCNC: 0.83 MG/DL
EGFR: 75 ML/MIN/1.73M2
GLUCOSE SERPL-MCNC: 89 MG/DL
POTASSIUM SERPL-SCNC: 4.7 MMOL/L
PROT SERPL-MCNC: 6.5 G/DL
SODIUM SERPL-SCNC: 142 MMOL/L
T4 FREE SERPL-MCNC: 1.5 NG/DL
THYROGLOB AB SERPL-ACNC: 18.8 IU/ML
THYROPEROXIDASE AB SERPL IA-ACNC: 15 IU/ML
TSH SERPL-ACNC: 0.12 UIU/ML

## 2025-02-07 PROCEDURE — 99215 OFFICE O/P EST HI 40 MIN: CPT

## 2025-02-07 PROCEDURE — G2211 COMPLEX E/M VISIT ADD ON: CPT

## 2025-02-13 LAB
T3 SERPL-MCNC: 111 NG/DL
T4 FREE SERPL-MCNC: 1.4 NG/DL
THYROGLOB AB SERPL-ACNC: 17.8 IU/ML
THYROPEROXIDASE AB SERPL IA-ACNC: 13.2 IU/ML
TSH RECEPTOR AB: <1.1 IU/L
TSH SERPL-ACNC: 0.13 UIU/ML
TSI ACT/NOR SER: <0.1 IU/L

## 2025-02-14 ENCOUNTER — APPOINTMENT (OUTPATIENT)
Dept: UROLOGY | Facility: CLINIC | Age: 75
End: 2025-02-14
Payer: MEDICARE

## 2025-02-14 DIAGNOSIS — N32.81 OVERACTIVE BLADDER: ICD-10-CM

## 2025-02-14 PROCEDURE — 99213 OFFICE O/P EST LOW 20 MIN: CPT | Mod: 93

## 2025-02-14 RX ORDER — METHIMAZOLE 5 MG/1
5 TABLET ORAL
Qty: 45 | Refills: 1 | Status: DISCONTINUED | COMMUNITY
Start: 2025-02-07 | End: 2025-02-14

## 2025-02-18 ENCOUNTER — APPOINTMENT (OUTPATIENT)
Dept: HEMATOLOGY ONCOLOGY | Facility: CLINIC | Age: 75
End: 2025-02-18

## 2025-02-19 ENCOUNTER — RESULT REVIEW (OUTPATIENT)
Age: 75
End: 2025-02-19

## 2025-02-19 ENCOUNTER — APPOINTMENT (OUTPATIENT)
Dept: PODIATRY | Facility: CLINIC | Age: 75
End: 2025-02-19
Payer: MEDICARE

## 2025-02-19 DIAGNOSIS — L85.1 ACQUIRED KERATOSIS [KERATODERMA] PALMARIS ET PLANTARIS: ICD-10-CM

## 2025-02-19 DIAGNOSIS — B35.1 TINEA UNGUIUM: ICD-10-CM

## 2025-02-19 DIAGNOSIS — M79.671 PAIN IN RIGHT FOOT: ICD-10-CM

## 2025-02-19 DIAGNOSIS — L85.3 XEROSIS CUTIS: ICD-10-CM

## 2025-02-19 DIAGNOSIS — G89.29 PAIN IN LEFT FOOT: ICD-10-CM

## 2025-02-19 DIAGNOSIS — I70.91 GENERALIZED ATHEROSCLEROSIS: ICD-10-CM

## 2025-02-19 DIAGNOSIS — M79.672 PAIN IN LEFT FOOT: ICD-10-CM

## 2025-02-19 DIAGNOSIS — G89.29 PAIN IN RIGHT FOOT: ICD-10-CM

## 2025-02-19 DIAGNOSIS — R23.4 CHANGES IN SKIN TEXTURE: ICD-10-CM

## 2025-02-19 PROCEDURE — 11056 PARNG/CUTG B9 HYPRKR LES 2-4: CPT | Mod: Q8

## 2025-02-19 PROCEDURE — 99214 OFFICE O/P EST MOD 30 MIN: CPT | Mod: 25

## 2025-02-19 PROCEDURE — 11721 DEBRIDE NAIL 6 OR MORE: CPT | Mod: 59

## 2025-02-19 RX ORDER — AMMONIUM LACTATE 12 %
12 CREAM (GRAM) TOPICAL TWICE DAILY
Qty: 1 | Refills: 3 | Status: ACTIVE | COMMUNITY
Start: 2025-02-19 | End: 1900-01-01

## 2025-02-20 ENCOUNTER — RESULT REVIEW (OUTPATIENT)
Age: 75
End: 2025-02-20

## 2025-02-20 ENCOUNTER — APPOINTMENT (OUTPATIENT)
Dept: HEMATOLOGY ONCOLOGY | Facility: CLINIC | Age: 75
End: 2025-02-20
Payer: MEDICARE

## 2025-02-20 VITALS
DIASTOLIC BLOOD PRESSURE: 60 MMHG | HEART RATE: 96 BPM | SYSTOLIC BLOOD PRESSURE: 103 MMHG | HEIGHT: 61 IN | BODY MASS INDEX: 23 KG/M2 | OXYGEN SATURATION: 99 % | RESPIRATION RATE: 16 BRPM | TEMPERATURE: 97.3 F | WEIGHT: 121.8 LBS

## 2025-02-20 DIAGNOSIS — C34.90 MALIGNANT NEOPLASM OF UNSPECIFIED PART OF UNSPECIFIED BRONCHUS OR LUNG: ICD-10-CM

## 2025-02-20 DIAGNOSIS — C79.51 SECONDARY MALIGNANT NEOPLASM OF BONE: ICD-10-CM

## 2025-02-20 DIAGNOSIS — G20.A1 PARKINSON'S DISEASE WITHOUT DYSKINESIA, WITHOUT MENTION OF FLUCTUATIONS: ICD-10-CM

## 2025-02-20 DIAGNOSIS — F02.B2 PARKINSON'S DISEASE WITHOUT DYSKINESIA, WITHOUT MENTION OF FLUCTUATIONS: ICD-10-CM

## 2025-02-20 DIAGNOSIS — C50.912 MALIGNANT NEOPLASM OF UNSPECIFIED SITE OF LEFT FEMALE BREAST: ICD-10-CM

## 2025-02-20 PROCEDURE — 99214 OFFICE O/P EST MOD 30 MIN: CPT

## 2025-02-20 PROCEDURE — 36415 COLL VENOUS BLD VENIPUNCTURE: CPT

## 2025-02-26 ENCOUNTER — NON-APPOINTMENT (OUTPATIENT)
Age: 75
End: 2025-02-26

## 2025-03-05 ENCOUNTER — APPOINTMENT (OUTPATIENT)
Dept: GERIATRICS | Facility: CLINIC | Age: 75
End: 2025-03-05
Payer: MEDICARE

## 2025-03-05 VITALS
WEIGHT: 122 LBS | RESPIRATION RATE: 16 BRPM | DIASTOLIC BLOOD PRESSURE: 68 MMHG | HEART RATE: 97 BPM | BODY MASS INDEX: 23.05 KG/M2 | SYSTOLIC BLOOD PRESSURE: 110 MMHG | OXYGEN SATURATION: 99 %

## 2025-03-05 DIAGNOSIS — C34.90 MALIGNANT NEOPLASM OF UNSPECIFIED PART OF UNSPECIFIED BRONCHUS OR LUNG: ICD-10-CM

## 2025-03-05 DIAGNOSIS — R53.83 OTHER FATIGUE: ICD-10-CM

## 2025-03-05 DIAGNOSIS — N32.81 OVERACTIVE BLADDER: ICD-10-CM

## 2025-03-05 DIAGNOSIS — Z91.81 HISTORY OF FALLING: ICD-10-CM

## 2025-03-05 DIAGNOSIS — G20.C PARKINSONISM, UNSPECIFIED: ICD-10-CM

## 2025-03-05 DIAGNOSIS — C50.912 MALIGNANT NEOPLASM OF UNSPECIFIED SITE OF LEFT FEMALE BREAST: ICD-10-CM

## 2025-03-05 PROCEDURE — 99497 ADVNCD CARE PLAN 30 MIN: CPT | Mod: 25

## 2025-03-05 PROCEDURE — 99215 OFFICE O/P EST HI 40 MIN: CPT

## 2025-03-06 ENCOUNTER — APPOINTMENT (OUTPATIENT)
Dept: ENDOCRINOLOGY | Facility: CLINIC | Age: 75
End: 2025-03-06

## 2025-03-06 VITALS
DIASTOLIC BLOOD PRESSURE: 58 MMHG | SYSTOLIC BLOOD PRESSURE: 100 MMHG | BODY MASS INDEX: 23.03 KG/M2 | HEART RATE: 82 BPM | HEIGHT: 61 IN | WEIGHT: 122 LBS | OXYGEN SATURATION: 100 %

## 2025-03-06 DIAGNOSIS — E04.1 NONTOXIC SINGLE THYROID NODULE: ICD-10-CM

## 2025-03-06 DIAGNOSIS — F32.9 MAJOR DEPRESSIVE DISORDER, SINGLE EPISODE, UNSPECIFIED: ICD-10-CM

## 2025-03-06 DIAGNOSIS — E05.90 THYROTOXICOSIS, UNSPECIFIED W/OUT THYROTOXIC CRISIS OR STORM: ICD-10-CM

## 2025-03-06 LAB
ALBUMIN SERPL ELPH-MCNC: 4.2 G/DL
ALP BLD-CCNC: 110 U/L
ALT SERPL-CCNC: <5 U/L
ANION GAP SERPL CALC-SCNC: 13 MMOL/L
AST SERPL-CCNC: 11 U/L
BILIRUB SERPL-MCNC: 0.2 MG/DL
BUN SERPL-MCNC: 27 MG/DL
CALCIUM SERPL-MCNC: 9.3 MG/DL
CHLORIDE SERPL-SCNC: 105 MMOL/L
CO2 SERPL-SCNC: 24 MMOL/L
CREAT SERPL-MCNC: 0.78 MG/DL
EGFRCR SERPLBLD CKD-EPI 2021: 81 ML/MIN/1.73M2
GLUCOSE SERPL-MCNC: 123 MG/DL
POTASSIUM SERPL-SCNC: 4.5 MMOL/L
PROT SERPL-MCNC: 6.3 G/DL
SODIUM SERPL-SCNC: 142 MMOL/L
T3FREE SERPL-MCNC: 2.63 PG/ML
T4 SERPL-MCNC: 6.8 UG/DL
THYROGLOB AB SERPL-ACNC: 17.6 IU/ML
THYROPEROXIDASE AB SERPL IA-ACNC: 20.4 IU/ML
TSH SERPL-ACNC: 2.05 UIU/ML
TSI ACT/NOR SER: <0.1 IU/L

## 2025-03-06 PROCEDURE — G2211 COMPLEX E/M VISIT ADD ON: CPT

## 2025-03-06 PROCEDURE — 99215 OFFICE O/P EST HI 40 MIN: CPT

## 2025-03-06 RX ORDER — METHIMAZOLE 5 MG/1
5 TABLET ORAL
Qty: 15 | Refills: 1 | Status: ACTIVE | COMMUNITY
Start: 2025-03-06

## 2025-03-13 ENCOUNTER — APPOINTMENT (OUTPATIENT)
Dept: GERIATRICS | Facility: CLINIC | Age: 75
End: 2025-03-13
Payer: MEDICARE

## 2025-03-13 VITALS
OXYGEN SATURATION: 98 % | WEIGHT: 120 LBS | HEIGHT: 61 IN | DIASTOLIC BLOOD PRESSURE: 70 MMHG | TEMPERATURE: 97.3 F | BODY MASS INDEX: 22.66 KG/M2 | HEART RATE: 83 BPM | SYSTOLIC BLOOD PRESSURE: 110 MMHG

## 2025-03-13 DIAGNOSIS — R63.4 ABNORMAL WEIGHT LOSS: ICD-10-CM

## 2025-03-13 DIAGNOSIS — M54.16 RADICULOPATHY, LUMBAR REGION: ICD-10-CM

## 2025-03-13 DIAGNOSIS — G47.00 INSOMNIA, UNSPECIFIED: ICD-10-CM

## 2025-03-13 DIAGNOSIS — L98.499 NON-PRESSURE CHRONIC ULCER OF SKIN OF OTHER SITES WITH UNSPECIFIED SEVERITY: ICD-10-CM

## 2025-03-13 DIAGNOSIS — F32.A ANXIETY DISORDER, UNSPECIFIED: ICD-10-CM

## 2025-03-13 DIAGNOSIS — G25.81 RESTLESS LEGS SYNDROME: ICD-10-CM

## 2025-03-13 DIAGNOSIS — F41.9 ANXIETY DISORDER, UNSPECIFIED: ICD-10-CM

## 2025-03-13 PROCEDURE — G0439: CPT

## 2025-04-23 ENCOUNTER — APPOINTMENT (OUTPATIENT)
Dept: ENDOCRINOLOGY | Facility: CLINIC | Age: 75
End: 2025-04-23
Payer: MEDICARE

## 2025-04-23 VITALS
OXYGEN SATURATION: 100 % | SYSTOLIC BLOOD PRESSURE: 108 MMHG | HEIGHT: 61 IN | HEART RATE: 86 BPM | BODY MASS INDEX: 22.66 KG/M2 | WEIGHT: 120 LBS | DIASTOLIC BLOOD PRESSURE: 60 MMHG

## 2025-04-23 DIAGNOSIS — F32.9 MAJOR DEPRESSIVE DISORDER, SINGLE EPISODE, UNSPECIFIED: ICD-10-CM

## 2025-04-23 DIAGNOSIS — E05.90 THYROTOXICOSIS, UNSPECIFIED W/OUT THYROTOXIC CRISIS OR STORM: ICD-10-CM

## 2025-04-23 DIAGNOSIS — E04.1 NONTOXIC SINGLE THYROID NODULE: ICD-10-CM

## 2025-04-23 PROCEDURE — 99215 OFFICE O/P EST HI 40 MIN: CPT

## 2025-04-23 PROCEDURE — G2211 COMPLEX E/M VISIT ADD ON: CPT

## 2025-04-24 LAB
T3 SERPL-MCNC: 98 NG/DL
T4 FREE SERPL-MCNC: 1.3 NG/DL
TSH SERPL-ACNC: 0.75 UIU/ML

## 2025-05-09 ENCOUNTER — APPOINTMENT (OUTPATIENT)
Dept: GASTROENTEROLOGY | Facility: CLINIC | Age: 75
End: 2025-05-09
Payer: MEDICARE

## 2025-05-09 VITALS
HEART RATE: 98 BPM | DIASTOLIC BLOOD PRESSURE: 70 MMHG | HEIGHT: 61 IN | SYSTOLIC BLOOD PRESSURE: 126 MMHG | BODY MASS INDEX: 22.66 KG/M2 | WEIGHT: 120 LBS | OXYGEN SATURATION: 99 %

## 2025-05-09 DIAGNOSIS — K62.5 HEMORRHAGE OF ANUS AND RECTUM: ICD-10-CM

## 2025-05-09 DIAGNOSIS — K59.00 CONSTIPATION, UNSPECIFIED: ICD-10-CM

## 2025-05-09 PROCEDURE — 99213 OFFICE O/P EST LOW 20 MIN: CPT

## 2025-05-09 RX ORDER — PRAMIPEXOLE DIHYDROCHLORIDE 0.12 MG/1
0.12 TABLET ORAL
Refills: 0 | Status: ACTIVE | COMMUNITY

## 2025-05-12 ENCOUNTER — RESULT REVIEW (OUTPATIENT)
Age: 75
End: 2025-05-12

## 2025-05-12 ENCOUNTER — APPOINTMENT (OUTPATIENT)
Dept: HEMATOLOGY ONCOLOGY | Facility: CLINIC | Age: 75
End: 2025-05-12
Payer: MEDICARE

## 2025-05-12 VITALS
TEMPERATURE: 98.5 F | SYSTOLIC BLOOD PRESSURE: 138 MMHG | WEIGHT: 125.44 LBS | DIASTOLIC BLOOD PRESSURE: 68 MMHG | HEIGHT: 61 IN | HEART RATE: 101 BPM | OXYGEN SATURATION: 100 % | BODY MASS INDEX: 23.68 KG/M2 | RESPIRATION RATE: 16 BRPM

## 2025-05-12 DIAGNOSIS — C34.90 MALIGNANT NEOPLASM OF UNSPECIFIED PART OF UNSPECIFIED BRONCHUS OR LUNG: ICD-10-CM

## 2025-05-12 DIAGNOSIS — D68.52 PROTHROMBIN GENE MUTATION: ICD-10-CM

## 2025-05-12 DIAGNOSIS — G24.01 DRUG INDUCED SUBACUTE DYSKINESIA: ICD-10-CM

## 2025-05-12 DIAGNOSIS — Z91.89 OTHER SPECIFIED PERSONAL RISK FACTORS, NOT ELSEWHERE CLASSIFIED: ICD-10-CM

## 2025-05-12 DIAGNOSIS — C50.912 MALIGNANT NEOPLASM OF UNSPECIFIED SITE OF LEFT FEMALE BREAST: ICD-10-CM

## 2025-05-12 DIAGNOSIS — Z87.19 PERSONAL HISTORY OF OTHER DISEASES OF THE DIGESTIVE SYSTEM: ICD-10-CM

## 2025-05-12 DIAGNOSIS — M62.81 MUSCLE WEAKNESS (GENERALIZED): ICD-10-CM

## 2025-05-12 DIAGNOSIS — K86.2 CYST OF PANCREAS: ICD-10-CM

## 2025-05-12 DIAGNOSIS — G89.3 NEOPLASM RELATED PAIN (ACUTE) (CHRONIC): ICD-10-CM

## 2025-05-12 DIAGNOSIS — J38.01 PARALYSIS OF VOCAL CORDS AND LARYNX, UNILATERAL: ICD-10-CM

## 2025-05-12 DIAGNOSIS — G62.89 OTHER SPECIFIED POLYNEUROPATHIES: ICD-10-CM

## 2025-05-12 DIAGNOSIS — C79.51 SECONDARY MALIGNANT NEOPLASM OF BONE: ICD-10-CM

## 2025-05-12 DIAGNOSIS — T42.8X5A DRUG INDUCED SUBACUTE DYSKINESIA: ICD-10-CM

## 2025-05-12 PROCEDURE — 99213 OFFICE O/P EST LOW 20 MIN: CPT

## 2025-05-12 PROCEDURE — 36415 COLL VENOUS BLD VENIPUNCTURE: CPT

## 2025-05-13 ENCOUNTER — RESULT REVIEW (OUTPATIENT)
Age: 75
End: 2025-05-13

## 2025-05-15 ENCOUNTER — APPOINTMENT (OUTPATIENT)
Dept: GERIATRICS | Facility: CLINIC | Age: 75
End: 2025-05-15
Payer: MEDICARE

## 2025-05-15 ENCOUNTER — APPOINTMENT (OUTPATIENT)
Dept: PODIATRY | Facility: CLINIC | Age: 75
End: 2025-05-15
Payer: MEDICARE

## 2025-05-15 VITALS — WEIGHT: 124 LBS | BODY MASS INDEX: 23.41 KG/M2 | HEIGHT: 61 IN

## 2025-05-15 DIAGNOSIS — G89.29 PAIN IN LEFT FOOT: ICD-10-CM

## 2025-05-15 DIAGNOSIS — B35.1 TINEA UNGUIUM: ICD-10-CM

## 2025-05-15 DIAGNOSIS — L85.1 ACQUIRED KERATOSIS [KERATODERMA] PALMARIS ET PLANTARIS: ICD-10-CM

## 2025-05-15 DIAGNOSIS — F41.9 ANXIETY DISORDER, UNSPECIFIED: ICD-10-CM

## 2025-05-15 DIAGNOSIS — M79.671 PAIN IN RIGHT FOOT: ICD-10-CM

## 2025-05-15 DIAGNOSIS — G89.29 PAIN IN RIGHT FOOT: ICD-10-CM

## 2025-05-15 DIAGNOSIS — G20.C PARKINSONISM, UNSPECIFIED: ICD-10-CM

## 2025-05-15 DIAGNOSIS — M40.209 UNSPECIFIED KYPHOSIS, SITE UNSPECIFIED: ICD-10-CM

## 2025-05-15 DIAGNOSIS — M79.672 PAIN IN LEFT FOOT: ICD-10-CM

## 2025-05-15 DIAGNOSIS — F32.A ANXIETY DISORDER, UNSPECIFIED: ICD-10-CM

## 2025-05-15 DIAGNOSIS — I70.91 GENERALIZED ATHEROSCLEROSIS: ICD-10-CM

## 2025-05-15 DIAGNOSIS — R63.4 ABNORMAL WEIGHT LOSS: ICD-10-CM

## 2025-05-15 PROCEDURE — 11056 PARNG/CUTG B9 HYPRKR LES 2-4: CPT | Mod: Q8

## 2025-05-15 PROCEDURE — G2211 COMPLEX E/M VISIT ADD ON: CPT

## 2025-05-15 PROCEDURE — 11721 DEBRIDE NAIL 6 OR MORE: CPT | Mod: 59

## 2025-05-15 PROCEDURE — 99215 OFFICE O/P EST HI 40 MIN: CPT

## 2025-06-04 ENCOUNTER — RESULT REVIEW (OUTPATIENT)
Age: 75
End: 2025-06-04

## 2025-06-04 ENCOUNTER — APPOINTMENT (OUTPATIENT)
Dept: GERIATRICS | Facility: CLINIC | Age: 75
End: 2025-06-04

## 2025-06-04 ENCOUNTER — NON-APPOINTMENT (OUTPATIENT)
Age: 75
End: 2025-06-04

## 2025-06-04 PROCEDURE — 99213 OFFICE O/P EST LOW 20 MIN: CPT

## 2025-06-11 ENCOUNTER — NON-APPOINTMENT (OUTPATIENT)
Age: 75
End: 2025-06-11

## 2025-06-11 PROBLEM — Z87.09 HISTORY OF ACUTE BRONCHITIS: Status: RESOLVED | Noted: 2024-07-12 | Resolved: 2025-06-11

## 2025-06-11 PROBLEM — H81.399 PERIPHERAL VERTIGO: Status: RESOLVED | Noted: 2022-08-26 | Resolved: 2025-06-11

## 2025-06-11 PROBLEM — M79.672 CHRONIC PAIN IN LEFT FOOT: Status: RESOLVED | Noted: 2024-01-19 | Resolved: 2025-06-11

## 2025-06-11 PROBLEM — R23.4 FISSURE OF SKIN: Status: RESOLVED | Noted: 2025-02-19 | Resolved: 2025-06-11

## 2025-06-11 PROBLEM — R11.0 CHEMOTHERAPY-INDUCED NAUSEA: Status: RESOLVED | Noted: 2021-12-07 | Resolved: 2025-06-11

## 2025-06-11 PROBLEM — Z87.828 HISTORY OF TRAUMATIC INJURY OF HEAD: Status: RESOLVED | Noted: 2023-02-10 | Resolved: 2025-06-11

## 2025-06-11 PROBLEM — J06.9 URI, ACUTE: Status: RESOLVED | Noted: 2024-07-08 | Resolved: 2025-06-11

## 2025-06-11 PROBLEM — G47.19 EXCESSIVE DAYTIME SLEEPINESS: Status: RESOLVED | Noted: 2023-02-21 | Resolved: 2025-06-11

## 2025-06-11 PROBLEM — M79.671 CHRONIC PAIN IN RIGHT FOOT: Status: RESOLVED | Noted: 2024-01-19 | Resolved: 2025-06-11

## 2025-06-12 ENCOUNTER — APPOINTMENT (OUTPATIENT)
Dept: CARDIOLOGY | Facility: CLINIC | Age: 75
End: 2025-06-12
Payer: MEDICARE

## 2025-06-12 VITALS
WEIGHT: 121 LBS | BODY MASS INDEX: 22.84 KG/M2 | DIASTOLIC BLOOD PRESSURE: 72 MMHG | SYSTOLIC BLOOD PRESSURE: 130 MMHG | HEIGHT: 61 IN

## 2025-06-12 PROBLEM — R00.0 SINUS TACHYCARDIA: Status: ACTIVE | Noted: 2025-06-12

## 2025-06-12 PROCEDURE — 99215 OFFICE O/P EST HI 40 MIN: CPT

## 2025-06-12 PROCEDURE — 93000 ELECTROCARDIOGRAM COMPLETE: CPT

## 2025-07-08 ENCOUNTER — APPOINTMENT (OUTPATIENT)
Dept: GERIATRICS | Facility: CLINIC | Age: 75
End: 2025-07-08
Payer: MEDICARE

## 2025-07-08 VITALS
WEIGHT: 120 LBS | HEIGHT: 61 IN | BODY MASS INDEX: 22.66 KG/M2 | OXYGEN SATURATION: 99 % | TEMPERATURE: 97.6 F | RESPIRATION RATE: 16 BRPM | HEART RATE: 88 BPM | SYSTOLIC BLOOD PRESSURE: 106 MMHG | DIASTOLIC BLOOD PRESSURE: 70 MMHG

## 2025-07-08 PROCEDURE — G2211 COMPLEX E/M VISIT ADD ON: CPT

## 2025-07-08 PROCEDURE — 99214 OFFICE O/P EST MOD 30 MIN: CPT

## 2025-07-14 ENCOUNTER — RESULT REVIEW (OUTPATIENT)
Age: 75
End: 2025-07-14

## 2025-07-14 ENCOUNTER — APPOINTMENT (OUTPATIENT)
Dept: HEMATOLOGY ONCOLOGY | Facility: CLINIC | Age: 75
End: 2025-07-14

## 2025-07-14 VITALS
RESPIRATION RATE: 16 BRPM | WEIGHT: 119.19 LBS | HEIGHT: 61 IN | OXYGEN SATURATION: 100 % | BODY MASS INDEX: 22.5 KG/M2 | DIASTOLIC BLOOD PRESSURE: 58 MMHG | TEMPERATURE: 99 F | SYSTOLIC BLOOD PRESSURE: 124 MMHG | HEART RATE: 99 BPM

## 2025-07-17 ENCOUNTER — APPOINTMENT (OUTPATIENT)
Dept: GERIATRICS | Facility: CLINIC | Age: 75
End: 2025-07-17
Payer: MEDICARE

## 2025-07-17 VITALS
HEART RATE: 92 BPM | DIASTOLIC BLOOD PRESSURE: 60 MMHG | OXYGEN SATURATION: 100 % | WEIGHT: 119 LBS | SYSTOLIC BLOOD PRESSURE: 110 MMHG | BODY MASS INDEX: 22.48 KG/M2 | TEMPERATURE: 96.8 F

## 2025-07-17 PROBLEM — R30.0 DYSURIA: Status: ACTIVE | Noted: 2025-07-17

## 2025-07-17 PROCEDURE — 99214 OFFICE O/P EST MOD 30 MIN: CPT

## 2025-07-17 RX ORDER — LIDOCAINE 5% 700 MG/1
5 PATCH TOPICAL
Qty: 60 | Refills: 1 | Status: ACTIVE | COMMUNITY
Start: 2025-07-17 | End: 1900-01-01

## 2025-07-19 PROBLEM — N39.0 ACUTE LOWER UTI: Status: ACTIVE | Noted: 2024-03-18

## 2025-07-19 RX ORDER — SULFAMETHOXAZOLE AND TRIMETHOPRIM 800; 160 MG/1; MG/1
800-160 TABLET ORAL
Qty: 10 | Refills: 0 | Status: ACTIVE | COMMUNITY
Start: 2025-07-19 | End: 1900-01-01

## 2025-07-21 DIAGNOSIS — G89.29 PAIN IN UNSPECIFIED HIP: ICD-10-CM

## 2025-07-21 DIAGNOSIS — M25.559 PAIN IN UNSPECIFIED HIP: ICD-10-CM

## 2025-07-25 ENCOUNTER — APPOINTMENT (OUTPATIENT)
Dept: PAIN MANAGEMENT | Facility: CLINIC | Age: 75
End: 2025-07-25
Payer: MEDICARE

## 2025-07-25 VITALS
BODY MASS INDEX: 22.47 KG/M2 | DIASTOLIC BLOOD PRESSURE: 60 MMHG | HEIGHT: 61 IN | SYSTOLIC BLOOD PRESSURE: 100 MMHG | WEIGHT: 119 LBS

## 2025-07-25 DIAGNOSIS — M48.062 SPINAL STENOSIS, LUMBAR REGION WITH NEUROGENIC CLAUDICATION: ICD-10-CM

## 2025-07-25 DIAGNOSIS — G89.4 CHRONIC PAIN SYNDROME: ICD-10-CM

## 2025-07-25 DIAGNOSIS — M79.18 MYALGIA, OTHER SITE: ICD-10-CM

## 2025-07-25 DIAGNOSIS — M54.16 RADICULOPATHY, LUMBAR REGION: ICD-10-CM

## 2025-07-25 DIAGNOSIS — M79.2 NEURALGIA AND NEURITIS, UNSPECIFIED: ICD-10-CM

## 2025-07-25 PROCEDURE — 99204 OFFICE O/P NEW MOD 45 MIN: CPT

## 2025-08-05 ENCOUNTER — APPOINTMENT (OUTPATIENT)
Dept: GERIATRICS | Facility: CLINIC | Age: 75
End: 2025-08-05
Payer: MEDICARE

## 2025-08-05 VITALS
HEART RATE: 91 BPM | SYSTOLIC BLOOD PRESSURE: 122 MMHG | DIASTOLIC BLOOD PRESSURE: 64 MMHG | HEIGHT: 61 IN | TEMPERATURE: 97 F | OXYGEN SATURATION: 100 %

## 2025-08-05 DIAGNOSIS — F02.B2 PARKINSON'S DISEASE WITHOUT DYSKINESIA, WITHOUT MENTION OF FLUCTUATIONS: ICD-10-CM

## 2025-08-05 DIAGNOSIS — D64.9 ANEMIA, UNSPECIFIED: ICD-10-CM

## 2025-08-05 DIAGNOSIS — G47.00 INSOMNIA, UNSPECIFIED: ICD-10-CM

## 2025-08-05 DIAGNOSIS — G20.A1 PARKINSON'S DISEASE WITHOUT DYSKINESIA, WITHOUT MENTION OF FLUCTUATIONS: ICD-10-CM

## 2025-08-05 DIAGNOSIS — N32.81 OVERACTIVE BLADDER: ICD-10-CM

## 2025-08-05 DIAGNOSIS — F41.9 ANXIETY DISORDER, UNSPECIFIED: ICD-10-CM

## 2025-08-05 DIAGNOSIS — F32.A ANXIETY DISORDER, UNSPECIFIED: ICD-10-CM

## 2025-08-05 PROCEDURE — G2211 COMPLEX E/M VISIT ADD ON: CPT

## 2025-08-05 PROCEDURE — 99215 OFFICE O/P EST HI 40 MIN: CPT

## 2025-08-07 ENCOUNTER — RX RENEWAL (OUTPATIENT)
Age: 75
End: 2025-08-07

## 2025-08-07 RX ORDER — MIRTAZAPINE 15 MG/1
15 TABLET, FILM COATED ORAL
Qty: 90 | Refills: 0 | Status: ACTIVE | COMMUNITY
Start: 2025-08-07 | End: 1900-01-01

## 2025-08-11 ENCOUNTER — APPOINTMENT (OUTPATIENT)
Dept: PODIATRY | Facility: CLINIC | Age: 75
End: 2025-08-11
Payer: MEDICARE

## 2025-08-11 DIAGNOSIS — L60.0 INGROWING NAIL: ICD-10-CM

## 2025-08-11 DIAGNOSIS — L03.032 CELLULITIS OF LEFT TOE: ICD-10-CM

## 2025-08-11 PROCEDURE — 99213 OFFICE O/P EST LOW 20 MIN: CPT

## 2025-08-11 RX ORDER — MUPIROCIN 20 MG/G
2 OINTMENT TOPICAL
Qty: 1 | Refills: 0 | Status: ACTIVE | COMMUNITY
Start: 2025-08-11 | End: 1900-01-01

## 2025-08-11 RX ORDER — CEPHALEXIN 500 MG/1
500 CAPSULE ORAL 3 TIMES DAILY
Qty: 30 | Refills: 1 | Status: ACTIVE | COMMUNITY
Start: 2025-08-11 | End: 1900-01-01

## 2025-08-13 ENCOUNTER — RESULT REVIEW (OUTPATIENT)
Age: 75
End: 2025-08-13

## 2025-08-19 ENCOUNTER — APPOINTMENT (OUTPATIENT)
Dept: GERIATRICS | Facility: CLINIC | Age: 75
End: 2025-08-19
Payer: MEDICARE

## 2025-08-19 VITALS
WEIGHT: 113 LBS | TEMPERATURE: 97.6 F | OXYGEN SATURATION: 100 % | BODY MASS INDEX: 21.34 KG/M2 | HEIGHT: 61 IN | SYSTOLIC BLOOD PRESSURE: 100 MMHG | HEART RATE: 91 BPM | DIASTOLIC BLOOD PRESSURE: 64 MMHG

## 2025-08-19 DIAGNOSIS — R44.3 HALLUCINATIONS, UNSPECIFIED: ICD-10-CM

## 2025-08-19 DIAGNOSIS — G89.4 CHRONIC PAIN SYNDROME: ICD-10-CM

## 2025-08-19 DIAGNOSIS — M62.81 MUSCLE WEAKNESS (GENERALIZED): ICD-10-CM

## 2025-08-19 PROBLEM — L89.313 PRESSURE INJURY OF RIGHT BUTTOCK, STAGE 3: Status: ACTIVE | Noted: 2025-08-19

## 2025-08-19 PROCEDURE — 99214 OFFICE O/P EST MOD 30 MIN: CPT

## 2025-08-19 RX ORDER — GEL DRESSING
GEL (GRAM) TOPICAL DAILY
Qty: 1 | Refills: 0 | Status: ACTIVE | COMMUNITY
Start: 2025-08-19 | End: 1900-01-01

## 2025-08-28 ENCOUNTER — APPOINTMENT (OUTPATIENT)
Dept: GERIATRICS | Facility: CLINIC | Age: 75
End: 2025-08-28
Payer: MEDICARE

## 2025-08-28 ENCOUNTER — APPOINTMENT (OUTPATIENT)
Dept: PODIATRY | Facility: CLINIC | Age: 75
End: 2025-08-28
Payer: MEDICARE

## 2025-08-28 VITALS
HEART RATE: 64 BPM | DIASTOLIC BLOOD PRESSURE: 62 MMHG | HEIGHT: 61 IN | OXYGEN SATURATION: 93 % | BODY MASS INDEX: 20.58 KG/M2 | SYSTOLIC BLOOD PRESSURE: 106 MMHG | TEMPERATURE: 97.4 F | WEIGHT: 109 LBS
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
## 2025-08-28 DIAGNOSIS — M21.371 FOOT DROP, RIGHT FOOT: ICD-10-CM

## 2025-08-28 DIAGNOSIS — G62.89 OTHER SPECIFIED POLYNEUROPATHIES: ICD-10-CM
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
## 2025-08-28 DIAGNOSIS — L60.0 INGROWING NAIL: ICD-10-CM

## 2025-08-28 DIAGNOSIS — T14.8XXA OTHER INJURY OF UNSPECIFIED BODY REGION, INITIAL ENCOUNTER: ICD-10-CM

## 2025-08-28 DIAGNOSIS — L89.313 PRESSURE ULCER OF RIGHT BUTTOCK, STAGE 3: ICD-10-CM

## 2025-08-28 PROCEDURE — G2211 COMPLEX E/M VISIT ADD ON: CPT

## 2025-08-28 PROCEDURE — 99214 OFFICE O/P EST MOD 30 MIN: CPT

## 2025-08-28 PROCEDURE — 99213 OFFICE O/P EST LOW 20 MIN: CPT

## 2025-09-02 ENCOUNTER — RESULT REVIEW (OUTPATIENT)
Age: 75
End: 2025-09-02

## 2025-09-08 ENCOUNTER — RESULT REVIEW (OUTPATIENT)
Age: 75
End: 2025-09-08

## 2025-09-08 ENCOUNTER — APPOINTMENT (OUTPATIENT)
Dept: HEMATOLOGY ONCOLOGY | Facility: CLINIC | Age: 75
End: 2025-09-08
Payer: MEDICARE

## 2025-09-08 VITALS
OXYGEN SATURATION: 99 % | HEART RATE: 86 BPM | SYSTOLIC BLOOD PRESSURE: 86 MMHG | RESPIRATION RATE: 16 BRPM | TEMPERATURE: 97.1 F | DIASTOLIC BLOOD PRESSURE: 54 MMHG

## 2025-09-08 DIAGNOSIS — E53.1 PYRIDOXINE DEFICIENCY: ICD-10-CM

## 2025-09-08 DIAGNOSIS — F02.B2 PARKINSON'S DISEASE WITHOUT DYSKINESIA, WITHOUT MENTION OF FLUCTUATIONS: ICD-10-CM

## 2025-09-08 DIAGNOSIS — G20.A1 PARKINSON'S DISEASE WITHOUT DYSKINESIA, WITHOUT MENTION OF FLUCTUATIONS: ICD-10-CM

## 2025-09-08 DIAGNOSIS — C50.912 MALIGNANT NEOPLASM OF UNSPECIFIED SITE OF LEFT FEMALE BREAST: ICD-10-CM

## 2025-09-08 DIAGNOSIS — C79.51 SECONDARY MALIGNANT NEOPLASM OF BONE: ICD-10-CM

## 2025-09-08 DIAGNOSIS — C34.90 MALIGNANT NEOPLASM OF UNSPECIFIED PART OF UNSPECIFIED BRONCHUS OR LUNG: ICD-10-CM

## 2025-09-08 PROCEDURE — 36415 COLL VENOUS BLD VENIPUNCTURE: CPT

## 2025-09-08 PROCEDURE — 99214 OFFICE O/P EST MOD 30 MIN: CPT

## 2025-09-08 PROCEDURE — G2211 COMPLEX E/M VISIT ADD ON: CPT

## 2025-09-11 ENCOUNTER — APPOINTMENT (OUTPATIENT)
Dept: PAIN MANAGEMENT | Facility: CLINIC | Age: 75
End: 2025-09-11
Payer: MEDICARE

## 2025-09-11 DIAGNOSIS — M79.2 NEURALGIA AND NEURITIS, UNSPECIFIED: ICD-10-CM

## 2025-09-11 DIAGNOSIS — M54.16 RADICULOPATHY, LUMBAR REGION: ICD-10-CM

## 2025-09-11 DIAGNOSIS — G89.4 CHRONIC PAIN SYNDROME: ICD-10-CM

## 2025-09-11 DIAGNOSIS — M79.18 MYALGIA, OTHER SITE: ICD-10-CM

## 2025-09-11 DIAGNOSIS — M48.062 SPINAL STENOSIS, LUMBAR REGION WITH NEUROGENIC CLAUDICATION: ICD-10-CM

## 2025-09-11 PROCEDURE — G2211 COMPLEX E/M VISIT ADD ON: CPT | Mod: 2W

## 2025-09-11 PROCEDURE — 99214 OFFICE O/P EST MOD 30 MIN: CPT | Mod: 93

## 2025-09-11 RX ORDER — GABAPENTIN 300 MG/1
300 CAPSULE ORAL
Qty: 90 | Refills: 1 | Status: ACTIVE | COMMUNITY
Start: 2025-09-11 | End: 1900-01-01

## 2025-09-19 ENCOUNTER — APPOINTMENT (OUTPATIENT)
Dept: HEMATOLOGY ONCOLOGY | Facility: CLINIC | Age: 75
End: 2025-09-19

## 2025-09-19 VITALS
TEMPERATURE: 97.2 F | RESPIRATION RATE: 16 BRPM | DIASTOLIC BLOOD PRESSURE: 49 MMHG | HEART RATE: 101 BPM | SYSTOLIC BLOOD PRESSURE: 74 MMHG | OXYGEN SATURATION: 100 %